# Patient Record
Sex: FEMALE | Race: WHITE | NOT HISPANIC OR LATINO | Employment: OTHER | URBAN - METROPOLITAN AREA
[De-identification: names, ages, dates, MRNs, and addresses within clinical notes are randomized per-mention and may not be internally consistent; named-entity substitution may affect disease eponyms.]

---

## 2018-09-24 ENCOUNTER — CONVERSION ENCOUNTER (OUTPATIENT)
Dept: OTHER | Facility: HOSPITAL | Age: 58
End: 2018-09-24

## 2018-09-24 ENCOUNTER — OFFICE VISIT CONVERTED (OUTPATIENT)
Dept: CARDIOLOGY | Facility: CLINIC | Age: 58
End: 2018-09-24
Attending: SPECIALIST

## 2018-09-25 ENCOUNTER — CONVERSION ENCOUNTER (OUTPATIENT)
Dept: CARDIOLOGY | Facility: CLINIC | Age: 58
End: 2018-09-25
Attending: SPECIALIST

## 2021-05-16 VITALS
WEIGHT: 122.12 LBS | BODY MASS INDEX: 19.17 KG/M2 | HEART RATE: 70 BPM | SYSTOLIC BLOOD PRESSURE: 122 MMHG | DIASTOLIC BLOOD PRESSURE: 87 MMHG | HEIGHT: 67 IN

## 2021-11-23 ENCOUNTER — OFFICE VISIT (OUTPATIENT)
Dept: PULMONOLOGY | Facility: CLINIC | Age: 61
End: 2021-11-23

## 2021-11-23 VITALS
RESPIRATION RATE: 14 BRPM | OXYGEN SATURATION: 99 % | WEIGHT: 118 LBS | TEMPERATURE: 99.3 F | SYSTOLIC BLOOD PRESSURE: 122 MMHG | BODY MASS INDEX: 18.52 KG/M2 | HEIGHT: 67 IN | DIASTOLIC BLOOD PRESSURE: 80 MMHG | HEART RATE: 80 BPM

## 2021-11-23 DIAGNOSIS — J44.9 CHRONIC OBSTRUCTIVE PULMONARY DISEASE, UNSPECIFIED COPD TYPE (HCC): Primary | ICD-10-CM

## 2021-11-23 DIAGNOSIS — IMO0001 LUNG NODULE < 6CM ON CT: ICD-10-CM

## 2021-11-23 DIAGNOSIS — F17.200 CURRENT SMOKER: ICD-10-CM

## 2021-11-23 DIAGNOSIS — T78.40XA ALLERGY, INITIAL ENCOUNTER: ICD-10-CM

## 2021-11-23 DIAGNOSIS — R05.9 COUGH: ICD-10-CM

## 2021-11-23 PROCEDURE — 99203 OFFICE O/P NEW LOW 30 MIN: CPT | Performed by: SPECIALIST

## 2021-11-23 RX ORDER — FLUCONAZOLE 200 MG/1
TABLET ORAL
COMMUNITY
Start: 2021-10-18 | End: 2021-11-23

## 2021-11-23 RX ORDER — BUDESONIDE AND FORMOTEROL FUMARATE DIHYDRATE 160; 4.5 UG/1; UG/1
2 AEROSOL RESPIRATORY (INHALATION) 2 TIMES DAILY PRN
COMMUNITY
Start: 2021-10-31

## 2021-11-23 RX ORDER — FLUCONAZOLE 150 MG/1
TABLET ORAL
COMMUNITY
Start: 2021-10-26 | End: 2021-11-23

## 2021-11-23 RX ORDER — SULFAMETHOXAZOLE AND TRIMETHOPRIM 800; 160 MG/1; MG/1
TABLET ORAL
COMMUNITY
Start: 2021-10-26 | End: 2021-11-23

## 2021-11-23 RX ORDER — IBANDRONATE SODIUM 150 MG/1
TABLET, FILM COATED ORAL
COMMUNITY
Start: 2021-08-24 | End: 2022-07-13

## 2021-11-23 RX ORDER — GLYCOPYRROLATE 1 MG/1
TABLET ORAL
COMMUNITY
End: 2022-05-11

## 2021-11-23 RX ORDER — ERGOCALCIFEROL 1.25 MG/1
50000 CAPSULE ORAL
COMMUNITY
Start: 2021-09-02

## 2021-11-23 RX ORDER — HYDROCODONE BITARTRATE AND ACETAMINOPHEN 5; 325 MG/1; MG/1
1 TABLET ORAL EVERY 6 HOURS PRN
COMMUNITY
Start: 2021-11-02 | End: 2022-12-05

## 2021-11-23 NOTE — PATIENT INSTRUCTIONS
Chronic Obstructive Pulmonary Disease  Chronic obstructive pulmonary disease (COPD) is a long-term (chronic) lung problem. When you have COPD, it is hard for air to get in and out of your lungs. Usually the condition gets worse over time, and your lungs will never return to normal. There are things you can do to keep yourself as healthy as possible.  · Your doctor may treat your condition with:  ? Medicines.  ? Oxygen.  ? Lung surgery.  · Your doctor may also recommend:  ? Rehabilitation. This includes steps to make your body work better. It may involve a team of specialists.  ? Quitting smoking, if you smoke.  ? Exercise and changes to your diet.  ? Comfort measures (palliative care).  Follow these instructions at home:  Medicines  · Take over-the-counter and prescription medicines only as told by your doctor.  · Talk to your doctor before taking any cough or allergy medicines. You may need to avoid medicines that cause your lungs to be dry.  Lifestyle  · If you smoke, stop. Smoking makes the problem worse. If you need help quitting, ask your doctor.  · Avoid being around things that make your breathing worse. This may include smoke, chemicals, and fumes.  · Stay active, but remember to rest as well.  · Learn and use tips on how to relax.  · Make sure you get enough sleep. Most adults need at least 7 hours of sleep every night.  · Eat healthy foods. Eat smaller meals more often. Rest before meals.  Controlled breathing  Learn and use tips on how to control your breathing as told by your doctor. Try:  · Breathing in (inhaling) through your nose for 1 second. Then, pucker your lips and breath out (exhale) through your lips for 2 seconds.  · Putting one hand on your belly (abdomen). Breathe in slowly through your nose for 1 second. Your hand on your belly should move out. Pucker your lips and breathe out slowly through your lips. Your hand on your belly should move in as you breathe out.    Controlled coughing  Learn  and use controlled coughing to clear mucus from your lungs. Follow these steps:  1. Lean your head a little forward.  2. Breathe in deeply.  3. Try to hold your breath for 3 seconds.  4. Keep your mouth slightly open while coughing 2 times.  5. Spit any mucus out into a tissue.  6. Rest and do the steps again 1 or 2 times as needed.  General instructions  · Make sure you get all the shots (vaccines) that your doctor recommends. Ask your doctor about a flu shot and a pneumonia shot.  · Use oxygen therapy and pulmonary rehabilitation if told by your doctor. If you need home oxygen therapy, ask your doctor if you should buy a tool to measure your oxygen level (oximeter).  · Make a COPD action plan with your doctor. This helps you to know what to do if you feel worse than usual.  · Manage any other conditions you have as told by your doctor.  · Avoid going outside when it is very hot, cold, or humid.  · Avoid people who have a sickness you can catch (contagious).  · Keep all follow-up visits as told by your doctor. This is important.  Contact a doctor if:  · You cough up more mucus than usual.  · There is a change in the color or thickness of the mucus.  · It is harder to breathe than usual.  · Your breathing is faster than usual.  · You have trouble sleeping.  · You need to use your medicines more often than usual.  · You have trouble doing your normal activities such as getting dressed or walking around the house.  Get help right away if:  · You have shortness of breath while resting.  · You have shortness of breath that stops you from:  ? Being able to talk.  ? Doing normal activities.  · Your chest hurts for longer than 5 minutes.  · Your skin color is more blue than usual.  · Your pulse oximeter shows that you have low oxygen for longer than 5 minutes.  · You have a fever.  · You feel too tired to breathe normally.  Summary  · Chronic obstructive pulmonary disease (COPD) is a long-term lung problem.  · The way your  lungs work will never return to normal. Usually the condition gets worse over time. There are things you can do to keep yourself as healthy as possible.  · Take over-the-counter and prescription medicines only as told by your doctor.  · If you smoke, stop. Smoking makes the problem worse.  This information is not intended to replace advice given to you by your health care provider. Make sure you discuss any questions you have with your health care provider.  Document Revised: 11/30/2018 Document Reviewed: 01/22/2018  [employer] Patient Education © 2021 [employer]    Smoking Tobacco Information, Adult  Smoking tobacco can be harmful to your health. Tobacco contains a poisonous (toxic), colorless chemical called nicotine. Nicotine is addictive. It changes the brain and can make it hard to stop smoking. Tobacco also has other toxic chemicals that can hurt your body and raise your risk of many cancers.  How can smoking tobacco affect me?  Smoking tobacco puts you at risk for:  · Cancer. Smoking is most commonly associated with lung cancer, but can also lead to cancer in other parts of the body.  · Chronic obstructive pulmonary disease (COPD). This is a long-term lung condition that makes it hard to breathe. It also gets worse over time.  · High blood pressure (hypertension), heart disease, stroke, or heart attack.  · Lung infections, such as pneumonia.  · Cataracts. This is when the lenses in the eyes become clouded.  · Digestive problems. This may include peptic ulcers, heartburn, and gastroesophageal reflux disease (GERD).  · Oral health problems, such as gum disease and tooth loss.  · Loss of taste and smell.  Smoking can affect your appearance by causing:  · Wrinkles.  · Yellow or stained teeth, fingers, and fingernails.  Smoking tobacco can also affect your social life, because:  · It may be challenging to find places to smoke when away from home. Many workplaces, restaurants, hotels, and public places are  tobacco-free.  · Smoking is expensive. This is due to the cost of tobacco and the long-term costs of treating health problems from smoking.  · Secondhand smoke may affect those around you. Secondhand smoke can cause lung cancer, breathing problems, and heart disease. Children of smokers have a higher risk for:  ? Sudden infant death syndrome (SIDS).  ? Ear infections.  ? Lung infections.  If you currently smoke tobacco, quitting now can help you:  · Lead a longer and healthier life.  · Look, smell, breathe, and feel better over time.  · Save money.  · Protect others from the harms of secondhand smoke.  What actions can I take to prevent health problems?  Quit smoking    · Do not start smoking. Quit if you already do.  · Make a plan to quit smoking and commit to it. Look for programs to help you and ask your health care provider for recommendations and ideas.  · Set a date and write down all the reasons you want to quit.  · Let your friends and family know you are quitting so they can help and support you. Consider finding friends who also want to quit. It can be easier to quit with someone else, so that you can support each other.  · Talk with your health care provider about using nicotine replacement medicines to help you quit, such as gum, lozenges, patches, sprays, or pills.  · Do not replace cigarette smoking with electronic cigarettes, which are commonly called e-cigarettes. The safety of e-cigarettes is not known, and some may contain harmful chemicals.  · If you try to quit but return to smoking, stay positive. It is common to slip up when you first quit, so take it one day at a time.  · Be prepared for cravings. When you feel the urge to smoke, chew gum or suck on hard candy.    Lifestyle  · Stay busy and take care of your body.  · Drink enough fluid to keep your urine pale yellow.  · Get plenty of exercise and eat a healthy diet. This can help prevent weight gain after quitting.  · Monitor your eating  habits. Quitting smoking can cause you to have a larger appetite than when you smoke.  · Find ways to relax. Go out with friends or family to a movie or a restaurant where people do not smoke.  · Ask your health care provider about having regular tests (screenings) to check for cancer. This may include blood tests, imaging tests, and other tests.  · Find ways to manage your stress, such as meditation, yoga, or exercise.  Where to find support  To get support to quit smoking, consider:  · Asking your health care provider for more information and resources.  · Taking classes to learn more about quitting smoking.  · Looking for local organizations that offer resources about quitting smoking.  · Joining a support group for people who want to quit smoking in your local community.  · Calling the [employer] counselor helpline: 0-486-Quit-Now (1-457.542.8788)  Where to find more information  You may find more information about quitting smoking from:  · HelpGuide.org: www.helpguide.org  · Smokefree.gov: smokefree.gov  · American Lung Association: www.lung.org  Contact a health care provider if you:  · Have problems breathing.  · Notice that your lips, nose, or fingers turn blue.  · Have chest pain.  · Are coughing up blood.  · Feel faint or you pass out.  · Have other health changes that cause you to worry.  Summary  · Smoking tobacco can negatively affect your health, the health of those around you, your finances, and your social life.  · Do not start smoking. Quit if you already do. If you need help quitting, ask your health care provider.  · Think about joining a support group for people who want to quit smoking in your local community. There are many effective programs that will help you to quit this behavior.  This information is not intended to replace advice given to you by your health care provider. Make sure you discuss any questions you have with your health care provider.  Document Revised: 09/11/2020 Document  Reviewed: 01/02/2018  [employer] Patient Education © 2021 [employer]    Steps to Quit Smoking  Smoking tobacco is the leading cause of preventable death. It can affect almost every organ in the body. Smoking puts you and people around you at risk for many serious, long-lasting (chronic) diseases. Quitting smoking can be hard, but it is one of the best things that you can do for your health. It is never too late to quit.  How do I get ready to quit?  When you decide to quit smoking, make a plan to help you succeed. Before you quit:  · Pick a date to quit. Set a date within the next 2 weeks to give you time to prepare.  · Write down the reasons why you are quitting. Keep this list in places where you will see it often.  · Tell your family, friends, and co-workers that you are quitting. Their support is important.  · Talk with your doctor about the choices that may help you quit.  · Find out if your health insurance will pay for these treatments.  · Know the people, places, things, and activities that make you want to smoke (triggers). Avoid them.  What first steps can I take to quit smoking?  · Throw away all cigarettes at home, at work, and in your car.  · Throw away the things that you use when you smoke, such as ashtrays and lighters.  · Clean your car. Make sure to empty the ashtray.  · Clean your home, including curtains and carpets.  What can I do to help me quit smoking?  Talk with your doctor about taking medicines and seeing a counselor at the same time. You are more likely to succeed when you do both.  · If you are pregnant or breastfeeding, talk with your doctor about counseling or other ways to quit smoking. Do not take medicine to help you quit smoking unless your doctor tells you to do so.  To quit smoking:  Quit right away  · Quit smoking totally, instead of slowly cutting back on how much you smoke over a period of time.  · Go to counseling. You are more likely to quit if you go to counseling sessions  regularly.  Take medicine  You may take medicines to help you quit. Some medicines need a prescription, and some you can buy over-the-counter. Some medicines may contain a drug called nicotine to replace the nicotine in cigarettes. Medicines may:  · Help you to stop having the desire to smoke (cravings).  · Help to stop the problems that come when you stop smoking (withdrawal symptoms).  Your doctor may ask you to use:  · Nicotine patches, gum, or lozenges.  · Nicotine inhalers or sprays.  · Non-nicotine medicine that is taken by mouth.  Find resources  Find resources and other ways to help you quit smoking and remain smoke-free after you quit. These resources are most helpful when you use them often. They include:  · Online chats with a counselor.  · Phone quitlines.  · Printed self-help materials.  · Support groups or group counseling.  · Text messaging programs.  · Mobile phone apps. Use apps on your mobile phone or tablet that can help you stick to your quit plan. There are many free apps for mobile phones and tablets as well as websites. Examples include Quit Guide from the CDC and smokefree.gov    What things can I do to make it easier to quit?    · Talk to your family and friends. Ask them to support and encourage you.  · Call a phone quitline (9-800-QUIT-NOW), reach out to support groups, or work with a counselor.  · Ask people who smoke to not smoke around you.  · Avoid places that make you want to smoke, such as:  ? Bars.  ? Parties.  ? Smoke-break areas at work.  · Spend time with people who do not smoke.  · Lower the stress in your life. Stress can make you want to smoke. Try these things to help your stress:  ? Getting regular exercise.  ? Doing deep-breathing exercises.  ? Doing yoga.  ? Meditating.  ? Doing a body scan. To do this, close your eyes, focus on one area of your body at a time from head to toe. Notice which parts of your body are tense. Try to relax the muscles in those areas.  How will I  feel when I quit smoking?  Day 1 to 3 weeks  Within the first 24 hours, you may start to have some problems that come from quitting tobacco. These problems are very bad 2-3 days after you quit, but they do not often last for more than 2-3 weeks. You may get these symptoms:  · Mood swings.  · Feeling restless, nervous, angry, or annoyed.  · Trouble concentrating.  · Dizziness.  · Strong desire for high-sugar foods and nicotine.  · Weight gain.  · Trouble pooping (constipation).  · Feeling like you may vomit (nausea).  · Coughing or a sore throat.  · Changes in how the medicines that you take for other issues work in your body.  · Depression.  · Trouble sleeping (insomnia).  Week 3 and afterward  After the first 2-3 weeks of quitting, you may start to notice more positive results, such as:  · Better sense of smell and taste.  · Less coughing and sore throat.  · Slower heart rate.  · Lower blood pressure.  · Clearer skin.  · Better breathing.  · Fewer sick days.  Quitting smoking can be hard. Do not give up if you fail the first time. Some people need to try a few times before they succeed. Do your best to stick to your quit plan, and talk with your doctor if you have any questions or concerns.  Summary  · Smoking tobacco is the leading cause of preventable death. Quitting smoking can be hard, but it is one of the best things that you can do for your health.  · When you decide to quit smoking, make a plan to help you succeed.  · Quit smoking right away, not slowly over a period of time.  · When you start quitting, seek help from your doctor, family, or friends.  This information is not intended to replace advice given to you by your health care provider. Make sure you discuss any questions you have with your health care provider.  Document Revised: 09/11/2020 Document Reviewed: 03/07/2020  [employer] Patient Education © 2021 [employer]    Pulmonary Nodule  A pulmonary nodule is tissue that has grown on your lung. A  nodule may be cancer, but most nodules are not cancer.  Follow these instructions at home:    · Take over-the-counter and prescription medicines only as told by your doctor.  · Do not use any products that have nicotine or tobacco, such as cigarettes and e-cigarettes. If you need help quitting, ask your doctor.  · Keep all follow-up visits as told by your doctor. This is important.  Contact a doctor if:  · You have trouble breathing when doing activities.  · You feel sick.  · You feel more tired than normal.  · You do not feel like eating.  · You lose weight without trying.  · You have chills.  · You have night sweats.  Get help right away if:  · You cannot catch your breath.  · You start making whistling sounds when breathing (wheezing).  · You cannot stop coughing.  · You cough up blood.  · You get dizzy.  · You feel like you are going to pass out (faint).  · You have sudden chest pain.  · You have a fever or symptoms for more than 2-3 days.  · You have a fever and your symptoms suddenly get worse.  Summary  · A pulmonary nodule is tissue that has grown on your lung.  · Most nodules are not cancer.  · Your doctor will do tests to know what kind of nodule you have, and whether you need treatment for it.  This information is not intended to replace advice given to you by your health care provider. Make sure you discuss any questions you have with your health care provider.  Document Revised: 01/11/2019 Document Reviewed: 01/16/2018  [employer] Patient Education © 2021 [employer]

## 2021-11-23 NOTE — PROGRESS NOTES
CONSULT NOTE     CHIEF COMPLAINT  Chief Complaint   Patient presents with   • Lung Nodule     6mm lung nodule New Patient   • COPD   • Emphysema        Primary Care Provider  Osei Staton MD     Referring Provider  Carrie Grace, *    Patient Complaint    ICD-10-CM ICD-9-CM   1. Chronic obstructive pulmonary disease, unspecified COPD type (HCC)  J44.9 496   2. Cough  R05.9 786.2   3. Allergy, initial encounter  T78.40XA 995.3   4. Lung nodule < 6cm on CT  R91.1 793.11   5. Current smoker  F17.200 305.1            Subjective          History of Presenting Illness  Theresa M Gabehart is a 60 y.o. female with a history of lung nodule and left significant history of the smoking patient had the CT scan of the chest done and it reported as emphysema and COPD with indeterminant right upper lobe nodule and also solitary mildly enlarged left hilar lymph node with other nonenlarged lymph nodes in the mediastinum are nonspecific.  Patient has mild hepatic steatosis.  Unfortunately I cannot see the films only I got the report.  Patient was then  at [facility].  At present patient is a current smoker smokes about a pack per day and used to smoke about 3 packets in the past.  Patient has cats in the house.  And he is using Symbicort which she was using as needed and at the instructions of her doctor the patient start using daily again.  Patient has a history of fracture of the back and multiple fractures of the pubic bones, osteoporosis, fibromyalgia, Van Willebrand disease.  Budd-Chiari syndrome etc.  Patient can walk only few peaks with a walker otherwise patient is on electric scooter.  Denies any shortness of breath as the patient does not walk much but admits for cough and also vague chest discomfort towards the left side.  Denied any diaphoretic symptoms.  Denied any fever, chills, sweating, hemoptysis or any recent travel history or any body who is sick around.  Denied any loss of taste or smell and other  related.    I have personally reviewed the review of systems, past family, social, medical and surgical histories; and agree with their findings.    HISTORY    Family History   Problem Relation Age of Onset   • Lung cancer Mother    • Diabetes Sister         Social History     Socioeconomic History   • Marital status: Single   Tobacco Use   • Smoking status: Current Every Day Smoker     Packs/day: 1.50     Years: 30.00     Pack years: 45.00     Types: Cigarettes   • Smokeless tobacco: Never Used   Vaping Use   • Vaping Use: Former   • Substances: Nicotine   • Devices: Refillable tank        History reviewed. No pertinent past medical history.       There is no immunization history on file for this patient.    Allergies   Allergen Reactions   • Clarithromycin Unknown - High Severity   • Meperidine Unknown - High Severity   • Metronidazole Unknown - High Severity   • Doxycycline Calcium Rash   • Gabapentin Rash          Current Outpatient Medications:   •  Calcium Carbonate-Vit D-Min (CALCIUM 1200 PO), Take 600 mg by mouth., Disp: , Rfl:   •  HYDROcodone-acetaminophen (NORCO) 5-325 MG per tablet, , Disp: , Rfl:   •  ibandronate (BONIVA) 150 MG tablet, , Disp: , Rfl:   •  Probiotic Product (PROBIOTIC BLEND PO), Take 1 tablet by mouth., Disp: , Rfl:   •  Symbicort 160-4.5 MCG/ACT inhaler, , Disp: , Rfl:   •  vitamin D (ERGOCALCIFEROL) 1.25 MG (24678 UT) capsule capsule, , Disp: , Rfl:   •  Zinc Sulfate (ZINC 15 PO), Take 1 each by mouth., Disp: , Rfl:   •  glycopyrrolate (Robinul) 1 MG tablet, , Disp: , Rfl:      Smoking status: Current    Objective     Vital Signs:   Vitals:    11/23/21 1507   BP: 122/80   Pulse: 80   Resp: 14   Temp: 99.3 °F (37.4 °C)   SpO2: 99%        Physical Exam:  Pleasant female and any electric scooter chair sitting.  Answering the questions appropriately and not in any apparent respiratory distress at rest.  HEENT: Atraumatic, anicteric.  Nose and throat showed no evidence of  congestion.  Neck: Supple, no JVD trachea central.  Chest and lungs: No obvious tenderness were noted.  Decreased breath sounds with a scattered rhonchi cleared with the cough.  Patient smells smoke.  Cardiovascular system: Regular rate and rhythm and no murmurs appreciated.  Abdomen: Soft and benign.  Extremities: No clubbing, no cyanosis.  Central nervous system patient is wheelchair-bound otherwise alert and oriented and able to move her limbs with no problem.     Result Review :   I have personally reviewed the CT scan of the chest report and as documented.  No films available to review.         Impression:  Patient with a history of heavy smoking  Multiple other medical problems as noted and being closely followed by primary team physician  Lung nodule and COPD as noted in the CT scan of the chest.    Plan:  Reviewed and discussed with the patient.  Discussed about importance of quitting smoking and its benefits and the consequences if she continues to smoke.  Alpha-1 antitrypsin with the phenotype.  Continue the present medical therapy.  Follow-up CT scan of the chest in couple of months with no contrast.  Pulmonary function testing with ABG.  Patient education.  Call us if needed in between.      Follow Up {Instructions Charge Capture  Follow-up Communications :23}  Return in about 2 months (around 1/23/2022).  Patient was given instructions and counseling regarding her condition or for health maintenance advice. Please see specific information pulled into the AVS if appropriate.     Chris Sánchez MD

## 2022-01-25 ENCOUNTER — TRANSCRIBE ORDERS (OUTPATIENT)
Dept: ADMINISTRATIVE | Facility: HOSPITAL | Age: 62
End: 2022-01-25

## 2022-01-25 DIAGNOSIS — R63.4 WEIGHT LOSS: Primary | ICD-10-CM

## 2022-01-25 DIAGNOSIS — K21.9 GASTROESOPHAGEAL REFLUX DISEASE, UNSPECIFIED WHETHER ESOPHAGITIS PRESENT: ICD-10-CM

## 2022-01-25 DIAGNOSIS — R13.10 DYSPHAGIA, UNSPECIFIED TYPE: ICD-10-CM

## 2022-02-23 ENCOUNTER — HOSPITAL ENCOUNTER (OUTPATIENT)
Dept: ULTRASOUND IMAGING | Facility: HOSPITAL | Age: 62
Discharge: HOME | End: 2022-02-23

## 2022-02-23 ENCOUNTER — HOSPITAL ENCOUNTER (OUTPATIENT)
Dept: GENERAL RADIOLOGY | Facility: HOSPITAL | Age: 62
Discharge: HOME | End: 2022-02-23

## 2022-02-23 ENCOUNTER — LAB (OUTPATIENT)
Dept: LAB | Facility: HOSPITAL | Age: 62
End: 2022-02-23

## 2022-02-23 ENCOUNTER — TRANSCRIBE ORDERS (OUTPATIENT)
Dept: LAB | Facility: HOSPITAL | Age: 62
End: 2022-02-23

## 2022-02-23 DIAGNOSIS — K21.9 GASTROESOPHAGEAL REFLUX DISEASE, UNSPECIFIED WHETHER ESOPHAGITIS PRESENT: ICD-10-CM

## 2022-02-23 DIAGNOSIS — R63.4 WEIGHT LOSS: ICD-10-CM

## 2022-02-23 DIAGNOSIS — R63.4 WEIGHT LOSS: Primary | ICD-10-CM

## 2022-02-23 DIAGNOSIS — R13.10 DYSPHAGIA, UNSPECIFIED TYPE: ICD-10-CM

## 2022-02-23 LAB
T4 FREE SERPL-MCNC: 1.54 NG/DL (ref 0.93–1.7)
TSH SERPL DL<=0.05 MIU/L-ACNC: 1.24 UIU/ML (ref 0.27–4.2)

## 2022-02-23 PROCEDURE — 84439 ASSAY OF FREE THYROXINE: CPT

## 2022-02-23 PROCEDURE — 63710000001 BARIUM SULFATE 40 % RECONSTITUTED SUSPENSION: Performed by: PHYSICIAN ASSISTANT

## 2022-02-23 PROCEDURE — 63710000001 SOD BICARB-CITRIC ACID-SIMETHICONE 2.21-1.53-0.04 G PACK: Performed by: PHYSICIAN ASSISTANT

## 2022-02-23 PROCEDURE — 63710000001 BARIUM SULFATE 60 % SUSPENSION: Performed by: PHYSICIAN ASSISTANT

## 2022-02-23 PROCEDURE — 86376 MICROSOMAL ANTIBODY EACH: CPT

## 2022-02-23 PROCEDURE — A9270 NON-COVERED ITEM OR SERVICE: HCPCS | Performed by: PHYSICIAN ASSISTANT

## 2022-02-23 PROCEDURE — 82104 ALPHA-1-ANTITRYPSIN PHENO: CPT | Performed by: SPECIALIST

## 2022-02-23 PROCEDURE — 36415 COLL VENOUS BLD VENIPUNCTURE: CPT

## 2022-02-23 PROCEDURE — 63710000001 BARIUM SULFATE 700 MG TABLET: Performed by: PHYSICIAN ASSISTANT

## 2022-02-23 PROCEDURE — 82103 ALPHA-1-ANTITRYPSIN TOTAL: CPT | Performed by: SPECIALIST

## 2022-02-23 PROCEDURE — 84443 ASSAY THYROID STIM HORMONE: CPT

## 2022-02-23 PROCEDURE — 74220 X-RAY XM ESOPHAGUS 1CNTRST: CPT

## 2022-02-23 PROCEDURE — 76536 US EXAM OF HEAD AND NECK: CPT

## 2022-02-23 RX ADMIN — BARIUM SULFATE 700 MG: 700 TABLET ORAL at 12:25

## 2022-02-23 RX ADMIN — BARIUM SULFATE 355 ML: 0.6 SUSPENSION ORAL at 12:25

## 2022-02-23 RX ADMIN — ANTACID/ANTIFLATULENT 1 PACKET: 380; 550; 10; 10 GRANULE, EFFERVESCENT ORAL at 12:25

## 2022-02-23 RX ADMIN — BARIUM SULFATE 55 ML: 0.81 POWDER, FOR SUSPENSION ORAL at 12:25

## 2022-02-24 LAB — THYROPEROXIDASE AB SERPL-ACNC: 10 IU/ML (ref 0–34)

## 2022-02-28 LAB
A1AT PHENOTYP SERPL IFE: NORMAL
A1AT SERPL-MCNC: 156 MG/DL (ref 101–187)

## 2022-03-11 ENCOUNTER — HOSPITAL ENCOUNTER (OUTPATIENT)
Dept: CT IMAGING | Facility: HOSPITAL | Age: 62
Discharge: HOME | End: 2022-03-11

## 2022-03-11 ENCOUNTER — HOSPITAL ENCOUNTER (OUTPATIENT)
Dept: RESPIRATORY THERAPY | Facility: HOSPITAL | Age: 62
Discharge: HOME | End: 2022-03-11

## 2022-03-11 DIAGNOSIS — IMO0001 LUNG NODULE < 6CM ON CT: ICD-10-CM

## 2022-03-11 DIAGNOSIS — J44.9 CHRONIC OBSTRUCTIVE PULMONARY DISEASE, UNSPECIFIED COPD TYPE: ICD-10-CM

## 2022-03-11 DIAGNOSIS — F17.200 CURRENT SMOKER: ICD-10-CM

## 2022-03-11 DIAGNOSIS — R05.9 COUGH: ICD-10-CM

## 2022-03-11 LAB
ARTERIAL PATENCY WRIST A: POSITIVE
BASE EXCESS BLDA CALC-SCNC: 0.6 MMOL/L (ref -2–2)
BDY SITE: ABNORMAL
COHGB MFR BLD: 6 % (ref 0–1.5)
FHHB: 2.7 % (ref 0–5)
HCO3 BLDA-SCNC: 24.1 MMOL/L (ref 22–26)
HGB BLDA-MCNC: 15.4 G/DL (ref 11.7–14.6)
INHALED O2 CONCENTRATION: 21 %
METHGB BLD QL: 0 % (ref 0–1.5)
MODALITY: ABNORMAL
OXYHGB MFR BLDV: 91.3 % (ref 94–99)
PCO2 BLDA: 35.5 MM HG (ref 35–45)
PH BLDA: 7.45 PH UNITS (ref 7.35–7.45)
PO2 BLD: 452 MM[HG] (ref 0–500)
PO2 BLDA: 95 MM HG (ref 80–100)
SAO2 % BLDCOA: 97.1 % (ref 95–99)

## 2022-03-11 PROCEDURE — 94729 DIFFUSING CAPACITY: CPT | Performed by: INTERNAL MEDICINE

## 2022-03-11 PROCEDURE — 94726 PLETHYSMOGRAPHY LUNG VOLUMES: CPT

## 2022-03-11 PROCEDURE — 94726 PLETHYSMOGRAPHY LUNG VOLUMES: CPT | Performed by: INTERNAL MEDICINE

## 2022-03-11 PROCEDURE — 83050 HGB METHEMOGLOBIN QUAN: CPT | Performed by: SPECIALIST

## 2022-03-11 PROCEDURE — 94729 DIFFUSING CAPACITY: CPT

## 2022-03-11 PROCEDURE — 94060 EVALUATION OF WHEEZING: CPT | Performed by: INTERNAL MEDICINE

## 2022-03-11 PROCEDURE — 82805 BLOOD GASES W/O2 SATURATION: CPT | Performed by: SPECIALIST

## 2022-03-11 PROCEDURE — 94060 EVALUATION OF WHEEZING: CPT

## 2022-03-11 PROCEDURE — 36600 WITHDRAWAL OF ARTERIAL BLOOD: CPT | Performed by: SPECIALIST

## 2022-03-11 PROCEDURE — 82375 ASSAY CARBOXYHB QUANT: CPT | Performed by: SPECIALIST

## 2022-03-11 RX ORDER — ALBUTEROL SULFATE 2.5 MG/3ML
2.5 SOLUTION RESPIRATORY (INHALATION) ONCE
Status: COMPLETED | OUTPATIENT
Start: 2022-03-11 | End: 2022-03-11

## 2022-03-11 RX ADMIN — ALBUTEROL SULFATE 2.5 MG: 2.5 SOLUTION RESPIRATORY (INHALATION) at 08:42

## 2022-03-18 ENCOUNTER — OFFICE VISIT (OUTPATIENT)
Dept: PULMONOLOGY | Facility: CLINIC | Age: 62
End: 2022-03-18

## 2022-03-18 VITALS
SYSTOLIC BLOOD PRESSURE: 115 MMHG | HEART RATE: 89 BPM | RESPIRATION RATE: 14 BRPM | HEIGHT: 67 IN | OXYGEN SATURATION: 100 % | TEMPERATURE: 98.9 F | WEIGHT: 110 LBS | DIASTOLIC BLOOD PRESSURE: 81 MMHG | BODY MASS INDEX: 17.27 KG/M2

## 2022-03-18 DIAGNOSIS — R05.9 COUGH: ICD-10-CM

## 2022-03-18 DIAGNOSIS — E44.1 MILD PROTEIN-CALORIE MALNUTRITION: ICD-10-CM

## 2022-03-18 DIAGNOSIS — J43.9 PULMONARY EMPHYSEMA, UNSPECIFIED EMPHYSEMA TYPE: Primary | ICD-10-CM

## 2022-03-18 DIAGNOSIS — R06.09 DYSPNEA ON EXERTION: ICD-10-CM

## 2022-03-18 DIAGNOSIS — R06.09 DOE (DYSPNEA ON EXERTION): ICD-10-CM

## 2022-03-18 DIAGNOSIS — Z71.6 ENCOUNTER FOR SMOKING CESSATION COUNSELING: ICD-10-CM

## 2022-03-18 DIAGNOSIS — F17.210 NICOTINE DEPENDENCE, CIGARETTES, UNCOMPLICATED: ICD-10-CM

## 2022-03-18 DIAGNOSIS — R09.89 PULMONARY AIR TRAPPING: ICD-10-CM

## 2022-03-18 DIAGNOSIS — R91.1 LUNG NODULE: ICD-10-CM

## 2022-03-18 PROCEDURE — 99215 OFFICE O/P EST HI 40 MIN: CPT | Performed by: NURSE PRACTITIONER

## 2022-03-18 PROCEDURE — 99406 BEHAV CHNG SMOKING 3-10 MIN: CPT | Performed by: NURSE PRACTITIONER

## 2022-03-18 RX ORDER — ALBUTEROL SULFATE 90 UG/1
2 AEROSOL, METERED RESPIRATORY (INHALATION) EVERY 4 HOURS PRN
Qty: 18 G | Refills: 11 | Status: SHIPPED | OUTPATIENT
Start: 2022-03-18

## 2022-03-18 RX ORDER — MULTIPLE VITAMINS W/ MINERALS TAB 9MG-400MCG
1 TAB ORAL DAILY
COMMUNITY
End: 2022-11-17

## 2022-03-18 RX ORDER — IPRATROPIUM BROMIDE AND ALBUTEROL SULFATE 2.5; .5 MG/3ML; MG/3ML
3 SOLUTION RESPIRATORY (INHALATION) 4 TIMES DAILY PRN
Qty: 360 ML | Refills: 3 | Status: SHIPPED | OUTPATIENT
Start: 2022-03-18 | End: 2023-03-02

## 2022-03-18 NOTE — PATIENT INSTRUCTIONS
COPD and Physical Activity  Chronic obstructive pulmonary disease (COPD) is a long-term (chronic) condition that affects the lungs. COPD is a general term that can be used to describe many different lung problems that cause lung swelling (inflammation) and limit airflow, including chronic bronchitis and emphysema.  The main symptom of COPD is shortness of breath, which makes it harder to do even simple tasks. This can also make it harder to exercise and be active. Talk with your health care provider about treatments to help you breathe better and actions you can take to prevent breathing problems during physical activity.  What are the benefits of exercising with COPD?  Exercising regularly is an important part of a healthy lifestyle. You can still exercise and do physical activities even though you have COPD. Exercise and physical activity improve your shortness of breath by increasing blood flow (circulation). This causes your heart to pump more oxygen through your body. Moderate exercise can improve your:  Oxygen use.  Energy level.  Shortness of breath.  Strength in your breathing muscles.  Heart health.  Sleep.  Self-esteem and feelings of self-worth.  Depression, stress, and anxiety levels.  Exercise can benefit everyone with COPD. The severity of your disease may affect how hard you can exercise, especially at first, but everyone can benefit. Talk with your health care provider about how much exercise is safe for you, and which activities and exercises are safe for you.  What actions can I take to prevent breathing problems during physical activity?  Sign up for a pulmonary rehabilitation program. This type of program may include:  Education about lung diseases.  Exercise classes that teach you how to exercise and be more active while improving your breathing. This usually involves:  Exercise using your lower extremities, such as a stationary bicycle.  About 30 minutes of exercise, 2 to 5 times per week, for  6 to 12 weeks  Strength training, such as push ups or leg lifts.  Nutrition education.  Group classes in which you can talk with others who also have COPD and learn ways to manage stress.  If you use an oxygen tank, you should use it while you exercise. Work with your health care provider to adjust your oxygen for your physical activity. Your resting flow rate is different from your flow rate during physical activity.  While you are exercising:  Take slow breaths.  Pace yourself and do not try to go too fast.  Purse your lips while breathing out. Pursing your lips is similar to a kissing or whistling position.  If doing exercise that uses a quick burst of effort, such as weight lifting:  Breathe in before starting the exercise.  Breathe out during the hardest part of the exercise (such as raising the weights).  Where to find support  You can find support for exercising with COPD from:  Your health care provider.  A pulmonary rehabilitation program.  Your local health department or community health programs.  Support groups, online or in-person. Your health care provider may be able to recommend support groups.  Where to find more information  You can find more information about exercising with COPD from:  American Lung Association: lung.org.  COPD Foundation: copdfoundation.org.  Contact a health care provider if:  Your symptoms get worse.  You have chest pain.  You have nausea.  You have a fever.  You have trouble talking or catching your breath.  You want to start a new exercise program or a new activity.  Summary  COPD is a general term that can be used to describe many different lung problems that cause lung swelling (inflammation) and limit airflow. This includes chronic bronchitis and emphysema.  Exercise and physical activity improve your shortness of breath by increasing blood flow (circulation). This causes your heart to provide more oxygen to your body.  Contact your health care provider before starting  any exercise program or new activity. Ask your health care provider what exercises and activities are safe for you.  This information is not intended to replace advice given to you by your health care provider. Make sure you discuss any questions you have with your health care provider.  Document Revised: 04/08/2020 Document Reviewed: 01/10/2019  Elsevier Patient Education © 2021 [employer]    Smoking Tobacco Information, Adult  Smoking tobacco can be harmful to your health. Tobacco contains a poisonous (toxic), colorless chemical called nicotine. Nicotine is addictive. It changes the brain and can make it hard to stop smoking. Tobacco also has other toxic chemicals that can hurt your body and raise your risk of many cancers.  How can smoking tobacco affect me?  Smoking tobacco puts you at risk for:  Cancer. Smoking is most commonly associated with lung cancer, but can also lead to cancer in other parts of the body.  Chronic obstructive pulmonary disease (COPD). This is a long-term lung condition that makes it hard to breathe. It also gets worse over time.  High blood pressure (hypertension), heart disease, stroke, or heart attack.  Lung infections, such as pneumonia.  Cataracts. This is when the lenses in the eyes become clouded.  Digestive problems. This may include peptic ulcers, heartburn, and gastroesophageal reflux disease (GERD).  Oral health problems, such as gum disease and tooth loss.  Loss of taste and smell.  Smoking can affect your appearance by causing:  Wrinkles.  Yellow or stained teeth, fingers, and fingernails.  Smoking tobacco can also affect your social life, because:  It may be challenging to find places to smoke when away from home. Many workplaces, restaurants, hotels, and public places are tobacco-free.  Smoking is expensive. This is due to the cost of tobacco and the long-term costs of treating health problems from smoking.  Secondhand smoke may affect those around you. Secondhand smoke  can cause lung cancer, breathing problems, and heart disease. Children of smokers have a higher risk for:  Sudden infant death syndrome (SIDS).  Ear infections.  Lung infections.  If you currently smoke tobacco, quitting now can help you:  Lead a longer and healthier life.  Look, smell, breathe, and feel better over time.  Save money.  Protect others from the harms of secondhand smoke.  What actions can I take to prevent health problems?  Quit smoking    Do not start smoking. Quit if you already do.  Make a plan to quit smoking and commit to it. Look for programs to help you and ask your health care provider for recommendations and ideas.  Set a date and write down all the reasons you want to quit.  Let your friends and family know you are quitting so they can help and support you. Consider finding friends who also want to quit. It can be easier to quit with someone else, so that you can support each other.  Talk with your health care provider about using nicotine replacement medicines to help you quit, such as gum, lozenges, patches, sprays, or pills.  Do not replace cigarette smoking with electronic cigarettes, which are commonly called e-cigarettes. The safety of e-cigarettes is not known, and some may contain harmful chemicals.  If you try to quit but return to smoking, stay positive. It is common to slip up when you first quit, so take it one day at a time.  Be prepared for cravings. When you feel the urge to smoke, chew gum or suck on hard candy.    Lifestyle  Stay busy and take care of your body.  Drink enough fluid to keep your urine pale yellow.  Get plenty of exercise and eat a healthy diet. This can help prevent weight gain after quitting.  Monitor your eating habits. Quitting smoking can cause you to have a larger appetite than when you smoke.  Find ways to relax. Go out with friends or family to a movie or a restaurant where people do not smoke.  Ask your health care provider about having regular tests  (screenings) to check for cancer. This may include blood tests, imaging tests, and other tests.  Find ways to manage your stress, such as meditation, yoga, or exercise.  Where to find support  To get support to quit smoking, consider:  Asking your health care provider for more information and resources.  Taking classes to learn more about quitting smoking.  Looking for local organizations that offer resources about quitting smoking.  Joining a support group for people who want to quit smoking in your local community.  Calling the smokefree.gov counselor helpline: 6-800-Quit-Now (1-206.349.9400)  Where to find more information  You may find more information about quitting smoking from:  HelpGuide.org: www.helpguide.org  Smokefree.gov: smokefree.gov  American Lung Association: www.lung.org  Contact a health care provider if you:  Have problems breathing.  Notice that your lips, nose, or fingers turn blue.  Have chest pain.  Are coughing up blood.  Feel faint or you pass out.  Have other health changes that cause you to worry.  Summary  Smoking tobacco can negatively affect your health, the health of those around you, your finances, and your social life.  Do not start smoking. Quit if you already do. If you need help quitting, ask your health care provider.  Think about joining a support group for people who want to quit smoking in your local community. There are many effective programs that will help you to quit this behavior.  This information is not intended to replace advice given to you by your health care provider. Make sure you discuss any questions you have with your health care provider.  Document Revised: 09/11/2020 Document Reviewed: 01/02/2018  Elsevier Patient Education © 2021 Elsevier Inc.

## 2022-03-18 NOTE — PROGRESS NOTES
Primary Care Provider  Carrie Grace APRN     Referring Provider  No ref. provider found     Chief Complaint  Emphysema (2mo f/u- PFT results) and Shortness of Breath (More than usual)    Subjective          Theresa M Gabehart presents to [facility]  History of Present Illness  Theresa M Gabehart is a 61 y.o. female patient previously seen by Dr. Sánchez as a new patient for a lung nodule, tobacco abuse of cigarettes ongoing, and emphysema.    Patient recently had a pulmonary function test on 3/11/2022.  The report has not been dictated, however the lung function shows an FVC of 115%, FEV1 109%, FEV1/FVC 73, , , and a diffusion capacity of 64%.  Patient has not had her high-resolution scan.  Patient's alpha-1 phenotype is MM/M with a level of 156.  Patient states she is doing okay since her last visit.  She continues to complain of shortness of breath with exertion.  She describes as mild to moderate in severity, worse exertion and improved with rest.  Patient states that she was also scheduled to have a CT scan on the same day as her pulmonary function test, of her she did recently have a CT scan and did not want to be subjected that much radiation in a short period of time.  Patient will reschedule CT scan to be performed before July 2022.  She does have a 6 mm nodule that was noted on a CT scan from July 2021, and is recommended to have a repeat scan in 6 to 12 months.  She states that she continues to smoke approximately 1.5 packs of cigarettes a day and she is working on quitting.  She continues to use her Symbicort as prescribed.  She does not have a rescue inhaler to use as needed.  She would like to have a risk inhaler and feels that a nebulizer machine would be helpful to use as needed as well.  Otherwise, she has no additional concerns at this time.  She denies any fevers or chills.  She denies using any antibiotics or steroids  for her lungs.  She denies a productive cough.  She is able to perform her ADLs as long as she takes frequent breaks.  She is in a motorized wheelchair today in office.  She does not receive vaccines.     Her history of smoking is   Tobacco Use: High Risk   • Smoking Tobacco Use: Current Every Day Smoker   • Smokeless Tobacco Use: Never Used     Sabinasa M Gabehart  reports that she has been smoking cigarettes. She has a 45.00 pack-year smoking history. She has never used smokeless tobacco.. I have educated her on the risk of diseases from using tobacco products such as cancer, COPD and heart disease.     I advised her to quit and she is willing to quit. We have discussed the following method/s for tobacco cessation:  Education Material Counseling [place].  Together we have set a quit date for 3 months.  She will follow up with me in 3 months or sooner to check on her progress.    I spent 5 minutes counseling the patient.           Review of Systems   Constitutional: Negative for chills, fatigue, fever, unexpected weight gain and unexpected weight loss.   HENT: Negative for congestion (Nasal), hearing loss, mouth sores, nosebleeds, postnasal drip, sore throat and trouble swallowing.    Eyes: Negative for blurred vision and visual disturbance.   Respiratory: Positive for cough and shortness of breath. Negative for apnea and wheezing.         Negative for Hemoptysis     Cardiovascular: Negative for chest pain, palpitations and leg swelling.   Gastrointestinal: Negative for abdominal pain, constipation, diarrhea, nausea, vomiting and GERD.        Negative for Jaundice  Negative for Bloating  Negative for Melena   Musculoskeletal: Negative for joint swelling and myalgias.        Negative for Joint pain  Negative for Joint stiffness   Skin: Negative for color change.        Negative for cyanosis   Neurological: Negative for syncope, weakness, numbness and headache.      Sleep: Negative for Excessive daytime  sleepiness  Negative for morning headaches  Negative for Snoring    Family History   Problem Relation Age of Onset   • Lung cancer Mother    • Diabetes Sister         Social History     Socioeconomic History   • Marital status:    Tobacco Use   • Smoking status: Current Every Day Smoker     Packs/day: 1.50     Years: 30.00     Pack years: 45.00     Types: Cigarettes   • Smokeless tobacco: Never Used   Vaping Use   • Vaping Use: Former   • Substances: Nicotine   • Devices: RefCardiolable tank   Substance and Sexual Activity   • Alcohol use: Defer   • Drug use: Defer   • Sexual activity: Defer        History reviewed. No pertinent past medical history.       There is no immunization history on file for this patient.      Allergies   Allergen Reactions   • Clarithromycin Unknown - High Severity   • Meperidine Unknown - High Severity   • Metronidazole Unknown - High Severity   • Doxycycline Calcium Rash   • Gabapentin Rash          Current Outpatient Medications:   •  Calcium Carbonate-Vit D-Min (CALCIUM 1200 PO), Take 600 mg by mouth., Disp: , Rfl:   •  HYDROcodone-acetaminophen (NORCO) 5-325 MG per tablet, , Disp: , Rfl:   •  ibandronate (BONIVA) 150 MG tablet, , Disp: , Rfl:   •  multivitamin with minerals tablet tablet, Take 1 tablet by mouth Daily., Disp: , Rfl:   •  Olopatadine HCl (PATADAY OP), Apply  to eye(s) as directed by provider., Disp: , Rfl:   •  Probiotic Product (PROBIOTIC BLEND PO), Take 1 tablet by mouth., Disp: , Rfl:   •  Symbicort 160-4.5 MCG/ACT inhaler, , Disp: , Rfl:   •  vitamin D (ERGOCALCIFEROL) 1.25 MG (87622 UT) capsule capsule, , Disp: , Rfl:   •  Zinc Sulfate (ZINC 15 PO), Take 1 each by mouth., Disp: , Rfl:   •  albuterol sulfate  (90 Base) MCG/ACT inhaler, Inhale 2 puffs Every 4 (Four) Hours As Needed for Wheezing., Disp: 18 g, Rfl: 11  •  glycopyrrolate (ROBINUL) 1 MG tablet, , Disp: , Rfl:   •  ipratropium-albuterol (DUO-NEB) 0.5-2.5 mg/3 ml nebulizer, Take 3 mL by  "nebulization 4 (Four) Times a Day As Needed for Wheezing., Disp: 360 mL, Rfl: 3     Objective   Physical Exam  Constitutional:       General: She is not in acute distress.     Appearance: Normal appearance. She is underweight.      Comments: Patient is in a motorized wheelchair   HENT:      Right Ear: Hearing normal.      Left Ear: Hearing normal.      Nose: No nasal tenderness or congestion.      Mouth/Throat:      Mouth: Mucous membranes are moist. No oral lesions.   Eyes:      Extraocular Movements: Extraocular movements intact.      Pupils: Pupils are equal, round, and reactive to light.   Neck:      Thyroid: No thyroid mass or thyromegaly.   Cardiovascular:      Rate and Rhythm: Normal rate and regular rhythm.      Pulses: Normal pulses.      Heart sounds: Normal heart sounds. No murmur heard.  Pulmonary:      Effort: Pulmonary effort is normal.      Breath sounds: Decreased breath sounds present. No wheezing, rhonchi or rales.   Chest:   Breasts:      Right: No axillary adenopathy.       Abdominal:      General: Bowel sounds are normal. There is no distension.      Palpations: Abdomen is soft.      Tenderness: There is no abdominal tenderness.   Musculoskeletal:      Cervical back: Neck supple.      Right lower leg: No edema.      Left lower leg: No edema.   Lymphadenopathy:      Cervical: No cervical adenopathy.      Upper Body:      Right upper body: No axillary adenopathy.   Skin:     General: Skin is warm and dry.      Findings: No lesion or rash.   Neurological:      General: No focal deficit present.      Mental Status: She is alert and oriented to person, place, and time.      Cranial Nerves: Cranial nerves are intact.   Psychiatric:         Mood and Affect: Affect normal. Mood is not anxious or depressed.         Vital Signs:   /81 (BP Location: Left arm, Patient Position: Sitting, Cuff Size: Adult)   Pulse 89   Temp 98.9 °F (37.2 °C) (Infrared)   Resp 14   Ht 170.2 cm (67\")   Wt 49.9 kg " (110 lb)   SpO2 100% Comment: room air  BMI 17.23 kg/m²        Result Review :       Personally reviewed alpha-1 level from 2/23/2022    Data reviewed: Radiologic studies Scanned chest CT 7/21/2021 and Pulmonary function test 3/11/2022 and Dr. Sánchez's last office note   Procedures        Assessment and Plan    Diagnoses and all orders for this visit:    1. Pulmonary emphysema, unspecified emphysema type (HCC) (Primary)  -     albuterol sulfate  (90 Base) MCG/ACT inhaler; Inhale 2 puffs Every 4 (Four) Hours As Needed for Wheezing.  Dispense: 18 g; Refill: 11  -     Home Nebulizer  -     ipratropium-albuterol (DUO-NEB) 0.5-2.5 mg/3 ml nebulizer; Take 3 mL by nebulization 4 (Four) Times a Day As Needed for Wheezing.  Dispense: 360 mL; Refill: 3    2. Encounter for smoking cessation counseling    3. Nicotine dependence, cigarettes, uncomplicated    4. Lung nodule    5. HOLCOMB (dyspnea on exertion)  -     albuterol sulfate  (90 Base) MCG/ACT inhaler; Inhale 2 puffs Every 4 (Four) Hours As Needed for Wheezing.  Dispense: 18 g; Refill: 11    6. Mild protein-calorie malnutrition (HCC)    7. Dyspnea on exertion    8. Pulmonary air trapping    9. Cough    10.  Continue Symbicort as prescribed.  Rinse mouth after each use.  11.  Start albuterol and DuoNeb's as needed.  Instructed patient that if she needs to use her albuterol inhaler more than twice a week, we may need to step up her maintenance therapy.  Patient verbalized understanding.  12.  Encourage patient to reschedule her CT scan of the chest to be performed before July of this year.  Patient verbalized understanding.  13.  Home nebulizer machine given in office today and instructions on how to use.  14.  Follow-up with primary care as scheduled.  15.  Follow-up with Robi 3 months, sooner if needed      I spent 50 minutes caring for Sabina on this date of service. This time includes time spent by me in the following activities:preparing  for the visit, reviewing tests, obtaining and/or reviewing a separately obtained history, performing a medically appropriate examination and/or evaluation , counseling and educating the patient/family/caregiver, ordering medications, tests, or procedures, documenting information in the medical record and independently interpreting results and communicating that information with the patient/family/caregiver    Follow Up   Return in about 3 months (around 6/18/2022) for Recheck with Robi.  Patient was given instructions and counseling regarding her condition or for health maintenance advice. Please see specific information pulled into the AVS if appropriate.

## 2022-03-23 ENCOUNTER — TELEPHONE (OUTPATIENT)
Dept: PULMONOLOGY | Facility: CLINIC | Age: 62
End: 2022-03-23

## 2022-03-23 DIAGNOSIS — R91.1 LUNG NODULE: Primary | ICD-10-CM

## 2022-03-23 NOTE — TELEPHONE ENCOUNTER
Pt called in and CT was ordered and PT didn't think she needed this but now would like to get a new order to get this done.  Please call pt at [phone]

## 2022-04-13 ENCOUNTER — HOSPITAL ENCOUNTER (OUTPATIENT)
Dept: CT IMAGING | Facility: HOSPITAL | Age: 62
Discharge: HOME | End: 2022-04-13
Admitting: NURSE PRACTITIONER

## 2022-04-13 ENCOUNTER — TELEPHONE (OUTPATIENT)
Dept: PULMONOLOGY | Facility: CLINIC | Age: 62
End: 2022-04-13

## 2022-04-13 DIAGNOSIS — R91.1 LUNG NODULE: ICD-10-CM

## 2022-04-13 DIAGNOSIS — R91.1 LUNG NODULE: Primary | ICD-10-CM

## 2022-04-13 PROCEDURE — 71250 CT THORAX DX C-: CPT

## 2022-04-13 NOTE — TELEPHONE ENCOUNTER
Spoke with radiologist, Dr. Kumar, patient has an enlarging right middle lobe nodule that is now 1.5 cm.  Patient is needing a PET scan due to the increase in size.  In July 2021, her nodule was 6 mm.  Her nodule has over doubled in size since then.  We will need a PET scan to evaluate further due to high risk of cancer.  Patient will also need a follow-up with me after her PET scan.

## 2022-04-13 NOTE — TELEPHONE ENCOUNTER
Pet scan is scheduled for Monday 04/18/2022 with an arrival time of 11:00 am to [address] [place] [address] (cancer [facility]). If she has questions or needs to change the appointment the number is [phone].    Scheduled pt a follow up appointment for 04/20/2022 at 1:15pm with Janett RIOS.    Please call patient with results and appointments, thank you!

## 2022-04-13 NOTE — TELEPHONE ENCOUNTER
Called and informed patient of results as well as upcoming appointment times for her PET scan and follow up appointment at our office. She verbalized her understanding in the times and dates. I told her to give us a phone call if anything changes and we will help her.

## 2022-04-18 ENCOUNTER — APPOINTMENT (OUTPATIENT)
Dept: PET IMAGING | Facility: HOSPITAL | Age: 62
End: 2022-04-18

## 2022-04-20 ENCOUNTER — APPOINTMENT (OUTPATIENT)
Dept: PET IMAGING | Facility: HOSPITAL | Age: 62
End: 2022-04-20

## 2022-04-20 ENCOUNTER — HOSPITAL ENCOUNTER (OUTPATIENT)
Dept: PET IMAGING | Facility: HOSPITAL | Age: 62
Discharge: HOME | End: 2022-04-20

## 2022-04-20 DIAGNOSIS — R91.1 LUNG NODULE: ICD-10-CM

## 2022-04-20 PROCEDURE — A9552 F18 FDG: HCPCS | Performed by: NURSE PRACTITIONER

## 2022-04-20 PROCEDURE — 78815 PET IMAGE W/CT SKULL-THIGH: CPT

## 2022-04-20 PROCEDURE — 0 FLUDEOXYGLUCOSE F18 SOLUTION: Performed by: NURSE PRACTITIONER

## 2022-04-20 RX ADMIN — FLUDEOXYGLUCOSE F18 1 DOSE: 300 INJECTION INTRAVENOUS at 14:32

## 2022-04-27 ENCOUNTER — TRANSCRIBE ORDERS (OUTPATIENT)
Dept: ADMINISTRATIVE | Facility: HOSPITAL | Age: 62
End: 2022-04-27

## 2022-04-27 ENCOUNTER — OFFICE VISIT (OUTPATIENT)
Dept: PULMONOLOGY | Facility: CLINIC | Age: 62
End: 2022-04-27

## 2022-04-27 VITALS
BODY MASS INDEX: 17.27 KG/M2 | OXYGEN SATURATION: 100 % | HEIGHT: 67 IN | RESPIRATION RATE: 14 BRPM | HEART RATE: 72 BPM | TEMPERATURE: 98.2 F | DIASTOLIC BLOOD PRESSURE: 84 MMHG | SYSTOLIC BLOOD PRESSURE: 119 MMHG | WEIGHT: 110 LBS

## 2022-04-27 DIAGNOSIS — F17.210 NICOTINE DEPENDENCE, CIGARETTES, UNCOMPLICATED: ICD-10-CM

## 2022-04-27 DIAGNOSIS — Z01.818 OTHER SPECIFIED PRE-OPERATIVE EXAMINATION: Primary | ICD-10-CM

## 2022-04-27 DIAGNOSIS — E44.1 MILD PROTEIN-CALORIE MALNUTRITION: ICD-10-CM

## 2022-04-27 DIAGNOSIS — R06.09 DYSPNEA ON EXERTION: ICD-10-CM

## 2022-04-27 DIAGNOSIS — D68.00 VON WILLEBRAND DISEASE: ICD-10-CM

## 2022-04-27 DIAGNOSIS — J43.9 PULMONARY EMPHYSEMA, UNSPECIFIED EMPHYSEMA TYPE: Primary | ICD-10-CM

## 2022-04-27 DIAGNOSIS — R91.1 LUNG NODULE: ICD-10-CM

## 2022-04-27 DIAGNOSIS — Z01.812 PRE-PROCEDURE LAB EXAM: ICD-10-CM

## 2022-04-27 DIAGNOSIS — Z71.6 ENCOUNTER FOR SMOKING CESSATION COUNSELING: ICD-10-CM

## 2022-04-27 DIAGNOSIS — R94.2 ABNORMAL PET SCAN OF LUNG: ICD-10-CM

## 2022-04-27 DIAGNOSIS — R06.2 WHEEZING: ICD-10-CM

## 2022-04-27 PROCEDURE — 99215 OFFICE O/P EST HI 40 MIN: CPT | Performed by: NURSE PRACTITIONER

## 2022-04-27 PROCEDURE — 99406 BEHAV CHNG SMOKING 3-10 MIN: CPT | Performed by: NURSE PRACTITIONER

## 2022-04-27 RX ORDER — CALCIUM CARBONATE 600 MG
1 TABLET ORAL 2 TIMES DAILY WITH MEALS
COMMUNITY
Start: 2022-03-07

## 2022-04-27 NOTE — PROGRESS NOTES
Primary Care Provider  Carrie Grace APRN     Referring Provider  No ref. provider found     Chief Complaint  Lung Nodule (F/u after PET scan ) and Wheezing (Sometimes in the morning )    Subjective          Theresa M Gabehart presents to [facility] PULMONARY & CR[facility]ICAL CARE MEDICINE  History of Present Illness  Theresa M Gabehart is a 61 y.o. female patient seen by Dr. Sánchez and myself.  She is here for management of a lung nodule, emphysema, dyspnea on exertion, Von Willebrand disease, and tobacco abuse of cigarettes ongoing.       Patient recently had a PET scan on 4/20/2022.  PET scan showed a hypermetabolic 1.5 cm nodule in the anterior and lateral right middle lobe.  This is concerning for a bronchogenic malignancy or lung cancer.  Patient will need a CT-guided biopsy for further evaluation.  These findings were discussed with patient today.  She is agreeable to proceeding with her CT-guided biopsy.  Patient states that her shortness of breath is mild in severity, worse with exertion and improved with rest.  She continues to use Symbicort as prescribed.  She states that she uses her albuterol twice a week.  Patient states that she did not receive her duo nebs through her pharmacy due to an insurance issue.  We will try to send these through [facility].  Patient continues to smoke 1.5 packs of cigarettes a day and is working on quitting.  She denies any fevers or chills.  She denies any weight loss or hemoptysis.  Patient denies using any antibiotics or steroids for her lungs.  Overall, patient states that she is doing well.  She refuses all vaccines.  She is still able to perform her ADLs with minimal assistance due to mobility issues.     Her history of smoking is   Tobacco Use: High Risk   • Smoking Tobacco Use: Current Every Day Smoker   • Smokeless Tobacco Use: Never Used     Theresa M Gabehart  reports that she has been smoking cigarettes. She has a 45.00 pack-year smoking  history. She has never used smokeless tobacco.. I have educated her on the risk of diseases from using tobacco products such as cancer, COPD and heart disease.     I advised her to quit and she is willing to quit. We have discussed the following method/s for tobacco cessation:  Education Material Counseling [place].  Together we have set a quit date for 3 months.  She will follow up with me in 2 weeks or sooner to check on her progress.    I spent 5 minutes counseling the patient.        Review of Systems   Constitutional: Negative for chills, fatigue, fever, unexpected weight gain and unexpected weight loss.   HENT: Negative for congestion (Nasal), hearing loss, mouth sores, nosebleeds, postnasal drip, sore throat and trouble swallowing.    Eyes: Negative for blurred vision and visual disturbance.   Respiratory: Positive for cough, shortness of breath and wheezing. Negative for apnea.         Negative for Hemoptysis     Cardiovascular: Negative for chest pain, palpitations and leg swelling.   Gastrointestinal: Negative for abdominal pain, constipation, diarrhea, nausea, vomiting and GERD.        Negative for Jaundice  Negative for Bloating  Negative for Melena   Musculoskeletal: Negative for joint swelling and myalgias.        Negative for Joint pain  Negative for Joint stiffness   Skin: Negative for color change.        Negative for cyanosis   Neurological: Negative for syncope, weakness, numbness and headache.      Sleep: Negative for Excessive daytime sleepiness  Negative for morning headaches  Negative for Snoring    Family History   Problem Relation Age of Onset   • Lung cancer Mother    • Diabetes Sister         Social History     Socioeconomic History   • Marital status:    Tobacco Use   • Smoking status: Current Every Day Smoker     Packs/day: 1.50     Years: 30.00     Pack years: 45.00     Types: Cigarettes   • Smokeless tobacco: Never Used   Vaping Use   • Vaping Use: Former   • Substances:  Nicotine   • Devices: Refillable tank   Substance and Sexual Activity   • Alcohol use: Defer   • Drug use: Defer   • Sexual activity: Defer        History reviewed. No pertinent past medical history.       There is no immunization history on file for this patient.      Allergies   Allergen Reactions   • Clarithromycin Unknown - High Severity   • Meperidine Unknown - High Severity   • Metronidazole Unknown - High Severity   • Doxycycline Calcium Rash   • Gabapentin Rash          Current Outpatient Medications:   •  albuterol sulfate  (90 Base) MCG/ACT inhaler, Inhale 2 puffs Every 4 (Four) Hours As Needed for Wheezing., Disp: 18 g, Rfl: 11  •  Calcium Carbonate-Vit D-Min (CALCIUM 1200 PO), Take 600 mg by mouth., Disp: , Rfl:   •  [employer] Calcium 600 MG tablet, Take 1 tablet by mouth 2 (Two) Times a Day With Meals., Disp: , Rfl:   •  glycopyrrolate (ROBINUL) 1 MG tablet, , Disp: , Rfl:   •  HYDROcodone-acetaminophen (NORCO) 5-325 MG per tablet, , Disp: , Rfl:   •  ibandronate (BONIVA) 150 MG tablet, , Disp: , Rfl:   •  ipratropium-albuterol (DUO-NEB) 0.5-2.5 mg/3 ml nebulizer, Take 3 mL by nebulization 4 (Four) Times a Day As Needed for Wheezing., Disp: 360 mL, Rfl: 3  •  multivitamin with minerals tablet tablet, Take 1 tablet by mouth Daily., Disp: , Rfl:   •  Olopatadine HCl (PATADAY OP), Apply  to eye(s) as directed by provider., Disp: , Rfl:   •  Probiotic Product (PROBIOTIC BLEND PO), Take 1 tablet by mouth., Disp: , Rfl:   •  Symbicort 160-4.5 MCG/ACT inhaler, , Disp: , Rfl:   •  vitamin D (ERGOCALCIFEROL) 1.25 MG (82087 UT) capsule capsule, , Disp: , Rfl:   •  Zinc Sulfate (ZINC 15 PO), Take 1 each by mouth., Disp: , Rfl:      Objective   Physical Exam  Constitutional:       General: She is not in acute distress.     Appearance: Normal appearance. She is underweight.   HENT:      Right Ear: Hearing normal.      Left Ear: Hearing normal.      Nose: No nasal tenderness or congestion.      Mouth/Throat:       "Mouth: Mucous membranes are moist. No oral lesions.   Eyes:      Extraocular Movements: Extraocular movements intact.      Pupils: Pupils are equal, round, and reactive to light.   Neck:      Thyroid: No thyroid mass or thyromegaly.   Cardiovascular:      Rate and Rhythm: Normal rate and regular rhythm.      Pulses: Normal pulses.      Heart sounds: Normal heart sounds. No murmur heard.  Pulmonary:      Effort: Pulmonary effort is normal.      Breath sounds: Decreased breath sounds present. No wheezing, rhonchi or rales.   Chest:   Breasts:      Right: No axillary adenopathy.       Abdominal:      General: Bowel sounds are normal. There is no distension.      Palpations: Abdomen is soft.      Tenderness: There is no abdominal tenderness.   Musculoskeletal:      Cervical back: Neck supple.      Right lower leg: No edema.      Left lower leg: No edema.   Lymphadenopathy:      Cervical: No cervical adenopathy.      Upper Body:      Right upper body: No axillary adenopathy.   Skin:     General: Skin is warm and dry.      Findings: No lesion or rash.   Neurological:      General: No focal deficit present.      Mental Status: She is alert and oriented to person, place, and time.      Cranial Nerves: Cranial nerves are intact.   Psychiatric:         Mood and Affect: Affect normal. Mood is not anxious or depressed.         Vital Signs:   /84 (BP Location: Left arm, Patient Position: Sitting, Cuff Size: Adult)   Pulse 72   Temp 98.2 °F (36.8 °C) (Infrared)   Resp 14   Ht 170.2 cm (67\")   Wt 49.9 kg (110 lb)   SpO2 100% Comment: room air  BMI 17.23 kg/m²        Result Review :   The following data was reviewed by: BLANCA Velazquez on 04/27/2022:      Personally reviewed alpha-1 level 156 with a phenotype MM from 2/23/2022    Data reviewed: Radiologic studies Scanned chest CT 7/21/2021, chest CT 4/13/2022, PET scan 4/20/2022 and My last office note   Procedures        Assessment and Plan    Diagnoses and all " orders for this visit:    1. Pulmonary emphysema, unspecified emphysema type (HCC) (Primary)    2. Lung nodule  -     CT Needle Biopsy Lung Right; Future    3. Dyspnea on exertion    4. Encounter for smoking cessation counseling    5. Nicotine dependence, cigarettes, uncomplicated    6. Abnormal PET scan of lung  -     CT Needle Biopsy Lung Right; Future  -     CBC & Differential; Future  -     aPTT; Future  -     Protime-INR Coumadin NO  (Warfarin) Therapy; Future    7. Mild protein-calorie malnutrition (HCC)    8. Pre-procedure lab exam  -     CBC & Differential; Future  -     aPTT; Future  -     Protime-INR Coumadin NO  (Warfarin) Therapy; Future  -     COVID PRE-OP / PRE-PROCEDURE SCREENING ORDER (NO ISOLATION) - Swab, Nasopharynx; Future    9. Von Willebrand disease (HCC)  -     CBC & Differential; Future  -     aPTT; Future  -     Protime-INR Coumadin NO  (Warfarin) Therapy; Future    10. Wheezing    11.  Continue Symbicort as prescribed.  Rinse mouth out after each use.  12.  Continue albuterol.  13.  We will send patient's DuoNeb's to [facility] [place].  14.  I have obtained consent for CT-guided biopsy.  Risks and benefits of biopsy discussed with patient today.  Risks include pneumothorax, hemothorax, bleeding, hypoxia, and death.  The patient recognizes these findings, acknowledges these findings and is agreeable to the procedure.  15.  Smoking cessation counseling provided.  I spent 5 minutes today counseling patient on the risks of smoking, including throat cancer, lung cancer, COPD, heart disease and death.  Also discussed the benefits of quitting.  16. Follow-up with me 1 week after CT-guided biopsy.    I spent 45 minutes caring for Sabina on this date of service. This time includes time spent by me in the following activities:preparing for the visit, reviewing tests, obtaining and/or reviewing a separately obtained history, performing a medically appropriate examination and/or evaluation , counseling and  educating the patient/family/caregiver, ordering medications, tests, or procedures, referring and communicating with other health care professionals  and documenting information in the medical record    Follow Up   Return in about 2 weeks (around 5/11/2022) for Recheck after CT-guided biopsy with Janett in 2 weeks.  Patient was given instructions and counseling regarding her condition or for health maintenance advice. Please see specific information pulled into the AVS if appropriate.        Positive

## 2022-04-27 NOTE — PATIENT INSTRUCTIONS
Smoking Tobacco Information, Adult  Smoking tobacco can be harmful to your health. Tobacco contains a poisonous (toxic), colorless chemical called nicotine. Nicotine is addictive. It changes the brain and can make it hard to stop smoking. Tobacco also has other toxic chemicals that can hurt your body and raise your risk of many cancers.  How can smoking tobacco affect me?  Smoking tobacco puts you at risk for:  Cancer. Smoking is most commonly associated with lung cancer, but can also lead to cancer in other parts of the body.  Chronic obstructive pulmonary disease (COPD). This is a long-term lung condition that makes it hard to breathe. It also gets worse over time.  High blood pressure (hypertension), heart disease, stroke, or heart attack.  Lung infections, such as pneumonia.  Cataracts. This is when the lenses in the eyes become clouded.  Digestive problems. This may include peptic ulcers, heartburn, and gastroesophageal reflux disease (GERD).  Oral health problems, such as gum disease and tooth loss.  Loss of taste and smell.  Smoking can affect your appearance by causing:  Wrinkles.  Yellow or stained teeth, fingers, and fingernails.  Smoking tobacco can also affect your social life, because:  It may be challenging to find places to smoke when away from home. Many workplaces, restaurants, hotels, and public places are tobacco-free.  Smoking is expensive. This is due to the cost of tobacco and the long-term costs of treating health problems from smoking.  Secondhand smoke may affect those around you. Secondhand smoke can cause lung cancer, breathing problems, and heart disease. Children of smokers have a higher risk for:  Sudden infant death syndrome (SIDS).  Ear infections.  Lung infections.  If you currently smoke tobacco, quitting now can help you:  Lead a longer and healthier life.  Look, smell, breathe, and feel better over time.  Save money.  Protect others from the harms of secondhand smoke.  What  actions can I take to prevent health problems?  Quit smoking    Do not start smoking. Quit if you already do.  Make a plan to quit smoking and commit to it. Look for programs to help you and ask your health care provider for recommendations and ideas.  Set a date and write down all the reasons you want to quit.  Let your friends and family know you are quitting so they can help and support you. Consider finding friends who also want to quit. It can be easier to quit with someone else, so that you can support each other.  Talk with your health care provider about using nicotine replacement medicines to help you quit, such as gum, lozenges, patches, sprays, or pills.  Do not replace cigarette smoking with electronic cigarettes, which are commonly called e-cigarettes. The safety of e-cigarettes is not known, and some may contain harmful chemicals.  If you try to quit but return to smoking, stay positive. It is common to slip up when you first quit, so take it one day at a time.  Be prepared for cravings. When you feel the urge to smoke, chew gum or suck on hard candy.    Lifestyle  Stay busy and take care of your body.  Drink enough fluid to keep your urine pale yellow.  Get plenty of exercise and eat a healthy diet. This can help prevent weight gain after quitting.  Monitor your eating habits. Quitting smoking can cause you to have a larger appetite than when you smoke.  Find ways to relax. Go out with friends or family to a movie or a restaurant where people do not smoke.  Ask your health care provider about having regular tests (screenings) to check for cancer. This may include blood tests, imaging tests, and other tests.  Find ways to manage your stress, such as meditation, yoga, or exercise.  Where to find support  To get support to quit smoking, consider:  Asking your health care provider for more information and resources.  Taking classes to learn more about quitting smoking.  Looking for local organizations  that offer resources about quitting smoking.  Joining a support group for people who want to quit smoking in your local community.  Calling the smokefree.gov counselor helpline: 1-800-Quit-Now (1-395.532.5136)  Where to find more information  You may find more information about quitting smoking from:  HelpGuide.org: www.helpguide.org  Smokefree.gov: smokefree.gov  American Lung Association: www.lung.org  Contact a health care provider if you:  Have problems breathing.  Notice that your lips, nose, or fingers turn blue.  Have chest pain.  Are coughing up blood.  Feel faint or you pass out.  Have other health changes that cause you to worry.  Summary  Smoking tobacco can negatively affect your health, the health of those around you, your finances, and your social life.  Do not start smoking. Quit if you already do. If you need help quitting, ask your health care provider.  Think about joining a support group for people who want to quit smoking in your local community. There are many effective programs that will help you to quit this behavior.  This information is not intended to replace advice given to you by your health care provider. Make sure you discuss any questions you have with your health care provider.  Document Revised: 09/11/2020 Document Reviewed: 01/02/2018  Elsevier Patient Education © 2021 [employer]

## 2022-05-11 ENCOUNTER — HOSPITAL ENCOUNTER (OUTPATIENT)
Dept: CT IMAGING | Facility: HOSPITAL | Age: 62
Discharge: HOME | End: 2022-05-11

## 2022-05-11 VITALS
HEIGHT: 67 IN | SYSTOLIC BLOOD PRESSURE: 121 MMHG | OXYGEN SATURATION: 99 % | BODY MASS INDEX: 17.89 KG/M2 | WEIGHT: 114 LBS | DIASTOLIC BLOOD PRESSURE: 88 MMHG | HEART RATE: 66 BPM | RESPIRATION RATE: 15 BRPM

## 2022-05-11 DIAGNOSIS — R94.2 ABNORMAL PET SCAN OF LUNG: ICD-10-CM

## 2022-05-11 DIAGNOSIS — R91.1 LUNG NODULE: ICD-10-CM

## 2022-05-11 NOTE — NURSING NOTE
Dr mckeon informed of patient having stated she has von willebrand disease. Dr mckeon spoke with patient and patient stated she doesn't not know what type of von willebrand. Called hematology office and they stated it would take 1-2 days to retreive that information. Patient and dr mckeon informed. Patient decided to cancel procedure today. Patient escorted to family. Pt going to attempt to find medical records

## 2022-06-02 DIAGNOSIS — D68.00 VON WILLEBRAND DISEASE: Primary | ICD-10-CM

## 2022-06-06 ENCOUNTER — TRANSCRIBE ORDERS (OUTPATIENT)
Dept: PULMONOLOGY | Facility: CLINIC | Age: 62
End: 2022-06-06

## 2022-06-06 ENCOUNTER — LAB (OUTPATIENT)
Dept: LAB | Facility: HOSPITAL | Age: 62
End: 2022-06-06

## 2022-06-06 DIAGNOSIS — D68.00 VON WILLEBRAND DISEASE: Primary | ICD-10-CM

## 2022-06-06 DIAGNOSIS — D68.00 VON WILLEBRAND DISEASE: ICD-10-CM

## 2022-06-06 LAB
ABO GROUP BLD: NORMAL
BLD GP AB SCN SERPL QL: NEGATIVE
RH BLD: POSITIVE
T&S EXPIRATION DATE: NORMAL

## 2022-06-06 PROCEDURE — 86900 BLOOD TYPING SEROLOGIC ABO: CPT

## 2022-06-06 PROCEDURE — 86850 RBC ANTIBODY SCREEN: CPT

## 2022-06-06 PROCEDURE — 86901 BLOOD TYPING SEROLOGIC RH(D): CPT

## 2022-06-06 PROCEDURE — 36415 COLL VENOUS BLD VENIPUNCTURE: CPT

## 2022-07-08 ENCOUNTER — LAB (OUTPATIENT)
Dept: LAB | Facility: HOSPITAL | Age: 62
End: 2022-07-08

## 2022-07-08 ENCOUNTER — TRANSCRIBE ORDERS (OUTPATIENT)
Dept: LAB | Facility: HOSPITAL | Age: 62
End: 2022-07-08

## 2022-07-08 DIAGNOSIS — Z01.812 PRE-PROCEDURE LAB EXAM: ICD-10-CM

## 2022-07-08 DIAGNOSIS — D68.00 VON WILLEBRAND DISEASE: ICD-10-CM

## 2022-07-08 DIAGNOSIS — Z01.818 PREOP TESTING: ICD-10-CM

## 2022-07-08 DIAGNOSIS — R94.2 ABNORMAL PET SCAN OF LUNG: ICD-10-CM

## 2022-07-08 DIAGNOSIS — Z01.818 PREOP TESTING: Primary | ICD-10-CM

## 2022-07-08 LAB
APTT PPP: 36.7 SECONDS (ref 24.2–34.2)
BASOPHILS # BLD AUTO: 0.06 10*3/MM3 (ref 0–0.2)
BASOPHILS NFR BLD AUTO: 0.7 % (ref 0–1.5)
DEPRECATED RDW RBC AUTO: 41.3 FL (ref 37–54)
EOSINOPHIL # BLD AUTO: 0.17 10*3/MM3 (ref 0–0.4)
EOSINOPHIL NFR BLD AUTO: 2 % (ref 0.3–6.2)
ERYTHROCYTE [DISTWIDTH] IN BLOOD BY AUTOMATED COUNT: 12.3 % (ref 12.3–15.4)
HCT VFR BLD AUTO: 39.8 % (ref 34–46.6)
HGB BLD-MCNC: 13.6 G/DL (ref 12–15.9)
IMM GRANULOCYTES # BLD AUTO: 0.02 10*3/MM3 (ref 0–0.05)
IMM GRANULOCYTES NFR BLD AUTO: 0.2 % (ref 0–0.5)
INR PPP: 0.97 (ref 0.86–1.15)
LYMPHOCYTES # BLD AUTO: 3.46 10*3/MM3 (ref 0.7–3.1)
LYMPHOCYTES NFR BLD AUTO: 41.1 % (ref 19.6–45.3)
MCH RBC QN AUTO: 32 PG (ref 26.6–33)
MCHC RBC AUTO-ENTMCNC: 34.2 G/DL (ref 31.5–35.7)
MCV RBC AUTO: 93.6 FL (ref 79–97)
MONOCYTES # BLD AUTO: 0.67 10*3/MM3 (ref 0.1–0.9)
MONOCYTES NFR BLD AUTO: 8 % (ref 5–12)
NEUTROPHILS NFR BLD AUTO: 4.03 10*3/MM3 (ref 1.7–7)
NEUTROPHILS NFR BLD AUTO: 48 % (ref 42.7–76)
NRBC BLD AUTO-RTO: 0 /100 WBC (ref 0–0.2)
PLATELET # BLD AUTO: 283 10*3/MM3 (ref 140–450)
PMV BLD AUTO: 10 FL (ref 6–12)
PROTHROMBIN TIME: 13 SECONDS (ref 11.8–14.9)
RBC # BLD AUTO: 4.25 10*6/MM3 (ref 3.77–5.28)
WBC NRBC COR # BLD: 8.41 10*3/MM3 (ref 3.4–10.8)

## 2022-07-08 PROCEDURE — U0005 INFEC AGEN DETEC AMPLI PROBE: HCPCS

## 2022-07-08 PROCEDURE — C9803 HOPD COVID-19 SPEC COLLECT: HCPCS

## 2022-07-08 PROCEDURE — 85025 COMPLETE CBC W/AUTO DIFF WBC: CPT

## 2022-07-08 PROCEDURE — 85610 PROTHROMBIN TIME: CPT

## 2022-07-08 PROCEDURE — 85730 THROMBOPLASTIN TIME PARTIAL: CPT

## 2022-07-08 PROCEDURE — U0004 COV-19 TEST NON-CDC HGH THRU: HCPCS

## 2022-07-08 PROCEDURE — 36415 COLL VENOUS BLD VENIPUNCTURE: CPT

## 2022-07-09 LAB — SARS-COV-2 RNA PNL SPEC NAA+PROBE: NOT DETECTED

## 2022-07-13 ENCOUNTER — TELEPHONE (OUTPATIENT)
Dept: PULMONOLOGY | Facility: CLINIC | Age: 62
End: 2022-07-13

## 2022-07-13 ENCOUNTER — HOSPITAL ENCOUNTER (OUTPATIENT)
Dept: CT IMAGING | Facility: HOSPITAL | Age: 62
Discharge: HOME | End: 2022-07-13

## 2022-07-13 VITALS
OXYGEN SATURATION: 98 % | RESPIRATION RATE: 12 BRPM | WEIGHT: 110.23 LBS | BODY MASS INDEX: 17.3 KG/M2 | HEIGHT: 67 IN | SYSTOLIC BLOOD PRESSURE: 129 MMHG | HEART RATE: 65 BPM | DIASTOLIC BLOOD PRESSURE: 86 MMHG

## 2022-07-13 DIAGNOSIS — R91.1 PULMONARY NODULE: ICD-10-CM

## 2022-07-13 DIAGNOSIS — D68.00 VON WILLEBRAND DISEASE: Primary | ICD-10-CM

## 2022-07-13 DIAGNOSIS — R94.2 ABNORMAL PET OF RIGHT LUNG: ICD-10-CM

## 2022-07-13 NOTE — TELEPHONE ENCOUNTER
Patient is needing a return phone call regarding her CT Needle Biopsy,she stated that someone in our office called her. I do not see any documentation in the chart. Please contact her at [phone]. Thank you.

## 2022-07-13 NOTE — NURSING NOTE
Dr Arteaga at bedside talking with pt about Von Willibrandt Disease, would like pt to be seen in Multidisciplinary Lung Clinic before doing a lung biopsy.  Pt in agreement.  Lung biopsy cancelled today.  Will call office and advise.

## 2022-07-13 NOTE — NURSING NOTE
Arrived to [facility] holding via personal electric wheelchair accompanied by RN.  Vitals stable.  States has chronic pain.

## 2022-07-13 NOTE — TELEPHONE ENCOUNTER
Unsure who called pt, spoke with Beena (referral coordinator) and she did not speak to pt. Will ask Kadi (Nurse navigator) if she called pt when she returns from lunch.

## 2022-07-13 NOTE — TELEPHONE ENCOUNTER
Spoke with radiologist who would like for patient to be seen by a multiply disciplinary team due to bleeding disorder.  They state that they have tried to receive records from patient's previous hematologist, with no success.  I placed an order for a referral to hematology, as well as thoracic surgery.  Radiologist feels that patient may be a good candidate for a resection, rather than a biopsy.

## 2022-07-13 NOTE — NURSING NOTE
Alisa spoke with Janett Chavarria re: pt biopsy cancelled for today, Dr Arteaga recommends multidisciplinary Lung clinic, pt still needs to find out what type Von Willebrand's she has, get consult with oncologist.  This was discussed with pt by Dr Arteaga as well.

## 2022-07-18 ENCOUNTER — TELEPHONE (OUTPATIENT)
Dept: PULMONOLOGY | Facility: CLINIC | Age: 62
End: 2022-07-18

## 2022-07-18 NOTE — TELEPHONE ENCOUNTER
Patient is scheduled for hematology consults on 07/27/2022 @ 1330.  Patient is aware and agreeable.

## 2022-07-19 ENCOUNTER — OFFICE VISIT (OUTPATIENT)
Dept: PULMONOLOGY | Facility: CLINIC | Age: 62
End: 2022-07-19

## 2022-07-19 ENCOUNTER — OFFICE VISIT (OUTPATIENT)
Dept: OTHER | Facility: HOSPITAL | Age: 62
End: 2022-07-19

## 2022-07-19 VITALS
WEIGHT: 110 LBS | HEIGHT: 67 IN | BODY MASS INDEX: 17.27 KG/M2 | HEART RATE: 79 BPM | RESPIRATION RATE: 18 BRPM | TEMPERATURE: 99.8 F | SYSTOLIC BLOOD PRESSURE: 110 MMHG | DIASTOLIC BLOOD PRESSURE: 73 MMHG | OXYGEN SATURATION: 97 %

## 2022-07-19 VITALS
WEIGHT: 110 LBS | SYSTOLIC BLOOD PRESSURE: 112 MMHG | OXYGEN SATURATION: 99 % | DIASTOLIC BLOOD PRESSURE: 82 MMHG | BODY MASS INDEX: 17.27 KG/M2 | HEIGHT: 67 IN | HEART RATE: 67 BPM

## 2022-07-19 DIAGNOSIS — J43.9 PULMONARY EMPHYSEMA, UNSPECIFIED EMPHYSEMA TYPE: ICD-10-CM

## 2022-07-19 DIAGNOSIS — R91.1 LUNG NODULE: Primary | ICD-10-CM

## 2022-07-19 DIAGNOSIS — R06.09 DYSPNEA ON EXERTION: ICD-10-CM

## 2022-07-19 DIAGNOSIS — D68.00 VON WILLEBRAND DISEASE: ICD-10-CM

## 2022-07-19 DIAGNOSIS — R91.1 PULMONARY NODULE: Primary | ICD-10-CM

## 2022-07-19 DIAGNOSIS — F17.210 NICOTINE DEPENDENCE, CIGARETTES, UNCOMPLICATED: ICD-10-CM

## 2022-07-19 PROCEDURE — 99214 OFFICE O/P EST MOD 30 MIN: CPT | Performed by: INTERNAL MEDICINE

## 2022-07-19 PROCEDURE — 99204 OFFICE O/P NEW MOD 45 MIN: CPT | Performed by: THORACIC SURGERY (CARDIOTHORACIC VASCULAR SURGERY)

## 2022-07-19 RX ORDER — LINACLOTIDE 145 UG/1
145 CAPSULE, GELATIN COATED ORAL AS NEEDED
COMMUNITY
Start: 2022-04-27

## 2022-07-19 NOTE — PROGRESS NOTES
"Chief Complaint  Lung nodule  Subjective        Sabina M Gabehart presents to [facility] MULTI-DISCIPLIN[facility] CLINIC  History of Present Illness    Miss Gabehart is a pleasant 61-year-old lady with a 1.5 cm right middle lobe lesion.    The patient reports that she was told that she has a spot in her upper right lobe in her right lung. The patient reports that she has been trying to \"figure this out\" since 2021. She states that it sounds like she will need a resection surgery rather than a biopsy because she has von Willebrand disease. She states that she has severe osteoporosis, neuropathy, fibromyalgia, and Arnold-Chiari malformation. She states that she can walk around a little, but she does not walk well. The patient reports that she stays in a lot of pain, but she only takes 5 mg of hydrocodone.     The patient states that she has been smoking for about 30 years. The patient reports that she smokes about 2 packs a day. The patient reports that she has had a  section and a cholecystectomy. She states she has not had trouble with anesthesia. She states that she has been diagnosed with COPD and advanced emphysema. The patient reports that she breathes fine. The patient reports that she had a stress test done a few years ago.    The patient reports that her mother had lung and breast cancer. She states that one of her sisters had a double mastectomy, but she is not certain that it was for breast cancer.    Objective   Vital Signs:  /82   Pulse 67   Ht 170.2 cm (67.01\")   Wt 49.9 kg (110 lb)   SpO2 99%   BMI 17.22 kg/m²   Estimated body mass index is 17.22 kg/m² as calculated from the following:    Height as of this encounter: 170.2 cm (67.01\").    Weight as of this encounter: 49.9 kg (110 lb).          Physical Exam  Vitals and nursing note reviewed.   Constitutional:       Appearance: She is well-developed.   HENT:      Head: Normocephalic and atraumatic.      Nose: Nose normal. "   Eyes:      Conjunctiva/sclera: Conjunctivae normal.   Cardiovascular:      Rate and Rhythm: Normal rate.   Pulmonary:      Effort: Pulmonary effort is normal.   Abdominal:      Palpations: Abdomen is soft.   Musculoskeletal:      Cervical back: Neck supple.   Skin:     General: Skin is warm and dry.   Neurological:      Mental Status: She is alert and oriented to person, place, and time.   Psychiatric:         Behavior: Behavior normal.         Thought Content: Thought content normal.         Judgment: Judgment normal.        Result Review :             I have independently reviewed the CT of the chest performed on 04/13/2022 which demonstrates a 1.5 cm nodule in the right middle lobe. There are no other nodules. There is no mediastinal or hilar lymphadenopathy. There are extensive coronary artery calcifications.    I have independently reviewed the PET scan performed on 04/20/2022 which demonstrates hypermetabolism in the known 1.5 cm right middle lobe nodule concerning for malignancy. There is no other evidence of hyper metabolism.    Pulmonary function studies demonstrate an FEV1 of 3.09 or 109% of predicted, and a DLCO of 15 or 64% of predicted.     Assessment and Plan   Diagnoses and all orders for this visit:    1. Lung nodule (Primary)  -     Stress Test With Myocardial Perfusion - One Day; Future  -     CT Chest Without Contrast; Future    2. Dyspnea on exertion  -     Stress Test With Myocardial Perfusion - One Day; Future  -     CT Chest Without Contrast; Future         Miss Gabehart is a very pleasant 61-year-old lady who presents to my clinic in a wheelchair who has a 1.5 cm right middle lobe lung nodule that has not been biopsied. There is concern for malignancy based on her PET scan. She will need risk stratification prior to discussion of surgical resection with a stress echo or a Lexiscan to evaluate her coronary artery calcifications as well as her dyspnea on exertion. She will also need a repeat  CT scan as her work-up was done 4 months ago. I will plan to see her back after her stress and CT scan to discuss further recommendations. She also has a history of von Willebrand. She is going to follow up with hematology to discuss.     I spent 45 minutes caring for Sabina on this date of service. This time includes time spent by me in the following activities:preparing for the visit, reviewing tests, obtaining and/or reviewing a separately obtained history, performing a medically appropriate examination and/or evaluation , counseling and educating the patient/family/caregiver, ordering medications, tests, or procedures, documenting information in the medical record and independently interpreting results and communicating that information with the patient/family/caregiver  Follow Up   No follow-ups on file.  Patient was given instructions and counseling regarding her condition or for health maintenance advice. Please see specific information pulled into the AVS if appropriate.     Transcribed from ambient dictation for Helen Pulliam MD by Nayana Mathis.  07/19/22   13:11 EDT    Patient verbalized consent to the visit recording.  I have personally performed the services described in this document as transcribed by the above individual, and it is both accurate and complete.  Helen Pulliam MD  7/29/2022  13:15 EDT

## 2022-07-19 NOTE — PROGRESS NOTES
Pulmonary Consultation    No ref. provider found,    Thank you for asking me to see Theresa M Gabehart for   Chief Complaint   Patient presents with   • Follow-up   • Emphysema   .      History of Present Illness  Theresa M Gabehart is a 61 y.o. female with a PMH significant for COPD von Willebrand's disease and tobacco abuse presents for follow-up patient is being followed up by chest surgeon for right lung nodule presently she denies any significant expectoration and her breathing is fairly stable she is taking her bronchodilators she continues to smoke patient denies any chest pain fever or hemoptysis       Tobacco use history:  Type: cigarettes  Amount: 1 ppd  Duration: 40 years  Cessation: n   Willing to quit: No      Review of Systems: History obtained from chart review and the patient.  Review of Systems   Respiratory: Positive for cough, shortness of breath and wheezing.    All other systems reviewed and are negative.    As described in the HPI. Otherwise, remainder of ROS (14 systems) were negative.    There is no problem list on file for this patient.        Current Outpatient Medications:   •  albuterol sulfate  (90 Base) MCG/ACT inhaler, Inhale 2 puffs Every 4 (Four) Hours As Needed for Wheezing., Disp: 18 g, Rfl: 11  •  [employer] Calcium 600 MG tablet, Take 1 tablet by mouth 2 (Two) Times a Day With Meals., Disp: , Rfl:   •  HYDROcodone-acetaminophen (NORCO) 5-325 MG per tablet, , Disp: , Rfl:   •  ipratropium-albuterol (DUO-NEB) 0.5-2.5 mg/3 ml nebulizer, Take 3 mL by nebulization 4 (Four) Times a Day As Needed for Wheezing., Disp: 360 mL, Rfl: 3  •  Linzess 145 MCG capsule capsule, TAKE 1 CAPSULE BY MOUTH EVERY DAY FOR 30 DAYS AS NEEDED, Disp: , Rfl:   •  multivitamin with minerals tablet tablet, Take 1 tablet by mouth Daily., Disp: , Rfl:   •  Probiotic Product (PROBIOTIC BLEND PO), Take 1 tablet by mouth., Disp: , Rfl:   •  Symbicort 160-4.5 MCG/ACT inhaler, , Disp: , Rfl:   •  vitamin D  "(ERGOCALCIFEROL) 1.25 MG (65229 UT) capsule capsule, , Disp: , Rfl:   •  Zinc Sulfate (ZINC 15 PO), Take 1 each by mouth., Disp: , Rfl:     Allergies   Allergen Reactions   • Clarithromycin Unknown - High Severity   • Meperidine Unknown - High Severity   • Metronidazole Unknown - High Severity   • Doxycycline Calcium Rash   • Gabapentin Rash       Past Medical History:   Diagnosis Date   • Allergic rhinitis    • Arnold-Chiari syndrome (HCC)    • Bleeding disorder (HCC)    • Carpal tunnel syndrome    • Collar bone fracture     history of   • Colon polyps    • COPD (chronic obstructive pulmonary disease) (HCC)    • Dupuytren's contracture syndrome    • Emphysema lung (HCC)    • Fibromyalgia    • History of fractured vertebra     18 fractured   • History of pelvic fracture    • Neuropathy    • Osteoporosis    • Vertigo    • Von Willebrand disease (HCC)      Past Surgical History:   Procedure Laterality Date   •  SECTION     • CHOLECYSTECTOMY     • COLONOSCOPY     • ENDOSCOPY     • HYSTERECTOMY     • KNEE ARTHROSCOPY     • ORTHOPEDIC SURGERY     • TONSILLECTOMY AND ADENOIDECTOMY       Social History     Socioeconomic History   • Marital status:    Tobacco Use   • Smoking status: Current Every Day Smoker     Packs/day: 1.50     Years: 30.00     Pack years: 45.00     Types: Cigarettes   • Smokeless tobacco: Never Used   Vaping Use   • Vaping Use: Former   • Substances: Nicotine   • Devices: Refillable tank   Substance and Sexual Activity   • Alcohol use: Defer   • Drug use: Defer   • Sexual activity: Defer     Family History   Problem Relation Age of Onset   • Lung cancer Mother    • Diabetes Sister           Objective     Blood pressure 110/73, pulse 79, temperature 99.8 °F (37.7 °C), resp. rate 18, height 170.2 cm (67.01\"), weight 49.9 kg (110 lb), SpO2 97 %.  Physical Exam  Vitals and nursing note reviewed.   Constitutional:       Appearance: Normal appearance.   HENT:      Head: Normocephalic and " atraumatic.      Nose: Nose normal.      Mouth/Throat:      Mouth: Mucous membranes are moist.      Pharynx: Oropharynx is clear.   Eyes:      Conjunctiva/sclera: Conjunctivae normal.      Pupils: Pupils are equal, round, and reactive to light.   Cardiovascular:      Rate and Rhythm: Normal rate and regular rhythm.      Pulses: Normal pulses.      Heart sounds: Normal heart sounds.   Pulmonary:      Effort: Pulmonary effort is normal.      Breath sounds: Rhonchi present.   Abdominal:      General: Abdomen is flat. Bowel sounds are normal.      Palpations: Abdomen is soft.   Musculoskeletal:         General: Normal range of motion.      Cervical back: Normal range of motion and neck supple.   Skin:     General: Skin is warm.      Capillary Refill: Capillary refill takes less than 2 seconds.   Neurological:      General: No focal deficit present.      Mental Status: She is alert.   Psychiatric:         Mood and Affect: Mood normal.         Behavior: Behavior normal.         There is no immunization history on file for this patient.           Assessment & Plan     Diagnoses and all orders for this visit:    1. Pulmonary nodule (Primary)    2. Von Willebrand disease (HCC)    3. Pulmonary emphysema, unspecified emphysema type (HCC)    4. Nicotine dependence, cigarettes, uncomplicated         Discussion/ Recommendations:   Patient is advised to keep her appointment with the chest surgeon for possible biopsy versus resection of the right lung nodule as it was metabolically active on the PET scan  Patient is advised to continue her inhalers including Symbicort and albuterol  Patient is advised to stop smoking  Patient is advised to follow-up CT scan  Discussed vaccination and recommended    BMI is below normal parameters (malnutrition). Recommendations: referral to primary care           Return in about 4 weeks (around 8/16/2022).      Thank you for allowing me to participate in the care of Theresa M Gabehart. Please do not  hesitate to contact me with any questions.         This document has been electronically signed by Wilber Carter MD on July 19, 2022 15:33 EDT

## 2022-07-27 ENCOUNTER — LAB (OUTPATIENT)
Dept: ONCOLOGY | Facility: HOSPITAL | Age: 62
End: 2022-07-27

## 2022-07-27 ENCOUNTER — CONSULT (OUTPATIENT)
Dept: ONCOLOGY | Facility: HOSPITAL | Age: 62
End: 2022-07-27

## 2022-07-27 VITALS
OXYGEN SATURATION: 98 % | HEART RATE: 89 BPM | RESPIRATION RATE: 16 BRPM | SYSTOLIC BLOOD PRESSURE: 96 MMHG | DIASTOLIC BLOOD PRESSURE: 76 MMHG | WEIGHT: 114.2 LBS | TEMPERATURE: 97.6 F | BODY MASS INDEX: 17.88 KG/M2

## 2022-07-27 DIAGNOSIS — D68.00 VON WILLEBRAND DISEASE: ICD-10-CM

## 2022-07-27 DIAGNOSIS — R79.1 PROLONGED PTT: ICD-10-CM

## 2022-07-27 DIAGNOSIS — R79.1 PROLONGED PTT: Primary | ICD-10-CM

## 2022-07-27 PROCEDURE — 85240 CLOT FACTOR VIII AHG 1 STAGE: CPT

## 2022-07-27 PROCEDURE — 85245 CLOT FACTOR VIII VW RISTOCTN: CPT

## 2022-07-27 PROCEDURE — G0463 HOSPITAL OUTPT CLINIC VISIT: HCPCS | Performed by: NURSE PRACTITIONER

## 2022-07-27 PROCEDURE — 36415 COLL VENOUS BLD VENIPUNCTURE: CPT

## 2022-07-27 PROCEDURE — 85246 CLOT FACTOR VIII VW ANTIGEN: CPT

## 2022-07-27 PROCEDURE — 99214 OFFICE O/P EST MOD 30 MIN: CPT | Performed by: NURSE PRACTITIONER

## 2022-07-27 NOTE — PROGRESS NOTES
"Chief Complaint  Von Willebrand Disease    Janett Chavarria APRN McKenna, Carrie Wolff, BLANCA    Records Obtained:  Records of the patients history including those obtained from  [facility] and patient information were reviewed and summarized in detail.    Reason for referral: prolonged PTT    Subjective          Theresa M Gabehart presents to [facility] for prolonged PTT    History of Present Illness     Ms. Theresa Gabehart presents  In  for new patient referral for \"von willenbrand disease\". Per patient report, states she was diagnosed as a young child when she had frequent nose bleeds. Also, reports she was told she had von willenbrands after she had a hysterectomy. Reports she had prolonged bleeding after a C section with twin and had to have 3 pints of blood after procedure. She esquivel not bring with her any documentation she has von willenbrands nor do I see any documentation in Epic where she has been tested previously. Lab work done 2 weeks ago did show a prolonged PTT of 36.7.     Also, with history of pulmonary nodule which has gotten bigger recently and has known about the right sided PN since sometime last year. Reports she can not have a lung biopsy until she is known what type of von willenbrands she has. She reports she saw Dr. Pulliam last week and was told she is not a surgical candidate. She continues to smoke greater than 1 PPD for at least 25-30 years. She is following with Dr. Carter (pulmonary). PET scan is 4/2022 showed a 1.5 cm anterior and lateral right middle lobe with intense uptake seen.     She is in  today and reports chronic back problems. Has history of arnold-chiari syndrome.         Oncology/Hematology History    No history exists.       Review of Systems   Constitutional: Positive for fatigue.   HENT: Negative.    Eyes: Negative.    Respiratory: Positive for shortness of breath.    Cardiovascular: Negative.    Gastrointestinal: Negative.  "   Endocrine: Negative.    Musculoskeletal: Negative.    Allergic/Immunologic: Negative.    Neurological: Negative.    Hematological: Bruises/bleeds easily.   Psychiatric/Behavioral: Negative.    All other systems reviewed and are negative.      Current Outpatient Medications on File Prior to Visit   Medication Sig Dispense Refill   • albuterol sulfate  (90 Base) MCG/ACT inhaler Inhale 2 puffs Every 4 (Four) Hours As Needed for Wheezing. 18 g 11   • [employer] Calcium 600 MG tablet Take 1 tablet by mouth 2 (Two) Times a Day With Meals.     • HYDROcodone-acetaminophen (NORCO) 5-325 MG per tablet      • ipratropium-albuterol (DUO-NEB) 0.5-2.5 mg/3 ml nebulizer Take 3 mL by nebulization 4 (Four) Times a Day As Needed for Wheezing. 360 mL 3   • Linzess 145 MCG capsule capsule TAKE 1 CAPSULE BY MOUTH EVERY DAY FOR 30 DAYS AS NEEDED     • multivitamin with minerals tablet tablet Take 1 tablet by mouth Daily.     • Probiotic Product (PROBIOTIC BLEND PO) Take 1 tablet by mouth.     • Symbicort 160-4.5 MCG/ACT inhaler      • vitamin D (ERGOCALCIFEROL) 1.25 MG (26232 UT) capsule capsule      • Zinc Sulfate (ZINC 15 PO) Take 1 each by mouth.       No current facility-administered medications on file prior to visit.       Allergies   Allergen Reactions   • Clarithromycin Unknown - High Severity   • Meperidine Unknown - High Severity   • Metronidazole Unknown - High Severity   • Doxycycline Calcium Rash   • Gabapentin Rash     Past Medical History:   Diagnosis Date   • Allergic rhinitis    • Arnold-Chiari syndrome ([facility])    • Bleeding disorder ([facility])    • Carpal tunnel syndrome    • Collar bone fracture     history of   • Colon polyps    • COPD (chronic obstructive pulmonary disease) ([facility])    • Dupuytren's contracture syndrome    • Emphysema lung ([facility])    • Fibromyalgia    • History of fractured vertebra     18 fractured   • History of pelvic fracture    • Neuropathy    • Osteoporosis    • Vertigo    • Von Willebrand disease ([facility])       Past Surgical History:   Procedure Laterality Date   •  SECTION     • CHOLECYSTECTOMY     • COLONOSCOPY     • ENDOSCOPY     • HYSTERECTOMY     • KNEE ARTHROSCOPY     • ORTHOPEDIC SURGERY     • TONSILLECTOMY AND ADENOIDECTOMY       Social History     Socioeconomic History   • Marital status:    Tobacco Use   • Smoking status: Current Every Day Smoker     Packs/day: 1.50     Years: 30.00     Pack years: 45.00     Types: Cigarettes   • Smokeless tobacco: Never Used   Vaping Use   • Vaping Use: Former   • Substances: Nicotine   • Devices: Refillable tank   Substance and Sexual Activity   • Alcohol use: Defer   • Drug use: Defer   • Sexual activity: Defer     Family History   Problem Relation Age of Onset   • Lung cancer Mother    • Diabetes Sister        There is no immunization history on file for this patient.    Objective   Physical Exam  Vitals and nursing note reviewed.   Constitutional:       Appearance: Normal appearance. She is normal weight.   HENT:      Head: Normocephalic.      Nose: Nose normal.      Mouth/Throat:      Mouth: Mucous membranes are moist.   Eyes:      Pupils: Pupils are equal, round, and reactive to light.   Cardiovascular:      Rate and Rhythm: Normal rate and regular rhythm.      Pulses: Normal pulses.      Heart sounds: Normal heart sounds. No murmur heard.  Pulmonary:      Effort: Pulmonary effort is normal. No respiratory distress.      Breath sounds: Normal breath sounds. No wheezing, rhonchi or rales.   Abdominal:      Palpations: Abdomen is soft.   Musculoskeletal:         General: Normal range of motion.      Cervical back: Normal range of motion and neck supple.   Skin:     General: Skin is warm and dry.      Capillary Refill: Capillary refill takes less than 2 seconds.   Neurological:      General: No focal deficit present.      Mental Status: She is alert and oriented to person, place, and time.   Psychiatric:         Mood and Affect: Mood normal.          Behavior: Behavior normal.         Thought Content: Thought content normal.         Judgment: Judgment normal.         Vitals:    07/27/22 1408   BP: 96/76   Pulse: 89   Resp: 16   Temp: 97.6 °F (36.4 °C)   SpO2: 98%   Weight: 51.8 kg (114 lb 3.2 oz)   PainSc: 0-No pain     ECOG score: 2         ECOG: (2) Ambulatory & Capable of Self Care, Unable to Carry Out Work Activity, Up & About Greater Than 50% of Waking Hours  Fall Risk Assessment was completed, and patient is at low risk for falls.  PHQ-9 Total Score: 0       The patient is  experiencing fatigue. Fatigue score: 5    PT/OT Functional Screening: PT fx screen: Weakness and No needs identified  Speech Functional Screening: Speech fx screen: No needs identified  Rehab to be ordered: Rehab to be ordered: No needs identified        Result Review :   The following data was reviewed by: BLANCA Monterroso on 07/27/2022:  Lab Results   Component Value Date    HGB 13.6 07/08/2022    HCT 39.8 07/08/2022    MCV 93.6 07/08/2022     07/08/2022    WBC 8.41 07/08/2022    NEUTROABS 4.03 07/08/2022    LYMPHSABS 3.46 (H) 07/08/2022    MONOSABS 0.67 07/08/2022    EOSABS 0.17 07/08/2022    BASOSABS 0.06 07/08/2022     No results found for: GLUCOSE, BUN, CREATININE, NA, K, CL, CO2, CALCIUM, PROTEINTOT, ALBUMIN, BILITOT, ALKPHOS, AST, ALT       Assessment and Plan    Diagnoses and all orders for this visit:    1. Prolonged PTT (Primary)  -     Von Willebrand Panel; Future    2. Von Willebrand disease (HCC)  -     Von Willebrand Panel; Future    do not seen any evidence for von willenbrand panel in epic or an of the scanned documents, although the patient reports she has been tested previously and does not know what type she has. No recent bleeding episodes. Awaiting a lung biopsy but needs further documentation and treatment plan for the VW. Discussed with Dr. Jonas today.       I spent 20 minutes caring for Sabina on this date of service. This time includes time  spent by me in the following activities:preparing for the visit, reviewing tests, obtaining and/or reviewing a separately obtained history, performing a medically appropriate examination and/or evaluation , counseling and educating the patient/family/caregiver, ordering medications, tests, or procedures, referring and communicating with other health care professionals , documenting information in the medical record, independently interpreting results and communicating that information with the patient/family/caregiver and care coordination    Patient Follow Up: 2 weeks.     Patient was given instructions and counseling regarding her condition or for health maintenance advice. Please see specific information pulled into the AVS if appropriate.     Paris Quezada, APRN    7/27/2022

## 2022-07-29 LAB
FACT VIII ACT/NOR PPP: 66 % (ref 56–140)
PATH INTERP BLD-IMP: NORMAL
VWF AG ACT/NOR PPP IA: 80 % (ref 50–200)
VWF:RCO ACT/NOR PPP PL AGG: 85 % (ref 50–200)

## 2022-08-16 ENCOUNTER — APPOINTMENT (OUTPATIENT)
Dept: OTHER | Facility: HOSPITAL | Age: 62
End: 2022-08-16

## 2022-08-26 ENCOUNTER — APPOINTMENT (OUTPATIENT)
Dept: NUCLEAR MEDICINE | Facility: HOSPITAL | Age: 62
End: 2022-08-26

## 2022-08-26 ENCOUNTER — APPOINTMENT (OUTPATIENT)
Dept: CT IMAGING | Facility: HOSPITAL | Age: 62
End: 2022-08-26

## 2022-09-01 ENCOUNTER — TELEPHONE (OUTPATIENT)
Dept: SURGERY | Facility: CLINIC | Age: 62
End: 2022-09-01

## 2022-09-01 ENCOUNTER — TELEPHONE (OUTPATIENT)
Dept: ONCOLOGY | Facility: HOSPITAL | Age: 62
End: 2022-09-01

## 2022-09-01 NOTE — TELEPHONE ENCOUNTER
Caller: Gabehart, Theresa M    Relationship: Self    Best call back number: [phone]    What is the best time to reach you: ANYTIME    Who are you requesting to speak with (clinical staff, provider,  specific staff member): SCHEDULING     What was the call regarding: NEED TO R/S 9/6 APPT HAS HER SCAN HAVE BEEN PUSHED TO 9/27.    WOULD LIKE APPT ON 10/7 BEFORE 2 OR AFTER 2:15     CALL TO SCHEDULE     Do you require a callback: YES

## 2022-09-01 NOTE — TELEPHONE ENCOUNTER
Caller: Gabehart, Theresa M    Relationship to patient: Self    Best call back number: [phone]    Chief complaint: SAME DAY APPT    Type of visit: FU    Requested date: 9/27/22     If rescheduling, when is the original appointment: 9/6/22     Additional notes: PT HAS HER TESTING ON 9/27/22, SHE IS WANTING TO HAVE HER FOLLOW UP ON THE SAME DAY, SO SHE DOESN'T HAVE TO MAKE ANOTHER TRIP. THANK YOU!

## 2022-09-06 ENCOUNTER — APPOINTMENT (OUTPATIENT)
Dept: ONCOLOGY | Facility: HOSPITAL | Age: 62
End: 2022-09-06

## 2022-09-06 ENCOUNTER — APPOINTMENT (OUTPATIENT)
Dept: OTHER | Facility: HOSPITAL | Age: 62
End: 2022-09-06

## 2022-09-27 ENCOUNTER — HOSPITAL ENCOUNTER (OUTPATIENT)
Dept: NUCLEAR MEDICINE | Facility: HOSPITAL | Age: 62
Discharge: HOME | End: 2022-09-27

## 2022-09-27 ENCOUNTER — HOSPITAL ENCOUNTER (OUTPATIENT)
Dept: CT IMAGING | Facility: HOSPITAL | Age: 62
Discharge: HOME | End: 2022-09-27
Admitting: THORACIC SURGERY (CARDIOTHORACIC VASCULAR SURGERY)

## 2022-09-27 DIAGNOSIS — R91.1 LUNG NODULE: ICD-10-CM

## 2022-09-27 DIAGNOSIS — R06.09 DYSPNEA ON EXERTION: ICD-10-CM

## 2022-09-27 LAB
BH CV IMMEDIATE POST RECOVERY TECH DATA SYMPTOMS: NORMAL
BH CV IMMEDIATE POST TECH DATA BLOOD PRESSURE: NORMAL MMHG
BH CV IMMEDIATE POST TECH DATA HEART RATE: 98 BPM
BH CV IMMEDIATE POST TECH DATA OXYGEN SATS: 97 %
BH CV REST NUCLEAR ISOTOPE DOSE: 9.8 MCI
BH CV SIX MINUTE RECOVERY TECH DATA BLOOD PRESSURE: NORMAL
BH CV SIX MINUTE RECOVERY TECH DATA HEART RATE: 92 BPM
BH CV SIX MINUTE RECOVERY TECH DATA OXYGEN SATURATION: 97 %
BH CV STRESS BP STAGE 1: NORMAL
BH CV STRESS COMMENTS STAGE 1: NORMAL
BH CV STRESS DOSE REGADENOSON STAGE 1: 0.4
BH CV STRESS DURATION MIN STAGE 1: 0
BH CV STRESS DURATION SEC STAGE 1: 10
BH CV STRESS HR STAGE 1: 98
BH CV STRESS NUCLEAR ISOTOPE DOSE: 35.1 MCI
BH CV STRESS O2 STAGE 1: 98
BH CV STRESS PROTOCOL 1: NORMAL
BH CV STRESS RECOVERY BP: NORMAL MMHG
BH CV STRESS RECOVERY HR: 92 BPM
BH CV STRESS RECOVERY O2: 97 %
BH CV STRESS STAGE 1: 1
BH CV THREE MINUTE POST TECH DATA BLOOD PRESSURE: NORMAL MMHG
BH CV THREE MINUTE POST TECH DATA HEART RATE: 95 BPM
BH CV THREE MINUTE POST TECH DATA OXYGEN SATURATION: 96 %
LV EF NUC BP: 51 %
MAXIMAL PREDICTED HEART RATE: 160 BPM
PERCENT MAX PREDICTED HR: 61.25 %
STRESS BASELINE BP: NORMAL MMHG
STRESS BASELINE HR: 62 BPM
STRESS O2 SAT REST: 98 %
STRESS PERCENT HR: 72 %
STRESS POST O2 SAT PEAK: 98 %
STRESS POST PEAK BP: NORMAL MMHG
STRESS POST PEAK HR: 98 BPM
STRESS TARGET HR: 136 BPM

## 2022-09-27 PROCEDURE — 25010000002 REGADENOSON 0.4 MG/5ML SOLUTION: Performed by: THORACIC SURGERY (CARDIOTHORACIC VASCULAR SURGERY)

## 2022-09-27 PROCEDURE — 0 TECHNETIUM TETROFOSMIN KIT: Performed by: THORACIC SURGERY (CARDIOTHORACIC VASCULAR SURGERY)

## 2022-09-27 PROCEDURE — A9502 TC99M TETROFOSMIN: HCPCS | Performed by: THORACIC SURGERY (CARDIOTHORACIC VASCULAR SURGERY)

## 2022-09-27 PROCEDURE — 93018 CV STRESS TEST I&R ONLY: CPT | Performed by: SPECIALIST

## 2022-09-27 PROCEDURE — 78452 HT MUSCLE IMAGE SPECT MULT: CPT | Performed by: SPECIALIST

## 2022-09-27 PROCEDURE — 78452 HT MUSCLE IMAGE SPECT MULT: CPT

## 2022-09-27 PROCEDURE — 71250 CT THORAX DX C-: CPT

## 2022-09-27 PROCEDURE — 93016 CV STRESS TEST SUPVJ ONLY: CPT | Performed by: NURSE PRACTITIONER

## 2022-09-27 PROCEDURE — 93017 CV STRESS TEST TRACING ONLY: CPT

## 2022-09-27 RX ADMIN — TETROFOSMIN 1 DOSE: 1.38 INJECTION, POWDER, LYOPHILIZED, FOR SOLUTION INTRAVENOUS at 10:10

## 2022-09-27 RX ADMIN — REGADENOSON 0.4 MG: 0.08 INJECTION, SOLUTION INTRAVENOUS at 11:32

## 2022-09-27 RX ADMIN — TETROFOSMIN 1 DOSE: 1.38 INJECTION, POWDER, LYOPHILIZED, FOR SOLUTION INTRAVENOUS at 11:32

## 2022-10-04 ENCOUNTER — APPOINTMENT (OUTPATIENT)
Dept: OTHER | Facility: HOSPITAL | Age: 62
End: 2022-10-04

## 2022-10-07 ENCOUNTER — OFFICE VISIT (OUTPATIENT)
Dept: ONCOLOGY | Facility: HOSPITAL | Age: 62
End: 2022-10-07

## 2022-10-07 ENCOUNTER — OFFICE VISIT (OUTPATIENT)
Dept: PULMONOLOGY | Facility: CLINIC | Age: 62
End: 2022-10-07

## 2022-10-07 VITALS
BODY MASS INDEX: 16.95 KG/M2 | DIASTOLIC BLOOD PRESSURE: 75 MMHG | HEART RATE: 72 BPM | SYSTOLIC BLOOD PRESSURE: 117 MMHG | WEIGHT: 108 LBS | TEMPERATURE: 98.7 F | OXYGEN SATURATION: 99 % | HEIGHT: 67 IN | RESPIRATION RATE: 18 BRPM

## 2022-10-07 VITALS
RESPIRATION RATE: 18 BRPM | WEIGHT: 108 LBS | DIASTOLIC BLOOD PRESSURE: 85 MMHG | HEART RATE: 84 BPM | BODY MASS INDEX: 16.91 KG/M2 | TEMPERATURE: 98.5 F | OXYGEN SATURATION: 100 % | SYSTOLIC BLOOD PRESSURE: 115 MMHG

## 2022-10-07 DIAGNOSIS — I25.10 CORONARY ARTERY CALCIFICATION SEEN ON CT SCAN: ICD-10-CM

## 2022-10-07 DIAGNOSIS — D68.00 VON WILLEBRAND DISEASE: ICD-10-CM

## 2022-10-07 DIAGNOSIS — R91.1 PULMONARY NODULE: Primary | ICD-10-CM

## 2022-10-07 DIAGNOSIS — F17.210 NICOTINE DEPENDENCE, CIGARETTES, UNCOMPLICATED: ICD-10-CM

## 2022-10-07 DIAGNOSIS — J43.9 PULMONARY EMPHYSEMA, UNSPECIFIED EMPHYSEMA TYPE: ICD-10-CM

## 2022-10-07 DIAGNOSIS — I71.21 ASCENDING AORTIC ANEURYSM, UNSPECIFIED WHETHER RUPTURED: ICD-10-CM

## 2022-10-07 PROBLEM — M51.36 ANNULAR TEAR OF LUMBAR DISC: Status: ACTIVE | Noted: 2022-10-07

## 2022-10-07 PROBLEM — J41.0 SIMPLE CHRONIC BRONCHITIS: Status: ACTIVE | Noted: 2022-10-07

## 2022-10-07 PROBLEM — Z72.0 TOBACCO USER: Status: ACTIVE | Noted: 2022-10-07

## 2022-10-07 PROBLEM — M79.7 FIBROMYALGIA: Status: ACTIVE | Noted: 2022-10-07

## 2022-10-07 PROBLEM — G56.01 CARPAL TUNNEL SYNDROME OF RIGHT WRIST: Status: ACTIVE | Noted: 2022-10-07

## 2022-10-07 PROBLEM — J44.9 CHRONIC OBSTRUCTIVE PULMONARY DISEASE: Status: ACTIVE | Noted: 2022-10-07

## 2022-10-07 PROBLEM — J30.2 SEASONAL ALLERGIES: Status: ACTIVE | Noted: 2022-10-07

## 2022-10-07 PROBLEM — M51.26 HERNIATED LUMBAR INTERVERTEBRAL DISC: Status: ACTIVE | Noted: 2022-10-07

## 2022-10-07 PROBLEM — M81.0 AGE RELATED OSTEOPOROSIS: Status: ACTIVE | Noted: 2022-10-07

## 2022-10-07 PROBLEM — K59.04 CHRONIC IDIOPATHIC CONSTIPATION: Status: ACTIVE | Noted: 2022-10-07

## 2022-10-07 PROBLEM — M12.811 ROTATOR CUFF ARTHROPATHY OF RIGHT SHOULDER: Status: ACTIVE | Noted: 2022-10-07

## 2022-10-07 PROBLEM — M19.90 ARTHRITIS: Status: ACTIVE | Noted: 2022-10-07

## 2022-10-07 PROBLEM — F41.9 ANXIETY DISORDER: Status: ACTIVE | Noted: 2022-10-07

## 2022-10-07 PROBLEM — G93.5 COMPRESSION OF BRAIN (HCC): Status: ACTIVE | Noted: 2022-10-07

## 2022-10-07 PROBLEM — L20.81 ATOPIC NEURODERMATITIS: Status: ACTIVE | Noted: 2022-10-07

## 2022-10-07 PROCEDURE — 99213 OFFICE O/P EST LOW 20 MIN: CPT | Performed by: NURSE PRACTITIONER

## 2022-10-07 PROCEDURE — G0463 HOSPITAL OUTPT CLINIC VISIT: HCPCS | Performed by: NURSE PRACTITIONER

## 2022-10-07 NOTE — PROGRESS NOTES
Primary Care Provider  Carrie Grace APRN   Referring Provider  No ref. provider found    Patient Complaint  Follow-up, COPD, and Bronchitis      SUBJECTIVE    History of Presenting Illness  Theresa M Gabehart is a pleasant 61 y.o. female who presents to [facility] for followup appointment after seeing thoracic surgoen, Dr. Pulliam and hematology. Patient is in a motorized wheelchair. Patient had an appointment today with hematology to follow-up on von Willebrand disease.  Patient did have test in office that show she does not have von Willebrand disease, VW panel was negative, CBC was normal.    Patient recently had a PET scan on 4/20/2022.  PET scan showed a hypermetabolic 1.5 cm nodule in the anterior and lateral right middle lobe.  This is concerning for a bronchogenic malignancy or lung cancer. Patient recently  had repeat CT scan done 9/27/2022.     Patient does have shortness of air if she exerts herself.  She states she just has low stamina low endurance.  She is currently using a motorized wheelchair to get around and she feels like long distances to walk from parking lot to doctors offices fatigues her.  However she states when she is at home she does walk around her home without difficulty.  She states she does not have any coughing, wheezing, headaches, chest pain, weight loss or hemoptysis. Denies fevers, chills and night sweats. Theresa M Gabehart is able to perform ADLs without difficulties and denies any swollen glands/lymph nodes in the head or neck.    I have personally reviewed the review of systems, past family, social, medical and surgical histories; and agree with their findings.    Review of Systems   Constitutional: Positive for fatigue. Negative for chills, diaphoresis, fever and unexpected weight change.   HENT: Negative.    Eyes: Negative.    Respiratory: Positive for shortness of breath. Negative for cough and wheezing.     Cardiovascular: Negative.  Negative for chest pain, palpitations and leg swelling.   Musculoskeletal: Negative.    Skin: Negative.         Family History   Problem Relation Age of Onset   • Lung cancer Mother    • Diabetes Sister         Social History     Socioeconomic History   • Marital status:    Tobacco Use   • Smoking status: Current Every Day Smoker     Packs/day: 2.00     Years: 30.00     Pack years: 60.00     Types: Cigarettes   • Smokeless tobacco: Never Used   Vaping Use   • Vaping Use: Former   Substance and Sexual Activity   • Alcohol use: Defer   • Drug use: Defer   • Sexual activity: Defer        Past Medical History:   Diagnosis Date   • Allergic rhinitis    • Arnold-Chiari syndrome (Formerly [facility])    • Bleeding disorder (Formerly [facility])    • Carpal tunnel syndrome    • Collar bone fracture     history of   • Colon polyps    • COPD (chronic obstructive pulmonary disease) (Formerly [facility])    • Dupuytren's contracture syndrome    • Emphysema lung (Formerly [facility])    • Fibromyalgia    • History of fractured vertebra     18 fractured   • History of pelvic fracture    • Neuropathy    • Osteoporosis    • Vertigo    • Von Willebrand disease           There is no immunization history on file for this patient.    Allergies   Allergen Reactions   • Clarithromycin Unknown - High Severity   • Meperidine Unknown - High Severity   • Metronidazole Unknown - High Severity   • Doxycycline Calcium Rash   • Gabapentin Rash          Current Outpatient Medications:   •  albuterol sulfate  (90 Base) MCG/ACT inhaler, Inhale 2 puffs Every 4 (Four) Hours As Needed for Wheezing., Disp: 18 g, Rfl: 11  •  [employer] Calcium 600 MG tablet, Take 1 tablet by mouth 2 (Two) Times a Day With Meals., Disp: , Rfl:   •  HYDROcodone-acetaminophen (NORCO) 5-325 MG per tablet, , Disp: , Rfl:   •  ipratropium-albuterol (DUO-NEB) 0.5-2.5 mg/3 ml nebulizer, Take 3 mL by nebulization 4 (Four) Times a Day As Needed for Wheezing., Disp: 360 mL, Rfl: 3  •  Linzess 145 MCG  "capsule capsule, TAKE 1 CAPSULE BY MOUTH EVERY DAY FOR 30 DAYS AS NEEDED, Disp: , Rfl:   •  multivitamin with minerals tablet tablet, Take 1 tablet by mouth Daily., Disp: , Rfl:   •  Probiotic Product (PROBIOTIC BLEND PO), Take 1 tablet by mouth., Disp: , Rfl:   •  Symbicort 160-4.5 MCG/ACT inhaler, , Disp: , Rfl:   •  vitamin D (ERGOCALCIFEROL) 1.25 MG (31807 UT) capsule capsule, , Disp: , Rfl:   •  Zinc Sulfate (ZINC 15 PO), Take 1 each by mouth., Disp: , Rfl:        OBJECTIVE    Vital Signs   /75 (BP Location: Right arm, Patient Position: Sitting, Cuff Size: Adult)   Pulse 72   Temp 98.7 °F (37.1 °C) (Tympanic)   Resp 18   Ht 170.2 cm (67\")   Wt 49 kg (108 lb)   SpO2 99% Comment: room air  BMI 16.92 kg/m²     Physical Exam  Vitals reviewed.   Constitutional:       General: She is not in acute distress.     Appearance: Normal appearance. She is not ill-appearing.   HENT:      Head: Normocephalic and atraumatic.      Nose: Nose normal.      Mouth/Throat:      Mouth: Mucous membranes are moist.      Pharynx: Oropharynx is clear.   Eyes:      Extraocular Movements: Extraocular movements intact.      Conjunctiva/sclera: Conjunctivae normal.      Pupils: Pupils are equal, round, and reactive to light.   Cardiovascular:      Rate and Rhythm: Regular rhythm.      Pulses: Normal pulses.      Heart sounds: Normal heart sounds.   Pulmonary:      Effort: Pulmonary effort is normal. No respiratory distress.      Breath sounds: Normal breath sounds. No stridor. No wheezing, rhonchi or rales.   Abdominal:      General: Bowel sounds are normal.   Musculoskeletal:         General: Normal range of motion.      Cervical back: Normal range of motion and neck supple.      Right lower leg: No edema.      Left lower leg: No edema.   Lymphadenopathy:      Cervical: No cervical adenopathy.   Skin:     General: Skin is warm and dry.   Neurological:      General: No focal deficit present.      Mental Status: She is alert and " oriented to person, place, and time.   Psychiatric:         Mood and Affect: Mood normal.         Behavior: Behavior normal.          Results Review  I have personally reviewed the prior office notes, labs, diagnostic imaging, CT scan and PFT as follows:  CT Chest Without Contrast Diagnostic [LIA860] (Order [id number])  Order  Status: Final result             Appointment Information      Display Notes    V-MG OK'D TO KEEP PER ERMA-PT HAS STRESS TEST BEFORE CT                   PACS Images     Radiology Images    Study Result    Narrative & Impression   PROCEDURE:  CT CHEST WO CONTRAST DIAGNOSTIC     COMPARISON: [facility], CT, CT CHEST WO CONTRAST DIAGNOSTIC, 4/13/2022, 11:58.     INDICATIONS:  GENERALIZED SOB WITH CHECK LUNG NODULE hypermetabolic 1.5 centimeter nodule right   middle lobe.  Smoker.     TECHNIQUE:    CT images were created without the administration of contrast material.       PROTOCOL:     Standard imaging protocol performed                 RADIATION:      DLP: 184.7mGy*cm               Automated exposure control was utilized to minimize radiation dose.      FINDINGS:          There are hilar mediastinal lymph nodes which is increased in size since previous study.  They are   not pathologically enlarged but are enlarged previous study.  For instance, pre-vascular lymph node   measures 7 millimeter short axis on image number 45.  Previously it measured about 5 millimeters.    AP window lymph node measures 7 millimeters in short axis on image number 61. Previously this   measured about 5 millimeters in short axis.  There also calcified hilar mediastinal lymph nodes.    There is severe coronary artery atherosclerotic calcification.  The heart size is normal.  The   ascending thoracic aorta measures up to 4.1 centimeters which is mildly aneurysmal.  There is no   pericardial effusion.  Descending thoracic aorta has normal size.     The right middle lobe pulmonary nodule is significantly  increased in size.  On image number 48   measures 2.6 x 1.9 centimeters.  There is mild-to-moderate upper lobe predominant emphysema.3 small   2 millimeter nodule in the superior lingula image number 35. This is unchanged.  No endobronchial   lesions.  No pleural effusions.     Status post cholecystectomy.  Upper abdomen otherwise normal appearance.  Soft tissues are   unremarkable.     No aggressive focal lytic or sclerotic osseous lesions.       IMPRESSION:                 Increased size of irregular marginated noncalcified pulmonary nodule in the right middle lobe   measuring maximally 2.6 centimeters.  Increased size of mediastinal lymph nodes since previous   study.  Lung cancer is most likely and has progressed.  No definite metastatic findings.  The lymph nodes could be better evaluated with PET-CT.  Mild to moderate emphysema.  Ascending thoracic aortic aneurysm.  Severe coronary artery atherosclerotic calcification.            ANN GOMES MD         Electronically Signed and Approved By: ANN GOMES MD on 2022 at 15:23          Theresa M Gabehart  Regadenoson Stress Test With Myocardial Perfusion SPECT (Multi Study)  Order# [id number]  Reading physician: Danny Osborn MD Ordering physician: Helen Pulliam MD Study date: 22       Patient Information    Patient Name   Theresa M Gabehart MRN   [id number] Legal Sex   Female  (Age)   1960 (61 y.o.)         Interpretation Summary    · Left ventricular ejection fraction is normal. (Calculated EF = 51%).  · Myocardial perfusion imaging indicates a normal myocardial perfusion study with no evidence of ischemia.  · Impressions are consistent with a low risk study.  · Findings consistent with a normal ECG stress test.              Pulmonary Function Test: Patient Communication     Add Comments   Not seen     Results  Pulmonary Function Test (Order [id number])      Order-Level Documents:    Scan on 3/11/2022 by Chris Sánchez MD:  PULMONARY FUNCTION TEST, [facility], 03/11/2022         Author: -- Service: -- Author Type: --   Filed:  Date of Service:  Creation Time:    Status: (Other)   Spirometry  FEV1/FVC 73%  FEV1 109% of predicted  % of predicted  There is no significant response to bronchodilator therapy seen        Lung volumes  Total lung capacity 120% of predicted  Residual volume 110% of predicted  Expiratory reserve volume 263% of predicted        Diffusion  DLCO is 64% of predicted        Interpretation  Spirometry shows no obstruction  There is no significant response to bronchodilator therapy seen  Lung volumes show air trapping and hyperinflation noted  DLCO is mildly reduced        Order   Pulmonary Function Test (Order # [id number]) on 11/23/2021   View Encounter      Von Willebrand Panel  Order: [id number]   Status: Final result     Visible to patient: No (not released)     Next appt: 10/18/2022 at 11:00 AM in Multidisciplinary (Helen Pulliam MD)     Dx: Von Willebrand disease; Prolonged PTT    Specimen Information: Arm, Left; Blood         0 Result Notes    Component   Ref Range & Units 2 mo ago    Factor VIII Activity   56 - 140 % 66    Von Willebrand Ag   50 - 200 % 80    VWF Activity   50 - 200 % 85    Resulting Agency [facility]             Narrative  Performed by: [facility]  Test(s) 684103-iki Willebrand Factor (vWF) Ag   was developed and its performance characteristics determined   by [facility]. It has not been cleared or approved by the Food   and Drug Administration.   Performed at:  01 - [facility]  [id number]   : Cherry Portillo MD, Phone:  [phone]      Specimen Collected: 07/27/22 14:41 Last Resulted: 07/29/22 13:06            Coag Studies Interp Report  Order: [id number] - Reflex for Order [id number]   Status: Final result     Visible to patient: No (not released)     Next appt: 10/18/2022 at 11:00 AM in Multidisciplinary (Helen Pulliam MD)     Dx: Von  Willebrand disease; Prolonged PTT    Specimen Information: Arm, Left; Blood         0 Result Notes    Component 2 mo ago    von Willebrand Interp Note    Comment: -------------------------------   COAGULATION:   VON WILLEBRAND FACTOR ASSESSMENT CURRENT RESULTS ASSESSMENT   The VWF:Ag is normal. The VWF:RCo is normal. The FVIII is   normal.   VON WILLEBRAND FACTOR ASSESSMENT CURRENT RESULTS   INTERPRETATION   -   These results are not consistent with a diagnosis of VWD   according to the current NHLBI guideline.   VON WILLEBRAND FACTOR ASSESSMENT   -   Results may be falsely elevated and possibly falsely normal   as VWF and FVIII may increase in samples drawn from patients   (particularly children) who are visibly stressed at the time   of phlebotomy, as acute phase reactants, or in response to   certain drug therapies such as desmopressin. Repeat testing   may be necessary before excluding a diagnosis of VWD   especially if the clinical suspicion is high for an   underlying bleeding disorder. The setting for phlebotomy   should be as calm as possible and patients should be   encouraged to sit quietly prior to the blood draw.   VON WILLEBRAND FACTOR ASSESSMENT DEFINITIONS   -   VWD - von Willebrand disease; VWF - von Willebrand factor;   VWF:Ag - VWF antigen; VWF:RCo - VWF ristocetin cofactor   activity; FVIII - factor VIII activity.   MEDICAL DIRECTOR:   For questions regarding panel interpretation, please contact   Sam Galeana M.D. at [facility] at   [phone].   -------------------------------   DISCLAIMER   These assessments and interpretations are provided as a   convenience in support of the physician-patient relationship   and are not intended to replace the physician's clinical   judgment. They are derived from national guidelines in   addition to other evidence and expert opinion. The clinician   should consider this information within the context of   clinical opinion and the  individual patient.   SEE GUIDANCE FOR VON WILLEBRAND FACTOR ASSESSMENT: (1) The   National Heart, Lung and Blood [place]. The Diagnosis,   Evaluation and Management of von Willebrand Disease.   [place], [place]: National Institutes of Health Publication   . 2007. Available at   http://www.nhlbi.nih.gov/guidelines/vwd/. (2) Benedict CANTU et   al. Am J Hematol. 2009; 84(6):366-370. (3) Davi M et al.   Haemophilia. 2004;10(3):199-217. (4) Darcy BENJAMIN et al.   Haemophilia. 2004; 10(3):218-231.   Resulting Agency [facility]             Narrative  Performed by: [facility]  Performed at:  02 - [facility]   [address] [place] [address] [address] [place]  [id number]   : Collin Tobin PhD, Phone:  [phone]      Specimen Collected: 07/27/22 14:41 Last Resulted: 07/29/22 13:06            Contains abnormal data CBC Auto Differential  Order: [id number] - Part of Panel Order [id number]   Status: Final result     Visible to patient: No (not released)     Dx: Von Willebrand disease; Pre-procedure...    Specimen Information: Arm, Left; Blood         0 Result Notes    Component   Ref Range & Units 3 mo ago    WBC   3.40 - 10.80 10*3/mm3 8.41    RBC   3.77 - 5.28 10*6/mm3 4.25    Hemoglobin   12.0 - 15.9 g/dL 13.6    Hematocrit   34.0 - 46.6 % 39.8    MCV   79.0 - 97.0 fL 93.6    MCH   26.6 - 33.0 pg 32.0    MCHC   31.5 - 35.7 g/dL 34.2    RDW   12.3 - 15.4 % 12.3    RDW-SD   37.0 - 54.0 fl 41.3    MPV   6.0 - 12.0 fL 10.0    Platelets   140 - 450 10*3/mm3 283    Neutrophil %   42.7 - 76.0 % 48.0    Lymphocyte %   19.6 - 45.3 % 41.1    Monocyte %   5.0 - 12.0 % 8.0    Eosinophil %   0.3 - 6.2 % 2.0    Basophil %   0.0 - 1.5 % 0.7    Immature Grans %   0.0 - 0.5 % 0.2    Neutrophils, Absolute   1.70 - 7.00 10*3/mm3 4.03    Lymphocytes, Absolute   0.70 - 3.10 10*3/mm3 3.46 High     Monocytes, Absolute   0.10 - 0.90 10*3/mm3 0.67    Eosinophils, Absolute   0.00 - 0.40 10*3/mm3 0.17    Basophils, Absolute   0.00 - 0.20 10*3/mm3 0.06     Immature Grans, Absolute   0.00 - 0.05 10*3/mm3 0.02    nRBC   0.0 - 0.2 /100 WBC 0.0    Resulting Agency [facility]              Specimen Collected: 07/08/22 14:35 Last Resulted: 07/08/22 22:21                  ASSESSMENT & PLAN    Patient Active Problem List   Diagnosis   • Age related osteoporosis   • Annular tear of lumbar disc   • Anxiety disorder   • Arthritis   • Atopic neurodermatitis   • Carpal tunnel syndrome of right wrist   • Chronic idiopathic constipation   • Chronic obstructive pulmonary disease (HCC)   • Compression of brain (HCC)   • Fibromyalgia   • Herniated lumbar intervertebral disc   • Rotator cuff arthropathy of right shoulder   • Seasonal allergies   • Simple chronic bronchitis (HCC)   • Solitary pulmonary nodule   • Tobacco user   • Von Willebrand disease       Diagnoses and all orders for this visit:    1. Pulmonary nodule (Primary)    2. Nicotine dependence, cigarettes, uncomplicated    3. Pulmonary emphysema, unspecified emphysema type (HCC)    4. Ascending aortic aneurysm, unspecified whether ruptured  -     Ambulatory Referral to Vascular Surgery    5. Coronary artery calcification seen on CT scan  -     Ambulatory Referral to Cardiology       Plan:  I discussed with patient findings of her CT scan from 9/27/2022 which showed an increased size of right middle lung nodule, a sending thoracic aneurysm, coronary artery calcifications.  Discussed with patient findings from her pulmonary function test.   Continue to followup with thoracic surgeon, Dr. Pulliam, as scheduled on 10/18/2022.  Continue with albuterol inhaler as needed for shortness of air.  Continue with Duo-Nebs and Symbicort as prescribed for emphysema.  Referral to vascular to follow-up on a sending thoracic aneurysm.  Referral to cardiology, Dr. Osborn to follow-up on coronary artery calcifications per CT scan, as patient has been a prior patient.  Patient to follow-up in 6 to 8 weeks or sooner if needed.    Smoking  status:current  Vaccination status:declines  Medications personally reviewed    Follow Up  Return in about 6 weeks (around 11/18/2022) for Dr. Bonds or Jaentt Chavarria NP.    Patient was given instructions and counseling regarding her condition or for health maintenance advice. Please see specific information pulled into the AVS if appropriate.

## 2022-10-07 NOTE — PROGRESS NOTES
"Chief Complaint  Von Willebrand Disease    Carrie Grace,*  Carrie Grace, APRN      Subjective          Theresa M Gabehart presents to [facility] for follow up for lab results.     History of Present Illness   Mr. Theresa Gabehart presents in motorized scooter. She was seen initially as referral from her PCP for possible von-willebrand's disease. Patient reports she was told as a child she was a \"free bleeder\". History of frequent nose bleedings and rectal bleeding and vaginal hemorrhage after twin boys. PTT was slightly elevated on 7/8/22 of 36.7. Patient seen in our office on 7/28/22 and von willebrand panel was drawn. Results came back as normal and she does not have VW. Factor 8 level was normal as well.     She does reports she is following with Dr. Pulliam and pulmonary for a spot on her lung and has follow up with pulmonary today and follow up with Dr. Pulliam on 10/19/22.     She denies any excessive bleeding or bruising noted.         Oncology/Hematology History    No history exists.       Review of Systems   Constitutional: Negative for appetite change, diaphoresis, fatigue, fever, unexpected weight gain and unexpected weight loss.   HENT: Negative for hearing loss, mouth sores, sore throat, swollen glands, trouble swallowing and voice change.    Eyes: Negative for blurred vision.   Respiratory: Negative for cough, shortness of breath and wheezing.    Cardiovascular: Negative for chest pain and palpitations.   Gastrointestinal: Negative for abdominal pain, blood in stool, constipation, diarrhea, nausea and vomiting.   Endocrine: Negative for cold intolerance and heat intolerance.   Genitourinary: Negative for difficulty urinating, dysuria, frequency, hematuria and urinary incontinence.   Musculoskeletal: Negative for arthralgias, back pain and myalgias.   Skin: Negative for rash, skin lesions and wound.   Neurological: Negative for dizziness, seizures, " weakness, numbness and headache.   Hematological: Does not bruise/bleed easily.   Psychiatric/Behavioral: Negative for depressed mood. The patient is not nervous/anxious.        Current Outpatient Medications on File Prior to Visit   Medication Sig Dispense Refill   • albuterol sulfate  (90 Base) MCG/ACT inhaler Inhale 2 puffs Every 4 (Four) Hours As Needed for Wheezing. 18 g 11   • [employer] Calcium 600 MG tablet Take 1 tablet by mouth 2 (Two) Times a Day With Meals.     • HYDROcodone-acetaminophen (NORCO) 5-325 MG per tablet      • ipratropium-albuterol (DUO-NEB) 0.5-2.5 mg/3 ml nebulizer Take 3 mL by nebulization 4 (Four) Times a Day As Needed for Wheezing. 360 mL 3   • Linzess 145 MCG capsule capsule TAKE 1 CAPSULE BY MOUTH EVERY DAY FOR 30 DAYS AS NEEDED     • multivitamin with minerals tablet tablet Take 1 tablet by mouth Daily.     • Probiotic Product (PROBIOTIC BLEND PO) Take 1 tablet by mouth.     • Symbicort 160-4.5 MCG/ACT inhaler      • vitamin D (ERGOCALCIFEROL) 1.25 MG (29638 UT) capsule capsule      • Zinc Sulfate (ZINC 15 PO) Take 1 each by mouth.       No current facility-administered medications on file prior to visit.       Allergies   Allergen Reactions   • Clarithromycin Unknown - High Severity   • Meperidine Unknown - High Severity   • Metronidazole Unknown - High Severity   • Doxycycline Calcium Rash   • Gabapentin Rash     Past Medical History:   Diagnosis Date   • Allergic rhinitis    • Arnold-Chiari syndrome (HCC)    • Bleeding disorder ([facility])    • Carpal tunnel syndrome    • Collar bone fracture     history of   • Colon polyps    • COPD (chronic obstructive pulmonary disease) ([facility])    • Dupuytren's contracture syndrome    • Emphysema lung ([facility])    • Fibromyalgia    • History of fractured vertebra     18 fractured   • History of pelvic fracture    • Neuropathy    • Osteoporosis    • Vertigo    • Von Willebrand disease      Past Surgical History:   Procedure Laterality Date   •   SECTION     • CHOLECYSTECTOMY     • COLONOSCOPY     • ENDOSCOPY     • HYSTERECTOMY     • KNEE ARTHROSCOPY     • ORTHOPEDIC SURGERY     • TONSILLECTOMY AND ADENOIDECTOMY       Social History     Socioeconomic History   • Marital status:    Tobacco Use   • Smoking status: Current Every Day Smoker     Packs/day: 1.50     Years: 30.00     Pack years: 45.00     Types: Cigarettes   • Smokeless tobacco: Never Used   Vaping Use   • Vaping Use: Former   • Substances: Nicotine   • Devices: Refillable tank   Substance and Sexual Activity   • Alcohol use: Defer   • Drug use: Defer   • Sexual activity: Defer     Family History   Problem Relation Age of Onset   • Lung cancer Mother    • Diabetes Sister        There is no immunization history on file for this patient.    Objective   Physical Exam  Vitals and nursing note reviewed.   Constitutional:       Appearance: Normal appearance. She is normal weight.   HENT:      Nose: Nose normal.      Mouth/Throat:      Mouth: Mucous membranes are moist.   Eyes:      Pupils: Pupils are equal, round, and reactive to light.   Cardiovascular:      Rate and Rhythm: Regular rhythm.      Pulses: Normal pulses.      Heart sounds: Normal heart sounds.   Pulmonary:      Effort: Pulmonary effort is normal. No respiratory distress.      Breath sounds: Normal breath sounds.   Abdominal:      Palpations: Abdomen is soft.   Musculoskeletal:         General: Normal range of motion.      Cervical back: Normal range of motion and neck supple.   Skin:     General: Skin is warm and dry.   Neurological:      General: No focal deficit present.      Mental Status: She is alert and oriented to person, place, and time.   Psychiatric:         Mood and Affect: Mood normal.         Behavior: Behavior normal.         Thought Content: Thought content normal.         Judgment: Judgment normal.         Vitals:    10/07/22 1254   BP: 115/85   Pulse: 84   Resp: 18   Temp: 98.5 °F (36.9 °C)   SpO2: 100%   Weight:  49 kg (108 lb)   PainSc: 0-No pain     ECOG score: 3         ECOG: (1) Restricted in Physically Strenuous Activity, Ambulatory & Able to Do Work of Light Nature  Fall Risk Assessment was completed, and patient is at moderate risk for falls.  PHQ-9 Total Score:         The patient is  experiencing fatigue. Fatigue score: 1    PT/OT Functional Screening: PT fx screen: Weakness  Speech Functional Screening: Speech fx screen: No needs identified  Rehab to be ordered: Rehab to be ordered: No needs identified        Result Review :   The following data was reviewed by: BLANCA Monterroso on 10/07/2022:  Lab Results   Component Value Date    HGB 13.6 07/08/2022    HCT 39.8 07/08/2022    MCV 93.6 07/08/2022     07/08/2022    WBC 8.41 07/08/2022    NEUTROABS 4.03 07/08/2022    LYMPHSABS 3.46 (H) 07/08/2022    MONOSABS 0.67 07/08/2022    EOSABS 0.17 07/08/2022    BASOSABS 0.06 07/08/2022     No results found for: GLUCOSE, BUN, CREATININE, NA, K, CL, CO2, CALCIUM, PROTEINTOT, ALBUMIN, BILITOT, ALKPHOS, AST, ALT       Assessment and Plan    Diagnoses and all orders for this visit:    1. Von Willebrand disease      VW panel was normal. CBC is normal.   Denies any excessive bleeding or persistent bruising.     No need for further follow up unless she is having problems.       Patient Follow Up: PRN with MD    Patient was given instructions and counseling regarding her condition or for health maintenance advice. Please see specific information pulled into the AVS if appropriate.     BLANCA Monterroso    10/7/2022

## 2022-10-13 ENCOUNTER — OFFICE VISIT (OUTPATIENT)
Dept: CARDIOLOGY | Facility: CLINIC | Age: 62
End: 2022-10-13

## 2022-10-13 VITALS
SYSTOLIC BLOOD PRESSURE: 109 MMHG | WEIGHT: 115.2 LBS | BODY MASS INDEX: 18.08 KG/M2 | HEIGHT: 67 IN | HEART RATE: 87 BPM | DIASTOLIC BLOOD PRESSURE: 69 MMHG

## 2022-10-13 DIAGNOSIS — R06.02 SHORTNESS OF BREATH: Primary | ICD-10-CM

## 2022-10-13 DIAGNOSIS — Z72.0 NICOTINE USE: ICD-10-CM

## 2022-10-13 PROCEDURE — 93000 ELECTROCARDIOGRAM COMPLETE: CPT | Performed by: SPECIALIST

## 2022-10-13 PROCEDURE — 99203 OFFICE O/P NEW LOW 30 MIN: CPT | Performed by: SPECIALIST

## 2022-10-13 NOTE — PROGRESS NOTES
[facility]   Cardiology Consult Note    Patient Name: Theresa M Gabehart  : 1960  Referring Physician: No ref. provider found  Subjective   Subjective     Reason for Consult/ Chief Complaint:   Chief Complaint   Patient presents with   • Establish Care   • Shortness of Breath   • Results     Stress test results.       HPI:  Theresa M Gabehart is a 61 y.o. female with coronary calcification on routine CT.  Has shortness of breath on exertion.  Has had a sestamibi stress test.  No chest pain.  No exertional angina.      Review of Systems:    Constitutional no fever,  no weight loss   Skin no rash   Otolaryngeal no difficulty swallowing   Cardiovascular See HPI   Pulmonary no cough, no sputum production   Gastrointestinal no constipation, no diarrhea   Genitourinary no dysuria, no hematuria   Hematologic no easy bruisability, no abnormal bleeding   Musculoskeletal no muscle pain   Neurologic no dizziness, no falls       Personal History     Past Medical History:  Past Medical History:   Diagnosis Date   • Allergic rhinitis    • Arnold-Chiari syndrome (HCC)    • Bleeding disorder (HCC)    • Carpal tunnel syndrome    • Collar bone fracture     history of   • Colon polyps    • COPD (chronic obstructive pulmonary disease) (HCC)    • Dupuytren's contracture syndrome    • Emphysema lung (HCC)    • Fibromyalgia    • History of fractured vertebra     18 fractured   • History of pelvic fracture    • Neuropathy    • Osteoporosis    • Vertigo    • Von Willebrand disease        Family History:   Family History   Problem Relation Age of Onset   • Lung cancer Mother    • Diabetes Sister        Social History:  reports that she has been smoking cigarettes. She has a 30.00 pack-year smoking history. She has never used smokeless tobacco. Drug use questions deferred to the physician. She reports that she does not drink alcohol.    Home Medications:  HYDROcodone-acetaminophen, Probiotic Product, Zinc Sulfate, albuterol  sulfate HFA, budesonide-formoterol, calcium, ipratropium-albuterol, linaclotide, multivitamin with minerals, and vitamin D    Allergies:  Allergies   Allergen Reactions   • Clarithromycin Unknown - High Severity   • Meperidine Unknown - High Severity   • Metronidazole Unknown - High Severity   • Doxycycline Calcium Rash   • Gabapentin Rash       Objective    Objective     Vitals:   Heart Rate:  [87] 87  BP: (109)/(69) 109/69  Body mass index is 18.04 kg/m².  PHYSICAL EXAM:    General Appearance:   · well developed  · well nourished  HENT:   · oropharynx moist  · lips not cyanotic  Neck:  · thyroid not enlarged  · supple  Respiratory:  · no respiratory distress  · normal breath sounds  · no rales  Cardiovascular:  · no jugular venous distention  · regular rhythm  · apical impulse normal  · S1 normal, S2 normal  · no S3, no S4   · no murmur  · no rub, no thrill  · carotid pulses normal; no bruit  · pedal pulses normal  · lower extremity edema: none    Skin:   · warm, dry  Psychiatric:  · judgement and insight appropriate  · normal mood and affect    RESULTS:          Result Review    Result Review:  I have personally reviewed the available results:  [x]  Laboratory  [x]  EKG/Telemetry   [x]  Cardiology/Vascular   [x] Medications  [x]  Old records          ECG 12 Lead    Date/Time: 10/13/2022 1:31 PM  Performed by: Danny Osborn MD  Authorized by: Danny Osborn MD   Rhythm: sinus rhythm  Rate: normal  QRS axis: normal    Clinical impression: normal ECG             Impression/Plan  1. Coronary artery disease/coronary calcification: Sestamibi stress test negative for ischemia.  No chest pain.  Discussed with patient results of her stress test.  2.  Shortness of breath: Echocardiogram to evaluate left ventricular systolic function.  Continue current bronchodilators.  3.  Positive nicotine use: Smoking cessation discussed with patient.        Electronically signed by Danny Osborn MD, 10/13/22, 1:25 PM  EDT.

## 2022-10-13 NOTE — PATIENT INSTRUCTIONS
Managing the Challenge of Quitting Smoking  Quitting smoking is a physical and mental challenge. You will face cravings, withdrawal symptoms, and temptation. Before quitting, work with your health care provider to make a plan that can help you manage quitting. Preparation can help you quit and keep you from giving in.  How to manage lifestyle changes  Managing stress  Stress can make you want to smoke, and wanting to smoke may cause stress. It is important to find ways to manage your stress. You might try some of the following:  Practice relaxation techniques.  Breathe slowly and deeply, in through your nose and out through your mouth.  Listen to music.  Soak in a bath or take a shower.  Imagine a peaceful place or vacation.  Get some support.  Talk with family or friends about your stress.  Join a support group.  Talk with a counselor or therapist.  Get some physical activity.  Go for a walk, run, or bike ride.  Play a favorite sport.  Practice yoga.    Medicines  Talk with your health care provider about medicines that might help you deal with cravings and make quitting easier for you.  Relationships  Social situations can be difficult when you are quitting smoking. To manage this, you can:  Avoid parties and other social situations where people might be smoking.  Avoid alcohol.  Leave right away if you have the urge to smoke.  Explain to your family and friends that you are quitting smoking. Ask for support and let them know you might be a bit grumpy.  Plan activities where smoking is not an option.  General instructions  Be aware that many people gain weight after they quit smoking. However, not everyone does. To keep from gaining weight, have a plan in place before you quit and stick to the plan after you quit. Your plan should include:  Having healthy snacks. When you have a craving, it may help to:  Eat popcorn, carrots, celery, or other cut vegetables.  Chew sugar-free gum.  Changing how you eat.  Eat small  portion sizes at meals.  Eat 4-6 small meals throughout the day instead of 1-2 large meals a day.  Be mindful when you eat. Do not watch television or do other things that might distract you as you eat.  Exercising regularly.  Make time to exercise each day. If you do not have time for a long workout, do short bouts of exercise for 5-10 minutes several times a day.  Do some form of strengthening exercise, such as weight lifting.  Do some exercise that gets your heart beating and causes you to breathe deeply, such as walking fast, running, swimming, or biking. This is very important.  Drinking plenty of water or other low-calorie or no-calorie drinks. Drink 6-8 glasses of water daily.    How to recognize withdrawal symptoms  Your body and mind may experience discomfort as you try to get used to not having nicotine in your system. These effects are called withdrawal symptoms. They may include:  Feeling hungrier than normal.  Having trouble concentrating.  Feeling irritable or restless.  Having trouble sleeping.  Feeling depressed.  Craving a cigarette.  To manage withdrawal symptoms:  Avoid places, people, and activities that trigger your cravings.  Remember why you want to quit.  Get plenty of sleep.  Avoid coffee and other caffeinated drinks. These may worsen some of your symptoms.  These symptoms may surprise you. But be assured that they are normal to have when quitting smoking.  How to manage cravings  Come up with a plan for how to deal with your cravings. The plan should include the following:  A definition of the specific situation you want to deal with.  An alternative action you will take.  A clear idea for how this action will help.  The name of someone who might help you with this.  Cravings usually last for 5-10 minutes. Consider taking the following actions to help you with your plan to deal with cravings:  Keep your mouth busy.  Chew sugar-free gum.  Suck on hard candies or a straw.  Brush your  teeth.  Keep your hands and body busy.  Change to a different activity right away.  Squeeze or play with a ball.  Do an activity or a hobby, such as making bead jewelry, practicing needlepoint, or working with wood.  Mix up your normal routine.  Take a short exercise break. Go for a quick walk or run up and down stairs.  Focus on doing something kind or helpful for someone else.  Call a friend or family member to talk during a craving.  Join a support group.  Contact a quitline.  Where to find support  To get help or find a support group:  Call the [employer] [place]'s Smoking Quitline: 2-513-QUIT NOW (944-7445)  Visit the website of the Substance Abuse and Mental Health Services Administration: www.samhsa.gov  Text QUIT to SmokefreeTXT: 784193  Where to find more information  Visit these websites to find more information on quitting smoking:  [employer] [place]: www.smokefree.gov  American Lung Association: www.lung.org  American Cancer Society: www.cancer.org  Centers for Disease Control and Prevention: www.cdc.gov  American Heart Association: www.heart.org  Contact a health care provider if:  You want to change your plan for quitting.  The medicines you are taking are not helping.  Your eating feels out of control or you cannot sleep.  Get help right away if:  You feel depressed or become very anxious.  Summary  Quitting smoking is a physical and mental challenge. You will face cravings, withdrawal symptoms, and temptation to smoke again. Preparation can help you as you go through these challenges.  Try different techniques to manage stress, handle social situations, and prevent weight gain.  You can deal with cravings by keeping your mouth busy (such as by chewing gum), keeping your hands and body busy, calling family or friends, or contacting a quitline for people who want to quit smoking.  You can deal with withdrawal symptoms by avoiding places where people smoke, getting plenty of rest, and  avoiding drinks with caffeine.  This information is not intended to replace advice given to you by your health care provider. Make sure you discuss any questions you have with your health care provider.  Document Revised: 10/06/2020 Document Reviewed: 10/06/2020  Elsevier Patient Education © 2022 [employer]

## 2022-10-18 ENCOUNTER — OFFICE VISIT (OUTPATIENT)
Dept: OTHER | Facility: HOSPITAL | Age: 62
End: 2022-10-18

## 2022-10-18 VITALS
BODY MASS INDEX: 18.05 KG/M2 | SYSTOLIC BLOOD PRESSURE: 110 MMHG | WEIGHT: 115 LBS | HEART RATE: 81 BPM | OXYGEN SATURATION: 100 % | DIASTOLIC BLOOD PRESSURE: 70 MMHG | HEIGHT: 67 IN

## 2022-10-18 DIAGNOSIS — C34.91 NSCLC OF RIGHT LUNG: Primary | ICD-10-CM

## 2022-10-18 DIAGNOSIS — R91.1 LUNG NODULE: ICD-10-CM

## 2022-10-18 PROCEDURE — 99214 OFFICE O/P EST MOD 30 MIN: CPT | Performed by: THORACIC SURGERY (CARDIOTHORACIC VASCULAR SURGERY)

## 2022-10-18 NOTE — PROGRESS NOTES
"Chief Complaint  Lung nodule    Subjective        Sabinasa M Gabehart presents to [facility]  History of Present Illness   Ms. Gabehart is a very pleasant 61-year-old lady who presents for follow-up for lung nodule.    The patient reports that she is feeling okay. She states that she has a hiatal hernia and needs her esophagus stretched. She reports she had an appointment for an echocardiogram this morning, but it was canceled because Dr. Osborn went on a 2-week vacation. She notes her appointment is not until 12/07/2022.    Objective   Vital Signs:  /70   Pulse 81   Ht 170.2 cm (67\")   Wt 52.2 kg (115 lb)   SpO2 100%   BMI 18.01 kg/m²   Estimated body mass index is 18.04 kg/m² as calculated from the following:    Height as of 10/13/22: 170.2 cm (67\").    Weight as of 10/13/22: 52.3 kg (115 lb 3.2 oz).          Physical Exam  Vitals and nursing note reviewed.   Constitutional:       Appearance: She is well-developed.   HENT:      Head: Normocephalic and atraumatic.      Nose: Nose normal.   Eyes:      Conjunctiva/sclera: Conjunctivae normal.   Cardiovascular:      Rate and Rhythm: Normal rate.   Pulmonary:      Effort: Pulmonary effort is normal.   Abdominal:      Palpations: Abdomen is soft.   Musculoskeletal:      Cervical back: Neck supple.   Skin:     General: Skin is warm and dry.   Neurological:      Mental Status: She is alert and oriented to person, place, and time.   Psychiatric:         Behavior: Behavior normal.         Thought Content: Thought content normal.         Judgment: Judgment normal.        Result Review :         I have independently reviewed the CT of the chest performed on 09/27/2022 which demonstrates an increase in the right middle lobe nodule now measuring 2.6 cm consistent with a malignancy. There are no pathologically enlarged mediastinal or hilar lymph nodes. No pleural or pericardial effusion.    Stress test consistent with normal EKG " stress.    Pulmonary function studies demonstrate an FEV1 of 3.09 or 100% of predicted and a DLCO of 15 or 64% of predicted.         Assessment and Plan   Diagnoses and all orders for this visit:    1. NSCLC of right lung (HCC) (Primary)  -     Ambulatory Referral to Radiation Oncology  -     CT Chest Without Contrast; Future    2. Lung nodule  -     Ambulatory Referral to Radiation Oncology      Ms. Gabehart is a pleasant 61-year-old lady with an enlarging right middle lobe lesion now measuring 2.6 cm consistent with malignancy. She has difficulty making it to appointments given her reduced mobility, this has not been biopsied. Given her overall mobility issues and overall health, I do not think she is a good surgical candidate. I would recommend treating this middle lobe lesion with stereotactic radiation. There is some comment of some enlarging mediastinal and hilar lymph nodes; however, they are all subcentimeter and are within normal limits. I would not recommend any biopsy of these. We discussed stereotactic radiation treatment would be 3-5 treatments depending on what the radiation oncologist decides to do. She will need continued surveillance so will plan to see her back in 4 month for CT of the chest.     I spent 30 minutes caring for Sabina on this date of service. This time includes time spent by me in the following activities:preparing for the visit, reviewing tests, obtaining and/or reviewing a separately obtained history, performing a medically appropriate examination and/or evaluation , counseling and educating the patient/family/caregiver, ordering medications, tests, or procedures, documenting information in the medical record and independently interpreting results and communicating that information with the patient/family/caregiver  Follow Up   No follow-ups on file.   The patient will follow up in 4 months with a CT scan.  Patient was given instructions and counseling regarding her condition or  for health maintenance advice. Please see specific information pulled into the AVS if appropriate.     Transcribed from ambient dictation for Helen Pulliam MD by Ella Lorenzana.  10/18/22   14:11 EDT    Patient or patient representative verbalized consent to the visit recording.  I have personally performed the services described in this document as transcribed by the above individual, and it is both accurate and complete.

## 2022-10-24 ENCOUNTER — CONSULT (OUTPATIENT)
Dept: RADIATION ONCOLOGY | Facility: HOSPITAL | Age: 62
End: 2022-10-24

## 2022-10-24 VITALS
TEMPERATURE: 99.1 F | OXYGEN SATURATION: 100 % | WEIGHT: 111.99 LBS | RESPIRATION RATE: 16 BRPM | DIASTOLIC BLOOD PRESSURE: 76 MMHG | SYSTOLIC BLOOD PRESSURE: 110 MMHG | BODY MASS INDEX: 17.54 KG/M2 | HEART RATE: 82 BPM

## 2022-10-24 DIAGNOSIS — R91.1 SOLITARY PULMONARY NODULE: ICD-10-CM

## 2022-10-24 DIAGNOSIS — C34.2 PRIMARY MALIGNANT NEOPLASM OF RIGHT MIDDLE LOBE OF LUNG: Primary | ICD-10-CM

## 2022-10-24 PROCEDURE — 99204 OFFICE O/P NEW MOD 45 MIN: CPT | Performed by: RADIOLOGY

## 2022-10-24 PROCEDURE — G0463 HOSPITAL OUTPT CLINIC VISIT: HCPCS | Performed by: RADIOLOGY

## 2022-10-24 NOTE — PROGRESS NOTES
New Patient Office Visit      Encounter Date: 10/24/2022   Patient Name: Theresa M Gabehart  YOB: 1960   Medical Record Number: [id number]   Primary Diagnosis: Primary malignant neoplasm of right middle lobe of lung (HCC) [C34.2]   Cancer Staging: Cancer Staging   No matching staging information was found for the patient.    Chief Complaint:    Chief Complaint   Patient presents with   • Consult   • Lung Cancer       History of Present Illness: Theresa M Gabehart is a 61-year-old female with a right middle lobe nodule consistent with primary lung malignancy who is seen in consultation regarding radiotherapy as definitive management.  Ms. Gabehart has a history of emphysema and was evaluated with a CT scan revealing a 1.5 cm right middle lobe nodule.  She subsequently underwent a PET scan on 4/20/2022 revealing this nodule to be FDG avid with a max SUV of 2.3.  She does have a history of von Willebrand's disease thus was not advised to undergo CT-guided biopsy.  She did undergo repeat CT scan of the chest without contrast on 9/27/2022 revealing increased size of the irregular marginated noncalcified pulmonary nodule within the right middle lobe, now measuring 2.6 cm.  There was increased size of mediastinal lymph nodes since last evaluated.  Ms. Gabehart does have chronic dyspnea on exertion and reports an approximate 15 pound weight loss over the past year.  She denies headaches, vision changes, focal weakness, confusion or paresthesias.  Ms. Gabehart was evaluated by Dr. Pulliam and deemed not an appropriate surgical candidate due to her history of von Willebrand's disease as well as her advanced COPD.      Subjective          Review of Systems: Review of Systems   Constitutional: Positive for unexpected weight change. Negative for appetite change and fatigue.   HENT: Positive for trouble swallowing (needs esophageal dilation ). Negative for sore throat and tinnitus.    Eyes: Positive for visual  disturbance (floaters).   Respiratory: Positive for shortness of breath (occasional with exertion). Negative for cough.    Cardiovascular: Positive for chest pain (occasional ) and palpitations (occasional ).        Aortic Aneurysm   Gastrointestinal: Negative for constipation, diarrhea and nausea.        GERD   Genitourinary: Positive for frequency and urgency. Negative for difficulty urinating and dysuria.   Musculoskeletal: Positive for arthralgias, back pain and gait problem (broken back several times).   Skin: Negative for rash.   Neurological: Positive for weakness. Negative for dizziness and headaches.        Neuropathy     Psychiatric/Behavioral: Positive for sleep disturbance (hasnt been sleeping well since cat has been sick). Negative for agitation and suicidal ideas.       Past Medical History:   Past Medical History:   Diagnosis Date   • Allergic rhinitis    • Arnold-Chiari syndrome (Formerly [facility])    • Bleeding disorder (Formerly [facility])    • Carpal tunnel syndrome    • Collar bone fracture     history of   • Colon polyps    • COPD (chronic obstructive pulmonary disease) (Formerly [facility])    • Dupuytren's contracture syndrome    • Emphysema lung (Formerly [facility])    • Fibromyalgia    • History of fractured vertebra     18 fractured   • History of pelvic fracture    • Lung cancer (Formerly [facility])    • Neuropathy    • Osteoporosis    • Vertigo    • Von Willebrand disease        Past Surgical History:   Past Surgical History:   Procedure Laterality Date   •  SECTION     • CHOLECYSTECTOMY     • COLONOSCOPY     • ENDOSCOPY     • HYSTERECTOMY     • KNEE ARTHROSCOPY     • ORTHOPEDIC SURGERY     • TONSILLECTOMY AND ADENOIDECTOMY     • UPPER GASTROINTESTINAL ENDOSCOPY         Family History:   Family History   Problem Relation Age of Onset   • Lung cancer Mother    • Diabetes Sister    • Breast cancer Sister        Social History:   Social History     Socioeconomic History   • Marital status:    Tobacco Use   • Smoking status: Every Day     Packs/day:  1.00     Years: 30.00     Pack years: 30.00     Types: Cigarettes     Start date: 1979   • Smokeless tobacco: Never   • Tobacco comments:     WAS SMOKING TWO PACKS DAILY, HAS CUT DOWN TO 1 PACK A DAY   Vaping Use   • Vaping Use: Former   Substance and Sexual Activity   • Alcohol use: Never   • Drug use: Never   • Sexual activity: Defer       Medications:     Current Outpatient Medications:   •  albuterol sulfate  (90 Base) MCG/ACT inhaler, Inhale 2 puffs Every 4 (Four) Hours As Needed for Wheezing., Disp: 18 g, Rfl: 11  •  [employer] Calcium 600 MG tablet, Take 1 tablet by mouth 2 (Two) Times a Day With Meals., Disp: , Rfl:   •  HYDROcodone-acetaminophen (NORCO) 5-325 MG per tablet, , Disp: , Rfl:   •  ipratropium-albuterol (DUO-NEB) 0.5-2.5 mg/3 ml nebulizer, Take 3 mL by nebulization 4 (Four) Times a Day As Needed for Wheezing., Disp: 360 mL, Rfl: 3  •  Linzess 145 MCG capsule capsule, TAKE 1 CAPSULE BY MOUTH EVERY DAY FOR 30 DAYS AS NEEDED, Disp: , Rfl:   •  multivitamin with minerals tablet tablet, Take 1 tablet by mouth Daily., Disp: , Rfl:   •  Probiotic Product (PROBIOTIC BLEND PO), Take 1 tablet by mouth., Disp: , Rfl:   •  Symbicort 160-4.5 MCG/ACT inhaler, , Disp: , Rfl:   •  vitamin D (ERGOCALCIFEROL) 1.25 MG (24956 UT) capsule capsule, , Disp: , Rfl:   •  Zinc Sulfate (ZINC 15 PO), Take 1 each by mouth., Disp: , Rfl:     Allergies:   Allergies   Allergen Reactions   • Clarithromycin Unknown - High Severity   • Meperidine Unknown - High Severity   • Metronidazole Unknown - High Severity   • Doxycycline Calcium Rash   • Gabapentin Rash       Pain: (on a scale of 0-10)   Pain Score    10/24/22 1301   PainSc: 0-No pain       Advanced Care Plan: N   Quality of Life: 80 - Restricted Physical Activity    Objective     Physical Exam:   Vital Signs:   Vitals:    10/24/22 1301   BP: 110/76   Pulse: 82   Resp: 16   Temp: 99.1 °F (37.3 °C)   TempSrc: Temporal   SpO2: 100%   Weight: 50.8 kg (111 lb 15.9 oz)   PainSc:  0-No pain     Body mass index is 17.54 kg/m².     Physical Exam  Constitutional:       General: She is not in acute distress.     Appearance: Normal appearance. She is not ill-appearing, toxic-appearing or diaphoretic.   HENT:      Head: Normocephalic and atraumatic.   Pulmonary:      Effort: Pulmonary effort is normal. No respiratory distress.   Abdominal:      General: There is no distension.      Palpations: Abdomen is soft.   Skin:     General: Skin is warm and dry.   Neurological:      General: No focal deficit present.      Mental Status: She is alert and oriented to person, place, and time.      Cranial Nerves: No cranial nerve deficit.      Gait: Gait normal.   Psychiatric:         Mood and Affect: Mood normal.         Behavior: Behavior normal.         Judgment: Judgment normal.         Results:   Radiographs: CT Chest Without Contrast Diagnostic    Result Date: 9/27/2022    Increased size of irregular marginated noncalcified pulmonary nodule in the right middle lobe measuring maximally 2.6 centimeters.  Increased size of mediastinal lymph nodes since previous study.  Lung cancer is most likely and has progressed. No definite metastatic findings.  The lymph nodes could be better evaluated with PET-CT. Mild to moderate emphysema. Ascending thoracic aortic aneurysm. Severe coronary artery atherosclerotic calcification.     ANN GOMES MD       Electronically Signed and Approved By: ANN GOMES MD on 9/27/2022 at 15:23              I personally reviewed the CT scan of the chest without contrast performed on 9/27/2022.  I additionally reviewed the PET/CT performed on 4/20/2022.  The pertinent findings are as above in HPI.    Labs:   WBC   Date Value Ref Range Status   07/08/2022 8.41 3.40 - 10.80 10*3/mm3 Final     Hemoglobin   Date Value Ref Range Status   07/08/2022 13.6 12.0 - 15.9 g/dL Final     Hematocrit   Date Value Ref Range Status   07/08/2022 39.8 34.0 - 46.6 % Final     Platelets   Date Value Ref  Range Status   07/08/2022 283 140 - 450 10*3/mm3 Final    No results found for: PSA No results found for: CEA   PFTs:FEV1 of 109% of predicted, FVC of  (115% of predicted), FEV1/FVC ratio 73%; DLCO 64% of predicted.    Assessment / Plan          Assessment/Plan:   Theresa Gabehart is a 61-year-old female with a right middle lobe nodule presumed primary lung cancer.  She additionally has some enlarging mediastinal lymph nodes that represent infectious versus inflammatory changes versus malignancy.  ECOG 1    I discussed the clinical and radiographic findings today with Ms. Gabehart.  I explained the role of radiotherapy in the management of both early stage lung cancer and advanced stage lung cancer.  I recommended repeat PET/CT given the 6-month interval since last performed.  I think this is most prudent given Ms. Gabehart cannot undergo CT-guided biopsy of the right upper lobe lesion and may not be a candidate for bronchoscopy with biopsy of her enlarging mediastinal lymph nodes.  I have also ordered an MRI of the brain with and without contrast for completion of staging.  Ms. Gabehart will return for evaluation in 2 weeks after undergoing the studies.  She was encouraged to contact me in the interim with any questions or concerns regarding her care.      Shaw Turner MD  Radiation Oncology  [facility]    This document has been signed by Shwa Turner MD on October 24, 2022 14:27 EDT

## 2022-10-25 ENCOUNTER — TELEPHONE (OUTPATIENT)
Dept: NUTRITION | Facility: HOSPITAL | Age: 62
End: 2022-10-25

## 2022-10-25 DIAGNOSIS — C34.91 PRIMARY LUNG CANCER, RIGHT: Primary | ICD-10-CM

## 2022-10-25 NOTE — PROGRESS NOTES
Nutrition referral received 10/24/22 due to MST score of 3.  Pt had reported a wt decline of 15#, however today during our conversation she stated she forgot to mention the time frame this wt loss occurred:  Over a period of 8-9 years.  Pt reports she has good access to food.  She reviewed the foods her freezer is stocked with and she cooks occasionally (home cooked meals) for her and her .  Pt reports she is eating well now and denies nutrition-related concerns at this time.    Oncology RD remains available per protocol.

## 2022-10-28 ENCOUNTER — LAB (OUTPATIENT)
Dept: LAB | Facility: HOSPITAL | Age: 62
End: 2022-10-28

## 2022-10-28 ENCOUNTER — HOSPITAL ENCOUNTER (OUTPATIENT)
Dept: MRI IMAGING | Facility: HOSPITAL | Age: 62
Discharge: HOME | End: 2022-10-28

## 2022-10-28 DIAGNOSIS — C34.2 PRIMARY MALIGNANT NEOPLASM OF RIGHT MIDDLE LOBE OF LUNG: ICD-10-CM

## 2022-10-28 LAB
CREAT BLDA-MCNC: 0.5 MG/DL
EGFRCR SERPLBLD CKD-EPI 2021: 107.5 ML/MIN/1.73

## 2022-10-28 PROCEDURE — 0 GADOBENATE DIMEGLUMINE 529 MG/ML SOLUTION: Performed by: RADIOLOGY

## 2022-10-28 PROCEDURE — 82565 ASSAY OF CREATININE: CPT

## 2022-10-28 PROCEDURE — A9577 INJ MULTIHANCE: HCPCS | Performed by: RADIOLOGY

## 2022-10-28 PROCEDURE — 70553 MRI BRAIN STEM W/O & W/DYE: CPT

## 2022-10-28 RX ADMIN — GADOBENATE DIMEGLUMINE 9 ML: 529 INJECTION, SOLUTION INTRAVENOUS at 08:20

## 2022-10-30 ENCOUNTER — HOSPITAL ENCOUNTER (EMERGENCY)
Facility: HOSPITAL | Age: 62
Discharge: HOME | End: 2022-10-31
Attending: EMERGENCY MEDICINE | Admitting: EMERGENCY MEDICINE

## 2022-10-30 ENCOUNTER — APPOINTMENT (OUTPATIENT)
Dept: GENERAL RADIOLOGY | Facility: HOSPITAL | Age: 62
End: 2022-10-30

## 2022-10-30 VITALS
WEIGHT: 117.5 LBS | RESPIRATION RATE: 20 BRPM | TEMPERATURE: 98.2 F | HEIGHT: 67 IN | DIASTOLIC BLOOD PRESSURE: 91 MMHG | OXYGEN SATURATION: 97 % | SYSTOLIC BLOOD PRESSURE: 127 MMHG | BODY MASS INDEX: 18.44 KG/M2 | HEART RATE: 72 BPM

## 2022-10-30 DIAGNOSIS — C34.90 MALIGNANT NEOPLASM OF LUNG, UNSPECIFIED LATERALITY, UNSPECIFIED PART OF LUNG: ICD-10-CM

## 2022-10-30 DIAGNOSIS — R07.89 CHEST DISCOMFORT: Primary | ICD-10-CM

## 2022-10-30 LAB
ALBUMIN SERPL-MCNC: 4.3 G/DL (ref 3.5–5.2)
ALBUMIN/GLOB SERPL: 1.4 G/DL
ALP SERPL-CCNC: 59 U/L (ref 39–117)
ALT SERPL W P-5'-P-CCNC: 11 U/L (ref 1–33)
ANION GAP SERPL CALCULATED.3IONS-SCNC: 13.2 MMOL/L (ref 5–15)
AST SERPL-CCNC: 13 U/L (ref 1–32)
BASOPHILS # BLD AUTO: 0.04 10*3/MM3 (ref 0–0.2)
BASOPHILS NFR BLD AUTO: 0.4 % (ref 0–1.5)
BILIRUB SERPL-MCNC: 0.3 MG/DL (ref 0–1.2)
BUN SERPL-MCNC: 11 MG/DL (ref 8–23)
BUN/CREAT SERPL: 19.6 (ref 7–25)
CALCIUM SPEC-SCNC: 9.4 MG/DL (ref 8.6–10.5)
CHLORIDE SERPL-SCNC: 102 MMOL/L (ref 98–107)
CO2 SERPL-SCNC: 26.8 MMOL/L (ref 22–29)
CREAT SERPL-MCNC: 0.56 MG/DL (ref 0.57–1)
D DIMER PPP FEU-MCNC: 0.3 MCGFEU/ML (ref 0–0.57)
DEPRECATED RDW RBC AUTO: 44 FL (ref 37–54)
EGFRCR SERPLBLD CKD-EPI 2021: 104.6 ML/MIN/1.73
EOSINOPHIL # BLD AUTO: 0.26 10*3/MM3 (ref 0–0.4)
EOSINOPHIL NFR BLD AUTO: 2.8 % (ref 0.3–6.2)
ERYTHROCYTE [DISTWIDTH] IN BLOOD BY AUTOMATED COUNT: 12.9 % (ref 12.3–15.4)
GLOBULIN UR ELPH-MCNC: 3 GM/DL
GLUCOSE SERPL-MCNC: 110 MG/DL (ref 65–99)
HCT VFR BLD AUTO: 41.6 % (ref 34–46.6)
HGB BLD-MCNC: 14.3 G/DL (ref 12–15.9)
HOLD SPECIMEN: NORMAL
HOLD SPECIMEN: NORMAL
IMM GRANULOCYTES # BLD AUTO: 0.01 10*3/MM3 (ref 0–0.05)
IMM GRANULOCYTES NFR BLD AUTO: 0.1 % (ref 0–0.5)
LIPASE SERPL-CCNC: 17 U/L (ref 13–60)
LYMPHOCYTES # BLD AUTO: 2.22 10*3/MM3 (ref 0.7–3.1)
LYMPHOCYTES NFR BLD AUTO: 24.2 % (ref 19.6–45.3)
MAGNESIUM SERPL-MCNC: 1.8 MG/DL (ref 1.6–2.4)
MCH RBC QN AUTO: 32 PG (ref 26.6–33)
MCHC RBC AUTO-ENTMCNC: 34.4 G/DL (ref 31.5–35.7)
MCV RBC AUTO: 93.1 FL (ref 79–97)
MONOCYTES # BLD AUTO: 0.62 10*3/MM3 (ref 0.1–0.9)
MONOCYTES NFR BLD AUTO: 6.8 % (ref 5–12)
NEUTROPHILS NFR BLD AUTO: 6.01 10*3/MM3 (ref 1.7–7)
NEUTROPHILS NFR BLD AUTO: 65.7 % (ref 42.7–76)
NRBC BLD AUTO-RTO: 0 /100 WBC (ref 0–0.2)
NT-PROBNP SERPL-MCNC: 50 PG/ML (ref 0–900)
PLATELET # BLD AUTO: 328 10*3/MM3 (ref 140–450)
PMV BLD AUTO: 9.5 FL (ref 6–12)
POTASSIUM SERPL-SCNC: 3.4 MMOL/L (ref 3.5–5.2)
PROT SERPL-MCNC: 7.3 G/DL (ref 6–8.5)
RBC # BLD AUTO: 4.47 10*6/MM3 (ref 3.77–5.28)
SODIUM SERPL-SCNC: 142 MMOL/L (ref 136–145)
TROPONIN I SERPL-MCNC: 0 NG/ML (ref 0–0.08)
TROPONIN I SERPL-MCNC: 0 NG/ML (ref 0–0.08)
WBC NRBC COR # BLD: 9.16 10*3/MM3 (ref 3.4–10.8)
WHOLE BLOOD HOLD COAG: NORMAL
WHOLE BLOOD HOLD SPECIMEN: NORMAL

## 2022-10-30 PROCEDURE — 85379 FIBRIN DEGRADATION QUANT: CPT | Performed by: EMERGENCY MEDICINE

## 2022-10-30 PROCEDURE — 99284 EMERGENCY DEPT VISIT MOD MDM: CPT

## 2022-10-30 PROCEDURE — 83735 ASSAY OF MAGNESIUM: CPT

## 2022-10-30 PROCEDURE — 36415 COLL VENOUS BLD VENIPUNCTURE: CPT

## 2022-10-30 PROCEDURE — 83880 ASSAY OF NATRIURETIC PEPTIDE: CPT

## 2022-10-30 PROCEDURE — 71045 X-RAY EXAM CHEST 1 VIEW: CPT

## 2022-10-30 PROCEDURE — 93010 ELECTROCARDIOGRAM REPORT: CPT | Performed by: INTERNAL MEDICINE

## 2022-10-30 PROCEDURE — 84484 ASSAY OF TROPONIN QUANT: CPT

## 2022-10-30 PROCEDURE — 93005 ELECTROCARDIOGRAM TRACING: CPT | Performed by: EMERGENCY MEDICINE

## 2022-10-30 PROCEDURE — 80053 COMPREHEN METABOLIC PANEL: CPT

## 2022-10-30 PROCEDURE — 93005 ELECTROCARDIOGRAM TRACING: CPT

## 2022-10-30 PROCEDURE — 85025 COMPLETE CBC W/AUTO DIFF WBC: CPT

## 2022-10-30 PROCEDURE — 83690 ASSAY OF LIPASE: CPT

## 2022-10-30 RX ORDER — SODIUM CHLORIDE 0.9 % (FLUSH) 0.9 %
10 SYRINGE (ML) INJECTION AS NEEDED
Status: DISCONTINUED | OUTPATIENT
Start: 2022-10-30 | End: 2022-10-31 | Stop reason: HOSPADM

## 2022-10-30 RX ORDER — ASPIRIN 81 MG/1
324 TABLET, CHEWABLE ORAL ONCE
Status: DISCONTINUED | OUTPATIENT
Start: 2022-10-30 | End: 2022-10-31 | Stop reason: HOSPADM

## 2022-11-03 LAB
QT INTERVAL: 377 MS
QT INTERVAL: 381 MS

## 2022-11-09 ENCOUNTER — HOSPITAL ENCOUNTER (OUTPATIENT)
Dept: PET IMAGING | Facility: HOSPITAL | Age: 62
Discharge: HOME | End: 2022-11-09

## 2022-11-09 DIAGNOSIS — C34.2 PRIMARY MALIGNANT NEOPLASM OF RIGHT MIDDLE LOBE OF LUNG: ICD-10-CM

## 2022-11-09 PROCEDURE — 0 FLUDEOXYGLUCOSE F18 SOLUTION: Performed by: RADIOLOGY

## 2022-11-09 PROCEDURE — 78815 PET IMAGE W/CT SKULL-THIGH: CPT

## 2022-11-09 PROCEDURE — A9552 F18 FDG: HCPCS | Performed by: RADIOLOGY

## 2022-11-09 RX ADMIN — FLUDEOXYGLUCOSE F18 1 DOSE: 300 INJECTION INTRAVENOUS at 09:48

## 2022-11-10 ENCOUNTER — APPOINTMENT (OUTPATIENT)
Dept: MRI IMAGING | Facility: HOSPITAL | Age: 62
End: 2022-11-10

## 2022-11-14 ENCOUNTER — APPOINTMENT (OUTPATIENT)
Dept: RADIATION ONCOLOGY | Facility: HOSPITAL | Age: 62
End: 2022-11-14

## 2022-11-15 DIAGNOSIS — R91.8 LUNG MASS: Primary | ICD-10-CM

## 2022-11-17 NOTE — PRE-PROCEDURE INSTRUCTIONS
Education provided on diet restrictions prior to procedure.  Medications were assessed and reviewed with instructions given on which medications are okay to take prior to procedure.  Advised Tylenol is okay, however do not take any NSAIDS.  Also, do not take any vitamins or supplements.  Discharge instructions will be provided post-procedure regarding which medications to continue/restart.

## 2022-11-18 ENCOUNTER — ANESTHESIA EVENT (OUTPATIENT)
Dept: GASTROENTEROLOGY | Facility: HOSPITAL | Age: 62
End: 2022-11-18

## 2022-11-18 ENCOUNTER — HOSPITAL ENCOUNTER (OUTPATIENT)
Facility: HOSPITAL | Age: 62
Setting detail: HOSPITAL OUTPATIENT SURGERY
Discharge: HOME | End: 2022-11-18
Attending: INTERNAL MEDICINE | Admitting: INTERNAL MEDICINE

## 2022-11-18 ENCOUNTER — ANESTHESIA (OUTPATIENT)
Dept: GASTROENTEROLOGY | Facility: HOSPITAL | Age: 62
End: 2022-11-18

## 2022-11-18 VITALS
OXYGEN SATURATION: 95 % | HEIGHT: 67 IN | HEART RATE: 84 BPM | SYSTOLIC BLOOD PRESSURE: 116 MMHG | BODY MASS INDEX: 17.3 KG/M2 | WEIGHT: 110.23 LBS | TEMPERATURE: 98.3 F | RESPIRATION RATE: 21 BRPM | DIASTOLIC BLOOD PRESSURE: 79 MMHG

## 2022-11-18 DIAGNOSIS — R91.8 LUNG MASS: ICD-10-CM

## 2022-11-18 PROCEDURE — C1726 CATH, BAL DIL, NON-VASCULAR: HCPCS | Performed by: INTERNAL MEDICINE

## 2022-11-18 PROCEDURE — 88305 TISSUE EXAM BY PATHOLOGIST: CPT | Performed by: INTERNAL MEDICINE

## 2022-11-18 PROCEDURE — 88342 IMHCHEM/IMCYTCHM 1ST ANTB: CPT | Performed by: INTERNAL MEDICINE

## 2022-11-18 PROCEDURE — 88341 IMHCHEM/IMCYTCHM EA ADD ANTB: CPT | Performed by: INTERNAL MEDICINE

## 2022-11-18 PROCEDURE — 88173 CYTOPATH EVAL FNA REPORT: CPT | Performed by: INTERNAL MEDICINE

## 2022-11-18 PROCEDURE — 25010000002 PROPOFOL 10 MG/ML EMULSION: Performed by: NURSE ANESTHETIST, CERTIFIED REGISTERED

## 2022-11-18 PROCEDURE — 31653 BRONCH EBUS SAMPLNG 3/> NODE: CPT | Performed by: INTERNAL MEDICINE

## 2022-11-18 RX ORDER — LIDOCAINE HYDROCHLORIDE 20 MG/ML
INJECTION, SOLUTION EPIDURAL; INFILTRATION; INTRACAUDAL; PERINEURAL AS NEEDED
Status: DISCONTINUED | OUTPATIENT
Start: 2022-11-18 | End: 2022-11-18 | Stop reason: SURG

## 2022-11-18 RX ORDER — PROPOFOL 10 MG/ML
VIAL (ML) INTRAVENOUS AS NEEDED
Status: DISCONTINUED | OUTPATIENT
Start: 2022-11-18 | End: 2022-11-18 | Stop reason: SURG

## 2022-11-18 RX ORDER — LIDOCAINE HYDROCHLORIDE 40 MG/ML
INJECTION, SOLUTION RETROBULBAR; TOPICAL AS NEEDED
Status: DISCONTINUED | OUTPATIENT
Start: 2022-11-18 | End: 2022-11-18 | Stop reason: HOSPADM

## 2022-11-18 RX ORDER — SODIUM CHLORIDE, SODIUM LACTATE, POTASSIUM CHLORIDE, CALCIUM CHLORIDE 600; 310; 30; 20 MG/100ML; MG/100ML; MG/100ML; MG/100ML
30 INJECTION, SOLUTION INTRAVENOUS CONTINUOUS
Status: DISCONTINUED | OUTPATIENT
Start: 2022-11-18 | End: 2022-11-18 | Stop reason: HOSPADM

## 2022-11-18 RX ADMIN — PROPOFOL 50 MG: 10 INJECTION, EMULSION INTRAVENOUS at 09:15

## 2022-11-18 RX ADMIN — LIDOCAINE HYDROCHLORIDE 100 MG: 20 INJECTION, SOLUTION EPIDURAL; INFILTRATION; INTRACAUDAL; PERINEURAL at 09:15

## 2022-11-18 RX ADMIN — SODIUM CHLORIDE, POTASSIUM CHLORIDE, SODIUM LACTATE AND CALCIUM CHLORIDE 30 ML/HR: 600; 310; 30; 20 INJECTION, SOLUTION INTRAVENOUS at 09:00

## 2022-11-18 RX ADMIN — PROPOFOL 300 MCG/KG/MIN: 10 INJECTION, EMULSION INTRAVENOUS at 09:15

## 2022-11-18 NOTE — ANESTHESIA PREPROCEDURE EVALUATION
Anesthesia Evaluation     Patient summary reviewed and Nursing notes reviewed   no history of anesthetic complications:  NPO Solid Status: > 8 hours  NPO Liquid Status: > 2 hours           Airway   Mallampati: II  TM distance: >3 FB  Neck ROM: full  No difficulty expected  Dental    (+) poor dentition        Pulmonary    (+) a smoker Current, lung cancer, COPD, decreased breath sounds (Poor air movement),   Cardiovascular - normal exam  Exercise tolerance: poor (<4 METS)    Rhythm: regular  Rate: normal        Neuro/Psych  (+) dizziness/light headedness, numbness, psychiatric history Anxiety,      ROS Comment: Chiari malformation  GI/Hepatic/Renal/Endo    (+)  hiatal hernia,      Musculoskeletal     Abdominal    Substance History - negative use     OB/GYN negative ob/gyn ROS         Other   arthritis, blood dyscrasia ([place]),   history of cancer    ROS/Med Hx Other: PAT Nursing Notes unavailable.                   Anesthesia Plan    ASA 3     general and MAC     (Patient understands anesthesia not responsible for dental damage.    Elevated risk of pulmonary complications explained. )  intravenous induction     Anesthetic plan, risks, benefits, and alternatives have been provided, discussed and informed consent has been obtained with: patient.    Use of blood products discussed with patient .   Plan discussed with CRNA.        CODE STATUS:

## 2022-11-18 NOTE — H&P
[facility]   HISTORY AND PHYSICAL    Patient Name: Theresa M Gabehart  : 1960  MRN: [id number]  Primary Care Physician:  Carrie Grace APRN  Date of admission: 2022    Subjective   Subjective     Chief Complaint: Reason for consultation mediastinal adenopathy and lung nodule    History of Present Illness  Patient here today to have bronchoscopy  Has a enlarging nodule  Has now PET positive mediastinal lymphadenopathy concerning for metastatic disease  Review of Systems   All other systems reviewed and are negative.       Personal History     Past Medical History:   Diagnosis Date   • Allergic rhinitis    • Arnold-Chiari syndrome (HCC)    • Bleeding disorder (HCC)    • Carpal tunnel syndrome    • Collar bone fracture     history of   • Colon polyps    • COPD (chronic obstructive pulmonary disease) (HCC)    • Dupuytren's contracture syndrome    • Dysphagia    • Emphysema lung (HCC)    • Fibromyalgia    • Hiatal hernia    • History of fractured vertebra     18 fractured   • History of pelvic fracture    • Lung cancer (HCC)    • Neuropathy    • Osteoporosis    • Vertigo    • Von Willebrand disease        Past Surgical History:   Procedure Laterality Date   •  SECTION     • CHOLECYSTECTOMY     • COLONOSCOPY     • ENDOSCOPY     • HYSTERECTOMY     • KNEE ARTHROSCOPY     • ORTHOPEDIC SURGERY     • TONSILLECTOMY AND ADENOIDECTOMY     • UPPER GASTROINTESTINAL ENDOSCOPY         Family History: family history includes Breast cancer in her sister; Diabetes in her sister; Lung cancer in her mother. Otherwise pertinent FHx was reviewed and not pertinent to current issue.    Social History:  reports that she has been smoking cigarettes. She started smoking about 43 years ago. She has a 30.00 pack-year smoking history. She has never used smokeless tobacco. She reports that she does not drink alcohol and does not use drugs.    Home Medications:  Aspirin, HYDROcodone-acetaminophen, Probiotic  Product, Zinc Sulfate, albuterol sulfate HFA, budesonide-formoterol, calcium, ipratropium-albuterol, linaclotide, and vitamin D    Allergies:  Allergies   Allergen Reactions   • Clarithromycin Unknown - High Severity   • Meperidine Unknown - High Severity   • Metronidazole Unknown - High Severity   • Doxycycline Calcium Rash   • Gabapentin Rash       Objective    Objective     Vitals:   Temp:  [97.4 °F (36.3 °C)] 97.4 °F (36.3 °C)  Heart Rate:  [82] 82  Resp:  [16] 16  BP: (123)/(80) 123/80    Physical Exam  Vital Signs Reviewed  General WDWN, Alert, NAD.    HEENT:  PERRL, EOMI.  OP, nares clear, no sinus tenderness  Neck:  Supple, no JVD, no thyromegaly  Lymph: no axillary, cervical, supraclavicular lymphadenopathy noted bilaterally  Chest:  good aeration, clear to auscultation bilaterally, tympanic to percussion bilaterally, no work of breathing noted  CV: RRR, no MGR, pulses 2+, equal.  Abd:  Soft, NT, ND, + BS, no HSM  EXT:  no clubbing, no cyanosis, no edema, no joint tenderness  Neuro:  A&Ox3, CN grossly intact, no focal deficits.  Skin: No rashes or lesions noted  Result Review    Result Review:  I have personally reviewed the results from the time of this admission to 11/18/2022 09:03 EST and agree with these findings:  []  Laboratory list / accordion  []  Microbiology  []  Radiology  []  EKG/Telemetry   []  Cardiology/Vascular   []  Pathology  []  Old records  []  Other:  Most notable findings include: CT scan showed mediastinal adenopathy      Assessment & Plan   Assessment / Plan     Brief Patient Summary:  Theresa M Gabehart is a 61 y.o. female who here with PET positive mediastinal adenopathy   a  Active Hospital Problems    Diagnosis    • **Lung mass      Plan:   Spoke with hematology oncology patient has reported bleeding disorder however has had endoscopies in the past colonoscopy and EGD with biopsies with no significant bleeding, has a von Willebrand panel that is negative    Spoke with Dr. Whiting  the patient's hematologist that she saw the group at this time he is okay with biopsy feels that the risk of bleeding is low    We will proceed with bronchoscopy with EBUS    Risk versus benefits of bronchoscopy explained to the patient including death, respiratory failure requiring intubation mechanical ventilation, pneumothorax requiring chest tube placement, bleeding, patient voices understanding of the risk of the procedure and wishes to proceed.    DVT prophylaxis:  No DVT prophylaxis order currently exists.    CODE STATUS:         Shaw Bonds DO    Electronically signed by Shaw Bonds DO, 11/18/22, 9:05 AM EST.

## 2022-11-18 NOTE — OP NOTE
Procedure name: Bronchoscopy with endobronchial ultrasound guided transbronchial fine needle aspiration of the stations station 11 L, station 7, station 4R, station 10 R     Indication right upper lobe pulmonary nodule with PET positive mediastinal lymphadenopathy     Risk versus benefits of bronchoscopy explained to the patient including death, respiratory failure requiring intubation mechanical ventilation, pneumothorax requiring chest tube placement, bleeding, patient voices understanding of the risk of the procedure and wishes to proceed.     Sedation-IV MAC anesthesia per the anesthesia service     Procedure details  Patient was brought back to the endoscopy suite she was sedated with IV MAC anesthesia, a bite block was placed on oral cavity and endobronchial ultrasound scope was then used to intubate the trachea, a survey was done of the mediastinal hilar lymph node stations, and then the endobronchial ultrasound scope was used to take in a bronchial ultrasound guided transbronchial needle aspirations of the following lymph node sites,11L, stations 7, 4R10R, next the endobronchial ultrasound scope was removed the therapeutic scope was then inserted into the oral cavity and the biopsy sites were examined.  There is no evidence of any active bleeding from any biopsy sites, there is no endobronchial lesion seen to the segmental level of the right tracheal bronchial tree and on the left tracheobronchial tree.     Postoperative diagnosis  Right upper lobe pulmonary nodule PET positive adenopathy    Specimens obtained  Endobronchial ultrasound-guided transbronchial fine-needle aspiration of the following stations lymph node station 11 L, lymph node station 7, lymph node station 4R, lymph node station 10 R     estimated blood loss  Less than 10 mL     Patient tolerated procedure well     No immediate complications        Plan  Patient is a followup results of Biopsy    Electronically signed by Shaw Menendez  DO Cammie, 11/18/22, 10:13 AM EST.

## 2022-11-18 NOTE — ANESTHESIA POSTPROCEDURE EVALUATION
Patient: Theresa M Gabehart    Procedure Summary     Date: 11/18/22 Room / Location: [facility] ENDOSCOPY 3 / [facility] ENDOSCOPY    Anesthesia Start: 0910 Anesthesia Stop: 1011    Procedure: BRONCHOSCOPY WITH ENDOBRONCHIAL ULTRASOUND AND FINE NEEDLE ASPIRATIONS (Bronchus) Diagnosis:       Lung mass      (Lung mass [R91.8])    Surgeons: Shaw Bonds DO Provider: Heena Bhakta MD    Anesthesia Type: general, MAC ASA Status: 3          Anesthesia Type: general, MAC    Vitals  Vitals Value Taken Time   /76 11/18/22 1024   Temp 36.7 °C (98 °F) 11/18/22 1010   Pulse 94 11/18/22 1027   Resp 22 11/18/22 1020   SpO2 99 % 11/18/22 1027   Vitals shown include unvalidated device data.        Post Anesthesia Care and Evaluation    Patient location during evaluation: bedside  Patient participation: complete - patient participated  Level of consciousness: awake  Pain management: adequate    Airway patency: patent  Anesthetic complications: No anesthetic complications  PONV Status: none  Cardiovascular status: acceptable and stable  Respiratory status: acceptable  Hydration status: acceptable    Comments: An Anesthesiologist personally participated in the most demanding procedures (including induction and emergence if applicable) in the anesthesia plan, monitored the course of anesthesia administration at frequent intervals and remained physically present and available for immediate diagnosis and treatment of emergencies.

## 2022-11-22 LAB
CYTO UR: NORMAL
LAB AP CASE REPORT: NORMAL
LAB AP CLINICAL INFORMATION: NORMAL
LAB AP SPECIAL STAINS: NORMAL
PATH REPORT.FINAL DX SPEC: NORMAL
PATH REPORT.GROSS SPEC: NORMAL
STAT OF ADQ CVX/VAG CYTO-IMP: NORMAL

## 2022-11-23 ENCOUNTER — OFFICE VISIT (OUTPATIENT)
Dept: PULMONOLOGY | Facility: CLINIC | Age: 62
End: 2022-11-23

## 2022-11-23 VITALS
RESPIRATION RATE: 19 BRPM | SYSTOLIC BLOOD PRESSURE: 120 MMHG | OXYGEN SATURATION: 98 % | HEIGHT: 68 IN | TEMPERATURE: 98.6 F | HEART RATE: 88 BPM | WEIGHT: 109 LBS | DIASTOLIC BLOOD PRESSURE: 78 MMHG | BODY MASS INDEX: 16.52 KG/M2

## 2022-11-23 DIAGNOSIS — C34.91 NON-SMALL CELL CANCER OF RIGHT LUNG: ICD-10-CM

## 2022-11-23 DIAGNOSIS — J43.2 CENTRILOBULAR EMPHYSEMA: ICD-10-CM

## 2022-11-23 DIAGNOSIS — C34.91 PRIMARY MALIGNANT NEOPLASM OF RIGHT LUNG METASTATIC TO OTHER SITE: Primary | ICD-10-CM

## 2022-11-23 PROBLEM — C34.90 NON-SMALL CELL LUNG CANCER: Status: ACTIVE | Noted: 2022-11-23

## 2022-11-23 PROCEDURE — 99215 OFFICE O/P EST HI 40 MIN: CPT | Performed by: INTERNAL MEDICINE

## 2022-11-23 RX ORDER — FLUTICASONE PROPIONATE 50 MCG
1 SPRAY, SUSPENSION (ML) NASAL DAILY PRN
COMMUNITY

## 2022-11-23 RX ORDER — LYSINE 500 MG
TABLET ORAL DAILY
COMMUNITY

## 2022-11-23 NOTE — ASSESSMENT & PLAN NOTE
Patient with PFT showing not obstruction    CT scan reviewed showing moderate amount of centrilobular emphysema    Continue Symbicort    Add Spiriva    Patient politely declines flu vaccine today

## 2022-11-23 NOTE — ASSESSMENT & PLAN NOTE
Patient with stage IIIa lung cancer  Followed by thoracic surgery  Make referral for oncology spoke with Dr. Whiting regarding this case  Already followed by radiation oncology  PET scan and MRI already performed  We will make referral for Dr. Vargas for Port-A-Cath placement

## 2022-11-23 NOTE — PROGRESS NOTES
"Chief Complaint  COPD, Lung Mass, Follow-up (Post Bronch ), Shortness of Breath (Mainly with walking. ), Wheezing, and Cough (Some blood still (a little))    Subjective        Theresa M Gabehart presents to [facility]  History of Present Illness  Follow-up today lung biopsy  Patient underwent EBUS on 11/18/2022  Biopsy of lymph node station 10 R station 4R and station 7 all positive for metastatic adenocarcinoma  11 L- for malignancy  Still some shortness of breath worse with exertion better with rest  No hemoptysis  Inform patient of her diagnosis  Objective   Vital Signs:  /78 (BP Location: Left arm, Patient Position: Sitting, Cuff Size: Adult)   Pulse 88   Temp 98.6 °F (37 °C) (Temporal)   Resp 19   Ht 173.7 cm (68.4\")   Wt 49.4 kg (109 lb)   SpO2 98% Comment: room air  BMI 16.38 kg/m²   Estimated body mass index is 16.38 kg/m² as calculated from the following:    Height as of this encounter: 173.7 cm (68.4\").    Weight as of this encounter: 49.4 kg (109 lb).          Physical Exam   Vital Signs Reviewed  General WDWN, Alert, NAD.    Chest:  good aeration, clear to auscultation bilaterally, tympanic to percussion bilaterally, no work of breathing noted  CV: RRR, no MGR, .  EXT:  no clubbing, no cyanosis, no edema, no joint tenderness  Neuro:  A&Ox3, CN grossly intact, no focal deficits.  Skin: No rashes or lesions noted  Result Review :      Data reviewed: Pathology reviewed showing adenocarcinoma          Assessment and Plan   Diagnoses and all orders for this visit:    1. Primary malignant neoplasm of right lung metastatic to other site (HCC) (Primary)  -     Ambulatory Referral to Oncology  -     Ambulatory Referral to General Surgery    2. Non-small cell cancer of right lung (HCC)  Assessment & Plan:  Patient with stage IIIa lung cancer  Followed by thoracic surgery  Make referral for oncology spoke with Dr. Whiting regarding this case  Already " followed by radiation oncology  PET scan and MRI already performed  We will make referral for Dr. Vargas for Port-A-Cath placement      3. Centrilobular emphysema (HCC)  Assessment & Plan:  Patient with PFT showing not obstruction    CT scan reviewed showing moderate amount of centrilobular emphysema    Continue Symbicort    Add Spiriva    Patient politely declines flu vaccine today        Other orders  -     tiotropium bromide monohydrate (SPIRIVA RESPIMAT) 2.5 MCG/ACT aerosol solution inhaler; Inhale 2 puffs Daily.  Dispense: 1 each; Refill: 11         I spent 45 minutes caring for Sabina on this date of service. This time includes time spent by me in the following activities:preparing for the visit, reviewing tests, obtaining and/or reviewing a separately obtained history, performing a medically appropriate examination and/or evaluation , counseling and educating the patient/family/caregiver, ordering medications, tests, or procedures and care coordination   Personally spoke with thoracic surgery and updating them on the results of the patient's lymph node biopsy  Personally spoke with Dr. Whiting about the patient's lymph node biopsy  Also personally spoke with Dr. Turner regarding this as well      Follow Up   Return in about 3 months (around 2/23/2023).  Patient was given instructions and counseling regarding her condition or for health maintenance advice. Please see specific information pulled into the AVS if appropriate.

## 2022-11-27 ENCOUNTER — PREP FOR SURGERY (OUTPATIENT)
Dept: OTHER | Facility: HOSPITAL | Age: 62
End: 2022-11-27

## 2022-11-27 DIAGNOSIS — C34.91 PRIMARY MALIGNANT NEOPLASM OF RIGHT LUNG METASTATIC TO OTHER SITE: Primary | ICD-10-CM

## 2022-11-27 RX ORDER — CEFAZOLIN SODIUM 2 G/100ML
2 INJECTION, SOLUTION INTRAVENOUS ONCE
Status: CANCELLED | OUTPATIENT
Start: 2022-11-27 | End: 2022-11-27

## 2022-11-28 ENCOUNTER — TELEPHONE (OUTPATIENT)
Dept: SURGERY | Facility: CLINIC | Age: 62
End: 2022-11-28

## 2022-11-28 PROBLEM — C34.91 PRIMARY MALIGNANT NEOPLASM OF RIGHT LUNG METASTATIC TO OTHER SITE (HCC): Status: ACTIVE | Noted: 2022-11-28

## 2022-11-28 NOTE — TELEPHONE ENCOUNTER
----- Message from Ishan Vargas MD sent at 11/27/2022  8:52 AM EST -----  Please call the patient on Monday, November 28 and make arrangements over the telephone for her to have a portacatheter placed.  Get a list of her home medicines and allergies.  The port should be put in sometime within the next 2 weeks.  I already spoke to the patient on the telephone and she is expecting your call on Monday.  I already put in a case request.  Thank you.

## 2022-11-28 NOTE — TELEPHONE ENCOUNTER
I called and scheduled pt surgery for Friday 12-2-22. Spoke with Juan Salazar in surgery scheduling.     Med List:   Linzess 145 mcg  Duo-Neb   Albuterol Inhaler  Calcium 600 mg  Norco 5-325mg  Symbicort 160-4.5 mcg  Vit D 1.25 mg  L-Lysine 500 mg  Flonase 50 mcg  Aspirin 81mg  Probiotic   Zinc     Allergies:  Clarithromycin  Meperidine  Metronidazole  Doxycycline Calcium   Gabapentin

## 2022-11-30 ENCOUNTER — OFFICE VISIT (OUTPATIENT)
Dept: ONCOLOGY | Facility: HOSPITAL | Age: 62
End: 2022-11-30

## 2022-11-30 ENCOUNTER — DOCUMENTATION (OUTPATIENT)
Dept: ONCOLOGY | Facility: HOSPITAL | Age: 62
End: 2022-11-30

## 2022-11-30 ENCOUNTER — LAB (OUTPATIENT)
Dept: ONCOLOGY | Facility: HOSPITAL | Age: 62
End: 2022-11-30

## 2022-11-30 ENCOUNTER — TELEPHONE (OUTPATIENT)
Dept: NUTRITION | Facility: HOSPITAL | Age: 62
End: 2022-11-30

## 2022-11-30 ENCOUNTER — HOSPITAL ENCOUNTER (OUTPATIENT)
Dept: ONCOLOGY | Facility: HOSPITAL | Age: 62
Setting detail: INFUSION SERIES
Discharge: HOME | End: 2022-11-30

## 2022-11-30 VITALS
TEMPERATURE: 97.3 F | BODY MASS INDEX: 16.9 KG/M2 | DIASTOLIC BLOOD PRESSURE: 75 MMHG | RESPIRATION RATE: 16 BRPM | HEART RATE: 77 BPM | SYSTOLIC BLOOD PRESSURE: 121 MMHG | WEIGHT: 112.43 LBS | OXYGEN SATURATION: 99 %

## 2022-11-30 DIAGNOSIS — C34.2 MALIGNANT NEOPLASM OF MIDDLE LOBE OF RIGHT LUNG: Primary | ICD-10-CM

## 2022-11-30 DIAGNOSIS — R63.4 WEIGHT LOSS: ICD-10-CM

## 2022-11-30 DIAGNOSIS — C34.2 MALIGNANT NEOPLASM OF MIDDLE LOBE OF RIGHT LUNG: ICD-10-CM

## 2022-11-30 PROBLEM — C34.91 PRIMARY MALIGNANT NEOPLASM OF RIGHT LUNG METASTATIC TO OTHER SITE: Status: RESOLVED | Noted: 2022-11-28 | Resolved: 2022-11-30

## 2022-11-30 PROBLEM — C34.90 NON-SMALL CELL LUNG CANCER: Status: RESOLVED | Noted: 2022-11-23 | Resolved: 2022-11-30

## 2022-11-30 LAB
ALBUMIN SERPL-MCNC: 4.4 G/DL (ref 3.5–5.2)
ALBUMIN/GLOB SERPL: 1.4 G/DL
ALP SERPL-CCNC: 58 U/L (ref 39–117)
ALT SERPL W P-5'-P-CCNC: 13 U/L (ref 1–33)
ANION GAP SERPL CALCULATED.3IONS-SCNC: 11.8 MMOL/L (ref 5–15)
AST SERPL-CCNC: 16 U/L (ref 1–32)
BASOPHILS # BLD AUTO: 0.08 10*3/MM3 (ref 0–0.2)
BASOPHILS NFR BLD AUTO: 0.8 % (ref 0–1.5)
BILIRUB SERPL-MCNC: 0.3 MG/DL (ref 0–1.2)
BUN SERPL-MCNC: 12 MG/DL (ref 8–23)
BUN/CREAT SERPL: 22.6 (ref 7–25)
CALCIUM SPEC-SCNC: 9.5 MG/DL (ref 8.6–10.5)
CHLORIDE SERPL-SCNC: 102 MMOL/L (ref 98–107)
CO2 SERPL-SCNC: 24.2 MMOL/L (ref 22–29)
CREAT SERPL-MCNC: 0.53 MG/DL (ref 0.57–1)
DEPRECATED RDW RBC AUTO: 43.1 FL (ref 37–54)
EGFRCR SERPLBLD CKD-EPI 2021: 106 ML/MIN/1.73
EOSINOPHIL # BLD AUTO: 0.55 10*3/MM3 (ref 0–0.4)
EOSINOPHIL NFR BLD AUTO: 5.5 % (ref 0.3–6.2)
ERYTHROCYTE [DISTWIDTH] IN BLOOD BY AUTOMATED COUNT: 12.5 % (ref 12.3–15.4)
GLOBULIN UR ELPH-MCNC: 3.2 GM/DL
GLUCOSE SERPL-MCNC: 89 MG/DL (ref 65–99)
HCT VFR BLD AUTO: 40.4 % (ref 34–46.6)
HGB BLD-MCNC: 13.5 G/DL (ref 12–15.9)
IMM GRANULOCYTES # BLD AUTO: 0.03 10*3/MM3 (ref 0–0.05)
IMM GRANULOCYTES NFR BLD AUTO: 0.3 % (ref 0–0.5)
LYMPHOCYTES # BLD AUTO: 3.19 10*3/MM3 (ref 0.7–3.1)
LYMPHOCYTES NFR BLD AUTO: 32 % (ref 19.6–45.3)
MCH RBC QN AUTO: 31 PG (ref 26.6–33)
MCHC RBC AUTO-ENTMCNC: 33.4 G/DL (ref 31.5–35.7)
MCV RBC AUTO: 92.9 FL (ref 79–97)
MONOCYTES # BLD AUTO: 0.85 10*3/MM3 (ref 0.1–0.9)
MONOCYTES NFR BLD AUTO: 8.5 % (ref 5–12)
NEUTROPHILS NFR BLD AUTO: 5.26 10*3/MM3 (ref 1.7–7)
NEUTROPHILS NFR BLD AUTO: 52.9 % (ref 42.7–76)
NRBC BLD AUTO-RTO: 0 /100 WBC (ref 0–0.2)
PLATELET # BLD AUTO: 394 10*3/MM3 (ref 140–450)
PMV BLD AUTO: 9.3 FL (ref 6–12)
POTASSIUM SERPL-SCNC: 3.9 MMOL/L (ref 3.5–5.2)
PROT SERPL-MCNC: 7.6 G/DL (ref 6–8.5)
RBC # BLD AUTO: 4.35 10*6/MM3 (ref 3.77–5.28)
SODIUM SERPL-SCNC: 138 MMOL/L (ref 136–145)
WBC NRBC COR # BLD: 9.96 10*3/MM3 (ref 3.4–10.8)

## 2022-11-30 PROCEDURE — 25010000002 CYANOCOBALAMIN PER 1000 MCG: Performed by: INTERNAL MEDICINE

## 2022-11-30 PROCEDURE — 36415 COLL VENOUS BLD VENIPUNCTURE: CPT

## 2022-11-30 PROCEDURE — 99205 OFFICE O/P NEW HI 60 MIN: CPT | Performed by: INTERNAL MEDICINE

## 2022-11-30 PROCEDURE — 85025 COMPLETE CBC W/AUTO DIFF WBC: CPT

## 2022-11-30 PROCEDURE — 80053 COMPREHEN METABOLIC PANEL: CPT

## 2022-11-30 PROCEDURE — 96372 THER/PROPH/DIAG INJ SC/IM: CPT

## 2022-11-30 RX ORDER — CYANOCOBALAMIN 1000 UG/ML
1000 INJECTION, SOLUTION INTRAMUSCULAR; SUBCUTANEOUS ONCE
Status: CANCELLED | OUTPATIENT
Start: 2022-11-30 | End: 2022-11-30

## 2022-11-30 RX ORDER — PROCHLORPERAZINE MALEATE 10 MG
10 TABLET ORAL EVERY 6 HOURS PRN
Qty: 20 TABLET | Refills: 3 | Status: SHIPPED | OUTPATIENT
Start: 2022-11-30

## 2022-11-30 RX ORDER — DEXAMETHASONE 4 MG/1
TABLET ORAL
Qty: 6 TABLET | Refills: 3 | Status: SHIPPED | OUTPATIENT
Start: 2022-11-30 | End: 2023-01-10 | Stop reason: SDUPTHER

## 2022-11-30 RX ORDER — FOLIC ACID 1 MG/1
1 TABLET ORAL DAILY
Qty: 30 TABLET | Refills: 3 | Status: SHIPPED | OUTPATIENT
Start: 2022-11-30

## 2022-11-30 RX ORDER — ONDANSETRON HYDROCHLORIDE 8 MG/1
8 TABLET, FILM COATED ORAL 3 TIMES DAILY PRN
Qty: 30 TABLET | Refills: 5 | Status: SHIPPED | OUTPATIENT
Start: 2022-11-30

## 2022-11-30 RX ORDER — CYANOCOBALAMIN 1000 UG/ML
1000 INJECTION, SOLUTION INTRAMUSCULAR; SUBCUTANEOUS ONCE
Status: COMPLETED | OUTPATIENT
Start: 2022-11-30 | End: 2022-11-30

## 2022-11-30 RX ADMIN — CYANOCOBALAMIN 1000 MCG: 1000 INJECTION, SOLUTION INTRAMUSCULAR at 15:15

## 2022-11-30 NOTE — ASSESSMENT & PLAN NOTE
Patient has biopsy-proven adenocarcinoma of the right middle lobe of lung as well as hilar mediastinal lymph nodes making her stage IIIa.  This is a new diagnosis and is life-threatening without treatment.    She has been evaluated by surgery not felt to be a surgical candidate.  I discussed the case with Dr. Turner, radiation oncology who has seen this patient in the past.  Based on NCCN guidelines, we are in agreement with concurrent chemo RT followed by maintenance immunotherapy.  She will be referred back to Dr. Turner for simulation and discussion of the radiation portion of the regimen.  With regard to chemotherapy, I would recommend carboplatin plus Alimta given every 3 weeks for 4 cycles in conjunction with radiation.  This will be followed by maintenance immunotherapy with durvalumab every 2 weeks for 1 year.  We discussed Port-A-Cath placement for infusion purposes given the multiple cycles involved.  Side effects of the chemotherapy discussed including fatigue, alopecia, mucositis, nausea, vomiting, diarrhea, rash, hand-foot syndrome, liver dysfunction, kidney dysfunction, low blood counts consider.  Written teaching information provided.  I discussed side effects of immunotherapy with durvalumab including infusion reactions, allergic reactions, thyroid dysfunction, liver dysfunction, kidney dysfunction, colitis/diarrhea, rash, arthritis, pneumonitis/lung dysfunction, rare instance of neurologic issues such as Guillain-Barré.  Written teaching information again provided.  Patient is agreeable to therapy as outlined.  She will have lab work today to ensure adequate endorgan functions and blood counts.  She will receive a B12 injection today.  Prescriptions for folic acid 1 mg daily, dexamethasone twice daily the day before, day of and day after chemotherapy provided.  Prescriptions for Zofran and Compazine as needed for nausea provided.  She will be referred to surgery for Port-A-Cath placement.  This  office will coordinate with Dr. Turner in radiation oncology regarding start date.  I will plan to see her for cycle 2-day 1 with lab work prior to monitor for toxicities.

## 2022-11-30 NOTE — PROGRESS NOTES
Diagnosis: Lung Cancer    Reason for Referral: Rounding with new patient and high distress score    INSURANCE: Medicare A and B    Distress Score: 10 indicated for pain, fatigue, tobacco use, memory or concentration, worry or anxiety, sadness or depression, fear, taking care of myself and taking care of others, access to medicine, and treatment decisions.    PHQ Score: 0    Mood: OSW met with patient in the clinic today following a high distress screening score. Patient was easy to engage and eager to receive assistance. Patient denied any history of mental health and was educated on symptoms of depression and anxiety. Patient stated she would keep in mind that she has an option for behavioral health should she need it. Patient was positive and forward thinking. Patient reported she did not want any medications that would cause dependency. Patient was open to Palliative care services once OSW explained the difference in Palliative Care and Hospice Services. OSW offered emotional support, active listening, and empathy.     Previous Cancer [place]: This is patient's initial cancer diagnosis.    Hobbies: Patient stated she enjoys christine painting.     Support systems: Patient stated she has minimal supports available. She has a friend identified to assist with transportation when needed. 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 Patient has twin sons age 39 and 3 grandchildren.    Transportation: Patient plans to drive herself to treatment when able and relying on a friend and hopes that her son will move from [place] to assist.     Finances: Patient stated she is a retired  from [employer]. She has a alf disability benefit that exceeds the income limits to allow her to qualify for medicaid, snap, etc.     Residential status/Who lives in the home: Patient lives in the home with her significant other and her cats.  She is currently renting an older mobile home.    Home  Care Needs: Patient has difficulty ambulating. She has an electric wheelchair/scooter.     Resources/Referrals: OSW provided her the phone number to apply for [employer], [facility], and Alzheimer's support groups. OSW offered smoking cessation due to patient attempting to reduce her cigarette smoking. Patient declined smoking cessation services at this time.

## 2022-11-30 NOTE — PROGRESS NOTES
Chief Complaint  Lung Cancer    Shaw Bonds*  Lesly, Carrie Wolff, APRN    Subjective          Theresa M Gabehart presents to [facility] for for evaluation of right lung cancer.  Patient reports that she had a nodule in the right chest identified on imaging last year.  She has a reported history of von Willebrand's disease so biopsy was not recommended.  Recent testing does not demonstrate any evidence of von Willebrand's disease.  The lesion was watched and has been increasing in size.  Most recent imaging from September of this year showed an increasing mass in the right middle lobe of the lung.  There is also right hilar and mediastinal adenopathy.  She was evaluated by Dr. Pulliam, thoracic oncology.  Not felt to be a surgical candidate.  She was then seen by Dr. Turner with radiation oncology.  He referred her to Dr. Turk for second surgical opinion.  He also ordered MRI of the brain which was negative for metastatic disease.  PET scan obtained showed hypermetabolic activity in the right middle lobe lesion as well as hilar mediastinal adenopathy.  Dr. Turk did not feel she was a candidate for surgical intervention.  She was also seen by Dr. Bonds, pulmonology who performed bronchoscopy with biopsies.  Multiple larisa stations positive for adenocarcinoma.  Patient denies chest pain, shortness of breath or exertional dyspnea except with heavier activities.  She denies any masses or adenopathy, no unusual aches or pains.  She notes adequate appetite and her weight is maintained.  She has had multiple injuries in the past and has chronic back pain but this is no different than previous.  She is a smoker and is actively trying to quit.  She presents today for evaluation of treatment options for her lung cancer.    Oncology/Hematology History Overview Note   11/18/2022  Lymph node, station 10 L, FNA:  - Negative for malignant cells  - Lymphoid tissue  present  2. Lymph node, station 7, FNA:  - Positive for malignant cells  - Metastatic adenocarcinoma, lung primary  3. Lymph node, station 4R, FNA:  - Positive for malignant cells  - Metastatic adenocarcinoma, lung primary  4. Lymph node, station 10 R, FNA:  - Positive for malignant cells  - Metastatic adenocarcinoma, lung primary    11/30/22 orders written for concurrent carbo & alimta with XRT     Malignant neoplasm of middle lobe of right lung (HCC)   11/30/2022 Initial Diagnosis    Malignant neoplasm of middle lobe of right lung (HCC)     11/30/2022 Cancer Staged    Staging form: Lung, AJCC 8th Edition  - Clinical: Stage IIIA (cT2a, cN2, cM0) - Signed by Narayan Jonas MD on 11/30/2022 12/7/2022 -  Chemotherapy    OP LUNG PEMEtrexed / CARBOplatin AUC=5 + XRT     3/1/2023 -  Chemotherapy    OP LUNG Durvalumab         Review of Systems   Constitutional: Positive for fatigue and unexpected weight loss. Negative for appetite change, diaphoresis, fever and unexpected weight gain.   HENT: Negative for hearing loss, mouth sores, sore throat, swollen glands, trouble swallowing and voice change.    Eyes: Negative for blurred vision.   Respiratory: Negative for cough, shortness of breath and wheezing.    Cardiovascular: Negative for chest pain and palpitations.   Gastrointestinal: Negative for abdominal pain, blood in stool, constipation, diarrhea, nausea and vomiting.   Endocrine: Negative for cold intolerance and heat intolerance.   Genitourinary: Negative for difficulty urinating, dysuria, frequency, hematuria and urinary incontinence.   Musculoskeletal: Positive for back pain (Chronic from prior injuries). Negative for arthralgias and myalgias.   Skin: Negative for rash, skin lesions and wound.   Neurological: Negative for dizziness, seizures, weakness, numbness and headache.   Hematological: Does not bruise/bleed easily.   Psychiatric/Behavioral: Negative for depressed mood. The patient is not nervous/anxious.       Current Outpatient Medications on File Prior to Visit   Medication Sig Dispense Refill   • albuterol sulfate  (90 Base) MCG/ACT inhaler Inhale 2 puffs Every 4 (Four) Hours As Needed for Wheezing. 18 g 11   • Aspirin 81 MG capsule Take 81 mg by mouth Daily.     • calcium carbonate (MAALOX) 600 MG chewable tablet Chew 600 mg Daily.     • [employer] Calcium 600 MG tablet Take 1 tablet by mouth 2 (Two) Times a Day With Meals.     • fluticasone (FLONASE) 50 MCG/ACT nasal spray 1 spray into the nostril(s) as directed by provider Daily.     • HYDROcodone-acetaminophen (NORCO) 5-325 MG per tablet Take 1 tablet by mouth Every 6 (Six) Hours As Needed.     • ipratropium-albuterol (DUO-NEB) 0.5-2.5 mg/3 ml nebulizer Take 3 mL by nebulization 4 (Four) Times a Day As Needed for Wheezing. 360 mL 3   • L-Lysine 500 MG tablet tablet Take  by mouth Daily.     • Linzess 145 MCG capsule capsule Take 145 mcg by mouth As Needed.     • Probiotic Product (PROBIOTIC BLEND PO) Take 1 tablet by mouth Daily.     • Symbicort 160-4.5 MCG/ACT inhaler      • tiotropium bromide monohydrate (SPIRIVA RESPIMAT) 2.5 MCG/ACT aerosol solution inhaler Inhale 2 puffs Daily. 1 each 11   • vitamin D (ERGOCALCIFEROL) 1.25 MG (77687 UT) capsule capsule Take 50,000 Units by mouth Every 7 (Seven) Days.     • Zinc Sulfate (ZINC 15 PO) Take 1 each by mouth Every Other Day.       No current facility-administered medications on file prior to visit.       Allergies   Allergen Reactions   • Clarithromycin Unknown - High Severity   • Meperidine Unknown - High Severity   • Metronidazole Unknown - High Severity   • Doxycycline Calcium Rash   • Gabapentin Rash     Past Medical History:   Diagnosis Date   • Allergic rhinitis    • Arnold-Chiari syndrome (HCC)    • Bleeding disorder (HCC)    • Carpal tunnel syndrome    • Collar bone fracture     history of   • Colon polyps    • COPD (chronic obstructive pulmonary disease) ([facility])    • Dupuytren's contracture syndrome    •  Dysphagia    • Emphysema lung (HCC)    • Fibromyalgia    • Hiatal hernia    • History of fractured vertebra     18 fractured   • History of pelvic fracture    • Lung cancer (HCC)    • Malignant neoplasm of middle lobe of right lung (HCC) 2022   • Neuropathy    • Osteoporosis    • Vertigo    • Von Willebrand disease      Past Surgical History:   Procedure Laterality Date   • BRONCHOSCOPY N/A 2022    Procedure: BRONCHOSCOPY WITH ENDOBRONCHIAL ULTRASOUND AND FINE NEEDLE ASPIRATIONS;  Surgeon: Shaw Bonds DO;  Location: [facility] ENDOSCOPY;  Service: Pulmonary;  Laterality: N/A;  MEDIASTINAL ADENOPATHY,  LUNG NODULE   •  SECTION     • CHOLECYSTECTOMY     • COLONOSCOPY     • ENDOSCOPY     • HYSTERECTOMY     • KNEE ARTHROSCOPY     • ORTHOPEDIC SURGERY     • TONSILLECTOMY AND ADENOIDECTOMY     • UPPER GASTROINTESTINAL ENDOSCOPY       Social History     Socioeconomic History   • Marital status:    Tobacco Use   • Smoking status: Every Day     Packs/day: 0.50     Years: 30.00     Pack years: 15.00     Types: Cigarettes     Start date:    • Smokeless tobacco: Never   • Tobacco comments:     WAS SMOKING TWO PACKS DAILY, HAS CUT DOWN TO 1 PACK A DAY   Vaping Use   • Vaping Use: Former   Substance and Sexual Activity   • Alcohol use: Never   • Drug use: Never   • Sexual activity: Defer     Family History   Problem Relation Age of Onset   • Lung cancer Mother    • Diabetes Sister    • Breast cancer Sister    • Malig Hyperthermia Neg Hx        Objective   Physical Exam  Vitals reviewed. Exam conducted with a chaperone present.   Constitutional:       General: She is not in acute distress.     Appearance: Normal appearance.   HENT:      Head: Normocephalic and atraumatic.   Eyes:      Conjunctiva/sclera: Conjunctivae normal.      Pupils: Pupils are equal, round, and reactive to light.   Cardiovascular:      Rate and Rhythm: Normal rate and regular rhythm.      Heart sounds: Normal heart  sounds. No murmur heard.    No gallop.   Pulmonary:      Effort: Pulmonary effort is normal.      Breath sounds: Normal breath sounds.   Abdominal:      General: Abdomen is flat. Bowel sounds are normal. There is no distension.      Palpations: Abdomen is soft.      Tenderness: There is no abdominal tenderness.   Musculoskeletal:      Cervical back: Neck supple.      Right lower leg: No edema.      Left lower leg: No edema.   Lymphadenopathy:      Cervical: No cervical adenopathy.   Neurological:      Mental Status: She is alert and oriented to person, place, and time.   Psychiatric:         Mood and Affect: Mood normal.         Behavior: Behavior normal.         Vitals:    11/30/22 1250   BP: 121/75   Pulse: 77   Resp: 16   Temp: 97.3 °F (36.3 °C)   SpO2: 99%   Weight: 51 kg (112 lb 7 oz)   PainSc:   3   PainLoc: Generalized     ECOG score: 2         PHQ-9 Total Score:                    Result Review :   The following data was reviewed by: Narayan Jonas MD on 11/30/2022:  Lab Results   Component Value Date    HGB 14.3 10/30/2022    HCT 41.6 10/30/2022    MCV 93.1 10/30/2022     10/30/2022    WBC 9.16 10/30/2022    NEUTROABS 6.01 10/30/2022    LYMPHSABS 2.22 10/30/2022    MONOSABS 0.62 10/30/2022    EOSABS 0.26 10/30/2022    BASOSABS 0.04 10/30/2022     Lab Results   Component Value Date    GLUCOSE 110 (H) 10/30/2022    BUN 11 10/30/2022    CREATININE 0.56 (L) 10/30/2022     10/30/2022    K 3.4 (L) 10/30/2022     10/30/2022    CO2 26.8 10/30/2022    CALCIUM 9.4 10/30/2022    PROTEINTOT 7.3 10/30/2022    ALBUMIN 4.30 10/30/2022    BILITOT 0.3 10/30/2022    ALKPHOS 59 10/30/2022    AST 13 10/30/2022    ALT 11 10/30/2022     Lab Results   Component Value Date    MG 1.8 10/30/2022    FREET4 1.54 02/23/2022    TSH 1.240 02/23/2022       Data reviewed: Radiologic studies PET scan images and report reviewed.  Records from Dr. Turk, thoracic oncology and Dr. Turner, radiation oncology reviewed.   Pulmonology records from Dr. Bonds including bronchoscopy with EBUS reviewed.  Pathology reports reviewed.      Assessment and Plan    Diagnoses and all orders for this visit:    1. Malignant neoplasm of middle lobe of right lung (HCC) (Primary)  Assessment & Plan:  Patient has biopsy-proven adenocarcinoma of the right middle lobe of lung as well as hilar mediastinal lymph nodes making her stage IIIa.  This is a new diagnosis and is life-threatening without treatment.    She has been evaluated by surgery not felt to be a surgical candidate.  I discussed the case with Dr. Turner, radiation oncology who has seen this patient in the past.  Based on NCCN guidelines, we are in agreement with concurrent chemo RT followed by maintenance immunotherapy.  She will be referred back to Dr. Turner for simulation and discussion of the radiation portion of the regimen.  With regard to chemotherapy, I would recommend carboplatin plus Alimta given every 3 weeks for 4 cycles in conjunction with radiation.  This will be followed by maintenance immunotherapy with durvalumab every 2 weeks for 1 year.  We discussed Port-A-Cath placement for infusion purposes given the multiple cycles involved.  Side effects of the chemotherapy discussed including fatigue, alopecia, mucositis, nausea, vomiting, diarrhea, rash, hand-foot syndrome, liver dysfunction, kidney dysfunction, low blood counts consider.  Written teaching information provided.  I discussed side effects of immunotherapy with durvalumab including infusion reactions, allergic reactions, thyroid dysfunction, liver dysfunction, kidney dysfunction, colitis/diarrhea, rash, arthritis, pneumonitis/lung dysfunction, rare instance of neurologic issues such as Guillain-Barré.  Written teaching information again provided.  Patient is agreeable to therapy as outlined.  She will have lab work today to ensure adequate endorgan functions and blood counts.  She will receive a B12 injection today.   Prescriptions for folic acid 1 mg daily, dexamethasone twice daily the day before, day of and day after chemotherapy provided.  Prescriptions for Zofran and Compazine as needed for nausea provided.  She will be referred to surgery for Port-A-Cath placement.  This office will coordinate with Dr. Turner in radiation oncology regarding start date.  I will plan to see her for cycle 2-day 1 with lab work prior to monitor for toxicities.    Orders:  -     CBC & Differential; Future  -     Comprehensive Metabolic Panel; Future  -     Ambulatory Referral to OP ONC Nutrition Services  -     prochlorperazine (COMPAZINE) 10 MG tablet; Take 1 tablet by mouth Every 6 (Six) Hours As Needed for Nausea or Vomiting.  Dispense: 20 tablet; Refill: 3  -     Clinic Appointment Request; Future  -     Infusion Appointment Request; Future  -     Cancel: cyanocobalamin injection 1,000 mcg  -     Provider communication  -     folic acid (FOLVITE) 1 MG tablet; Take 1 tablet by mouth Daily. Start 7 days prior to chemotherapy until at least 3 weeks after all chemotherapy.  Dispense: 30 tablet; Refill: 3  -     dexamethasone (DECADRON) 4 MG tablet; Take 1 tablet oral twice a day for 3 consecutive days beginning the day before chemotherapy and continue for 6 doses.Take with food.  Dispense: 6 tablet; Refill: 3  -     ondansetron (ZOFRAN) 8 MG tablet; Take 1 tablet by mouth 3 (Three) Times a Day As Needed for Nausea or Vomiting.  Dispense: 30 tablet; Refill: 5    2. Weight loss  Assessment & Plan:  Patient will be referred to dietitian for evaluation.  Reassess weight at next visit.    Orders:  -     Ambulatory Referral to OP ONC Nutrition Services      I spent 66 minutes caring for Sabina on this date of service. This time includes time spent by me in the following activities:preparing for the visit, reviewing tests, obtaining and/or reviewing a separately obtained history, performing a medically appropriate examination and/or evaluation ,  counseling and educating the patient/family/caregiver, ordering medications, tests, or procedures, referring and communicating with other health care professionals , documenting information in the medical record, independently interpreting results and communicating that information with the patient/family/caregiver and care coordination    Patient Follow Up: Cycle 2-day 1    Patient was given instructions and counseling regarding her condition or for health maintenance advice. Please see specific information pulled into the AVS if appropriate.     Narayan Jonas MD    11/30/2022

## 2022-11-30 NOTE — PROGRESS NOTES
MD Consult received for weight loss. Attempt made X 2 to contact pt via phone, however, unable to leave VM and no answer. Will continue to attempt to reach pt via phone at a later time.

## 2022-12-01 ENCOUNTER — ANESTHESIA EVENT (OUTPATIENT)
Dept: PERIOP | Facility: HOSPITAL | Age: 62
End: 2022-12-01

## 2022-12-01 ENCOUNTER — OFFICE VISIT (OUTPATIENT)
Dept: ONCOLOGY | Facility: HOSPITAL | Age: 62
End: 2022-12-01

## 2022-12-01 VITALS
TEMPERATURE: 97.8 F | HEART RATE: 77 BPM | SYSTOLIC BLOOD PRESSURE: 115 MMHG | BODY MASS INDEX: 16.9 KG/M2 | DIASTOLIC BLOOD PRESSURE: 77 MMHG | OXYGEN SATURATION: 100 % | WEIGHT: 112.43 LBS | RESPIRATION RATE: 16 BRPM

## 2022-12-01 DIAGNOSIS — Z71.9 ENCOUNTER FOR EDUCATION: ICD-10-CM

## 2022-12-01 DIAGNOSIS — C34.2 MALIGNANT NEOPLASM OF MIDDLE LOBE OF RIGHT LUNG: Primary | ICD-10-CM

## 2022-12-01 PROBLEM — D02.20: Status: ACTIVE | Noted: 2022-12-01

## 2022-12-01 PROCEDURE — G0463 HOSPITAL OUTPT CLINIC VISIT: HCPCS | Performed by: NURSE PRACTITIONER

## 2022-12-01 PROCEDURE — 99215 OFFICE O/P EST HI 40 MIN: CPT | Performed by: NURSE PRACTITIONER

## 2022-12-01 NOTE — PROGRESS NOTES
allCHEMOTHERAPY PREPARATION  .    Theresa M Gabehart  [id number]  1960    Chief Complaint: chemo education     History of present illness:  Theresa M Gabehart is a 61 y.o. year old female who is here today for chemotherapy preparation and needs assessment.  The patient has been diagnosed with stage IIIA lung cancer adenocarcinoma and is scheduled to begin treatment with Carboplatin, Pemetrexed every  21 days x 4 cycles, then followed by every 14 days Imfinzi.      Oncology History:    Oncology/Hematology History Overview Note   11/18/2022  Lymph node, station 10 L, FNA:  - Negative for malignant cells  - Lymphoid tissue present  2. Lymph node, station 7, FNA:  - Positive for malignant cells  - Metastatic adenocarcinoma, lung primary  3. Lymph node, station 4R, FNA:  - Positive for malignant cells  - Metastatic adenocarcinoma, lung primary  4. Lymph node, station 10 R, FNA:  - Positive for malignant cells  - Metastatic adenocarcinoma, lung primary    11/30/22 orders written for concurrent carbo & alimta with XRT     Malignant neoplasm of middle lobe of right lung (HCC)   11/30/2022 Initial Diagnosis    Malignant neoplasm of middle lobe of right lung (HCC)     11/30/2022 Cancer Staged    Staging form: Lung, AJCC 8th Edition  - Clinical: Stage IIIA (cT2a, cN2, cM0) - Signed by Narayan Jonas MD on 11/30/2022 12/7/2022 -  Chemotherapy    OP LUNG PEMEtrexed / CARBOplatin AUC=5 + XRT     3/1/2023 -  Chemotherapy    OP LUNG Durvalumab         The current medication list and allergy list were reviewed and reconciled.     Past Medical History, Past Surgical History, Social History, Family History have been reviewed and are without significant changes except as mentioned.    Review of Systems:    Review of Systems   Constitutional: Positive for fatigue.   HENT: Negative.    Eyes: Negative.    Respiratory: Negative.    Cardiovascular: Negative.    Gastrointestinal: Negative.    Endocrine: Negative.     Genitourinary: Negative.    Musculoskeletal: Negative.    Skin: Negative.    Allergic/Immunologic: Negative.    Neurological: Negative.    Hematological: Negative.    Psychiatric/Behavioral: The patient is nervous/anxious.        Physical Exam:    Vitals:    12/01/22 1356   BP: 115/77   Pulse: 77   Resp: 16   Temp: 97.8 °F (36.6 °C)   SpO2: 100%     Vitals:    12/01/22 1356   PainSc:   4   PainLoc: Generalized          ECOG: (1) Restricted in Physically Strenuous Activity, Ambulatory & Able to Do Work of Light Nature    General: well appearing, in no acute distress  Cardiovascular: regular rate and rhythm, no murmurs noted  Lungs: clear to auscultation bilaterally, respirations even and unlabored  Extremities: no lower extremity edema  Skin: no rashes, lesions, bruising, or petechiae  Psych: Mood is stable            NEEDS ASSESSMENTS    VAD Assessment  The patient and I discussed planned intravenous chemotherapy as well as other IV treatments that are often needed throughout the course of treatment. These may include, but are not limited to blood transfusions, antibiotics, and IV hydration. The vasculature does not appear to be adequate for multiple peripheral IVs throughout their treatment course. Discussed risks and benefits of VADs. The patient would like to pursue Port-A-Cath insertion prior to initiation of treatment.       Palliative Care  The patient and I discussed palliative care services. Palliative care is not the same as Hospice care. This is specialized medical care for people living with serious illness with the goal of improving quality of life for the patient and their family. [facility] has partnered with \[facility]\"" to offer our patients outpatient palliative care early along with their treatment to assist in coordination of care, symptom management, pain management, and medical decision making.  Oncology criteria for palliative care referral is not met at this time. The patient is not interested in a  palliative care consultation.     Additional Referral needs  none      CHEMOTHERAPY EDUCATION    Booklets Given: Chemotherapy and You [x]  Eating Hints [x]    Sexuality/Fertility Books []      Chemotherapy/Biotherapy Education Sheets: (list all that apply)  nausea management, acid reflux management, diarrhea management, Cancer resourse contacts information, skin and mouth care and vaccination information                                                                                                                                                                 Chemotherapy Regimen:   Treatment Plans     Name Type Plan Dates Plan Provider         Active    OP LUNG PEMEtrexed / CARBOplatin AUC=5 + XRT ONCOLOGY TREATMENT  11/29/2022 - Present Narayan Jonas MD                    TOPICS EDUCATION PROVIDED COMMENTS   ANEMIA:  role of RBC, cause, s/s, ways to manage, role of transfusion [x]    THROMBOCYTOPENIA:  role of platelet, cause, s/s, ways to prevent bleeding, things to avoid, when to seek help [x]    NEUTROPENIA:  role of WBC, cause, infection precautions, s/s of infection, when to call MD [x]    NUTRITION & APPETITE CHANGES:  importance of maintaining healthy diet & weight, ways to manage to improve intake, dietary consult, exercise regimen [x]    DIARRHEA:  causes, s/s of dehydration, ways to manage, dietary changes, when to call MD [x]    CONSTIPATION:  causes, ways to manage, dietary changes, when to call MD [x]    NAUSEA & VOMITING:  cause, use of antiemetics, dietary changes, when to call MD [x]    MOUTH SORES:  causes, oral care, ways to manage [x]    ALOPECIA:  cause, ways to manage, resources [x]    INFERTILITY & SEXUALITY:  causes, fertility preservation options, sexuality changes, ways to manage, importance of birth control [x]    NERVOUS SYSTEM CHANGES:  causes, s/s, neuropathies, cognitive changes, ways to manage [x]    PAIN:  causes, ways to manage [x]    SKIN & NAIL CHANGES:  cause, s/s, ways to  manage [x]    ORGAN TOXICITIES:  cause, s/s, need for diagnostic tests, labs, when to notify MD [x]    SURVIVORSHIP:  distress, distress assessment, secondary malignancies, early/late effects, follow-up, social issues, social support [x]    HOME CARE:  use of spill kits, storing of PO chemo, how to manage bodily fluids [x]    MISCELLANEOUS:  drug interactions, administration, vesicant, et [x]        Assessment and Plan:    Diagnoses and all orders for this visit:    1. Malignant neoplasm of middle lobe of right lung (HCC) (Primary)    2. Encounter for education      Patient is going for port insertion on 12/2/22.   Radiation consult with Dr. Turner on 12/5/22.   Cycle 1 day 1, chemotherapy start date on 12/5/22 at 0930.   Follow up with Dr. donato for cycle 2 day 1.     The patient and I have reviewed their cancer diagnosis and scheduled treatment plan. Needs assessment was completed including genetics, psychosocial needs, barriers to care, VAD evaluation, advanced care planning, and palliative care services. Referrals have been ordered as appropriate based upon our evaluation and patient desires.     Chemotherapy teaching was also completed today as documented above. Adequate time was given to answer all questions to her satisfaction. Patient and family are aware of their care team members and contact information if they have questions or problems throughout the treatment course. The patient is adequately prepared to begin treatment as scheduled.     Reviewed with patient education regarding EMLA cream, dexamethasone and Zofran prescriptions sent to pharmacy.       I advised the patient that she can take Tylenol or Ibuprofen as needed for aches/pains related to cancer/treatment.  I also advised that her can use Senakot or Miralax as needed for constipation or Imodium as needed for diarrhea.       I reviewed with the patient the care team members. I also reviewed the option of the urgent care clinic through our  oncology office for evaluation and management of symptoms related to treatment.    I spent 40 minutes caring for Sabina on this date of service. This time includes time spent by me in the following activities: preparing for the visit, reviewing tests, obtaining and/or reviewing a separately obtained history, performing a medically appropriate examination and/or evaluation, counseling and educating the patient/family/caregiver, ordering medications, tests, or procedures, referring and communicating with other health care professionals, documenting information in the medical record and independently interpreting results and communicating that information with the patient/family/caregiver.     Paris Quezada, APRN

## 2022-12-01 NOTE — PRE-PROCEDURE INSTRUCTIONS
PRE-OP INSTRUCTIONS REVIEWED WITH PATIENT: FASTING/BATHING/ARRIVAL PROCEDURES.  INSTRUCTED TO TAKE A.M. DAY OF SURGERY: SYBICORT INHALER, PRN: ALBUTEROL INHALER/NEB., NORCO.  UNDERSTANDING VERBALIZED.

## 2022-12-02 ENCOUNTER — APPOINTMENT (OUTPATIENT)
Dept: GENERAL RADIOLOGY | Facility: HOSPITAL | Age: 62
End: 2022-12-02

## 2022-12-02 ENCOUNTER — TELEPHONE (OUTPATIENT)
Dept: NUTRITION | Facility: HOSPITAL | Age: 62
End: 2022-12-02

## 2022-12-02 ENCOUNTER — HOSPITAL ENCOUNTER (OUTPATIENT)
Facility: HOSPITAL | Age: 62
Setting detail: HOSPITAL OUTPATIENT SURGERY
Discharge: HOME | End: 2022-12-02
Attending: SURGERY | Admitting: SURGERY

## 2022-12-02 ENCOUNTER — ANESTHESIA (OUTPATIENT)
Dept: PERIOP | Facility: HOSPITAL | Age: 62
End: 2022-12-02

## 2022-12-02 VITALS
WEIGHT: 113.1 LBS | TEMPERATURE: 98.2 F | BODY MASS INDEX: 17.75 KG/M2 | DIASTOLIC BLOOD PRESSURE: 69 MMHG | OXYGEN SATURATION: 99 % | HEIGHT: 67 IN | HEART RATE: 70 BPM | SYSTOLIC BLOOD PRESSURE: 103 MMHG | RESPIRATION RATE: 16 BRPM

## 2022-12-02 DIAGNOSIS — C34.91 PRIMARY MALIGNANT NEOPLASM OF RIGHT LUNG METASTATIC TO OTHER SITE: ICD-10-CM

## 2022-12-02 PROCEDURE — 71045 X-RAY EXAM CHEST 1 VIEW: CPT

## 2022-12-02 PROCEDURE — 25010000002 HEPARIN (PORCINE) PER 1000 UNITS: Performed by: SURGERY

## 2022-12-02 PROCEDURE — 25010000002 FENTANYL CITRATE (PF) 50 MCG/ML SOLUTION: Performed by: NURSE ANESTHETIST, CERTIFIED REGISTERED

## 2022-12-02 PROCEDURE — 25010000002 MIDAZOLAM PER 1 MG: Performed by: ANESTHESIOLOGY

## 2022-12-02 PROCEDURE — 77001 FLUOROGUIDE FOR VEIN DEVICE: CPT | Performed by: SURGERY

## 2022-12-02 PROCEDURE — 25010000002 PROPOFOL 10 MG/ML EMULSION: Performed by: NURSE ANESTHETIST, CERTIFIED REGISTERED

## 2022-12-02 PROCEDURE — 36561 INSERT TUNNELED CV CATH: CPT | Performed by: SURGERY

## 2022-12-02 PROCEDURE — 76000 FLUOROSCOPY <1 HR PHYS/QHP: CPT

## 2022-12-02 PROCEDURE — 25010000002 CEFAZOLIN IN DEXTROSE 2-4 GM/100ML-% SOLUTION: Performed by: SURGERY

## 2022-12-02 PROCEDURE — C1788 PORT, INDWELLING, IMP: HCPCS | Performed by: SURGERY

## 2022-12-02 DEVICE — PRT INTRO VASC/INTERV VORTEX FILL/HL DETACH/POLYURET/CATH 8F: Type: IMPLANTABLE DEVICE | Site: INTERNAL JUGULAR | Status: FUNCTIONAL

## 2022-12-02 RX ORDER — HYDROCODONE BITARTRATE AND ACETAMINOPHEN 5; 325 MG/1; MG/1
1 TABLET ORAL EVERY 6 HOURS PRN
Qty: 8 TABLET | Refills: 0 | Status: SHIPPED | OUTPATIENT
Start: 2022-12-02

## 2022-12-02 RX ORDER — ONDANSETRON 2 MG/ML
4 INJECTION INTRAMUSCULAR; INTRAVENOUS ONCE AS NEEDED
Status: DISCONTINUED | OUTPATIENT
Start: 2022-12-02 | End: 2022-12-02 | Stop reason: HOSPADM

## 2022-12-02 RX ORDER — CEFAZOLIN SODIUM 2 G/100ML
2 INJECTION, SOLUTION INTRAVENOUS ONCE
Status: COMPLETED | OUTPATIENT
Start: 2022-12-02 | End: 2022-12-02

## 2022-12-02 RX ORDER — SODIUM CHLORIDE, SODIUM LACTATE, POTASSIUM CHLORIDE, CALCIUM CHLORIDE 600; 310; 30; 20 MG/100ML; MG/100ML; MG/100ML; MG/100ML
9 INJECTION, SOLUTION INTRAVENOUS CONTINUOUS PRN
Status: DISCONTINUED | OUTPATIENT
Start: 2022-12-02 | End: 2022-12-02 | Stop reason: SDUPTHER

## 2022-12-02 RX ORDER — MIDAZOLAM HYDROCHLORIDE 1 MG/ML
2 INJECTION INTRAMUSCULAR; INTRAVENOUS ONCE
Status: COMPLETED | OUTPATIENT
Start: 2022-12-02 | End: 2022-12-02

## 2022-12-02 RX ORDER — ACETAMINOPHEN 500 MG
1000 TABLET ORAL ONCE
Status: DISCONTINUED | OUTPATIENT
Start: 2022-12-02 | End: 2022-12-02 | Stop reason: SDUPTHER

## 2022-12-02 RX ORDER — GLYCOPYRROLATE 0.2 MG/ML
0.2 INJECTION INTRAMUSCULAR; INTRAVENOUS
Status: DISCONTINUED | OUTPATIENT
Start: 2022-12-02 | End: 2022-12-02 | Stop reason: SDUPTHER

## 2022-12-02 RX ORDER — SODIUM CHLORIDE, SODIUM LACTATE, POTASSIUM CHLORIDE, CALCIUM CHLORIDE 600; 310; 30; 20 MG/100ML; MG/100ML; MG/100ML; MG/100ML
9 INJECTION, SOLUTION INTRAVENOUS CONTINUOUS PRN
Status: DISCONTINUED | OUTPATIENT
Start: 2022-12-02 | End: 2022-12-02 | Stop reason: HOSPADM

## 2022-12-02 RX ORDER — OXYCODONE HYDROCHLORIDE 5 MG/1
5 TABLET ORAL
Status: DISCONTINUED | OUTPATIENT
Start: 2022-12-02 | End: 2022-12-02 | Stop reason: HOSPADM

## 2022-12-02 RX ORDER — GLYCOPYRROLATE 0.2 MG/ML
0.2 INJECTION INTRAMUSCULAR; INTRAVENOUS
Status: DISCONTINUED | OUTPATIENT
Start: 2022-12-02 | End: 2022-12-02 | Stop reason: HOSPADM

## 2022-12-02 RX ORDER — ACETAMINOPHEN 500 MG
1000 TABLET ORAL ONCE
Status: DISCONTINUED | OUTPATIENT
Start: 2022-12-02 | End: 2022-12-02 | Stop reason: HOSPADM

## 2022-12-02 RX ORDER — PHENYLEPHRINE HCL IN 0.9% NACL 1 MG/10 ML
SYRINGE (ML) INTRAVENOUS AS NEEDED
Status: DISCONTINUED | OUTPATIENT
Start: 2022-12-02 | End: 2022-12-02 | Stop reason: SURG

## 2022-12-02 RX ORDER — PROPOFOL 10 MG/ML
VIAL (ML) INTRAVENOUS AS NEEDED
Status: DISCONTINUED | OUTPATIENT
Start: 2022-12-02 | End: 2022-12-02 | Stop reason: SURG

## 2022-12-02 RX ORDER — MIDAZOLAM HYDROCHLORIDE 1 MG/ML
2 INJECTION INTRAMUSCULAR; INTRAVENOUS ONCE
Status: DISCONTINUED | OUTPATIENT
Start: 2022-12-02 | End: 2022-12-02 | Stop reason: SDUPTHER

## 2022-12-02 RX ORDER — BUPIVACAINE HYDROCHLORIDE 2.5 MG/ML
INJECTION, SOLUTION EPIDURAL; INFILTRATION; INTRACAUDAL AS NEEDED
Status: DISCONTINUED | OUTPATIENT
Start: 2022-12-02 | End: 2022-12-02 | Stop reason: HOSPADM

## 2022-12-02 RX ORDER — FENTANYL CITRATE 50 UG/ML
INJECTION, SOLUTION INTRAMUSCULAR; INTRAVENOUS AS NEEDED
Status: DISCONTINUED | OUTPATIENT
Start: 2022-12-02 | End: 2022-12-02 | Stop reason: SURG

## 2022-12-02 RX ORDER — LIDOCAINE HYDROCHLORIDE 20 MG/ML
INJECTION, SOLUTION EPIDURAL; INFILTRATION; INTRACAUDAL; PERINEURAL AS NEEDED
Status: DISCONTINUED | OUTPATIENT
Start: 2022-12-02 | End: 2022-12-02 | Stop reason: SURG

## 2022-12-02 RX ADMIN — LIDOCAINE HYDROCHLORIDE 25 MG: 20 INJECTION, SOLUTION EPIDURAL; INFILTRATION; INTRACAUDAL; PERINEURAL at 11:16

## 2022-12-02 RX ADMIN — Medication 100 MCG: at 11:32

## 2022-12-02 RX ADMIN — FENTANYL CITRATE 25 MCG: 50 INJECTION, SOLUTION INTRAMUSCULAR; INTRAVENOUS at 11:20

## 2022-12-02 RX ADMIN — PROPOFOL 150 MCG/KG/MIN: 10 INJECTION, EMULSION INTRAVENOUS at 11:08

## 2022-12-02 RX ADMIN — FENTANYL CITRATE 25 MCG: 50 INJECTION, SOLUTION INTRAMUSCULAR; INTRAVENOUS at 11:06

## 2022-12-02 RX ADMIN — Medication 100 MCG: at 11:36

## 2022-12-02 RX ADMIN — CEFAZOLIN SODIUM 2 G: 2 INJECTION, SOLUTION INTRAVENOUS at 11:02

## 2022-12-02 RX ADMIN — PROPOFOL 20 MG: 10 INJECTION, EMULSION INTRAVENOUS at 11:16

## 2022-12-02 RX ADMIN — SODIUM CHLORIDE, POTASSIUM CHLORIDE, SODIUM LACTATE AND CALCIUM CHLORIDE: 600; 310; 30; 20 INJECTION, SOLUTION INTRAVENOUS at 11:02

## 2022-12-02 RX ADMIN — MIDAZOLAM HYDROCHLORIDE 2 MG: 1 INJECTION, SOLUTION INTRAMUSCULAR; INTRAVENOUS at 10:51

## 2022-12-02 RX ADMIN — PROPOFOL 30 MG: 10 INJECTION, EMULSION INTRAVENOUS at 11:08

## 2022-12-02 NOTE — DISCHARGE INSTRUCTIONS
Dr. Vargas's Instructions          DIET  Resume your regular diet.     ACTIVITY  Do not lift anything greater than 25 pounds with the arm on the same side the port was placed for one week.  After one week, you have no activity restrictions and may gradually increase your activities using common sense.     WOUND CARE & SHOWERING/BATHING  Your incisions are covered with a plastic type material that will flake off on its own in the next few weeks.  If the plastic type material has not flaked off on its own by 2 weeks after your surgery then use tweezers to pull it off.  You have sutures in your incisions but they are placed below the level of the skin and they will slowly dissolve (they do not need to be removed).  The skin around your incisions will likely have some bruising.  This is normal.  You can shower beginning tomorrow but wait two weeks after your surgery before taking any tub baths.      HOME MEDICATIONS  Resume all your normal home medications.     PAIN CONTROL  You will receive a narcotic pain medicine prescription before you are discharged home.  Be sure to take the narcotic pain medication with some food so as not to upset your stomach.  Do not drive while you are taking the prescription pain medication.  Take Tylenol or Motrin for mild pain.     BOWEL MOVEMENTS  It is not unusual for narcotic pain medications to cause constipation.  Also, the medications and anesthesia you received for your surgery can have a constipating effect.  To help avoid constipation, drink at least four eight-ounce glasses of water each day and use over-the-counter laxatives/stool softeners (dulcolax, milk of magnesia, senokot, etc.).  I recommend drinking the aforementioned amount of water daily and taking 30 ml of milk of magnesia two times each day while you are using the prescribed narcotic pain medication.  If your bowel movements become too loose or too frequent, then simply stop following these recommendations unless  you start to feel constipated.     FOLLOW-UP VISIT & QUESTIONS/CONCERNS  Call Dr. Vargas´s office at [phone] and schedule a follow-up appointment for about two weeks after your surgery date.  However, if you are doing fine and don´t have any concerns then simply cancel the follow-up appointment.  Should you have any questions or concerns, please call Dr. Canseco office.                  DISCHARGE INSTRUCTIONS  INSERTION OF   PORT-A-CATH      For your surgery you had:  General anesthesia (you may have a sore throat for the first 24 hours)  IV sedation  Local anesthesia  You may experience dizziness, drowsiness, or light-headedness for several hours following surgery/procedure.  Do not stay alone today or tonight.  Limit your activity for 24 hours.  You should not drive, operate machinery, drink alcohol, or sign legally binding documents for 24 hours or while you are taking pain medication.  Resume your diet slowly.  Follow whatever special dietary instructions you may have been given by your doctor.  Last dose of pain medication was given at:   .  NOTIFY YOUR DOCTOR IF YOU EXPERIENCE ANY OF THE FOLLOWING:  Temperature greater than 101 degrees Fahrenheit  Shaking Chills  Redness or excessive drainage from incision  Nausea, vomiting and/opr pain that is not controlled by prescribed medications  Increase in bleeding or bleeding that is excessive  Unable to urinate in 6 hours after surgery  If unable to reach your doctor, please go to the closest Emergency Room [facility]  You may shower Saturday   .  Apply an ice pack intermittently for 20 minutes for a total of 24-48 hours.  Do not apply directly to the skin.       Port should be flushed/heparinized once a month (even when not being used).  Keep Port-A-Cath ID Card in your purse of wallet.  Medications per physician instructions as indicated on Discharge Medication Information Sheet.  You should see    for follow-up care  on   .                                                 Phone number:       SPECIAL INSTRUCTIONS:

## 2022-12-02 NOTE — PROGRESS NOTES
Outpatient Nutrition Oncology Assessment    Patient Name: Theresa M Gabehart  YOB: 1960  MRN: [id number]  Assessment Date: 12/2/2022    CLINICAL NUTRITION ASSESSMENT  Dx: Malignant neoplasm of middle lobe of right lung      Type of Cancer Treatment ( See Below)       CLINICAL NUTRITION ASSESSMENT      Reason for Assessment  Physician consult     H&P:    Past Medical History:   Diagnosis Date   • Allergic rhinitis    • Aortic aneurysm ([facility])     FOLLOWED BY GARCIA   • Arnold-Chiari syndrome ([facility])    • Bleeding disorder ([facility])     FREE BLUE DUGGAN TEST NEGATIVE   • Carpal tunnel syndrome     RIGHT   • Colon polyps    • Colon polyps    • COPD (chronic obstructive pulmonary disease) ([facility])     NEBS/INHALER   • Dupuytren's contracture syndrome    • Dysphagia     ESOPHAGEAL STRICTURE   • Emphysema lung ([facility])    • Fibromyalgia    • Hiatal hernia    • History of fractured vertebra     19 fractured/HEALING, 2021   • History of pelvic fracture     2021 HEALED   • Lung cancer (HCC)     RIGHT   • Malignant neoplasm of middle lobe of right lung ([facility]) 11/30/2022   • Neuropathy    • Osteoporosis    • Vertigo         Current Problems:   Patient Active Problem List   Diagnosis Code   • Age related osteoporosis M81.0   • Annular tear of lumbar disc M51.36   • Anxiety disorder F41.9   • Arthritis M19.90   • Atopic neurodermatitis L20.81   • Carpal tunnel syndrome of right wrist G56.01   • Chronic idiopathic constipation K59.04   • Chronic obstructive pulmonary disease (HCC) J44.9   • Compression of brain (HCC) G93.5   • Fibromyalgia M79.7   • Herniated lumbar intervertebral disc M51.26   • Rotator cuff arthropathy of right shoulder M12.811   • Seasonal allergies J30.2   • Simple chronic bronchitis (HCC) J41.0   • Solitary pulmonary nodule R91.1   • Tobacco user Z72.0   • Von Willebrand disease D68.00   • Lung mass R91.8   • Centrilobular emphysema (HCC) J43.2   • Malignant neoplasm of middle lobe of right  lung (HCC) C34.2   • Weight loss R63.4   • Carcinoma in situ of bronchus or lung D02.20         Anthropometrics     Row Name 12/02/22 1625          Anthropometrics    Weight for Calculation 51.3 kg (113 lb 1.5 oz)                 BMI kg/m2   There is no height or weight on file to calculate BMI.    Weight Hx  Wt Readings from Last 30 Encounters:   12/02/22 0905 51.3 kg (113 lb 1.5 oz)   12/01/22 1356 51 kg (112 lb 7 oz)   11/30/22 1250 51 kg (112 lb 7 oz)   11/23/22 0828 49.4 kg (109 lb)   11/18/22 0736 50 kg (110 lb 3.7 oz)   11/17/22 1104 49.9 kg (110 lb)   10/30/22 1703 53.3 kg (117 lb 8.1 oz)   10/24/22 1301 50.8 kg (111 lb 15.9 oz)   10/18/22 1143 52.2 kg (115 lb)   10/13/22 1250 52.3 kg (115 lb 3.2 oz)   10/07/22 1401 49 kg (108 lb)   10/07/22 1254 49 kg (108 lb)   07/27/22 1408 51.8 kg (114 lb 3.2 oz)   07/19/22 1518 49.9 kg (110 lb)   07/19/22 1129 49.9 kg (110 lb)   07/13/22 0832 50 kg (110 lb 3.7 oz)   05/11/22 0805 51.7 kg (114 lb)   04/27/22 1019 49.9 kg (110 lb)   03/18/22 1401 49.9 kg (110 lb)   11/23/21 1507 53.5 kg (118 lb)   09/24/18 0000 55.4 kg (122 lb 2 oz)         Estimated/Assessed Needs - Anthropometrics     Row Name 12/02/22 1625          Anthropometrics    Weight for Calculation 51.3 kg (113 lb 1.5 oz)        Estimated/Assessed Needs    Additional Documentation KCAL/KG (Group);Protein Requirements (Group);Fluid Requirements (Group)        KCAL/KG    KCAL/KG 40 Kcal/Kg (kcal)     40 Kcal/Kg (kcal) 2052        Protein Requirements    Weight Used For Protein Calculations 51.3 kg (113 lb 1.5 oz)     Est Protein Requirement Amount (gms/kg) 1.8 gm protein     Estimated Protein Requirements (gms/day) 92.34                  Labs/Medications        Pertinent Labs Reviewed.   Results from last 7 days   Lab Units 11/30/22  1514   SODIUM mmol/L 138   POTASSIUM mmol/L 3.9   CHLORIDE mmol/L 102   CO2 mmol/L 24.2   BUN mg/dL 12   CREATININE mg/dL 0.53*   CALCIUM mg/dL 9.5   BILIRUBIN mg/dL 0.3   ALK PHOS  U/L 58   ALT (SGPT) U/L 13   AST (SGOT) U/L 16   GLUCOSE mg/dL 89     Results from last 7 days   Lab Units 11/30/22  1514   HEMOGLOBIN g/dL 13.5   HEMATOCRIT % 40.4     COVID19   Date Value Ref Range Status   07/08/2022 Not Detected Not Detected - Ref. Range Final     No results found for: HGBA1C      Pertinent Medications Aspirin, HYDROcodone-acetaminophen, L-Lysine, Probiotic Product, Zinc Sulfate, albuterol sulfate HFA, budesonide-formoterol, calcium, calcium carbonate, dexamethasone, fluticasone, folic acid, ipratropium-albuterol, linaclotide, ondansetron, prochlorperazine, tiotropium bromide monohydrate, and vitamin D     Physical Findings            Current Nutrition Orders & Evaluation of Intake       Oral Nutrition     Current PO Diet Higher Calorie / high Protein Diet, meals and snacks throughout the day   Supplement Boost High Protein 1X/day     Enteral Nutrition     Current Formula    Schedule      Parenteral Nutrition     Current Prescription    Schedule      Nutrition Diagnosis        Nutrition Dx Problem 1 Unintentional weight loss related to increased nutrient needs due to catabolic disease as evidenced by physiological causes increasing nutrient needs. and hypermetabolic state.       Nutrition Intervention       [facility]  Written Materials Mailed: High Calorie / High Protein Diet, High Calorie Snack List, Smoothie Recipes, Boost Coupons  Nutrition F/U      Monitor/Evaluation       Monitor Per oncology nutrition protocol.     Comments:  MD Consult received for 20lb UWL. Spoke with pt via phone. She reports losing ~20lbs unintentionally over the past few years. She reports her UBW: 130lbs and her weight currently is around 108-109lbs (she reports more recent weights of 112-113lbs was obtained while wearing multiple layers of clothes). She reports good appetite, consumes high calorie / high protein foods throughout the day in meals and snacks (nuts, martinez, eggs, cheese, sausage,  2% milk, etc). She also consumes ~1 Boost High Protein a day. She reports desire to gain weight. Discussed with pt different ideas on how to increase calorie intake and protein intake throughout the day (high calorie food choices, snack ideas, meal and food fortification, ONS options, etc). Pt v/u. Pt amendable to having handouts mailed to listed address - confirmed address with pt. Plan for pt to start tx next week (chemo and possible XRT). Will f/u with pt next week to complete nutrition education at that time.     Electronically signed by:  Federica Guerrier RD  12/02/22 16:27 EST

## 2022-12-02 NOTE — ANESTHESIA PREPROCEDURE EVALUATION
Anesthesia Evaluation     Patient summary reviewed and Nursing notes reviewed   no history of anesthetic complications:  NPO Solid Status: > 8 hours  NPO Liquid Status: > 2 hours           Airway   Mallampati: II  TM distance: >3 FB  Neck ROM: full  No difficulty expected  Dental      Pulmonary - normal exam    breath sounds clear to auscultation  (+) lung cancer, COPD,   Cardiovascular - negative cardio ROS and normal exam  Exercise tolerance: good (4-7 METS)    Rhythm: regular  Rate: normal        Neuro/Psych  (+) dizziness/light headedness, numbness, psychiatric history,    GI/Hepatic/Renal/Endo    (+)  hiatal hernia,      Musculoskeletal     Radiculopathy:     Abdominal    Substance History - negative use     OB/GYN negative ob/gyn ROS         Other   arthritis,    history of cancer active    ROS/Med Hx Other: <4 mets, soae, hx of thoracic aneurysm, 4.1cm, stress test ef 51% this sept good fxn, bronch for lung cancer dx nov. 19th    Pt states she has von willebrands' disease            Latest Reference Range & Units 11/30/22 15:14   Glucose 65 - 99 mg/dL 89   Sodium 136 - 145 mmol/L 138   Potassium 3.5 - 5.2 mmol/L 3.9   CO2 22.0 - 29.0 mmol/L 24.2   Chloride 98 - 107 mmol/L 102   Anion Gap 5.0 - 15.0 mmol/L 11.8   Creatinine 0.57 - 1.00 mg/dL 0.53 (L)   BUN 8 - 23 mg/dL 12   BUN/Creatinine Ratio 7.0 - 25.0  22.6   Calcium 8.6 - 10.5 mg/dL 9.5   eGFR >60.0 mL/min/1.73 106.0   Alkaline Phosphatase 39 - 117 U/L 58   Total Protein 6.0 - 8.5 g/dL 7.6   ALT (SGPT) 1 - 33 U/L 13   AST (SGOT) 1 - 32 U/L 16   Total Bilirubin 0.0 - 1.2 mg/dL 0.3   Albumin 3.50 - 5.20 g/dL 4.40   Globulin gm/dL 3.2   A/G Ratio g/dL 1.4   (L): Data is abnormally low     Latest Reference Range & Units 11/30/22 15:14   Hemoglobin 12.0 - 15.9 g/dL 13.5   Hematocrit 34.0 - 46.6 % 40.4     - NORMAL ECG -  Sinus rhythm  When compared with ECG of 30-Oct-2022 16:24:55,  No significant change    Interpretation Summary 9/27/22    • Left ventricular  "ejection fraction is normal. (Calculated EF = 51%).  • Myocardial perfusion imaging indicates a normal myocardial perfusion study with no evidence of ischemia.  • Impressions are consistent with a low risk study.  • Findings consistent with a normal ECG stress test.            Anesthesia Plan    ASA 3     general     (Patient understands anesthesia not responsible for dental damage.    Von Willebrand's disease will hold off on ddavp right now (explained to pt if it was a bigger procedure we would need to treat)    All questions answered, pt very upset because of she feels \"the lung cancer diagnosis was missed despite me going to pulmonary lots of times\")  intravenous induction     Anesthetic plan, risks, benefits, and alternatives have been provided, discussed and informed consent has been obtained with: patient.    Use of blood products discussed with patient .   Plan discussed with CRNA.        CODE STATUS:       "

## 2022-12-02 NOTE — OP NOTE
INSERTION VENOUS ACCESS DEVICE  Procedure Report    Patient Name:  Theresa M Gabehart  YOB: 1960    Date of Surgery:  12/2/2022     Pre-op Diagnosis:   Primary malignant neoplasm of right lung metastatic to other site (HCC) [C34.91]    Pre-Op Diagnosis Codes:     * Primary malignant neoplasm of right lung metastatic to other site (HCC) [C34.91]       Post-op Diagnosis:   Post-Op Diagnosis Codes:     * Primary malignant neoplasm of right lung metastatic to other site (HCC) [C34.91]    Procedure/CPT® Codes:   21855    Procedure(s):  INSERTION OF PORTACATH    Staff:  Surgeon(s):  Ishan Vargas MD    Assistant: Rohit Palomares CSA    Anesthesia: Monitored Anesthesia Care    Estimated Blood Loss: Minimal    Complications:  None    Drains:  None    Packing:  None    Implants:    Implant Name Type Inv. Item Serial No.  Lot No. LRB No. Used Action   PRT INTRO VASC/INTERV VORTEX FILL/HL DETACH/POLYURET/CATH 8F - [id number] Implant PRT INTRO VASC/INTERV VORTEX FILL/HL DETACH/POLYURET/CATH 8F  [employer] [id number] Right 1 Implanted     Specimen: None    Indications:  Lung cancer. Need to receive IV therapy.     Findings:  Angiodynamics Smartport placed via right internal jugular vein.     Description of Procedure: Patient was taken to the operating room placed supine on the operating table.  Timeout verification was performed. MAC anesthesia was administered.  Patient was prepped and draped in usual fashion.  Using ultrasound, the right internal jugular vein was identified and then accessed with a needle.  A guidewire was placed through the needle and the needle was withdrawn.  Fluoroscopy was used to confirm the guidewire was positioned appropriately in the superior vena cava.  A subcutaneous pocket was created at the right upper chest to accommodate the port.  The inner dilator was placed inside of the tear-away sheath and both were placed over the guidewire into the right internal  jugular vein.  The inner guidewire and dilator were removed.  The catheter was placed through the tear-away sheath and the sheath was withdrawn.  The tunneler trocar was used to tunnel the catheter to the subcutaneous pocket.  Then under direct visualization with fluoroscopy, the catheter was pulled back until the tip was positioned appropriately in the superior vena cava.  The catheter was cut to the appropriate length and the locking cap was placed onto the catheter.   The catheter was connected to the port, the locking cap was secured, and the port was placed within the subcutaneous pocket.  I accessed the port with a Olivares needle.  I could easily aspirate venous blood.  The port was then flushed with heparinized solution.  The wound was closed appropriately with buried absorbable suture followed by appropriate dressings.  No complications.  Sponge needle and instrument counts were correct.  Patient was transported to the recovery area in stable condition.    Assistant: Rohit Palomares CSA was responsible for performing the following activities: Retraction, Suturing, Closing and Placing Dressing.  His skilled assistance was necessary for the success of this case.    Ishan Vargas MD     Date: 12/2/2022  Time: 11:32 EST    Electronically signed by Ishan Vargas MD, 12/02/22, 11:32 AM EST.

## 2022-12-02 NOTE — ANESTHESIA POSTPROCEDURE EVALUATION
Patient: Theresa M Gabehart    Procedure Summary     Date: 12/02/22 Room / Location: [facility] OR  / [facility]    Anesthesia Start: 1102 Anesthesia Stop: 1147    Procedure: INSERTION OF PORTACATH Diagnosis:       Primary malignant neoplasm of right lung metastatic to other site (HCC)      (Primary malignant neoplasm of right lung metastatic to other site (HCC) [C34.91])    Surgeons: Ishan Vargas MD Provider: Sam Dixon MD    Anesthesia Type: general ASA Status: 3          Anesthesia Type: general    Vitals  Vitals Value Taken Time   /86 12/02/22 1221   Temp 36.2 °C (97.2 °F) 12/02/22 1145   Pulse 71 12/02/22 1224   Resp 22 12/02/22 1145   SpO2 99 % 12/02/22 1224   Vitals shown include unvalidated device data.        Post Anesthesia Care and Evaluation    Patient location during evaluation: bedside  Patient participation: complete - patient participated  Level of consciousness: awake  Pain management: adequate    Airway patency: patent  Anesthetic complications: No anesthetic complications  PONV Status: none  Cardiovascular status: acceptable and stable  Respiratory status: acceptable  Hydration status: acceptable    Comments: An Anesthesiologist personally participated in the most demanding procedures (including induction and emergence if applicable) in the anesthesia plan, monitored the course of anesthesia administration at frequent intervals and remained physically present and available for immediate diagnosis and treatment of emergencies.

## 2022-12-05 ENCOUNTER — HOSPITAL ENCOUNTER (OUTPATIENT)
Dept: RADIATION ONCOLOGY | Facility: HOSPITAL | Age: 62
Discharge: HOME | End: 2022-12-05
Admitting: RADIOLOGY
Payer: MEDICARE

## 2022-12-05 ENCOUNTER — HOSPITAL ENCOUNTER (OUTPATIENT)
Dept: RADIATION ONCOLOGY | Facility: HOSPITAL | Age: 62
Setting detail: RADIATION/ONCOLOGY SERIES
End: 2022-12-05
Payer: MEDICARE

## 2022-12-05 ENCOUNTER — OFFICE VISIT (OUTPATIENT)
Dept: RADIATION ONCOLOGY | Facility: HOSPITAL | Age: 62
End: 2022-12-05

## 2022-12-05 VITALS
HEART RATE: 95 BPM | DIASTOLIC BLOOD PRESSURE: 71 MMHG | WEIGHT: 114.42 LBS | SYSTOLIC BLOOD PRESSURE: 117 MMHG | OXYGEN SATURATION: 99 % | BODY MASS INDEX: 17.92 KG/M2 | RESPIRATION RATE: 16 BRPM | TEMPERATURE: 98.7 F

## 2022-12-05 DIAGNOSIS — C34.2 MALIGNANT NEOPLASM OF MIDDLE LOBE OF RIGHT LUNG: Primary | ICD-10-CM

## 2022-12-05 DIAGNOSIS — C34.11 MALIGNANT NEOPLASM OF UPPER LOBE, RIGHT BRONCHUS OR LUNG: ICD-10-CM

## 2022-12-05 PROCEDURE — 77263 THER RADIOLOGY TX PLNG CPLX: CPT | Performed by: RADIOLOGY

## 2022-12-05 PROCEDURE — 77470 SPECIAL RADIATION TREATMENT: CPT | Performed by: RADIOLOGY

## 2022-12-05 PROCEDURE — 0 IOPAMIDOL PER 1 ML: Performed by: RADIOLOGY

## 2022-12-05 PROCEDURE — G0463 HOSPITAL OUTPT CLINIC VISIT: HCPCS | Performed by: RADIOLOGY

## 2022-12-05 PROCEDURE — 77332 RADIATION TREATMENT AID(S): CPT | Performed by: RADIOLOGY

## 2022-12-05 PROCEDURE — 99214 OFFICE O/P EST MOD 30 MIN: CPT | Performed by: RADIOLOGY

## 2022-12-05 RX ADMIN — IOPAMIDOL 15 ML: 755 INJECTION, SOLUTION INTRAVENOUS at 16:08

## 2022-12-05 NOTE — PROGRESS NOTES
Follow Up Office Visit      Encounter Date: 12/05/2022   Patient Name: Theresa M Gabehart  YOB: 1960   Medical Record Number: [id number]   Primary Diagnosis: Malignant neoplasm of middle lobe of right lung (HCC) [C34.2]   Cancer Staging: Cancer Staging   Malignant neoplasm of middle lobe of right lung (HCC)  Staging form: Lung, AJCC 8th Edition  - Clinical: Stage IIIA (cT2a, cN2, cM0) - Signed by Narayan Jonas MD on 11/30/2022      Chief Complaint:    Chief Complaint   Patient presents with   • Follow-up   • Lung Cancer       History of Present Illness: Theresa M Gabehart returns for follow-up after undergoing PET/CT and bronchoscopy with endobronchial ultrasound.  PET/CT on 11/9/2022 revealed interval increase in size of the mass in the lateral segment of the right middle lobe consistent with worsening primary lung carcinoma.  There was new metabolically active right hilar and mediastinal adenopathy consistent with metastatic disease with no abnormal uptake in the head/neck, abdomen, pelvis or osseous structures.  Bronchoscopy and biopsy was performed on 11/18/2022 with pathology revealing adenocarcinoma within station 7, station 4R and station 10 R.  Ms. Gipson reports feeling well overall but she is a bit overwhelmed with her recent diagnosis.    Subjective      Review of Systems: Review of Systems   Constitutional: Positive for fatigue (5/  10). Negative for appetite change and unexpected weight change.   HENT: Positive for tinnitus (Occasional, ongoing) and trouble swallowing (Occasional, suppose to have her esophagus stretched.). Negative for hearing loss, sore throat and voice change.    Eyes: Negative for visual disturbance.   Respiratory: Positive for shortness of breath (With activity, started a couple of months ago.). Negative for cough.    Cardiovascular: Positive for chest pain (Across chest, noted after biopsy. ) and palpitations (Occasional, ongoing). Negative for leg  swelling.   Gastrointestinal: Negative for blood in stool, constipation, diarrhea, nausea and vomiting.   Endocrine:        Occasional night sweats.     Genitourinary: Positive for hematuria (Noted in tests for years, ongoing) and urgency (Occasional, drinks cranberry with relief.). Negative for difficulty urinating, dysuria and frequency.   Musculoskeletal: Positive for arthralgias (Generalized 4/5, ongoing), back pain (Ongoing) and gait problem (In motorized wheelchair.). Negative for joint swelling.   Skin: Negative for color change and rash.   Neurological: Positive for numbness (Generalized neuropathy, ongoing) and headaches (x couple of days, noted at night. Resolves on own. ). Negative for dizziness and weakness.   Psychiatric/Behavioral: Negative for sleep disturbance.       The following portions of the patient's history were reviewed and updated as appropriate: allergies, current medications, past family history, past medical history, past social history, past surgical history and problem list.    Medications:     Current Outpatient Medications:   •  albuterol sulfate  (90 Base) MCG/ACT inhaler, Inhale 2 puffs Every 4 (Four) Hours As Needed for Wheezing., Disp: 18 g, Rfl: 11  •  [employer] Calcium 600 MG tablet, Take 1 tablet by mouth 2 (Two) Times a Day With Meals., Disp: , Rfl:   •  fluticasone (FLONASE) 50 MCG/ACT nasal spray, 1 spray into the nostril(s) as directed by provider Daily As Needed., Disp: , Rfl:   •  folic acid (FOLVITE) 1 MG tablet, Take 1 tablet by mouth Daily. Start 7 days prior to chemotherapy until at least 3 weeks after all chemotherapy., Disp: 30 tablet, Rfl: 3  •  HYDROcodone-acetaminophen (Norco) 5-325 MG per tablet, Take 1 tablet by mouth Every 6 (Six) Hours As Needed for Moderate Pain (NOTE: You can take two tablets instead of one tablet every four hours instead of every six hours if needed for pain)., Disp: 8 tablet, Rfl: 0  •  ipratropium-albuterol (DUO-NEB) 0.5-2.5 mg/3 ml  nebulizer, Take 3 mL by nebulization 4 (Four) Times a Day As Needed for Wheezing., Disp: 360 mL, Rfl: 3  •  L-Lysine 500 MG tablet tablet, Take  by mouth Daily., Disp: , Rfl:   •  Linzess 145 MCG capsule capsule, Take 145 mcg by mouth As Needed., Disp: , Rfl:   •  Symbicort 160-4.5 MCG/ACT inhaler, 2 puffs 2 (Two) Times a Day As Needed., Disp: , Rfl:   •  vitamin D (ERGOCALCIFEROL) 1.25 MG (15950 UT) capsule capsule, Take 50,000 Units by mouth Every 7 (Seven) Days., Disp: , Rfl:   •  Zinc Sulfate (ZINC 15 PO), Take 1 each by mouth Every Other Day., Disp: , Rfl:   •  Aspirin 81 MG capsule, Take 81 mg by mouth Daily. LAST DOSE 11/24/22, Disp: , Rfl:   •  dexamethasone (DECADRON) 4 MG tablet, Take 1 tablet oral twice a day for 3 consecutive days beginning the day before chemotherapy and continue for 6 doses.Take with food. (Patient taking differently: Take 1 tablet oral twice a day for 3 consecutive days beginning the day before chemotherapy and continue for 6 doses.Take with food. 12/1/22 PT HAS NOT STARTED THIS), Disp: 6 tablet, Rfl: 3  •  ondansetron (ZOFRAN) 8 MG tablet, Take 1 tablet by mouth 3 (Three) Times a Day As Needed for Nausea or Vomiting., Disp: 30 tablet, Rfl: 5  •  Probiotic Product (PROBIOTIC BLEND PO), Take 1 tablet by mouth Daily., Disp: , Rfl:   •  prochlorperazine (COMPAZINE) 10 MG tablet, Take 1 tablet by mouth Every 6 (Six) Hours As Needed for Nausea or Vomiting., Disp: 20 tablet, Rfl: 3  •  tiotropium bromide monohydrate (SPIRIVA RESPIMAT) 2.5 MCG/ACT aerosol solution inhaler, Inhale 2 puffs Daily. (Patient taking differently: Inhale 2 puffs Daily. 12/1/22 PT STATES SHE HAS NOT STARTED THIS YET), Disp: 1 each, Rfl: 11    Allergies:   Allergies   Allergen Reactions   • Clarithromycin Rash   • Metronidazole Unknown - High Severity   • Doxycycline Calcium Rash   • Gabapentin Mental Status Change       ECOG: (1) Restricted in physically strenuous activity, ambulatory and able to do work of light  nature  Quality of Life: 80 - Restricted Physical Activity     Objective     Physical Exam:   Vital Signs:   Vitals:    12/05/22 1425   BP: 117/71   Pulse: 95   Resp: 16   Temp: 98.7 °F (37.1 °C)   TempSrc: Temporal   SpO2: 99%   Weight: 51.9 kg (114 lb 6.7 oz)   PainSc:   4     Body mass index is 17.92 kg/m².     Physical Exam  Constitutional:       General: She is not in acute distress.     Appearance: Normal appearance. She is not ill-appearing, toxic-appearing or diaphoretic.      Comments: Thin   HENT:      Head: Normocephalic and atraumatic.   Pulmonary:      Effort: Pulmonary effort is normal. No respiratory distress.   Abdominal:      General: Abdomen is flat. There is no distension.   Skin:     General: Skin is warm and dry.   Neurological:      General: No focal deficit present.      Mental Status: She is alert and oriented to person, place, and time.      Cranial Nerves: No cranial nerve deficit.   Psychiatric:         Mood and Affect: Mood normal.         Behavior: Behavior normal.         Judgment: Judgment normal.         Radiographs: CT Chest Without Contrast Diagnostic    Result Date: 9/27/2022    Increased size of irregular marginated noncalcified pulmonary nodule in the right middle lobe measuring maximally 2.6 centimeters.  Increased size of mediastinal lymph nodes since previous study.  Lung cancer is most likely and has progressed. No definite metastatic findings.  The lymph nodes could be better evaluated with PET-CT. Mild to moderate emphysema. Ascending thoracic aortic aneurysm. Severe coronary artery atherosclerotic calcification.     ANN GOMES MD       Electronically Signed and Approved By: ANN GOMES MD on 9/27/2022 at 15:23             MRI Brain With & Without Contrast    Result Date: 10/28/2022     1. No evidence of intracranial metastatic disease  2. Chronic microvascular ischemic white matter change      JOSE JUAN FLORES MD       Electronically Signed and Approved By: JOSE JUAN FLORES  MD on 10/28/2022 at 14:36             XR Chest 1 View    Result Date: 12/2/2022   Port tip terminates at the cavoatrial junction.  Please see operating physician report.  Fluoroscopy in OR without radiologist present.       ANN GOMES MD       Electronically Signed and Approved By: ANN GOMES MD on 12/02/2022 at 13:53             XR Chest 1 View    Result Date: 12/2/2022    Right jugular Port-A-Cath tip is at the cavoatrial junction.  No pneumothorax is seen.  3 cm mass in the right mid lung is unchanged.       VIVIANA SMITH MD       Electronically Signed and Approved By: VIVIANA SMITH MD on 12/02/2022 at 12:05             XR Chest 1 View    Result Date: 10/30/2022     1. Probable pulmonary emphysema with no acute process demonstrated  2. Known right-sided pulmonary nodule (refer to comparison CT chest dated 09/27/2022)      JOSE JUAN FLORES MD       Electronically Signed and Approved By: JOSE JUAN FLORES MD on 10/30/2022 at 17:45             NM PET/CT Skull Base to Mid Thigh    Result Date: 11/10/2022    1. Interval increase in size of the mass in the lateral segment of the right middle lobe consistent with worsening primary lung carcinoma. 2. There is new metabolically active right hilar and mediastinal adenopathy consistent with metastatic disease. 3. There is no abnormal uptake within the head/neck, abdomen, pelvis, or osseous structures.     TRINITY LANDRY MD       Electronically Signed and Approved By: TRINITY LANDRY MD on 11/10/2022 at 9:45           I personally reviewed the PET/CT performed on 11/10/2022.  The pertinent findings are as above.       Pathology: I personally reviewed the pathology report for the procedure performed on 11/18/2022.  The pertinent findings are as above.        Assessment / Plan          Assessment/Plan:   Theresa Gabehart is a 61-year-old female with cT2 cN2 cM0 adenocarcinoma of the right middle lobe.  She has no clinical or radiographic evidence of distant metastatic disease.  ECOG  1    I discussed the clinical, radiographic and pathologic findings today with Ms. Gabehart.  I once again explained the rationale for the delivery of concurrent chemoradiation and stage III lung cancer.  We discussed the expected acute and chronic toxicities associated with radiotherapy.  I recommended external beam radiotherapy concurrent with chemo therapy and Ms. Gabehart is agreeable to my recommendation.  She will undergo CT simulation today for treatment planning purposes.      Shaw Turner MD  Radiation Oncology  [facility]    This document has been signed by Shaw Turner MD on December 5, 2022 15:37 EST

## 2022-12-06 PROBLEM — Z45.2 ENCOUNTER FOR ADJUSTMENT OR MANAGEMENT OF VASCULAR ACCESS DEVICE: Status: ACTIVE | Noted: 2022-12-06

## 2022-12-06 RX ORDER — SODIUM CHLORIDE 0.9 % (FLUSH) 0.9 %
20 SYRINGE (ML) INJECTION AS NEEDED
Status: CANCELLED | OUTPATIENT
Start: 2022-12-07

## 2022-12-06 RX ORDER — HEPARIN SODIUM (PORCINE) LOCK FLUSH IV SOLN 100 UNIT/ML 100 UNIT/ML
500 SOLUTION INTRAVENOUS AS NEEDED
Status: CANCELLED | OUTPATIENT
Start: 2022-12-07

## 2022-12-07 ENCOUNTER — HOSPITAL ENCOUNTER (OUTPATIENT)
Dept: ONCOLOGY | Facility: HOSPITAL | Age: 62
Discharge: HOME | End: 2022-12-07
Admitting: INTERNAL MEDICINE

## 2022-12-07 ENCOUNTER — HOSPITAL ENCOUNTER (OUTPATIENT)
Dept: RADIATION ONCOLOGY | Facility: HOSPITAL | Age: 62
Discharge: HOME | End: 2022-12-07

## 2022-12-07 VITALS
HEIGHT: 64 IN | HEART RATE: 99 BPM | DIASTOLIC BLOOD PRESSURE: 85 MMHG | WEIGHT: 112.88 LBS | TEMPERATURE: 98.7 F | OXYGEN SATURATION: 97 % | BODY MASS INDEX: 19.27 KG/M2 | SYSTOLIC BLOOD PRESSURE: 155 MMHG | RESPIRATION RATE: 18 BRPM

## 2022-12-07 DIAGNOSIS — C34.2 MALIGNANT NEOPLASM OF MIDDLE LOBE OF RIGHT LUNG: Primary | ICD-10-CM

## 2022-12-07 DIAGNOSIS — Z45.2 ENCOUNTER FOR ADJUSTMENT OR MANAGEMENT OF VASCULAR ACCESS DEVICE: ICD-10-CM

## 2022-12-07 LAB
ALBUMIN SERPL-MCNC: 4.19 G/DL (ref 3.5–5.2)
ALBUMIN/GLOB SERPL: 1.5 G/DL
ALP SERPL-CCNC: 55 U/L (ref 39–117)
ALT SERPL W P-5'-P-CCNC: 13 U/L (ref 1–33)
ANION GAP SERPL CALCULATED.3IONS-SCNC: 9.1 MMOL/L (ref 5–15)
AST SERPL-CCNC: 11 U/L (ref 1–32)
BASOPHILS # BLD AUTO: 0.03 10*3/MM3 (ref 0–0.2)
BASOPHILS NFR BLD AUTO: 0.2 % (ref 0–1.5)
BILIRUB SERPL-MCNC: <0.2 MG/DL (ref 0–1.2)
BUN SERPL-MCNC: 17 MG/DL (ref 8–23)
BUN/CREAT SERPL: 34.7 (ref 7–25)
CALCIUM SPEC-SCNC: 9.7 MG/DL (ref 8.6–10.5)
CHLORIDE SERPL-SCNC: 105 MMOL/L (ref 98–107)
CO2 SERPL-SCNC: 26.9 MMOL/L (ref 22–29)
CREAT SERPL-MCNC: 0.49 MG/DL (ref 0.57–1)
DEPRECATED RDW RBC AUTO: 43.1 FL (ref 37–54)
EGFRCR SERPLBLD CKD-EPI 2021: 108.1 ML/MIN/1.73
EOSINOPHIL # BLD AUTO: 0.31 10*3/MM3 (ref 0–0.4)
EOSINOPHIL NFR BLD AUTO: 1.9 % (ref 0.3–6.2)
ERYTHROCYTE [DISTWIDTH] IN BLOOD BY AUTOMATED COUNT: 12.6 % (ref 12.3–15.4)
GLOBULIN UR ELPH-MCNC: 2.7 GM/DL
GLUCOSE SERPL-MCNC: 151 MG/DL (ref 65–99)
HCT VFR BLD AUTO: 37.3 % (ref 34–46.6)
HGB BLD-MCNC: 12.7 G/DL (ref 12–15.9)
IMM GRANULOCYTES # BLD AUTO: 0.03 10*3/MM3 (ref 0–0.05)
IMM GRANULOCYTES NFR BLD AUTO: 0.2 % (ref 0–0.5)
LYMPHOCYTES # BLD AUTO: 2.31 10*3/MM3 (ref 0.7–3.1)
LYMPHOCYTES NFR BLD AUTO: 14.5 % (ref 19.6–45.3)
MCH RBC QN AUTO: 31.6 PG (ref 26.6–33)
MCHC RBC AUTO-ENTMCNC: 34 G/DL (ref 31.5–35.7)
MCV RBC AUTO: 92.8 FL (ref 79–97)
MONOCYTES # BLD AUTO: 1.18 10*3/MM3 (ref 0.1–0.9)
MONOCYTES NFR BLD AUTO: 7.4 % (ref 5–12)
NEUTROPHILS NFR BLD AUTO: 12.12 10*3/MM3 (ref 1.7–7)
NEUTROPHILS NFR BLD AUTO: 75.8 % (ref 42.7–76)
PLATELET # BLD AUTO: 318 10*3/MM3 (ref 140–450)
PMV BLD AUTO: 9 FL (ref 6–12)
POTASSIUM SERPL-SCNC: 3.3 MMOL/L (ref 3.5–5.2)
PROT SERPL-MCNC: 6.9 G/DL (ref 6–8.5)
RAD ONC ARIA COURSE ID: NORMAL
RAD ONC ARIA COURSE INTENT: NORMAL
RAD ONC ARIA COURSE LAST TREATMENT DATE: NORMAL
RAD ONC ARIA COURSE START DATE: NORMAL
RAD ONC ARIA COURSE TREATMENT ELAPSED DAYS: 0
RAD ONC ARIA FIRST TREATMENT DATE: NORMAL
RAD ONC ARIA PLAN FRACTIONS TREATED TO DATE: 1
RAD ONC ARIA PLAN ID: NORMAL
RAD ONC ARIA PLAN NAME: NORMAL
RAD ONC ARIA PLAN PRESCRIBED DOSE PER FRACTION: 2 GY
RAD ONC ARIA PLAN PRIMARY REFERENCE POINT: NORMAL
RAD ONC ARIA PLAN TOTAL FRACTIONS PRESCRIBED: 30
RAD ONC ARIA PLAN TOTAL PRESCRIBED DOSE: 6000 CGY
RAD ONC ARIA REFERENCE POINT DOSAGE GIVEN TO DATE: 2 GY
RAD ONC ARIA REFERENCE POINT ID: NORMAL
RAD ONC ARIA REFERENCE POINT SESSION DOSAGE GIVEN: 2 GY
RBC # BLD AUTO: 4.02 10*6/MM3 (ref 3.77–5.28)
SODIUM SERPL-SCNC: 141 MMOL/L (ref 136–145)
WBC NRBC COR # BLD: 15.98 10*3/MM3 (ref 3.4–10.8)

## 2022-12-07 PROCEDURE — 77300 RADIATION THERAPY DOSE PLAN: CPT | Performed by: RADIOLOGY

## 2022-12-07 PROCEDURE — 25010000002 PALONOSETRON PER 25 MCG: Performed by: INTERNAL MEDICINE

## 2022-12-07 PROCEDURE — 25010000002 PEMETREXED 100 MG RECONSTITUTED SOLUTION 1 EACH VIAL: Performed by: INTERNAL MEDICINE

## 2022-12-07 PROCEDURE — 96413 CHEMO IV INFUSION 1 HR: CPT

## 2022-12-07 PROCEDURE — 96411 CHEMO IV PUSH ADDL DRUG: CPT

## 2022-12-07 PROCEDURE — 25010000002 PEMETREXED PER 10 MG: Performed by: INTERNAL MEDICINE

## 2022-12-07 PROCEDURE — 85025 COMPLETE CBC W/AUTO DIFF WBC: CPT | Performed by: INTERNAL MEDICINE

## 2022-12-07 PROCEDURE — 96375 TX/PRO/DX INJ NEW DRUG ADDON: CPT

## 2022-12-07 PROCEDURE — 80053 COMPREHEN METABOLIC PANEL: CPT | Performed by: INTERNAL MEDICINE

## 2022-12-07 PROCEDURE — 77338 DESIGN MLC DEVICE FOR IMRT: CPT | Performed by: RADIOLOGY

## 2022-12-07 PROCEDURE — 77301 RADIOTHERAPY DOSE PLAN IMRT: CPT | Performed by: RADIOLOGY

## 2022-12-07 PROCEDURE — 96409 CHEMO IV PUSH SNGL DRUG: CPT

## 2022-12-07 PROCEDURE — 25010000002 CARBOPLATIN PER 50 MG: Performed by: INTERNAL MEDICINE

## 2022-12-07 PROCEDURE — 77386: CPT | Performed by: RADIOLOGY

## 2022-12-07 PROCEDURE — 25010000002 FOSAPREPITANT PER 1 MG: Performed by: INTERNAL MEDICINE

## 2022-12-07 PROCEDURE — 25010000002 HEPARIN LOCK FLUSH PER 10 UNITS: Performed by: INTERNAL MEDICINE

## 2022-12-07 PROCEDURE — 63710000001 OLANZAPINE 2.5 MG TABLET: Performed by: INTERNAL MEDICINE

## 2022-12-07 PROCEDURE — 96367 TX/PROPH/DG ADDL SEQ IV INF: CPT

## 2022-12-07 PROCEDURE — A9270 NON-COVERED ITEM OR SERVICE: HCPCS | Performed by: INTERNAL MEDICINE

## 2022-12-07 RX ORDER — SODIUM CHLORIDE 9 MG/ML
250 INJECTION, SOLUTION INTRAVENOUS ONCE
Status: CANCELLED | OUTPATIENT
Start: 2022-12-07

## 2022-12-07 RX ORDER — DIPHENHYDRAMINE HYDROCHLORIDE 50 MG/ML
50 INJECTION INTRAMUSCULAR; INTRAVENOUS AS NEEDED
Status: CANCELLED | OUTPATIENT
Start: 2022-12-07

## 2022-12-07 RX ORDER — SODIUM CHLORIDE 9 MG/ML
250 INJECTION, SOLUTION INTRAVENOUS ONCE
Status: COMPLETED | OUTPATIENT
Start: 2022-12-07 | End: 2022-12-07

## 2022-12-07 RX ORDER — HEPARIN SODIUM (PORCINE) LOCK FLUSH IV SOLN 100 UNIT/ML 100 UNIT/ML
500 SOLUTION INTRAVENOUS AS NEEDED
Status: CANCELLED | OUTPATIENT
Start: 2022-12-07

## 2022-12-07 RX ORDER — SODIUM CHLORIDE 0.9 % (FLUSH) 0.9 %
20 SYRINGE (ML) INJECTION AS NEEDED
Status: CANCELLED | OUTPATIENT
Start: 2022-12-07

## 2022-12-07 RX ORDER — OLANZAPINE 5 MG/1
5 TABLET ORAL ONCE
Status: CANCELLED | OUTPATIENT
Start: 2022-12-07 | End: 2022-12-07

## 2022-12-07 RX ORDER — OLANZAPINE 2.5 MG/1
5 TABLET ORAL ONCE
Status: COMPLETED | OUTPATIENT
Start: 2022-12-07 | End: 2022-12-07

## 2022-12-07 RX ORDER — HEPARIN SODIUM (PORCINE) LOCK FLUSH IV SOLN 100 UNIT/ML 100 UNIT/ML
500 SOLUTION INTRAVENOUS AS NEEDED
Status: DISCONTINUED | OUTPATIENT
Start: 2022-12-07 | End: 2022-12-08 | Stop reason: HOSPADM

## 2022-12-07 RX ORDER — FAMOTIDINE 10 MG/ML
20 INJECTION, SOLUTION INTRAVENOUS AS NEEDED
Status: CANCELLED | OUTPATIENT
Start: 2022-12-07

## 2022-12-07 RX ORDER — PALONOSETRON 0.05 MG/ML
0.25 INJECTION, SOLUTION INTRAVENOUS ONCE
Status: COMPLETED | OUTPATIENT
Start: 2022-12-07 | End: 2022-12-07

## 2022-12-07 RX ORDER — SODIUM CHLORIDE 0.9 % (FLUSH) 0.9 %
20 SYRINGE (ML) INJECTION AS NEEDED
Status: DISCONTINUED | OUTPATIENT
Start: 2022-12-07 | End: 2022-12-08 | Stop reason: HOSPADM

## 2022-12-07 RX ORDER — PALONOSETRON 0.05 MG/ML
0.25 INJECTION, SOLUTION INTRAVENOUS ONCE
Status: CANCELLED | OUTPATIENT
Start: 2022-12-07

## 2022-12-07 RX ADMIN — SODIUM CHLORIDE 250 ML: 9 INJECTION, SOLUTION INTRAVENOUS at 11:54

## 2022-12-07 RX ADMIN — SODIUM CHLORIDE 100 ML: 9 INJECTION, SOLUTION INTRAVENOUS at 12:00

## 2022-12-07 RX ADMIN — PALONOSETRON 0.25 MG: 0.05 INJECTION, SOLUTION INTRAVENOUS at 11:55

## 2022-12-07 RX ADMIN — OLANZAPINE 5 MG: 2.5 TABLET, FILM COATED ORAL at 11:54

## 2022-12-07 RX ADMIN — HEPARIN 500 UNITS: 100 SYRINGE at 13:41

## 2022-12-07 RX ADMIN — PEMETREXED DISODIUM 800 MG: 500 INJECTION, POWDER, LYOPHILIZED, FOR SOLUTION INTRAVENOUS at 12:47

## 2022-12-07 RX ADMIN — Medication 20 ML: at 13:40

## 2022-12-07 RX ADMIN — CARBOPLATIN 620 MG: 10 INJECTION, SOLUTION INTRAVENOUS at 13:03

## 2022-12-07 NOTE — PROGRESS NOTES
"Presents for cycle #1, day#1 Pemeterxed 500 mg/m2 with Carboplatin AUC (5) every 3 weeks.    Ht: 163 cm  Wt: 51.2 kg  BSA: 1.54 m2    Doses verified using today's parameters and are within acceptable limits of variation and rounding.  Note: AUC calculation based on actual (not rounded) Scr value per MD orders.    Labs reviewed and are within Epic comm parameter limits to proceed.  Of note, Scr=0.49 and eCrCl=99 ml/min    *B12 given 11/30 and will repeat in ~ 6 weeks  * Folic acid initiated ~ 5 days ago per patient (usually start 7 days before Rx)  * Dexamethasone 4 mg BID initiated yesterday and will receive today and tomorrow then stop until next cycle  Advised pt to take with food.    UpToDate patient information printed and provided to patient and key information verbally highlighted including: Overview of regimen including infusion times; Recognition and management of allergic/infusion reactions; N/V management; hydration with 2-3 liters of PO fluids daily for at least 2-3 days weith each cycle;  \"call your doctor right away\" parameters.    All questions were answered in kind and no outstanding issues are noted at this time.  RTC in 3 weeks for cycle #2 with labs prior.    "

## 2022-12-07 NOTE — PROGRESS NOTES
Briefly spoke with pt during initial infusion (see previous RD notes). Pt had inquired about more calorie dense ONS during telephone consultation - provided pt with samples and coupons of Ensure Plus and Ensure Complete today and discussed flavor options if GI distress occurs. Also discussed oral care nutrition therapy with pt and written materials provided. Pt receiving XRT as well - per discussion with CECILIO Mistry - pt will be followed during XRT as well. Oncology RD had mailed additional handouts regarding high calorie / high protein diet, ideas, and recipes as well (pt has not received these yet). Will continue to f/u per protocol.

## 2022-12-08 ENCOUNTER — HOSPITAL ENCOUNTER (OUTPATIENT)
Dept: RADIATION ONCOLOGY | Facility: HOSPITAL | Age: 62
Discharge: HOME | End: 2022-12-08

## 2022-12-08 ENCOUNTER — TELEPHONE (OUTPATIENT)
Dept: CARDIOLOGY | Facility: CLINIC | Age: 62
End: 2022-12-08

## 2022-12-08 VITALS
DIASTOLIC BLOOD PRESSURE: 96 MMHG | HEART RATE: 88 BPM | SYSTOLIC BLOOD PRESSURE: 117 MMHG | OXYGEN SATURATION: 100 % | TEMPERATURE: 98.7 F

## 2022-12-08 VITALS — BODY MASS INDEX: 19.68 KG/M2 | WEIGHT: 114.64 LBS

## 2022-12-08 DIAGNOSIS — C34.2 MALIGNANT NEOPLASM OF MIDDLE LOBE OF RIGHT LUNG: Primary | ICD-10-CM

## 2022-12-08 DIAGNOSIS — C34.2 PRIMARY MALIGNANT NEOPLASM OF RIGHT MIDDLE LOBE OF LUNG: Primary | ICD-10-CM

## 2022-12-08 LAB
RAD ONC ARIA COURSE ID: NORMAL
RAD ONC ARIA COURSE INTENT: NORMAL
RAD ONC ARIA COURSE LAST TREATMENT DATE: NORMAL
RAD ONC ARIA COURSE START DATE: NORMAL
RAD ONC ARIA COURSE TREATMENT ELAPSED DAYS: 1
RAD ONC ARIA FIRST TREATMENT DATE: NORMAL
RAD ONC ARIA PLAN FRACTIONS TREATED TO DATE: 2
RAD ONC ARIA PLAN ID: NORMAL
RAD ONC ARIA PLAN NAME: NORMAL
RAD ONC ARIA PLAN PRESCRIBED DOSE PER FRACTION: 2 GY
RAD ONC ARIA PLAN PRIMARY REFERENCE POINT: NORMAL
RAD ONC ARIA PLAN TOTAL FRACTIONS PRESCRIBED: 30
RAD ONC ARIA PLAN TOTAL PRESCRIBED DOSE: 6000 CGY
RAD ONC ARIA REFERENCE POINT DOSAGE GIVEN TO DATE: 4 GY
RAD ONC ARIA REFERENCE POINT ID: NORMAL
RAD ONC ARIA REFERENCE POINT SESSION DOSAGE GIVEN: 2 GY

## 2022-12-08 PROCEDURE — 77014 CHG CT GUIDANCE RADIATION THERAPY FLDS PLACEMENT: CPT | Performed by: RADIOLOGY

## 2022-12-08 PROCEDURE — 77386: CPT | Performed by: RADIOLOGY

## 2022-12-08 PROCEDURE — 77427 RADIATION TX MANAGEMENT X5: CPT | Performed by: RADIOLOGY

## 2022-12-08 NOTE — TELEPHONE ENCOUNTER
----- Message from Ella Lindsey sent at 12/8/2022 11:54 AM EST -----    ----- Message -----  From: Danny Osborn MD  Sent: 12/8/2022  11:07 AM EST  To: Ella Lindsey    Notify pt echocardiogram shows normal heart function and no significant valve abnormality. Keep follow up as scheduled.

## 2022-12-08 NOTE — TELEPHONE ENCOUNTER
PT ADVISED OF ECHO RESULTS AND TO KEEP F/U APPT, IF SHE NEEDS TO BE SEEN SOONER TOLD HER TO CALL AND GET [facility] APPT

## 2022-12-08 NOTE — PROGRESS NOTES
Outpatient Nutrition Oncology Assessment    Patient Name: Theresa M Gabehart  YOB: 1960  MRN: [id number]  Assessment Date: 12/8/2022    CLINICAL NUTRITION ASSESSMENT    Dx:  Lung Ca      Type of Cancer Treatment Carboplatin, Alimta, XRT to R lung & mediastinum                  Current Problems:   Patient Active Problem List   Diagnosis Code   • Age related osteoporosis M81.0   • Annular tear of lumbar disc M51.36   • Anxiety disorder F41.9   • Arthritis M19.90   • Atopic neurodermatitis L20.81   • Carpal tunnel syndrome of right wrist G56.01   • Chronic idiopathic constipation K59.04   • Chronic obstructive pulmonary disease (HCC) J44.9   • Compression of brain (HCC) G93.5   • Fibromyalgia M79.7   • Herniated lumbar intervertebral disc M51.26   • Rotator cuff arthropathy of right shoulder M12.811   • Seasonal allergies J30.2   • Simple chronic bronchitis (HCC) J41.0   • Solitary pulmonary nodule R91.1   • Tobacco user Z72.0   • Von Willebrand disease D68.00   • Lung mass R91.8   • Centrilobular emphysema (HCC) J43.2   • Malignant neoplasm of middle lobe of right lung (HCC) C34.2   • Weight loss R63.4   • Carcinoma in situ of bronchus or lung D02.20   • Malignant neoplasm of upper lobe, right bronchus or lung (HCC) C34.11   • Encounter for adjustment or management of vascular access device Z45.2         Anthropometrics     Row Name 12/08/22 0939 12/08/22 0900       Anthropometrics    Weight -- 52 kg (114 lb 10.2 oz)    Weight for Calculation 52 kg (114 lb 10.2 oz) --                BMI kg/m2   Body mass index is 19.68 kg/m².    Weight Hx  Wt Readings from Last 30 Encounters:   12/08/22 0900 52 kg (114 lb 10.2 oz)   12/07/22 0942 51.2 kg (112 lb 14 oz)   12/07/22 1538 50.8 kg (112 lb)   12/05/22 1425 51.9 kg (114 lb 6.7 oz)   12/02/22 0905 51.3 kg (113 lb 1.5 oz)   12/01/22 1356 51 kg (112 lb 7 oz)   11/30/22 1250 51 kg (112 lb 7 oz)   11/23/22 0828 49.4 kg (109 lb)   11/18/22 0736 50 kg (110 lb 3.7 oz)    11/17/22 1104 49.9 kg (110 lb)   10/30/22 1703 53.3 kg (117 lb 8.1 oz)   10/24/22 1301 50.8 kg (111 lb 15.9 oz)   10/18/22 1143 52.2 kg (115 lb)   10/13/22 1250 52.3 kg (115 lb 3.2 oz)   10/07/22 1401 49 kg (108 lb)   10/07/22 1254 49 kg (108 lb)   07/27/22 1408 51.8 kg (114 lb 3.2 oz)   07/19/22 1518 49.9 kg (110 lb)   07/19/22 1129 49.9 kg (110 lb)   07/13/22 0832 50 kg (110 lb 3.7 oz)   05/11/22 0805 51.7 kg (114 lb)   04/27/22 1019 49.9 kg (110 lb)   03/18/22 1401 49.9 kg (110 lb)   11/23/21 1507 53.5 kg (118 lb)   09/24/18 0000 55.4 kg (122 lb 2 oz)         Estimated/Assessed Needs - Anthropometrics     Row Name 12/08/22 0939 12/08/22 0900       Anthropometrics    Weight -- 52 kg (114 lb 10.2 oz)    Weight for Calculation 52 kg (114 lb 10.2 oz) --       Estimated/Assessed Needs    Additional Documentation Fluid Requirements (Group);KCAL/KG (Group);Protein Requirements (Group) --       KCAL/KG    KCAL/KG 40 Kcal/Kg (kcal) --    40 Kcal/Kg (kcal) 2080 --       Protein Requirements    Weight Used For Protein Calculations 52 kg (114 lb 10.2 oz) --    Est Protein Requirement Amount (gms/kg) 2.0 gm protein --    Estimated Protein Requirements (gms/day) 104 --       Fluid Requirements    Fluid Requirements (mL/day) 1560 --    RDA Method (mL) 1560 --                 Labs/Medications        Pertinent Labs Reviewed.   Results from last 7 days   Lab Units 12/07/22  1009   SODIUM mmol/L 141   POTASSIUM mmol/L 3.3*   CHLORIDE mmol/L 105   CO2 mmol/L 26.9   BUN mg/dL 17   CREATININE mg/dL 0.49*   CALCIUM mg/dL 9.7   BILIRUBIN mg/dL <0.2   ALK PHOS U/L 55   ALT (SGPT) U/L 13   AST (SGOT) U/L 11   GLUCOSE mg/dL 151*     Results from last 7 days   Lab Units 12/07/22  1009   HEMOGLOBIN g/dL 12.7   HEMATOCRIT % 37.3       Pertinent Medications Aspirin, HYDROcodone-acetaminophen, L-Lysine, Probiotic Product, Zinc Sulfate, albuterol sulfate HFA, budesonide-formoterol, calcium, dexamethasone, fluticasone, folic acid,  ipratropium-albuterol, linaclotide, ondansetron, prochlorperazine, tiotropium bromide monohydrate, and vitamin D     Physical Findings        Malnutrition Severity Assessment     Row Name 12/08/22 0940          Malnutrition Severity Assessment    Malnutrition Type Chronic Disease - Related Malnutrition        Muscle Loss    Loss of Muscle Mass Findings Moderate     [place] Moderate - slight depression        Fat Loss    Subcutaneous Fat Loss Findings Moderate     Orbital Region  Moderate -  somewhat hollowness, slightly dark circles        Criteria Met (Must meet criteria for severity in at least 2 of these categories: M Wasting, Fat Loss, Fluid, Secondary Signs, Wt. Status, Intake)    Patient meets criteria for  Moderate (non-severe) Malnutrition                  Nutrition Diagnosis        Nutrition Dx Problem 1 Moderate malnutrition related to increased nutrient needs due to catabolic disease as evidenced by physiological causes increasing nutrient needs., hypermetabolic state. and muscle wasting., subcutaneous fat loss.       Nutrition Intervention       RD Action Nutrition assessment  Discussion on current nutrition-related concerns and nutritional state  Reviewed potential side effects that could impact proper nutrition during tx (both chemo & XRT):  Esophagitis, n/v, mucositis, diarrhea, decreased appetite     Monitor/Evaluation       Monitor Per oncology nutrition protocol.     Comments:    Initial week of tx.  Per records, pt lost 9# from July-October 2022 (7.3% loss X 3 months) however has gained back 2-5# (depending on the day and if wearing clothing/shoes).  Oncology RDs have spoken to pt via telephone due to previous nutrition referrals.  Seen by Oncology RD yesterday at [facility] center for discussion on nutrition-related concerns (ongoing struggle to maintain wt due to multiple hypermetabolic conditions) and regarding potential nutrition concerns that could occur w/ chemotherapy.  Pt states she  has been given paperwork on n/v, diarrhea, and oral care.  Discussed main concern w/ XRT is esophagitis and at that time will provide further education- briefly explained MNT for now.  Pt's main concern today is dehydration due to recent excessive sweating.  She report poor skin turgor today due to this- meeting with RN staff afterward we talk.  Pt is trying to eat high kcal/high protein foods- discussed some ideas further to help.  She likes Boost high protein- has been purchasing, however recommended Boost Plus instead due to the higher kcal content.  Sample of Boost Plus & store coupons provided.        Electronically signed by:  Fidelia Valdovinos RD  12/08/22 09:40 EST

## 2022-12-08 NOTE — PROGRESS NOTES
On Treatment Visit       Patient: Theresa M Gabehart   YOB: 1960   Medical Record Number: [id number]     Date of Visit  December 8, 2022   Primary Diagnosis:Malignant neoplasm of middle lobe of right lung (HCC) [C34.2]  Cancer Staging: Cancer Staging   Stage IIIA (cT2a, cN2, cM0)      Ms.Gabehart was seen today for an on treatment visit.  She is receiving radiation therapy to the Rt lung and mediastinum.  She  has received 400 cGy in 2 fractions out of a planned dose of 6000 cGy in 30 fractions. She is currently receiving concurrent  Carboplatin/pemetrexed chemotherapy per Dr. Jonas.     Pt. Asked to be seen with c/o low oral temp and night sweats.  She has had the sweats in the past, presumed hot flashes.  She started chemo yesterday but latest round of hot flashes started 3 nights ago.                                           Review of Systems:   Review of Systems   Constitutional:        Patient requested to be seen today, stating that she was concerned about being dehydrated.  Vitals were wnl.  She denies any nausea or vomiting, states that she is drinking and eating normally.  Mucous membranes are pink and skin turgor is wnl.  Dr. Egan to evaluate patient.     Gastrointestinal: Negative for nausea and vomiting.       Vitals:     Vitals:    12/08/22 1128   BP: 117/96   Pulse: 88   Temp: 98.7 °F (37.1 °C)   SpO2: 100%       Weight:   Wt Readings from Last 3 Encounters:   12/08/22 52 kg (114 lb 10.2 oz)   12/07/22 51.2 kg (112 lb 14 oz)   12/07/22 50.8 kg (112 lb)      Pain:  There were no vitals filed for this visit.      Physical Exam:  Physical Exam  Constitutional:       General: She is not in acute distress.  HENT:      Mouth/Throat:      Mouth: Mucous membranes are moist.      Pharynx: Oropharynx is clear.   Cardiovascular:      Rate and Rhythm: Normal rate and regular rhythm.   Pulmonary:      Effort: Pulmonary effort is normal.      Breath sounds: No wheezing, rhonchi or rales.    Neurological:      Mental Status: She is alert.           Plan: I have reviewed treatment setup notes, checked and approved the daily guidance images.  I reviewed dose delivery, treatment parameters and deemed them appropriate. We plan to continue radiation therapy as prescribed. Encouraged continued oral hydration, monitor temp.      Blanquita Egan MD  Radiation Oncology   Electronically signed 12/8/2022  11:29 EST

## 2022-12-09 ENCOUNTER — DOCUMENTATION (OUTPATIENT)
Dept: RADIATION ONCOLOGY | Facility: HOSPITAL | Age: 62
End: 2022-12-09

## 2022-12-09 ENCOUNTER — HOSPITAL ENCOUNTER (OUTPATIENT)
Dept: RADIATION ONCOLOGY | Facility: HOSPITAL | Age: 62
Discharge: HOME | End: 2022-12-09

## 2022-12-09 LAB
RAD ONC ARIA COURSE ID: NORMAL
RAD ONC ARIA COURSE INTENT: NORMAL
RAD ONC ARIA COURSE LAST TREATMENT DATE: NORMAL
RAD ONC ARIA COURSE START DATE: NORMAL
RAD ONC ARIA COURSE TREATMENT ELAPSED DAYS: 2
RAD ONC ARIA FIRST TREATMENT DATE: NORMAL
RAD ONC ARIA PLAN FRACTIONS TREATED TO DATE: 3
RAD ONC ARIA PLAN ID: NORMAL
RAD ONC ARIA PLAN NAME: NORMAL
RAD ONC ARIA PLAN PRESCRIBED DOSE PER FRACTION: 2 GY
RAD ONC ARIA PLAN PRIMARY REFERENCE POINT: NORMAL
RAD ONC ARIA PLAN TOTAL FRACTIONS PRESCRIBED: 30
RAD ONC ARIA PLAN TOTAL PRESCRIBED DOSE: 6000 CGY
RAD ONC ARIA REFERENCE POINT DOSAGE GIVEN TO DATE: 6 GY
RAD ONC ARIA REFERENCE POINT ID: NORMAL
RAD ONC ARIA REFERENCE POINT SESSION DOSAGE GIVEN: 2 GY

## 2022-12-09 PROCEDURE — 77386: CPT | Performed by: RADIOLOGY

## 2022-12-09 PROCEDURE — 77014 CHG CT GUIDANCE RADIATION THERAPY FLDS PLACEMENT: CPT | Performed by: RADIOLOGY

## 2022-12-09 NOTE — PROGRESS NOTES
Diagnosis: Lung cancer    Reason for Referral: Rounding with radiation therapy patients    Content of Visit: OSW met with patient to follow up on her access to resources. She stated [employer] was sending her $25 visa cards. Patient stated gas was costing her $40 a day and eating out. Patient stated she plans to make meals on Sunday for the week to reduce the cost. OSW provided an overview of [employer] program and the application process. Patient plans to apply. OSW provided emotional support by active listening, empathy, normalizing and validating her thoughts and feelings. Patient stated she was not expecting to feel as tired as she does after chemo treatment. She is planning to drive herself to radiation and only use her friend to drive her to chemo.     Resources/Referrals Provided: OSW reviewed resources and access to [employer] and [employer].     Patient expressed gratitude for assistance and resources.

## 2022-12-12 ENCOUNTER — HOSPITAL ENCOUNTER (OUTPATIENT)
Dept: RADIATION ONCOLOGY | Facility: HOSPITAL | Age: 62
Discharge: HOME | End: 2022-12-12

## 2022-12-12 LAB
RAD ONC ARIA COURSE ID: NORMAL
RAD ONC ARIA COURSE INTENT: NORMAL
RAD ONC ARIA COURSE LAST TREATMENT DATE: NORMAL
RAD ONC ARIA COURSE START DATE: NORMAL
RAD ONC ARIA COURSE TREATMENT ELAPSED DAYS: 5
RAD ONC ARIA FIRST TREATMENT DATE: NORMAL
RAD ONC ARIA PLAN FRACTIONS TREATED TO DATE: 4
RAD ONC ARIA PLAN ID: NORMAL
RAD ONC ARIA PLAN NAME: NORMAL
RAD ONC ARIA PLAN PRESCRIBED DOSE PER FRACTION: 2 GY
RAD ONC ARIA PLAN PRIMARY REFERENCE POINT: NORMAL
RAD ONC ARIA PLAN TOTAL FRACTIONS PRESCRIBED: 30
RAD ONC ARIA PLAN TOTAL PRESCRIBED DOSE: 6000 CGY
RAD ONC ARIA REFERENCE POINT DOSAGE GIVEN TO DATE: 8 GY
RAD ONC ARIA REFERENCE POINT ID: NORMAL
RAD ONC ARIA REFERENCE POINT SESSION DOSAGE GIVEN: 2 GY

## 2022-12-12 PROCEDURE — 77386: CPT | Performed by: RADIOLOGY

## 2022-12-12 PROCEDURE — 77014 CHG CT GUIDANCE RADIATION THERAPY FLDS PLACEMENT: CPT | Performed by: RADIOLOGY

## 2022-12-13 ENCOUNTER — HOSPITAL ENCOUNTER (OUTPATIENT)
Dept: RADIATION ONCOLOGY | Facility: HOSPITAL | Age: 62
Discharge: HOME | End: 2022-12-13

## 2022-12-13 VITALS
BODY MASS INDEX: 19.56 KG/M2 | SYSTOLIC BLOOD PRESSURE: 108 MMHG | HEART RATE: 98 BPM | WEIGHT: 113.98 LBS | DIASTOLIC BLOOD PRESSURE: 88 MMHG | RESPIRATION RATE: 12 BRPM | OXYGEN SATURATION: 100 % | TEMPERATURE: 98.6 F

## 2022-12-13 DIAGNOSIS — C34.2 MALIGNANT NEOPLASM OF MIDDLE LOBE OF RIGHT LUNG: Primary | ICD-10-CM

## 2022-12-13 LAB
RAD ONC ARIA COURSE ID: NORMAL
RAD ONC ARIA COURSE INTENT: NORMAL
RAD ONC ARIA COURSE LAST TREATMENT DATE: NORMAL
RAD ONC ARIA COURSE START DATE: NORMAL
RAD ONC ARIA COURSE TREATMENT ELAPSED DAYS: 6
RAD ONC ARIA FIRST TREATMENT DATE: NORMAL
RAD ONC ARIA PLAN FRACTIONS TREATED TO DATE: 5
RAD ONC ARIA PLAN ID: NORMAL
RAD ONC ARIA PLAN NAME: NORMAL
RAD ONC ARIA PLAN PRESCRIBED DOSE PER FRACTION: 2 GY
RAD ONC ARIA PLAN PRIMARY REFERENCE POINT: NORMAL
RAD ONC ARIA PLAN TOTAL FRACTIONS PRESCRIBED: 30
RAD ONC ARIA PLAN TOTAL PRESCRIBED DOSE: 6000 CGY
RAD ONC ARIA REFERENCE POINT DOSAGE GIVEN TO DATE: 10 GY
RAD ONC ARIA REFERENCE POINT ID: NORMAL
RAD ONC ARIA REFERENCE POINT SESSION DOSAGE GIVEN: 2 GY

## 2022-12-13 PROCEDURE — 77014 CHG CT GUIDANCE RADIATION THERAPY FLDS PLACEMENT: CPT | Performed by: RADIOLOGY

## 2022-12-13 PROCEDURE — 77336 RADIATION PHYSICS CONSULT: CPT | Performed by: RADIOLOGY

## 2022-12-13 PROCEDURE — 77386: CPT | Performed by: RADIOLOGY

## 2022-12-13 NOTE — PROGRESS NOTES
On Treatment Visit       Patient: Theresa M Gabehart   YOB: 1960   Medical Record Number: [id number]     Date of Visit  December 13, 2022   Primary Diagnosis:Malignant neoplasm of middle lobe of right lung (HCC) [C34.2]  Cancer Staging: Cancer Staging   Malignant neoplasm of middle lobe of right lung (HCC)  Staging form: Lung, AJCC 8th Edition  - Clinical: Stage IIIA (cT2a, cN2, cM0) - Signed by Narayan Jonas MD on 11/30/2022        Ms.Gabehart was seen today for an on treatment visit.  She is receiving radiation therapy to the RUL lung. She  has received 1000 cGy in 5 fractions out of a planned dose of 6000 cGy in 30 fractions. She is currently receiving concurrent pemetrexed/carboplatin chemotherapy per Dr. Jonas.     Feels much better today with increased nutritional intake.                                       Review of Systems:   Review of Systems   Constitutional: Negative for appetite change, fatigue and unexpected weight change.   HENT: Positive for trouble swallowing (States her esophagus feels irritated- taken mylanta with some relief.). Negative for sore throat and voice change.    Respiratory: Negative for cough and shortness of breath.    Cardiovascular: Negative for chest pain and palpitations.   Gastrointestinal: Positive for constipation (Past couple of days, resolved this morning. Has linzess at home.) and nausea (Dry heaved this morning, has since resolved.). Negative for diarrhea and vomiting.   Endocrine:        Occasional night sweats, ongoing     Genitourinary: Negative for difficulty urinating, dysuria, frequency and urgency.        Darker urine than normal- educated on increasing intake.     Musculoskeletal: Positive for arthralgias (Ongoing), back pain (Ongoing) and gait problem (In motorized wheelchair.).   Skin: Negative for color change and rash.   Neurological: Negative for dizziness, weakness and headaches.   Psychiatric/Behavioral: Negative for sleep disturbance.        Vitals:     Vitals:    12/13/22 1532   BP: 108/88   Pulse: 98   Resp: 12   Temp: 98.6 °F (37 °C)   SpO2: 100%       Weight:   Wt Readings from Last 3 Encounters:   12/13/22 51.7 kg (113 lb 15.7 oz)   12/08/22 52 kg (114 lb 10.2 oz)   12/07/22 51.2 kg (112 lb 14 oz)      Pain:    Pain Score    12/13/22 1532   PainSc: 0-No pain         Physical Exam:  Gen: WD/WN; NAD  HEENT: MMM  Trachea: midline  Chest: symmetric  Resp: normal respiratory effort  Extr: warm, well-perfused  Neuro: awake and alert; no aphasia or neglect    Plan: I have reviewed treatment setup notes, checked and approved the daily guidance images.  I reviewed dose delivery, treatment parameters and deemed them appropriate. We plan to continue radiation therapy as prescribed.          Radiation Oncology    Electronically signed by Shaw Turner MD 12/13/2022  16:32 EST

## 2022-12-14 ENCOUNTER — HOSPITAL ENCOUNTER (OUTPATIENT)
Dept: RADIATION ONCOLOGY | Facility: HOSPITAL | Age: 62
Discharge: HOME | End: 2022-12-14

## 2022-12-14 VITALS — WEIGHT: 112.43 LBS | BODY MASS INDEX: 19.3 KG/M2

## 2022-12-14 LAB
RAD ONC ARIA COURSE ID: NORMAL
RAD ONC ARIA COURSE INTENT: NORMAL
RAD ONC ARIA COURSE LAST TREATMENT DATE: NORMAL
RAD ONC ARIA COURSE START DATE: NORMAL
RAD ONC ARIA COURSE TREATMENT ELAPSED DAYS: 7
RAD ONC ARIA FIRST TREATMENT DATE: NORMAL
RAD ONC ARIA PLAN FRACTIONS TREATED TO DATE: 6
RAD ONC ARIA PLAN ID: NORMAL
RAD ONC ARIA PLAN NAME: NORMAL
RAD ONC ARIA PLAN PRESCRIBED DOSE PER FRACTION: 2 GY
RAD ONC ARIA PLAN PRIMARY REFERENCE POINT: NORMAL
RAD ONC ARIA PLAN TOTAL FRACTIONS PRESCRIBED: 30
RAD ONC ARIA PLAN TOTAL PRESCRIBED DOSE: 6000 CGY
RAD ONC ARIA REFERENCE POINT DOSAGE GIVEN TO DATE: 12 GY
RAD ONC ARIA REFERENCE POINT ID: NORMAL
RAD ONC ARIA REFERENCE POINT SESSION DOSAGE GIVEN: 2 GY

## 2022-12-14 PROCEDURE — 77014 CHG CT GUIDANCE RADIATION THERAPY FLDS PLACEMENT: CPT | Performed by: RADIOLOGY

## 2022-12-14 PROCEDURE — 77386: CPT | Performed by: RADIOLOGY

## 2022-12-14 NOTE — PROGRESS NOTES
Outpatient Nutrition Oncology Follow Up    Patient Name: Theresa M Gabehart  YOB: 1960  MRN: [id number]      Reason for Consult:  Radiation tx f/u        Today's Weight:  51 kg    Weight Change:  Wt stable from last week    Nutrition-related concerns: Other: pt reports mild dysphagia/esophagitis due to tx (reported to MD)    Current PO intake:  Reports good PO intake due to changing her appointment time in radiation tx    Consult or Intervention:  Pt reports her PO intake has actually improved since she was able to change her radiation tx time to the afternoon.  She is now getting up and cooking breakfast, then eating a meal on the go on her way to tx.  She will stop for a snack on her way home, then goes home & cooks dinner.  She is trying to eat high kcal, high protein foods.    Nutrition Diagnosis: Moderate malnutrition related to increased nutrient needs due to catabolic disease as evidenced by physiological causes increasing nutrient needs. and hypermetabolic state.    Plan: Will follow up per oncology nutrition protocol

## 2022-12-15 ENCOUNTER — HOSPITAL ENCOUNTER (OUTPATIENT)
Dept: RADIATION ONCOLOGY | Facility: HOSPITAL | Age: 62
Discharge: HOME | End: 2022-12-15

## 2022-12-15 LAB
RAD ONC ARIA COURSE ID: NORMAL
RAD ONC ARIA COURSE INTENT: NORMAL
RAD ONC ARIA COURSE LAST TREATMENT DATE: NORMAL
RAD ONC ARIA COURSE START DATE: NORMAL
RAD ONC ARIA COURSE TREATMENT ELAPSED DAYS: 8
RAD ONC ARIA FIRST TREATMENT DATE: NORMAL
RAD ONC ARIA PLAN FRACTIONS TREATED TO DATE: 7
RAD ONC ARIA PLAN ID: NORMAL
RAD ONC ARIA PLAN NAME: NORMAL
RAD ONC ARIA PLAN PRESCRIBED DOSE PER FRACTION: 2 GY
RAD ONC ARIA PLAN PRIMARY REFERENCE POINT: NORMAL
RAD ONC ARIA PLAN TOTAL FRACTIONS PRESCRIBED: 30
RAD ONC ARIA PLAN TOTAL PRESCRIBED DOSE: 6000 CGY
RAD ONC ARIA REFERENCE POINT DOSAGE GIVEN TO DATE: 14 GY
RAD ONC ARIA REFERENCE POINT ID: NORMAL
RAD ONC ARIA REFERENCE POINT SESSION DOSAGE GIVEN: 2 GY

## 2022-12-15 PROCEDURE — 77014 CHG CT GUIDANCE RADIATION THERAPY FLDS PLACEMENT: CPT | Performed by: RADIOLOGY

## 2022-12-15 PROCEDURE — 77427 RADIATION TX MANAGEMENT X5: CPT | Performed by: RADIOLOGY

## 2022-12-15 PROCEDURE — 77386: CPT | Performed by: RADIOLOGY

## 2022-12-16 ENCOUNTER — OFFICE VISIT (OUTPATIENT)
Dept: SURGERY | Facility: CLINIC | Age: 62
End: 2022-12-16

## 2022-12-16 ENCOUNTER — HOSPITAL ENCOUNTER (OUTPATIENT)
Dept: RADIATION ONCOLOGY | Facility: HOSPITAL | Age: 62
Discharge: HOME | End: 2022-12-16

## 2022-12-16 VITALS — WEIGHT: 113 LBS | BODY MASS INDEX: 19.29 KG/M2 | HEIGHT: 64 IN | RESPIRATION RATE: 16 BRPM

## 2022-12-16 DIAGNOSIS — Z09 ENCOUNTER FOR FOLLOW-UP: Primary | ICD-10-CM

## 2022-12-16 LAB
RAD ONC ARIA COURSE ID: NORMAL
RAD ONC ARIA COURSE INTENT: NORMAL
RAD ONC ARIA COURSE LAST TREATMENT DATE: NORMAL
RAD ONC ARIA COURSE START DATE: NORMAL
RAD ONC ARIA COURSE TREATMENT ELAPSED DAYS: 9
RAD ONC ARIA FIRST TREATMENT DATE: NORMAL
RAD ONC ARIA PLAN FRACTIONS TREATED TO DATE: 8
RAD ONC ARIA PLAN ID: NORMAL
RAD ONC ARIA PLAN NAME: NORMAL
RAD ONC ARIA PLAN PRESCRIBED DOSE PER FRACTION: 2 GY
RAD ONC ARIA PLAN PRIMARY REFERENCE POINT: NORMAL
RAD ONC ARIA PLAN TOTAL FRACTIONS PRESCRIBED: 30
RAD ONC ARIA PLAN TOTAL PRESCRIBED DOSE: 6000 CGY
RAD ONC ARIA REFERENCE POINT DOSAGE GIVEN TO DATE: 16 GY
RAD ONC ARIA REFERENCE POINT ID: NORMAL
RAD ONC ARIA REFERENCE POINT SESSION DOSAGE GIVEN: 2 GY

## 2022-12-16 PROCEDURE — 99024 POSTOP FOLLOW-UP VISIT: CPT | Performed by: SURGERY

## 2022-12-16 PROCEDURE — 77386: CPT | Performed by: RADIOLOGY

## 2022-12-16 PROCEDURE — 77014 CHG CT GUIDANCE RADIATION THERAPY FLDS PLACEMENT: CPT | Performed by: RADIOLOGY

## 2022-12-16 NOTE — PROGRESS NOTES
Patient is here for follow-up after portacatheter placement.  No complaints regarding the portacatheter.  Port site looks fine.  Incision is healing well.  No new issues to address.  Patient will see me PRN.

## 2022-12-19 ENCOUNTER — HOSPITAL ENCOUNTER (OUTPATIENT)
Dept: RADIATION ONCOLOGY | Facility: HOSPITAL | Age: 62
Discharge: HOME | End: 2022-12-19

## 2022-12-19 LAB
RAD ONC ARIA COURSE ID: NORMAL
RAD ONC ARIA COURSE INTENT: NORMAL
RAD ONC ARIA COURSE LAST TREATMENT DATE: NORMAL
RAD ONC ARIA COURSE START DATE: NORMAL
RAD ONC ARIA COURSE TREATMENT ELAPSED DAYS: 12
RAD ONC ARIA FIRST TREATMENT DATE: NORMAL
RAD ONC ARIA PLAN FRACTIONS TREATED TO DATE: 9
RAD ONC ARIA PLAN ID: NORMAL
RAD ONC ARIA PLAN NAME: NORMAL
RAD ONC ARIA PLAN PRESCRIBED DOSE PER FRACTION: 2 GY
RAD ONC ARIA PLAN PRIMARY REFERENCE POINT: NORMAL
RAD ONC ARIA PLAN TOTAL FRACTIONS PRESCRIBED: 30
RAD ONC ARIA PLAN TOTAL PRESCRIBED DOSE: 6000 CGY
RAD ONC ARIA REFERENCE POINT DOSAGE GIVEN TO DATE: 18 GY
RAD ONC ARIA REFERENCE POINT ID: NORMAL
RAD ONC ARIA REFERENCE POINT SESSION DOSAGE GIVEN: 2 GY

## 2022-12-19 PROCEDURE — 77014 CHG CT GUIDANCE RADIATION THERAPY FLDS PLACEMENT: CPT | Performed by: RADIOLOGY

## 2022-12-19 PROCEDURE — 77386: CPT | Performed by: RADIOLOGY

## 2022-12-20 ENCOUNTER — HOSPITAL ENCOUNTER (OUTPATIENT)
Dept: RADIATION ONCOLOGY | Facility: HOSPITAL | Age: 62
Discharge: HOME | End: 2022-12-20

## 2022-12-20 VITALS
BODY MASS INDEX: 19.83 KG/M2 | WEIGHT: 115.52 LBS | RESPIRATION RATE: 16 BRPM | SYSTOLIC BLOOD PRESSURE: 121 MMHG | OXYGEN SATURATION: 99 % | DIASTOLIC BLOOD PRESSURE: 72 MMHG | HEART RATE: 85 BPM | TEMPERATURE: 98.8 F

## 2022-12-20 DIAGNOSIS — C34.11 MALIGNANT NEOPLASM OF UPPER LOBE, RIGHT BRONCHUS OR LUNG: Primary | ICD-10-CM

## 2022-12-20 LAB
RAD ONC ARIA COURSE ID: NORMAL
RAD ONC ARIA COURSE INTENT: NORMAL
RAD ONC ARIA COURSE LAST TREATMENT DATE: NORMAL
RAD ONC ARIA COURSE START DATE: NORMAL
RAD ONC ARIA COURSE TREATMENT ELAPSED DAYS: 13
RAD ONC ARIA FIRST TREATMENT DATE: NORMAL
RAD ONC ARIA PLAN FRACTIONS TREATED TO DATE: 10
RAD ONC ARIA PLAN ID: NORMAL
RAD ONC ARIA PLAN NAME: NORMAL
RAD ONC ARIA PLAN PRESCRIBED DOSE PER FRACTION: 2 GY
RAD ONC ARIA PLAN PRIMARY REFERENCE POINT: NORMAL
RAD ONC ARIA PLAN TOTAL FRACTIONS PRESCRIBED: 30
RAD ONC ARIA PLAN TOTAL PRESCRIBED DOSE: 6000 CGY
RAD ONC ARIA REFERENCE POINT DOSAGE GIVEN TO DATE: 20 GY
RAD ONC ARIA REFERENCE POINT ID: NORMAL
RAD ONC ARIA REFERENCE POINT SESSION DOSAGE GIVEN: 2 GY

## 2022-12-20 PROCEDURE — 77386: CPT | Performed by: RADIOLOGY

## 2022-12-20 PROCEDURE — 77014 CHG CT GUIDANCE RADIATION THERAPY FLDS PLACEMENT: CPT | Performed by: RADIOLOGY

## 2022-12-20 PROCEDURE — 77336 RADIATION PHYSICS CONSULT: CPT | Performed by: RADIOLOGY

## 2022-12-20 NOTE — PROGRESS NOTES
On Treatment Visit       Patient: Theresa M Gabehart   YOB: 1960   Medical Record Number: [id number]     Date of Visit  December 20, 2022   Primary Diagnosis:Malignant neoplasm of upper lobe, right bronchus or lung (HCC) [C34.11]  Cancer Staging: Cancer Staging   Malignant neoplasm of middle lobe of right lung (HCC)  Staging form: Lung, AJCC 8th Edition  - Clinical: Stage IIIA (cT2a, cN2, cM0) - Signed by Narayan Jonas MD on 11/30/2022        Ms.Gabehart was seen today for an on treatment visit.  She is receiving radiation therapy to the RUL lung. She  has received 2000 cGy in 10 fractions out of a planned dose of 6000 cGy in 30 fractions. She is currently receiving concurrent pemetrexed/carboplatin chemotherapy per Dr. Jonas.     New onset odynophagia                                       Review of Systems:   Review of Systems   Constitutional: Negative for appetite change, fatigue and unexpected weight change.   HENT: Positive for trouble swallowing (States her esophagus is burning, taken mylanta with some relief.     9/10). Negative for sore throat and voice change.    Respiratory: Negative for cough and shortness of breath.    Cardiovascular: Negative for chest pain, palpitations and leg swelling.   Gastrointestinal: Positive for diarrhea (Occasional, resolves on own). Negative for constipation, nausea and vomiting.   Genitourinary: Negative for difficulty urinating, dysuria, frequency and urgency.   Musculoskeletal: Positive for arthralgias (Ongoing), back pain (Ongoing) and gait problem (In motorized wheelchair.). Negative for joint swelling.   Skin: Negative for color change and rash.   Neurological: Positive for headaches (Occasional). Negative for dizziness and weakness.   Psychiatric/Behavioral: Negative for sleep disturbance.       Vitals:     Vitals:    12/20/22 1520   BP: 121/72   Pulse: 85   Resp: 16   Temp: 98.8 °F (37.1 °C)   SpO2: 99%       Weight:   Wt Readings from Last 3  Encounters:   12/20/22 52.4 kg (115 lb 8.3 oz)   12/16/22 51.3 kg (113 lb)   12/14/22 51 kg (112 lb 7 oz)      Pain:    Pain Score    12/20/22 1520   PainSc:   9         Physical Exam:  Gen: WD/WN; NAD  HEENT: MMM  Trachea: midline  Chest: symmetric  Resp: normal respiratory effort  Extr: warm, well-perfused  Neuro: awake and alert; no aphasia or neglect    Plan: I have reviewed treatment setup notes, checked and approved the daily guidance images.  I reviewed dose delivery, treatment parameters and deemed them appropriate. We plan to continue radiation therapy as prescribed.      Prescribed Junie's Magic potion    Radiation Oncology    Electronically signed by Shaw Turner MD 12/20/2022  16:31 EST

## 2022-12-21 ENCOUNTER — HOSPITAL ENCOUNTER (OUTPATIENT)
Dept: RADIATION ONCOLOGY | Facility: HOSPITAL | Age: 62
Discharge: HOME | End: 2022-12-21

## 2022-12-21 VITALS — BODY MASS INDEX: 19.98 KG/M2 | WEIGHT: 116.4 LBS

## 2022-12-21 LAB
RAD ONC ARIA COURSE ID: NORMAL
RAD ONC ARIA COURSE INTENT: NORMAL
RAD ONC ARIA COURSE LAST TREATMENT DATE: NORMAL
RAD ONC ARIA COURSE START DATE: NORMAL
RAD ONC ARIA COURSE TREATMENT ELAPSED DAYS: 14
RAD ONC ARIA FIRST TREATMENT DATE: NORMAL
RAD ONC ARIA PLAN FRACTIONS TREATED TO DATE: 11
RAD ONC ARIA PLAN ID: NORMAL
RAD ONC ARIA PLAN NAME: NORMAL
RAD ONC ARIA PLAN PRESCRIBED DOSE PER FRACTION: 2 GY
RAD ONC ARIA PLAN PRIMARY REFERENCE POINT: NORMAL
RAD ONC ARIA PLAN TOTAL FRACTIONS PRESCRIBED: 30
RAD ONC ARIA PLAN TOTAL PRESCRIBED DOSE: 6000 CGY
RAD ONC ARIA REFERENCE POINT DOSAGE GIVEN TO DATE: 22 GY
RAD ONC ARIA REFERENCE POINT ID: NORMAL
RAD ONC ARIA REFERENCE POINT SESSION DOSAGE GIVEN: 2 GY

## 2022-12-21 PROCEDURE — 77386: CPT | Performed by: RADIOLOGY

## 2022-12-21 PROCEDURE — 77014 CHG CT GUIDANCE RADIATION THERAPY FLDS PLACEMENT: CPT | Performed by: RADIOLOGY

## 2022-12-21 NOTE — PROGRESS NOTES
Outpatient Nutrition Oncology Follow Up    Patient Name: Theresa M Gabehart  YOB: 1960  MRN: [id number]      Reason for Consult:  Radiation tx f/u       Today's Weight:  52.82 kg    Weight Change:  1 kg gain in the past week    Nutrition-related concerns: Dysphagia/odynophagia and Esophagitis    Consult or Intervention:  Pt with wt gain since last week.  Seems to be making a good effort to eat adequately.  She is experiencing esophagitis this week therefore MD called in [employer] Magic potion- waiting in pharmacy for .  No other issues this week.    Nutrition Diagnosis: Moderate malnutrition related to increased nutrient needs due to catabolic disease as evidenced by physiological causes increasing nutrient needs., hypermetabolic state., muscle wasting. and fat loss.    Plan: Will follow up per oncology nutrition protocol

## 2022-12-22 ENCOUNTER — HOSPITAL ENCOUNTER (OUTPATIENT)
Dept: RADIATION ONCOLOGY | Facility: HOSPITAL | Age: 62
Discharge: HOME | End: 2022-12-22

## 2022-12-22 LAB
RAD ONC ARIA COURSE ID: NORMAL
RAD ONC ARIA COURSE INTENT: NORMAL
RAD ONC ARIA COURSE LAST TREATMENT DATE: NORMAL
RAD ONC ARIA COURSE START DATE: NORMAL
RAD ONC ARIA COURSE TREATMENT ELAPSED DAYS: 15
RAD ONC ARIA FIRST TREATMENT DATE: NORMAL
RAD ONC ARIA PLAN FRACTIONS TREATED TO DATE: 12
RAD ONC ARIA PLAN ID: NORMAL
RAD ONC ARIA PLAN NAME: NORMAL
RAD ONC ARIA PLAN PRESCRIBED DOSE PER FRACTION: 2 GY
RAD ONC ARIA PLAN PRIMARY REFERENCE POINT: NORMAL
RAD ONC ARIA PLAN TOTAL FRACTIONS PRESCRIBED: 30
RAD ONC ARIA PLAN TOTAL PRESCRIBED DOSE: 6000 CGY
RAD ONC ARIA REFERENCE POINT DOSAGE GIVEN TO DATE: 24 GY
RAD ONC ARIA REFERENCE POINT ID: NORMAL
RAD ONC ARIA REFERENCE POINT SESSION DOSAGE GIVEN: 2 GY

## 2022-12-22 PROCEDURE — 77014 CHG CT GUIDANCE RADIATION THERAPY FLDS PLACEMENT: CPT | Performed by: RADIOLOGY

## 2022-12-22 PROCEDURE — 77427 RADIATION TX MANAGEMENT X5: CPT | Performed by: RADIOLOGY

## 2022-12-22 PROCEDURE — 77386: CPT | Performed by: RADIOLOGY

## 2022-12-27 ENCOUNTER — HOSPITAL ENCOUNTER (OUTPATIENT)
Dept: RADIATION ONCOLOGY | Facility: HOSPITAL | Age: 62
Discharge: HOME | End: 2022-12-27

## 2022-12-27 VITALS
OXYGEN SATURATION: 100 % | HEART RATE: 97 BPM | TEMPERATURE: 99.3 F | WEIGHT: 114.2 LBS | BODY MASS INDEX: 19.6 KG/M2 | SYSTOLIC BLOOD PRESSURE: 132 MMHG | DIASTOLIC BLOOD PRESSURE: 74 MMHG | RESPIRATION RATE: 16 BRPM

## 2022-12-27 DIAGNOSIS — C34.2 PRIMARY MALIGNANT NEOPLASM OF RIGHT MIDDLE LOBE OF LUNG: Primary | ICD-10-CM

## 2022-12-27 LAB
RAD ONC ARIA COURSE ID: NORMAL
RAD ONC ARIA COURSE INTENT: NORMAL
RAD ONC ARIA COURSE LAST TREATMENT DATE: NORMAL
RAD ONC ARIA COURSE START DATE: NORMAL
RAD ONC ARIA COURSE TREATMENT ELAPSED DAYS: 20
RAD ONC ARIA FIRST TREATMENT DATE: NORMAL
RAD ONC ARIA PLAN FRACTIONS TREATED TO DATE: 13
RAD ONC ARIA PLAN ID: NORMAL
RAD ONC ARIA PLAN NAME: NORMAL
RAD ONC ARIA PLAN PRESCRIBED DOSE PER FRACTION: 2 GY
RAD ONC ARIA PLAN PRIMARY REFERENCE POINT: NORMAL
RAD ONC ARIA PLAN TOTAL FRACTIONS PRESCRIBED: 30
RAD ONC ARIA PLAN TOTAL PRESCRIBED DOSE: 6000 CGY
RAD ONC ARIA REFERENCE POINT DOSAGE GIVEN TO DATE: 26 GY
RAD ONC ARIA REFERENCE POINT ID: NORMAL
RAD ONC ARIA REFERENCE POINT SESSION DOSAGE GIVEN: 2 GY

## 2022-12-27 PROCEDURE — 77386: CPT | Performed by: RADIOLOGY

## 2022-12-27 PROCEDURE — 77014 CHG CT GUIDANCE RADIATION THERAPY FLDS PLACEMENT: CPT | Performed by: RADIOLOGY

## 2022-12-27 NOTE — PROGRESS NOTES
On Treatment Visit       Patient: Theresa M Gabehart   YOB: 1960   Medical Record Number: [id number]     Date of Visit  December 27, 2022   Primary Diagnosis:Primary malignant neoplasm of right middle lobe of lung (HCC) [C34.2]  Cancer Staging: Cancer Staging   Malignant neoplasm of middle lobe of right lung (HCC)  Staging form: Lung, AJCC 8th Edition  - Clinical: Stage IIIA (cT2a, cN2, cM0) - Signed by Narayan Jonas MD on 11/30/2022        Ms.Gabehart was seen today for an on treatment visit.  She is receiving radiation therapy to the RUL lung. She  has received 2600 cGy in 13 fractions out of a planned dose of 6000 cGy in 30 fractions. She is currently receiving concurrent pemetrexed/carboplatin chemotherapy per Dr. Jonas.     Persistent odynophagia                                       Review of Systems:   Review of Systems   Constitutional: Positive for unexpected weight change (wt loss of 2 lbs in the last week). Negative for appetite change and fatigue.   HENT: Positive for trouble swallowing (States her esophagus is burning, taken mylanta with some relief.     9/10). Negative for sore throat and voice change.    Respiratory: Negative for cough and shortness of breath (with exertion).    Gastrointestinal: Negative for constipation, diarrhea, nausea and vomiting.   Genitourinary: Negative for difficulty urinating, dysuria, frequency and urgency.   Musculoskeletal: Positive for arthralgias (Ongoing), back pain (Ongoing) and gait problem (In motorized wheelchair.). Negative for joint swelling.   Skin: Negative for color change and rash.   Neurological: Positive for headaches (Occasional). Negative for dizziness and weakness.   Psychiatric/Behavioral: Negative for sleep disturbance.       Vitals:     Vitals:    12/27/22 1546   BP: 132/74   Pulse: 97   Resp: 16   Temp: 99.3 °F (37.4 °C)   SpO2: 100%       Weight:   Wt Readings from Last 3 Encounters:   12/27/22 51.8 kg (114 lb 3.2 oz)   12/21/22  52.8 kg (116 lb 6.5 oz)   12/20/22 52.4 kg (115 lb 8.3 oz)      Pain:    Pain Score    12/27/22 1546   PainSc:   4   PainLoc: Comment: lungs and throat         Physical Exam:  Gen: WD/WN; NAD  HEENT: MMM  Trachea: midline  Chest: symmetric  Resp: normal respiratory effort  Extr: warm, well-perfused  Neuro: awake and alert; no aphasia or neglect    Plan: I have reviewed treatment setup notes, checked and approved the daily guidance images.  I reviewed dose delivery, treatment parameters and deemed them appropriate. We plan to continue radiation therapy as prescribed.      Prescribed Junie's Magic potion    Radiation Oncology    Electronically signed by Shaw Turner MD 12/27/2022  16:51 EST

## 2022-12-28 ENCOUNTER — OFFICE VISIT (OUTPATIENT)
Dept: ONCOLOGY | Facility: HOSPITAL | Age: 62
End: 2022-12-28

## 2022-12-28 ENCOUNTER — HOSPITAL ENCOUNTER (OUTPATIENT)
Dept: ONCOLOGY | Facility: HOSPITAL | Age: 62
Setting detail: INFUSION SERIES
Discharge: HOME | End: 2022-12-28

## 2022-12-28 ENCOUNTER — HOSPITAL ENCOUNTER (OUTPATIENT)
Dept: RADIATION ONCOLOGY | Facility: HOSPITAL | Age: 62
Discharge: HOME | End: 2022-12-28

## 2022-12-28 ENCOUNTER — TELEPHONE (OUTPATIENT)
Dept: ONCOLOGY | Facility: HOSPITAL | Age: 62
End: 2022-12-28

## 2022-12-28 VITALS
DIASTOLIC BLOOD PRESSURE: 76 MMHG | BODY MASS INDEX: 20.29 KG/M2 | HEIGHT: 64 IN | OXYGEN SATURATION: 100 % | SYSTOLIC BLOOD PRESSURE: 122 MMHG | RESPIRATION RATE: 16 BRPM | TEMPERATURE: 97.9 F | HEART RATE: 97 BPM | WEIGHT: 118.83 LBS

## 2022-12-28 VITALS
TEMPERATURE: 97.9 F | WEIGHT: 118.83 LBS | HEART RATE: 97 BPM | RESPIRATION RATE: 16 BRPM | BODY MASS INDEX: 20.4 KG/M2 | SYSTOLIC BLOOD PRESSURE: 122 MMHG | DIASTOLIC BLOOD PRESSURE: 76 MMHG | OXYGEN SATURATION: 100 %

## 2022-12-28 VITALS — BODY MASS INDEX: 20.22 KG/M2 | WEIGHT: 118.45 LBS

## 2022-12-28 DIAGNOSIS — T45.1X5A ANEMIA DUE TO CHEMOTHERAPY: ICD-10-CM

## 2022-12-28 DIAGNOSIS — C34.2 MALIGNANT NEOPLASM OF MIDDLE LOBE OF RIGHT LUNG: Primary | ICD-10-CM

## 2022-12-28 DIAGNOSIS — Z45.2 ENCOUNTER FOR ADJUSTMENT OR MANAGEMENT OF VASCULAR ACCESS DEVICE: ICD-10-CM

## 2022-12-28 DIAGNOSIS — C34.81 MALIGNANT NEOPLASM OF OVERLAPPING SITES OF RIGHT LUNG: ICD-10-CM

## 2022-12-28 DIAGNOSIS — C34.81 MALIGNANT NEOPLASM OF OVERLAPPING SITES OF RIGHT LUNG: Primary | ICD-10-CM

## 2022-12-28 DIAGNOSIS — D64.81 ANEMIA DUE TO CHEMOTHERAPY: ICD-10-CM

## 2022-12-28 DIAGNOSIS — E87.6 HYPOKALEMIA: ICD-10-CM

## 2022-12-28 PROBLEM — C34.11 MALIGNANT NEOPLASM OF UPPER LOBE, RIGHT BRONCHUS OR LUNG: Status: RESOLVED | Noted: 2022-12-05 | Resolved: 2022-12-28

## 2022-12-28 LAB
ALBUMIN SERPL-MCNC: 4.2 G/DL (ref 3.5–5.2)
ALBUMIN/GLOB SERPL: 1.8 G/DL
ALP SERPL-CCNC: 53 U/L (ref 39–117)
ALT SERPL W P-5'-P-CCNC: 31 U/L (ref 1–33)
ANION GAP SERPL CALCULATED.3IONS-SCNC: 12.1 MMOL/L (ref 5–15)
AST SERPL-CCNC: 17 U/L (ref 1–32)
BASOPHILS # BLD AUTO: 0.01 10*3/MM3 (ref 0–0.2)
BASOPHILS NFR BLD AUTO: 0.2 % (ref 0–1.5)
BILIRUB SERPL-MCNC: 0.2 MG/DL (ref 0–1.2)
BUN SERPL-MCNC: 11 MG/DL (ref 8–23)
BUN/CREAT SERPL: 21.2 (ref 7–25)
CALCIUM SPEC-SCNC: 9.4 MG/DL (ref 8.6–10.5)
CHLORIDE SERPL-SCNC: 105 MMOL/L (ref 98–107)
CO2 SERPL-SCNC: 22.9 MMOL/L (ref 22–29)
CREAT SERPL-MCNC: 0.52 MG/DL (ref 0.57–1)
DEPRECATED RDW RBC AUTO: 44.7 FL (ref 37–54)
EGFRCR SERPLBLD CKD-EPI 2021: 105.9 ML/MIN/1.73
EOSINOPHIL # BLD AUTO: 0.01 10*3/MM3 (ref 0–0.4)
EOSINOPHIL NFR BLD AUTO: 0.2 % (ref 0.3–6.2)
ERYTHROCYTE [DISTWIDTH] IN BLOOD BY AUTOMATED COUNT: 13.5 % (ref 12.3–15.4)
GLOBULIN UR ELPH-MCNC: 2.4 GM/DL
GLUCOSE SERPL-MCNC: 160 MG/DL (ref 65–99)
HCT VFR BLD AUTO: 34.2 % (ref 34–46.6)
HGB BLD-MCNC: 11.4 G/DL (ref 12–15.9)
IMM GRANULOCYTES # BLD AUTO: 0.01 10*3/MM3 (ref 0–0.05)
IMM GRANULOCYTES NFR BLD AUTO: 0.2 % (ref 0–0.5)
LYMPHOCYTES # BLD AUTO: 0.78 10*3/MM3 (ref 0.7–3.1)
LYMPHOCYTES NFR BLD AUTO: 14.4 % (ref 19.6–45.3)
MCH RBC QN AUTO: 31.1 PG (ref 26.6–33)
MCHC RBC AUTO-ENTMCNC: 33.3 G/DL (ref 31.5–35.7)
MCV RBC AUTO: 93.4 FL (ref 79–97)
MONOCYTES # BLD AUTO: 0.79 10*3/MM3 (ref 0.1–0.9)
MONOCYTES NFR BLD AUTO: 14.6 % (ref 5–12)
NEUTROPHILS NFR BLD AUTO: 3.81 10*3/MM3 (ref 1.7–7)
NEUTROPHILS NFR BLD AUTO: 70.4 % (ref 42.7–76)
PLATELET # BLD AUTO: 252 10*3/MM3 (ref 140–450)
PMV BLD AUTO: 8.5 FL (ref 6–12)
POTASSIUM SERPL-SCNC: 3.3 MMOL/L (ref 3.5–5.2)
PROT SERPL-MCNC: 6.6 G/DL (ref 6–8.5)
RAD ONC ARIA COURSE ID: NORMAL
RAD ONC ARIA COURSE INTENT: NORMAL
RAD ONC ARIA COURSE LAST TREATMENT DATE: NORMAL
RAD ONC ARIA COURSE START DATE: NORMAL
RAD ONC ARIA COURSE TREATMENT ELAPSED DAYS: 21
RAD ONC ARIA FIRST TREATMENT DATE: NORMAL
RAD ONC ARIA PLAN FRACTIONS TREATED TO DATE: 14
RAD ONC ARIA PLAN ID: NORMAL
RAD ONC ARIA PLAN NAME: NORMAL
RAD ONC ARIA PLAN PRESCRIBED DOSE PER FRACTION: 2 GY
RAD ONC ARIA PLAN PRIMARY REFERENCE POINT: NORMAL
RAD ONC ARIA PLAN TOTAL FRACTIONS PRESCRIBED: 30
RAD ONC ARIA PLAN TOTAL PRESCRIBED DOSE: 6000 CGY
RAD ONC ARIA REFERENCE POINT DOSAGE GIVEN TO DATE: 28 GY
RAD ONC ARIA REFERENCE POINT ID: NORMAL
RAD ONC ARIA REFERENCE POINT SESSION DOSAGE GIVEN: 2 GY
RBC # BLD AUTO: 3.66 10*6/MM3 (ref 3.77–5.28)
SODIUM SERPL-SCNC: 140 MMOL/L (ref 136–145)
WBC NRBC COR # BLD: 5.41 10*3/MM3 (ref 3.4–10.8)

## 2022-12-28 PROCEDURE — 77386: CPT | Performed by: RADIOLOGY

## 2022-12-28 PROCEDURE — 99214 OFFICE O/P EST MOD 30 MIN: CPT | Performed by: INTERNAL MEDICINE

## 2022-12-28 PROCEDURE — 25010000002 PEMETREXED PER 10 MG: Performed by: INTERNAL MEDICINE

## 2022-12-28 PROCEDURE — 25010000002 CARBOPLATIN PER 50 MG: Performed by: INTERNAL MEDICINE

## 2022-12-28 PROCEDURE — 25010000002 PALONOSETRON PER 25 MCG: Performed by: INTERNAL MEDICINE

## 2022-12-28 PROCEDURE — 96367 TX/PROPH/DG ADDL SEQ IV INF: CPT

## 2022-12-28 PROCEDURE — 85025 COMPLETE CBC W/AUTO DIFF WBC: CPT | Performed by: INTERNAL MEDICINE

## 2022-12-28 PROCEDURE — 25010000002 HEPARIN LOCK FLUSH PER 10 UNITS: Performed by: INTERNAL MEDICINE

## 2022-12-28 PROCEDURE — 96411 CHEMO IV PUSH ADDL DRUG: CPT

## 2022-12-28 PROCEDURE — 25010000002 PEMETREXED 100 MG RECONSTITUTED SOLUTION 1 EACH VIAL: Performed by: INTERNAL MEDICINE

## 2022-12-28 PROCEDURE — 80053 COMPREHEN METABOLIC PANEL: CPT | Performed by: INTERNAL MEDICINE

## 2022-12-28 PROCEDURE — 96413 CHEMO IV INFUSION 1 HR: CPT

## 2022-12-28 PROCEDURE — 96375 TX/PRO/DX INJ NEW DRUG ADDON: CPT

## 2022-12-28 PROCEDURE — 77014 CHG CT GUIDANCE RADIATION THERAPY FLDS PLACEMENT: CPT | Performed by: RADIOLOGY

## 2022-12-28 PROCEDURE — 25010000002 FOSAPREPITANT PER 1 MG: Performed by: INTERNAL MEDICINE

## 2022-12-28 RX ORDER — SODIUM CHLORIDE 0.9 % (FLUSH) 0.9 %
20 SYRINGE (ML) INJECTION AS NEEDED
Status: DISCONTINUED | OUTPATIENT
Start: 2022-12-28 | End: 2022-12-29 | Stop reason: HOSPADM

## 2022-12-28 RX ORDER — SODIUM CHLORIDE 9 MG/ML
250 INJECTION, SOLUTION INTRAVENOUS ONCE
Status: COMPLETED | OUTPATIENT
Start: 2022-12-28 | End: 2022-12-28

## 2022-12-28 RX ORDER — HEPARIN SODIUM (PORCINE) LOCK FLUSH IV SOLN 100 UNIT/ML 100 UNIT/ML
500 SOLUTION INTRAVENOUS AS NEEDED
Status: DISCONTINUED | OUTPATIENT
Start: 2022-12-28 | End: 2022-12-29 | Stop reason: HOSPADM

## 2022-12-28 RX ORDER — OLANZAPINE 2.5 MG/1
5 TABLET ORAL ONCE
Status: COMPLETED | OUTPATIENT
Start: 2022-12-28 | End: 2022-12-28

## 2022-12-28 RX ORDER — PALONOSETRON 0.05 MG/ML
0.25 INJECTION, SOLUTION INTRAVENOUS ONCE
Status: COMPLETED | OUTPATIENT
Start: 2022-12-28 | End: 2022-12-28

## 2022-12-28 RX ORDER — SODIUM CHLORIDE 9 MG/ML
250 INJECTION, SOLUTION INTRAVENOUS ONCE
Status: CANCELLED | OUTPATIENT
Start: 2022-12-28

## 2022-12-28 RX ORDER — OLANZAPINE 5 MG/1
5 TABLET ORAL ONCE
Status: CANCELLED | OUTPATIENT
Start: 2022-12-28 | End: 2022-12-28

## 2022-12-28 RX ORDER — SODIUM CHLORIDE 0.9 % (FLUSH) 0.9 %
20 SYRINGE (ML) INJECTION AS NEEDED
Status: CANCELLED | OUTPATIENT
Start: 2022-12-28

## 2022-12-28 RX ORDER — HEPARIN SODIUM (PORCINE) LOCK FLUSH IV SOLN 100 UNIT/ML 100 UNIT/ML
500 SOLUTION INTRAVENOUS AS NEEDED
Status: CANCELLED | OUTPATIENT
Start: 2022-12-28

## 2022-12-28 RX ORDER — PALONOSETRON 0.05 MG/ML
0.25 INJECTION, SOLUTION INTRAVENOUS ONCE
Status: CANCELLED | OUTPATIENT
Start: 2022-12-28

## 2022-12-28 RX ORDER — FAMOTIDINE 10 MG/ML
20 INJECTION, SOLUTION INTRAVENOUS AS NEEDED
Status: CANCELLED | OUTPATIENT
Start: 2022-12-28

## 2022-12-28 RX ORDER — DIPHENHYDRAMINE HYDROCHLORIDE 50 MG/ML
50 INJECTION INTRAMUSCULAR; INTRAVENOUS AS NEEDED
Status: CANCELLED | OUTPATIENT
Start: 2022-12-28

## 2022-12-28 RX ADMIN — HEPARIN SODIUM (PORCINE) LOCK FLUSH IV SOLN 100 UNIT/ML 500 UNITS: 100 SOLUTION at 11:33

## 2022-12-28 RX ADMIN — FOSAPREPITANT 100 ML: 150 INJECTION, POWDER, LYOPHILIZED, FOR SOLUTION INTRAVENOUS at 09:58

## 2022-12-28 RX ADMIN — OLANZAPINE 5 MG: 2.5 TABLET, FILM COATED ORAL at 10:14

## 2022-12-28 RX ADMIN — PALONOSETRON 0.25 MG: 0.05 INJECTION, SOLUTION INTRAVENOUS at 10:14

## 2022-12-28 RX ADMIN — PEMETREXED DISODIUM 800 MG: 500 INJECTION, POWDER, LYOPHILIZED, FOR SOLUTION INTRAVENOUS at 10:37

## 2022-12-28 RX ADMIN — SODIUM CHLORIDE 250 ML: 9 INJECTION, SOLUTION INTRAVENOUS at 09:58

## 2022-12-28 RX ADMIN — Medication 20 ML: at 11:32

## 2022-12-28 RX ADMIN — CARBOPLATIN 610 MG: 10 INJECTION, SOLUTION INTRAVENOUS at 10:56

## 2022-12-28 NOTE — ASSESSMENT & PLAN NOTE
Patient is receiving concurrent chemo RT with radiation, carboplatin and pemetrexed.  She is due for cycle 2.  Lab work is adequate for treatment.  Proceed with cycle 2 as planned.  Continue XRT as per radiation oncology.  RTC 3 weeks for OV, cycle 3 with lab work prior to monitor for toxicities.

## 2022-12-28 NOTE — ASSESSMENT & PLAN NOTE
Called and spoke w/ Obdulia  Hold a day of synthroid and restart on synthroid normal schedule on Friday. Needs labs. Obdulia will call to schedule labs as soon as she figures out when to she can bring Ms. Adame in. If any palpitations, SOB, dizziness, lightheadedness, bring Ms. Adame to ED.    Hemoglobin has decreased to 11.4 g/dL but no symptoms.  No intervention required at this point.  Repeat CBC next visit.

## 2022-12-28 NOTE — PROGRESS NOTES
Chief Complaint  Chemotherapy    Carrie Grace APRN    Subjective          Theresa M Gabehart presents to [facility] for cycle 2 of concurrent chemo along with radiation.  She states the first cycle went okay.  She did have some increased fatigue with the last cycle.  She notes mild nausea but antiemetics were effective.  She has been taking her folic acid daily as well as her dexamethasone around each cycle.  She reports adequate appetite and her weight is maintained.  Her energy level is low but adequate for her ADLs.  She denies new masses or adenopathy, no unusual aches or pains.  She denies issues from her Port-A-Cath.    Oncology/Hematology History Overview Note   11/18/2022  Lymph node, station 10 L, FNA:  - Negative for malignant cells  - Lymphoid tissue present  2. Lymph node, station 7, FNA:  - Positive for malignant cells  - Metastatic adenocarcinoma, lung primary  3. Lymph node, station 4R, FNA:  - Positive for malignant cells  - Metastatic adenocarcinoma, lung primary  4. Lymph node, station 10 R, FNA:  - Positive for malignant cells  - Metastatic adenocarcinoma, lung primary    11/30/22 orders written for concurrent carbo & alimta with XRT     Malignant neoplasm of overlapping sites of right lung (HCC)   11/30/2022 Initial Diagnosis    Malignant neoplasm of middle lobe of right lung (HCC)     11/30/2022 Cancer Staged    Staging form: Lung, AJCC 8th Edition  - Clinical: Stage IIIA (cT2a, cN2, cM0) - Signed by Narayan Jonas MD on 11/30/2022 12/7/2022 -  Chemotherapy    OP LUNG PEMEtrexed / CARBOplatin AUC=5 + XRT     3/1/2023 -  Chemotherapy    OP LUNG Durvalumab     Malignant neoplasm of upper lobe, right bronchus or lung (HCC) (Resolved)   12/5/2022 Initial Diagnosis    Malignant neoplasm of upper lobe, right bronchus or lung (HCC)     12/7/2022 -  Radiation    RADIATION THERAPY Treatment Details (Noted on 12/5/2022)  Site: Right Lung - Upper  lobe  Technique: IMRT  Goal: No goal specified  Planned Treatment Start Date: 12/7/2022         Review of Systems   Constitutional: Negative for appetite change, diaphoresis, fatigue, fever, unexpected weight gain and unexpected weight loss.   HENT: Negative for hearing loss, mouth sores, sore throat, swollen glands, trouble swallowing and voice change.    Eyes: Negative for blurred vision.   Respiratory: Negative for cough, shortness of breath and wheezing.    Cardiovascular: Negative for chest pain and palpitations.   Gastrointestinal: Negative for abdominal pain, blood in stool, constipation, diarrhea, nausea and vomiting.   Endocrine: Negative for cold intolerance and heat intolerance.   Genitourinary: Negative for difficulty urinating, dysuria, frequency, hematuria and urinary incontinence.   Musculoskeletal: Negative for arthralgias, back pain and myalgias.   Skin: Negative for rash, skin lesions and wound.   Neurological: Negative for dizziness, seizures, weakness, numbness and headache.   Hematological: Does not bruise/bleed easily.   Psychiatric/Behavioral: Negative for depressed mood. The patient is not nervous/anxious.      Current Outpatient Medications on File Prior to Visit   Medication Sig Dispense Refill   • albuterol sulfate  (90 Base) MCG/ACT inhaler Inhale 2 puffs Every 4 (Four) Hours As Needed for Wheezing. 18 g 11   • Aspirin 81 MG capsule Take 81 mg by mouth Daily. LAST DOSE 11/24/22     • [employer] Calcium 600 MG tablet Take 1 tablet by mouth 2 (Two) Times a Day With Meals.     • dexamethasone (DECADRON) 4 MG tablet Take 1 tablet oral twice a day for 3 consecutive days beginning the day before chemotherapy and continue for 6 doses.Take with food. (Patient taking differently: Take 1 tablet oral twice a day for 3 consecutive days beginning the day before chemotherapy and continue for 6 doses.Take with food.  12/1/22 PT HAS NOT STARTED THIS) 6 tablet 3   • fluticasone (FLONASE) 50 MCG/ACT nasal  spray 1 spray into the nostril(s) as directed by provider Daily As Needed.     • folic acid (FOLVITE) 1 MG tablet Take 1 tablet by mouth Daily. Start 7 days prior to chemotherapy until at least 3 weeks after all chemotherapy. 30 tablet 3   • HYDROcodone-acetaminophen (Norco) 5-325 MG per tablet Take 1 tablet by mouth Every 6 (Six) Hours As Needed for Moderate Pain (NOTE: You can take two tablets instead of one tablet every four hours instead of every six hours if needed for pain). 8 tablet 0   • ipratropium-albuterol (DUO-NEB) 0.5-2.5 mg/3 ml nebulizer Take 3 mL by nebulization 4 (Four) Times a Day As Needed for Wheezing. 360 mL 3   • L-Lysine 500 MG tablet tablet Take  by mouth Daily.     • Linzess 145 MCG capsule capsule Take 145 mcg by mouth As Needed.     • Nystatin-Diphenhydramine-Hydrocortisone-Lidocaine Take 5 mL by mouth Every 3 (Three) Hours As Needed (Sore throat, trouble swallowing). Swish and swallow. 300 mL 2   • Nystatin-Diphenhydramine-Hydrocortisone-Lidocaine Swish and swallow 5 mL Every 3 (Three) Hours As Needed. 300 mL 2   • ondansetron (ZOFRAN) 8 MG tablet Take 1 tablet by mouth 3 (Three) Times a Day As Needed for Nausea or Vomiting. 30 tablet 5   • Probiotic Product (PROBIOTIC BLEND PO) Take 1 tablet by mouth Daily.     • prochlorperazine (COMPAZINE) 10 MG tablet Take 1 tablet by mouth Every 6 (Six) Hours As Needed for Nausea or Vomiting. 20 tablet 3   • Symbicort 160-4.5 MCG/ACT inhaler 2 puffs 2 (Two) Times a Day As Needed.     • tiotropium bromide monohydrate (SPIRIVA RESPIMAT) 2.5 MCG/ACT aerosol solution inhaler Inhale 2 puffs Daily. (Patient taking differently: Inhale 2 puffs Daily. 12/1/22 PT STATES SHE HAS NOT STARTED THIS YET) 1 each 11   • vitamin D (ERGOCALCIFEROL) 1.25 MG (30341 UT) capsule capsule Take 50,000 Units by mouth Every 7 (Seven) Days.     • Zinc Sulfate (ZINC 15 PO) Take 1 each by mouth Every Other Day.       Current Facility-Administered Medications on File Prior to Visit    Medication Dose Route Frequency Provider Last Rate Last Admin   • [COMPLETED] CARBOplatin (PARAPLATIN) 610 mg in sodium chloride 0.9 % 336 mL chemo IVPB  610 mg Intravenous Once Narayan Jonas MD   Stopped at 12/28/22 1127   • [COMPLETED] FOSAPREPITANT 150 MG/100ML NORMAL SALINE (CBC) IVPB 100 mL 100 mL  150 mg Intravenous Once Narayan Jonas MD   Stopped at 12/28/22 1028   • heparin injection 500 Units  500 Units Intravenous PRN Narayan Jonas MD   500 Units at 12/28/22 1133   • [COMPLETED] OLANZapine (zyPREXA) tablet 5 mg  5 mg Oral Once Narayan Jonas MD   5 mg at 12/28/22 1014   • [COMPLETED] palonosetron (ALOXI) injection 0.25 mg  0.25 mg Intravenous Once Narayan Jonas MD   0.25 mg at 12/28/22 1014   • [COMPLETED] PEMEtrexed (ALIMTA) 800 mg in sodium chloride 0.9 % 100 mL chemo IVPB  800 mg Intravenous Once Narayan Jonas MD   Stopped at 12/28/22 1048   • sodium chloride 0.9 % flush 20 mL  20 mL Intravenous PRN Narayan Jonas MD   20 mL at 12/28/22 1132   • [COMPLETED] sodium chloride 0.9 % infusion 250 mL  250 mL Intravenous Once Narayan Jonas MD   Stopped at 12/28/22 1132       Allergies   Allergen Reactions   • Clarithromycin Rash   • Metronidazole Unknown - High Severity   • Doxycycline Calcium Rash   • Gabapentin Mental Status Change     Past Medical History:   Diagnosis Date   • Allergic rhinitis    • Anemia due to chemotherapy 12/28/2022   • Aortic aneurysm ([facility])     FOLLOWED BY GARCIA   • Arnold-Chiari syndrome ([facility])    • Bleeding disorder ([facility])     FREE BLUE DUGGAN TEST NEGATIVE   • Carpal tunnel syndrome     RIGHT   • Colon polyps    • Colon polyps    • COPD (chronic obstructive pulmonary disease) ([facility])     NEBS/INHALER   • Dupuytren's contracture syndrome    • Dysphagia     ESOPHAGEAL STRICTURE   • Emphysema lung ([facility])    • Fibromyalgia    • Hiatal hernia    • History of fractured vertebra     18 fractured/HEALING, 2021   • History of pelvic fracture       HEALED   • Hypokalemia 2022   • Lung cancer (HCC)     RIGHT   • Malignant neoplasm of middle lobe of right lung (HCC) 2022   • Malignant neoplasm of overlapping sites of right lung (HCC) 2022   • Neuropathy    • Osteoporosis    • Vertigo      Past Surgical History:   Procedure Laterality Date   • BRONCHOSCOPY N/A 2022    Procedure: BRONCHOSCOPY WITH ENDOBRONCHIAL ULTRASOUND AND FINE NEEDLE ASPIRATIONS;  Surgeon: Shaw Bonds DO;  Location: [facility] ENDOSCOPY;  Service: Pulmonary;  Laterality: N/A;  MEDIASTINAL ADENOPATHY,  LUNG NODULE   •  SECTION     • CHOLECYSTECTOMY     • COLONOSCOPY     • ENDOSCOPY     • HYSTERECTOMY     • KNEE ARTHROSCOPY     • ORTHOPEDIC SURGERY     • TONSILLECTOMY AND ADENOIDECTOMY     • UPPER GASTROINTESTINAL ENDOSCOPY     • VENOUS ACCESS DEVICE (PORT) INSERTION N/A 2022    Procedure: INSERTION OF PORTACATH;  Surgeon: Ishan Vargas MD;  [place] [facility] [place] [facility] [place]  Service: General;  Laterality: N/A;     Social History     Socioeconomic History   • Marital status:    Tobacco Use   • Smoking status: Every Day     Packs/day: 0.50     Years: 30.00     Pack years: 15.00     Types: Cigarettes     Start date:    • Smokeless tobacco: Never   • Tobacco comments:     WAS SMOKING TWO PACKS DAILY, HAS CUT DOWN TO 1 PACK A DAY     INST PER ANESTHESIA PROTOCOL   Vaping Use   • Vaping Use: Former   Substance and Sexual Activity   • Alcohol use: Never   • Drug use: Never   • Sexual activity: Defer     Family History   Problem Relation Age of Onset   • Lung cancer Mother    • Diabetes Sister    • Breast cancer Sister    • Malig Hyperthermia Neg Hx        Objective   Physical Exam  Vitals reviewed. Exam conducted with a chaperone present.   Constitutional:       General: She is not in acute distress.     Appearance: Normal appearance.   Cardiovascular:      Rate and Rhythm: Normal rate and regular rhythm.      Heart sounds: Normal heart  sounds. No murmur heard.    No gallop.   Pulmonary:      Effort: Pulmonary effort is normal.      Breath sounds: Normal breath sounds.      Comments: Port-A-Cath  Abdominal:      General: Abdomen is flat. Bowel sounds are normal. There is no distension.      Palpations: Abdomen is soft.      Tenderness: There is no abdominal tenderness.   Musculoskeletal:      Cervical back: Neck supple.      Right lower leg: No edema.      Left lower leg: No edema.   Lymphadenopathy:      Cervical: No cervical adenopathy.   Neurological:      Mental Status: She is alert and oriented to person, place, and time.   Psychiatric:         Mood and Affect: Mood normal.         Behavior: Behavior normal.         Vitals:    12/28/22 0824   BP: 122/76   Pulse: 97   Resp: 16   Temp: 97.9 °F (36.6 °C)   TempSrc: Temporal   SpO2: 100%   Weight: 53.9 kg (118 lb 13.3 oz)   PainSc: 0-No pain     ECOG score: 2         PHQ-9 Total Score:                    Result Review :   The following data was reviewed by: Narayan Jonas MD on 12/28/2022:  Lab Results   Component Value Date    HGB 11.4 (L) 12/28/2022    HCT 34.2 12/28/2022    MCV 93.4 12/28/2022     12/28/2022    WBC 5.41 12/28/2022    NEUTROABS 3.81 12/28/2022    LYMPHSABS 0.78 12/28/2022    MONOSABS 0.79 12/28/2022    EOSABS 0.01 12/28/2022    BASOSABS 0.01 12/28/2022     Lab Results   Component Value Date    GLUCOSE 160 (H) 12/28/2022    BUN 11 12/28/2022    CREATININE 0.52 (L) 12/28/2022     12/28/2022    K 3.3 (L) 12/28/2022     12/28/2022    CO2 22.9 12/28/2022    CALCIUM 9.4 12/28/2022    PROTEINTOT 6.6 12/28/2022    ALBUMIN 4.2 12/28/2022    BILITOT 0.2 12/28/2022    ALKPHOS 53 12/28/2022    AST 17 12/28/2022    ALT 31 12/28/2022     Lab Results   Component Value Date    MG 1.8 10/30/2022    FREET4 1.54 02/23/2022    TSH 1.240 02/23/2022             Assessment and Plan    Diagnoses and all orders for this visit:    1. Malignant neoplasm of overlapping sites of right  lung (HCC) (Primary)  Assessment & Plan:  Patient is receiving concurrent chemo RT with radiation, carboplatin and pemetrexed.  She is due for cycle 2.  Lab work is adequate for treatment.  Proceed with cycle 2 as planned.  Continue XRT as per radiation oncology.  RTC 3 weeks for OV, cycle 3 with lab work prior to monitor for toxicities.    Orders:  -     Cancel: sodium chloride 0.9 % infusion 250 mL  -     Cancel: OLANZapine (zyPREXA) tablet 5 mg  -     Cancel: palonosetron (ALOXI) injection 0.25 mg  -     Cancel: fosaprepitant (EMEND) 150 mg in sodium chloride 0.9 % 100 mL IVPB  -     Cancel: PEMEtrexed (ALIMTA) 810 mg in sodium chloride 0.9 % 132.4 mL chemo IVPB  -     Cancel: CARBOplatin (PARAPLATIN) 610 mg in sodium chloride 0.9 % 311 mL chemo IVPB  -     CBC and Differential; Future  -     Comprehensive metabolic panel; Future    2. Anemia due to chemotherapy  Assessment & Plan:  Hemoglobin has decreased to 11.4 g/dL but no symptoms.  No intervention required at this point.  Repeat CBC next visit.      3. Hypokalemia  Assessment & Plan:  Potassium level is mildly decreased at 3.3.  This may be related to her carboplatin.  We discussed increasing potassium in the diet such as bananas, orange juice, tomatoes.  Potassium level will be repeated at next visit.  If it remains low, potassium supplementation would be appropriate.      Other orders  -     Cancel: Hydrocortisone Sod Suc (PF) (Solu-CORTEF) injection 100 mg  -     Cancel: diphenhydrAMINE (BENADRYL) injection 50 mg  -     Cancel: famotidine (PEPCID) injection 20 mg          Patient Follow Up: Cycle 3-day 1    Patient was given instructions and counseling regarding her condition or for health maintenance advice. Please see specific information pulled into the AVS if appropriate.     Narayan Jonas MD    12/28/2022

## 2022-12-28 NOTE — ASSESSMENT & PLAN NOTE
Potassium level is mildly decreased at 3.3.  This may be related to her carboplatin.  We discussed increasing potassium in the diet such as bananas, orange juice, tomatoes.  Potassium level will be repeated at next visit.  If it remains low, potassium supplementation would be appropriate.

## 2022-12-28 NOTE — PROGRESS NOTES
"Outpatient Nutrition Oncology Follow Up    Patient Name: Theresa M Gabehart  YOB: 1960  MRN: [id number]      Reason for Consult:  Radiation tx f/u       Today's Weight:  53.73 kg    Weight Change:  1 kg gain compared to last week (12/21/22)    Nutrition-related concerns: Esophagitis (has Junie's Magic potion, but only taking when needed; generally takes Mylanta, which reports good results)    Current PO intake:  Small/frequent meals of regular foods (when not experiencing esophageal pn/burning) and occasionally eats soft/moist foods     Current Nutrition Supplement intake: Boost Plus recommended at least BID     Consult or Intervention:  Pt was weighed in radiation therapy yesterday at 114#, which indicated a wt decline, however pt weighed at both infusion center & in radiation oncology again today (118# on both scales).  Pt reports good intake and was surprised to see her wt had declined yesterday.  She stated today she ate a good breakfast:  2 biscuits, 2 hash browns, however reports she has not had a BM today.  Pt was weighed without jacket on at both places.  She does have on snow boots, though.  Question accuracy of yesterday's wt (12/27/22).  Provided pt with more samples of Boost Plus today and handout on \"Sore Mouth or Throat\" for soft/moist/high kcal/high protein food ideas.    Nutrition Diagnosis: Moderate malnutrition related to increased nutrient needs due to catabolic disease as evidenced by physiological causes increasing nutrient needs., hypermetabolic state., muscle wasting. and fat loss.    Plan: Will follow up per oncology nutrition protocol              "

## 2022-12-28 NOTE — TELEPHONE ENCOUNTER
Spoke with patient to advised that her potassium was slightly decreased. Recommend increasing potassium in the diet such as bananas, orange juice, tomatoes. Potassium level will be repeated at next visit. Patient verbalized understanding of all information discussed.

## 2022-12-29 ENCOUNTER — HOSPITAL ENCOUNTER (OUTPATIENT)
Dept: RADIATION ONCOLOGY | Facility: HOSPITAL | Age: 62
Discharge: HOME | End: 2022-12-29

## 2022-12-29 LAB
RAD ONC ARIA COURSE ID: NORMAL
RAD ONC ARIA COURSE INTENT: NORMAL
RAD ONC ARIA COURSE LAST TREATMENT DATE: NORMAL
RAD ONC ARIA COURSE START DATE: NORMAL
RAD ONC ARIA COURSE TREATMENT ELAPSED DAYS: 22
RAD ONC ARIA FIRST TREATMENT DATE: NORMAL
RAD ONC ARIA PLAN FRACTIONS TREATED TO DATE: 15
RAD ONC ARIA PLAN ID: NORMAL
RAD ONC ARIA PLAN NAME: NORMAL
RAD ONC ARIA PLAN PRESCRIBED DOSE PER FRACTION: 2 GY
RAD ONC ARIA PLAN PRIMARY REFERENCE POINT: NORMAL
RAD ONC ARIA PLAN TOTAL FRACTIONS PRESCRIBED: 30
RAD ONC ARIA PLAN TOTAL PRESCRIBED DOSE: 6000 CGY
RAD ONC ARIA REFERENCE POINT DOSAGE GIVEN TO DATE: 30 GY
RAD ONC ARIA REFERENCE POINT ID: NORMAL
RAD ONC ARIA REFERENCE POINT SESSION DOSAGE GIVEN: 2 GY

## 2022-12-29 PROCEDURE — 77386: CPT | Performed by: RADIOLOGY

## 2022-12-29 PROCEDURE — 77014 CHG CT GUIDANCE RADIATION THERAPY FLDS PLACEMENT: CPT | Performed by: RADIOLOGY

## 2022-12-30 ENCOUNTER — HOSPITAL ENCOUNTER (OUTPATIENT)
Dept: RADIATION ONCOLOGY | Facility: HOSPITAL | Age: 62
Discharge: HOME | End: 2022-12-30

## 2022-12-30 LAB
RAD ONC ARIA COURSE ID: NORMAL
RAD ONC ARIA COURSE INTENT: NORMAL
RAD ONC ARIA COURSE LAST TREATMENT DATE: NORMAL
RAD ONC ARIA COURSE START DATE: NORMAL
RAD ONC ARIA COURSE TREATMENT ELAPSED DAYS: 23
RAD ONC ARIA FIRST TREATMENT DATE: NORMAL
RAD ONC ARIA PLAN FRACTIONS TREATED TO DATE: 16
RAD ONC ARIA PLAN ID: NORMAL
RAD ONC ARIA PLAN NAME: NORMAL
RAD ONC ARIA PLAN PRESCRIBED DOSE PER FRACTION: 2 GY
RAD ONC ARIA PLAN PRIMARY REFERENCE POINT: NORMAL
RAD ONC ARIA PLAN TOTAL FRACTIONS PRESCRIBED: 30
RAD ONC ARIA PLAN TOTAL PRESCRIBED DOSE: 6000 CGY
RAD ONC ARIA REFERENCE POINT DOSAGE GIVEN TO DATE: 32 GY
RAD ONC ARIA REFERENCE POINT ID: NORMAL
RAD ONC ARIA REFERENCE POINT SESSION DOSAGE GIVEN: 2 GY

## 2022-12-30 PROCEDURE — 77386: CPT | Performed by: RADIOLOGY

## 2022-12-30 PROCEDURE — 77014 CHG CT GUIDANCE RADIATION THERAPY FLDS PLACEMENT: CPT | Performed by: RADIOLOGY

## 2023-01-03 ENCOUNTER — HOSPITAL ENCOUNTER (OUTPATIENT)
Dept: RADIATION ONCOLOGY | Facility: HOSPITAL | Age: 63
Discharge: HOME | End: 2023-01-03
Payer: MEDICARE

## 2023-01-03 ENCOUNTER — HOSPITAL ENCOUNTER (OUTPATIENT)
Dept: RADIATION ONCOLOGY | Facility: HOSPITAL | Age: 63
Setting detail: RADIATION/ONCOLOGY SERIES
Discharge: HOME | End: 2023-01-03
Payer: MEDICARE

## 2023-01-03 ENCOUNTER — HOSPITAL ENCOUNTER (OUTPATIENT)
Dept: RADIATION ONCOLOGY | Facility: HOSPITAL | Age: 63
Setting detail: RADIATION/ONCOLOGY SERIES
End: 2023-01-03
Payer: MEDICARE

## 2023-01-03 VITALS
TEMPERATURE: 98.6 F | OXYGEN SATURATION: 100 % | WEIGHT: 116.18 LBS | BODY MASS INDEX: 19.84 KG/M2 | SYSTOLIC BLOOD PRESSURE: 120 MMHG | DIASTOLIC BLOOD PRESSURE: 82 MMHG | HEART RATE: 102 BPM | RESPIRATION RATE: 16 BRPM

## 2023-01-03 DIAGNOSIS — C34.81 MALIGNANT NEOPLASM OF OVERLAPPING SITES OF RIGHT LUNG: Primary | ICD-10-CM

## 2023-01-03 LAB
RAD ONC ARIA COURSE ID: NORMAL
RAD ONC ARIA COURSE INTENT: NORMAL
RAD ONC ARIA COURSE LAST TREATMENT DATE: NORMAL
RAD ONC ARIA COURSE START DATE: NORMAL
RAD ONC ARIA COURSE TREATMENT ELAPSED DAYS: 27
RAD ONC ARIA FIRST TREATMENT DATE: NORMAL
RAD ONC ARIA PLAN FRACTIONS TREATED TO DATE: 17
RAD ONC ARIA PLAN ID: NORMAL
RAD ONC ARIA PLAN NAME: NORMAL
RAD ONC ARIA PLAN PRESCRIBED DOSE PER FRACTION: 2 GY
RAD ONC ARIA PLAN PRIMARY REFERENCE POINT: NORMAL
RAD ONC ARIA PLAN TOTAL FRACTIONS PRESCRIBED: 30
RAD ONC ARIA PLAN TOTAL PRESCRIBED DOSE: 6000 CGY
RAD ONC ARIA REFERENCE POINT DOSAGE GIVEN TO DATE: 34 GY
RAD ONC ARIA REFERENCE POINT ID: NORMAL
RAD ONC ARIA REFERENCE POINT SESSION DOSAGE GIVEN: 2 GY

## 2023-01-03 PROCEDURE — 77386: CPT | Performed by: RADIOLOGY

## 2023-01-03 PROCEDURE — 77014 CHG CT GUIDANCE RADIATION THERAPY FLDS PLACEMENT: CPT | Performed by: RADIOLOGY

## 2023-01-03 PROCEDURE — 77427 RADIATION TX MANAGEMENT X5: CPT | Performed by: RADIOLOGY

## 2023-01-03 NOTE — PROGRESS NOTES
On Treatment Visit       Patient: Theresa M Gabehart   YOB: 1960   Medical Record Number: [id number]     Date of Visit  January 3, 2023   Primary Diagnosis:Malignant neoplasm of overlapping sites of right lung (HCC) [C34.81]  Cancer Staging: Cancer Staging   Malignant neoplasm of middle lobe of right lung (HCC)  Staging form: Lung, AJCC 8th Edition  - Clinical: Stage IIIA (cT2a, cN2, cM0) - Signed by Narayan Jonas MD on 11/30/2022        Ms.Gabehart was seen today for an on treatment visit.  She is receiving radiation therapy to the RUL lung. She  has received 3400 cGy in 17 fractions out of a planned dose of 6000 cGy in 30 fractions. She is currently receiving concurrent pemetrexed/carboplatin chemotherapy per Dr. Jonas.     Feels well overall but does have significant fatigue.  Persistent odynophagia that has not worsened.                                       Review of Systems:   Review of Systems   Constitutional: Positive for fatigue (5/10) and unexpected weight change (wt loss of 2 lbs in the last week). Negative for appetite change.   HENT: Positive for trouble swallowing (Mild burning, takes manny's magic or mylanta with relief  ) and voice change (Raspy). Negative for sore throat.    Respiratory: Negative for cough and shortness of breath.    Cardiovascular: Positive for chest pain (On left side, states that chest pain comes and goes it began after bronchoscopy.  Stated that pain only lasted for a second.). Negative for palpitations.   Gastrointestinal: Negative for constipation, diarrhea, nausea and vomiting.   Genitourinary: Positive for frequency (Comes and goes) and urgency (Comes and goes.). Negative for difficulty urinating, dysuria and hematuria.   Musculoskeletal: Positive for arthralgias (Ongoing), back pain (Ongoing) and gait problem (In motorized wheelchair.). Negative for joint swelling.        C/o right breast tenderness.     Skin: Negative for color change and rash.    Neurological: Positive for weakness (Noted after chemo   x last 5 days.). Negative for dizziness and headaches.   Psychiatric/Behavioral: Negative for sleep disturbance.       Vitals:     Vitals:    01/03/23 1431   BP: 120/82   Pulse: 102   Resp: 16   Temp: 98.6 °F (37 °C)   SpO2: 100%       Weight:   Wt Readings from Last 3 Encounters:   01/03/23 52.7 kg (116 lb 2.9 oz)   12/28/22 53.9 kg (118 lb 13.3 oz)   12/28/22 53.7 kg (118 lb 7.3 oz)      Pain:    Pain Score    01/03/23 1431   PainSc:   3         Physical Exam:  Gen: WD/WN; NAD  HEENT: MMM  Trachea: midline  Chest: symmetric  Resp: normal respiratory effort  Extr: warm, well-perfused  Neuro: awake and alert; no aphasia or neglect    Plan: I have reviewed treatment setup notes, checked and approved the daily guidance images.  I reviewed dose delivery, treatment parameters and deemed them appropriate. We plan to continue radiation therapy as prescribed.      Prescribed Junie's Magic potion    Radiation Oncology    Electronically signed by Shaw Turner MD 1/3/2023  16:08 EST

## 2023-01-04 ENCOUNTER — HOSPITAL ENCOUNTER (OUTPATIENT)
Dept: RADIATION ONCOLOGY | Facility: HOSPITAL | Age: 63
Discharge: HOME | End: 2023-01-04
Payer: MEDICARE

## 2023-01-04 VITALS — BODY MASS INDEX: 19.91 KG/M2 | WEIGHT: 116.62 LBS

## 2023-01-04 DIAGNOSIS — C34.2 MALIGNANT NEOPLASM OF MIDDLE LOBE OF RIGHT LUNG: ICD-10-CM

## 2023-01-04 LAB
RAD ONC ARIA COURSE ID: NORMAL
RAD ONC ARIA COURSE INTENT: NORMAL
RAD ONC ARIA COURSE LAST TREATMENT DATE: NORMAL
RAD ONC ARIA COURSE START DATE: NORMAL
RAD ONC ARIA COURSE TREATMENT ELAPSED DAYS: 28
RAD ONC ARIA FIRST TREATMENT DATE: NORMAL
RAD ONC ARIA PLAN FRACTIONS TREATED TO DATE: 18
RAD ONC ARIA PLAN ID: NORMAL
RAD ONC ARIA PLAN NAME: NORMAL
RAD ONC ARIA PLAN PRESCRIBED DOSE PER FRACTION: 2 GY
RAD ONC ARIA PLAN PRIMARY REFERENCE POINT: NORMAL
RAD ONC ARIA PLAN TOTAL FRACTIONS PRESCRIBED: 30
RAD ONC ARIA PLAN TOTAL PRESCRIBED DOSE: 6000 CGY
RAD ONC ARIA REFERENCE POINT DOSAGE GIVEN TO DATE: 36 GY
RAD ONC ARIA REFERENCE POINT ID: NORMAL
RAD ONC ARIA REFERENCE POINT SESSION DOSAGE GIVEN: 2 GY

## 2023-01-04 PROCEDURE — 77386: CPT | Performed by: RADIOLOGY

## 2023-01-04 PROCEDURE — 77014 CHG CT GUIDANCE RADIATION THERAPY FLDS PLACEMENT: CPT | Performed by: RADIOLOGY

## 2023-01-04 RX ORDER — DEXAMETHASONE 4 MG/1
TABLET ORAL
Qty: 6 TABLET | Refills: 3 | Status: CANCELLED | OUTPATIENT
Start: 2023-01-04

## 2023-01-05 ENCOUNTER — HOSPITAL ENCOUNTER (OUTPATIENT)
Dept: RADIATION ONCOLOGY | Facility: HOSPITAL | Age: 63
Discharge: HOME | End: 2023-01-05
Payer: MEDICARE

## 2023-01-05 LAB
RAD ONC ARIA COURSE ID: NORMAL
RAD ONC ARIA COURSE INTENT: NORMAL
RAD ONC ARIA COURSE LAST TREATMENT DATE: NORMAL
RAD ONC ARIA COURSE START DATE: NORMAL
RAD ONC ARIA COURSE TREATMENT ELAPSED DAYS: 29
RAD ONC ARIA FIRST TREATMENT DATE: NORMAL
RAD ONC ARIA PLAN FRACTIONS TREATED TO DATE: 19
RAD ONC ARIA PLAN ID: NORMAL
RAD ONC ARIA PLAN NAME: NORMAL
RAD ONC ARIA PLAN PRESCRIBED DOSE PER FRACTION: 2 GY
RAD ONC ARIA PLAN PRIMARY REFERENCE POINT: NORMAL
RAD ONC ARIA PLAN TOTAL FRACTIONS PRESCRIBED: 30
RAD ONC ARIA PLAN TOTAL PRESCRIBED DOSE: 6000 CGY
RAD ONC ARIA REFERENCE POINT DOSAGE GIVEN TO DATE: 38 GY
RAD ONC ARIA REFERENCE POINT ID: NORMAL
RAD ONC ARIA REFERENCE POINT SESSION DOSAGE GIVEN: 2 GY

## 2023-01-05 PROCEDURE — 77386: CPT | Performed by: RADIOLOGY

## 2023-01-05 PROCEDURE — 77014 CHG CT GUIDANCE RADIATION THERAPY FLDS PLACEMENT: CPT | Performed by: RADIOLOGY

## 2023-01-05 NOTE — PROGRESS NOTES
Outpatient Nutrition Oncology Follow Up    Patient Name: Theresa M Gabehart  YOB: 1960  MRN: [id number]      Reason for Consult:  Radiation tx f/u       Today's Weight:   52.9 kg 1/4/23    Weight Change:  1.7% total loss/difference in the past week, however pt had gained wt last week due to being constipated (ate too much cheese), then lost weight (takes olive oil to help bowels move), now gaining back weight    Nutrition-related concerns: Esophagitis and Other: decreased appetite    Current PO intake:  Eating high kcal/protein foods in small/frequent meals; dining out more due to busy with appointments     Current Nutrition Supplement intake:  No longer drinking Boost Plus due to unable to chocolate flavor since it aggravates her esophagus.  Pt reports she does not like strawberry or vanilla flavors.  Offered juice-type oral supplements (Boost Breeze or Ensure Enlive), but pt afraid acidity of these may aggravate her esophagus.     Consult or Intervention:  Pt reports to continue to make a good effort to eat well.  Encouraged homemade milkshakes if she is unable to tolerate oral nutrition shakes at this time.  We discussed several ideas for protein foods today.     Nutrition Diagnosis: Severe malnutrition related to increased nutrient needs due to catabolic disease as evidenced by physiological causes increasing nutrient needs., hypermetabolic state., muscle wasting. and fat loss.    Plan: Will follow up per oncology nutrition protocol

## 2023-01-06 ENCOUNTER — HOSPITAL ENCOUNTER (OUTPATIENT)
Dept: RADIATION ONCOLOGY | Facility: HOSPITAL | Age: 63
Discharge: HOME | End: 2023-01-06

## 2023-01-06 LAB
RAD ONC ARIA COURSE ID: NORMAL
RAD ONC ARIA COURSE INTENT: NORMAL
RAD ONC ARIA COURSE LAST TREATMENT DATE: NORMAL
RAD ONC ARIA COURSE START DATE: NORMAL
RAD ONC ARIA COURSE TREATMENT ELAPSED DAYS: 30
RAD ONC ARIA FIRST TREATMENT DATE: NORMAL
RAD ONC ARIA PLAN FRACTIONS TREATED TO DATE: 20
RAD ONC ARIA PLAN ID: NORMAL
RAD ONC ARIA PLAN NAME: NORMAL
RAD ONC ARIA PLAN PRESCRIBED DOSE PER FRACTION: 2 GY
RAD ONC ARIA PLAN PRIMARY REFERENCE POINT: NORMAL
RAD ONC ARIA PLAN TOTAL FRACTIONS PRESCRIBED: 30
RAD ONC ARIA PLAN TOTAL PRESCRIBED DOSE: 6000 CGY
RAD ONC ARIA REFERENCE POINT DOSAGE GIVEN TO DATE: 40 GY
RAD ONC ARIA REFERENCE POINT ID: NORMAL
RAD ONC ARIA REFERENCE POINT SESSION DOSAGE GIVEN: 2 GY

## 2023-01-06 PROCEDURE — 77014 CHG CT GUIDANCE RADIATION THERAPY FLDS PLACEMENT: CPT | Performed by: RADIOLOGY

## 2023-01-06 PROCEDURE — 77386: CPT | Performed by: RADIOLOGY

## 2023-01-06 PROCEDURE — 77336 RADIATION PHYSICS CONSULT: CPT | Performed by: RADIOLOGY

## 2023-01-09 ENCOUNTER — HOSPITAL ENCOUNTER (OUTPATIENT)
Dept: RADIATION ONCOLOGY | Facility: HOSPITAL | Age: 63
Discharge: HOME | End: 2023-01-09

## 2023-01-09 LAB
RAD ONC ARIA COURSE ID: NORMAL
RAD ONC ARIA COURSE INTENT: NORMAL
RAD ONC ARIA COURSE LAST TREATMENT DATE: NORMAL
RAD ONC ARIA COURSE START DATE: NORMAL
RAD ONC ARIA COURSE TREATMENT ELAPSED DAYS: 33
RAD ONC ARIA FIRST TREATMENT DATE: NORMAL
RAD ONC ARIA PLAN FRACTIONS TREATED TO DATE: 21
RAD ONC ARIA PLAN ID: NORMAL
RAD ONC ARIA PLAN NAME: NORMAL
RAD ONC ARIA PLAN PRESCRIBED DOSE PER FRACTION: 2 GY
RAD ONC ARIA PLAN PRIMARY REFERENCE POINT: NORMAL
RAD ONC ARIA PLAN TOTAL FRACTIONS PRESCRIBED: 30
RAD ONC ARIA PLAN TOTAL PRESCRIBED DOSE: 6000 CGY
RAD ONC ARIA REFERENCE POINT DOSAGE GIVEN TO DATE: 42 GY
RAD ONC ARIA REFERENCE POINT ID: NORMAL
RAD ONC ARIA REFERENCE POINT SESSION DOSAGE GIVEN: 2 GY

## 2023-01-09 PROCEDURE — 77014 CHG CT GUIDANCE RADIATION THERAPY FLDS PLACEMENT: CPT | Performed by: RADIOLOGY

## 2023-01-09 PROCEDURE — 77386: CPT | Performed by: RADIOLOGY

## 2023-01-10 ENCOUNTER — HOSPITAL ENCOUNTER (OUTPATIENT)
Dept: RADIATION ONCOLOGY | Facility: HOSPITAL | Age: 63
Discharge: HOME | End: 2023-01-10

## 2023-01-10 VITALS
RESPIRATION RATE: 16 BRPM | OXYGEN SATURATION: 100 % | BODY MASS INDEX: 20.25 KG/M2 | DIASTOLIC BLOOD PRESSURE: 77 MMHG | TEMPERATURE: 99 F | HEART RATE: 100 BPM | SYSTOLIC BLOOD PRESSURE: 124 MMHG | WEIGHT: 118.61 LBS

## 2023-01-10 DIAGNOSIS — C34.2 MALIGNANT NEOPLASM OF MIDDLE LOBE OF RIGHT LUNG: ICD-10-CM

## 2023-01-10 DIAGNOSIS — N64.4 BREAST PAIN: Primary | ICD-10-CM

## 2023-01-10 DIAGNOSIS — N63.11 UNSPECIFIED LUMP IN THE RIGHT BREAST, UPPER OUTER QUADRANT: ICD-10-CM

## 2023-01-10 DIAGNOSIS — C34.81 MALIGNANT NEOPLASM OF OVERLAPPING SITES OF RIGHT LUNG: Primary | ICD-10-CM

## 2023-01-10 DIAGNOSIS — Z12.31 ENCOUNTER FOR SCREENING MAMMOGRAM FOR MALIGNANT NEOPLASM OF BREAST: ICD-10-CM

## 2023-01-10 DIAGNOSIS — N63.10 MASS OF RIGHT BREAST, UNSPECIFIED QUADRANT: Primary | ICD-10-CM

## 2023-01-10 LAB
RAD ONC ARIA COURSE ID: NORMAL
RAD ONC ARIA COURSE INTENT: NORMAL
RAD ONC ARIA COURSE LAST TREATMENT DATE: NORMAL
RAD ONC ARIA COURSE START DATE: NORMAL
RAD ONC ARIA COURSE TREATMENT ELAPSED DAYS: 34
RAD ONC ARIA FIRST TREATMENT DATE: NORMAL
RAD ONC ARIA PLAN FRACTIONS TREATED TO DATE: 22
RAD ONC ARIA PLAN ID: NORMAL
RAD ONC ARIA PLAN NAME: NORMAL
RAD ONC ARIA PLAN PRESCRIBED DOSE PER FRACTION: 2 GY
RAD ONC ARIA PLAN PRIMARY REFERENCE POINT: NORMAL
RAD ONC ARIA PLAN TOTAL FRACTIONS PRESCRIBED: 30
RAD ONC ARIA PLAN TOTAL PRESCRIBED DOSE: 6000 CGY
RAD ONC ARIA REFERENCE POINT DOSAGE GIVEN TO DATE: 44 GY
RAD ONC ARIA REFERENCE POINT ID: NORMAL
RAD ONC ARIA REFERENCE POINT SESSION DOSAGE GIVEN: 2 GY

## 2023-01-10 PROCEDURE — 77427 RADIATION TX MANAGEMENT X5: CPT | Performed by: RADIOLOGY

## 2023-01-10 PROCEDURE — 77014 CHG CT GUIDANCE RADIATION THERAPY FLDS PLACEMENT: CPT | Performed by: RADIOLOGY

## 2023-01-10 PROCEDURE — 77386: CPT | Performed by: RADIOLOGY

## 2023-01-10 RX ORDER — DEXAMETHASONE 4 MG/1
TABLET ORAL
Qty: 21 TABLET | Refills: 3 | Status: SHIPPED | OUTPATIENT
Start: 2023-01-10

## 2023-01-10 NOTE — PROGRESS NOTES
On Treatment Visit       Patient: Theresa M Gabehart   YOB: 1960   Medical Record Number: [id number]     Date of Visit  January 11, 2023   Primary Diagnosis:Malignant neoplasm of overlapping sites of right lung (HCC) [C34.81]  Cancer Staging: Cancer Staging   Malignant neoplasm of middle lobe of right lung (HCC)  Staging form: Lung, AJCC 8th Edition  - Clinical: Stage IIIA (cT2a, cN2, cM0) - Signed by Narayan Jonas MD on 11/30/2022        Ms.Gabehart was seen today for an on treatment visit.  She is receiving radiation therapy to the RUL lung. She  has received 4400 cGy in 22 fractions out of a planned dose of 6000 cGy in 30 fractions. She is currently receiving concurrent pemetrexed/carboplatin chemotherapy per Dr. Jonas.     Persistent right-sided breast pain most notable at the nipple areolar complex; no associated mass.                                       Review of Systems:   Review of Systems   Constitutional: Negative for appetite change, fatigue and unexpected weight change.   HENT: Positive for trouble swallowing (Mild burning, takes manny's magic or mylanta with relief  ). Negative for sore throat and voice change.    Respiratory: Negative for cough and shortness of breath.    Cardiovascular: Negative for chest pain and palpitations.   Gastrointestinal: Negative for constipation, diarrhea, nausea and vomiting.   Genitourinary: Positive for frequency (Comes and goes) and urgency (Comes and goes.). Negative for difficulty urinating, dysuria and hematuria.   Musculoskeletal: Positive for arthralgias (Ongoing), back pain (Ongoing) and gait problem (In motorized wheelchair.). Negative for joint swelling.        C/o right breast tenderness.     Skin: Negative for color change and rash.   Neurological: Positive for weakness (Noted after chemoo) and headaches (relieved on its own). Negative for dizziness.   Psychiatric/Behavioral: Negative for sleep disturbance.       Vitals:     Vitals:     01/10/23 1534   BP: 124/77   Pulse: 100   Resp: 16   Temp: 99 °F (37.2 °C)   SpO2: 100%       Weight:   Wt Readings from Last 3 Encounters:   01/10/23 53.8 kg (118 lb 9.7 oz)   01/04/23 52.9 kg (116 lb 10 oz)   01/03/23 52.7 kg (116 lb 2.9 oz)      Pain:    Pain Score    01/10/23 1534   PainSc:   3   PainLoc: Breast  Comment: right         Physical Exam:  Gen: WD/WN; NAD  HEENT: MMM  Trachea: midline  Chest: symmetric; faint right NAC hyperpigmentation.  No right breast masses  Resp: normal respiratory effort  Extr: warm, well-perfused  Neuro: awake and alert; no aphasia or neglect    Plan: I have reviewed treatment setup notes, checked and approved the daily guidance images.  I reviewed dose delivery, treatment parameters and deemed them appropriate. We plan to continue radiation therapy as prescribed.      Counseled regarding recommendation for mammogram given she has not undergone mammogram since 2014.  Ms. Gabehart refused mammography but was agreeable to ultrasound.  Focused complete right breast ultrasound ordered.    Radiation Oncology    Electronically signed by Shaw Turner MD 1/10/2023  07:19 EST

## 2023-01-11 ENCOUNTER — HOSPITAL ENCOUNTER (OUTPATIENT)
Dept: RADIATION ONCOLOGY | Facility: HOSPITAL | Age: 63
Discharge: HOME | End: 2023-01-11

## 2023-01-11 VITALS — WEIGHT: 115.96 LBS | BODY MASS INDEX: 19.8 KG/M2

## 2023-01-11 LAB
RAD ONC ARIA COURSE ID: NORMAL
RAD ONC ARIA COURSE INTENT: NORMAL
RAD ONC ARIA COURSE LAST TREATMENT DATE: NORMAL
RAD ONC ARIA COURSE START DATE: NORMAL
RAD ONC ARIA COURSE TREATMENT ELAPSED DAYS: 35
RAD ONC ARIA FIRST TREATMENT DATE: NORMAL
RAD ONC ARIA PLAN FRACTIONS TREATED TO DATE: 23
RAD ONC ARIA PLAN ID: NORMAL
RAD ONC ARIA PLAN NAME: NORMAL
RAD ONC ARIA PLAN PRESCRIBED DOSE PER FRACTION: 2 GY
RAD ONC ARIA PLAN PRIMARY REFERENCE POINT: NORMAL
RAD ONC ARIA PLAN TOTAL FRACTIONS PRESCRIBED: 30
RAD ONC ARIA PLAN TOTAL PRESCRIBED DOSE: 6000 CGY
RAD ONC ARIA REFERENCE POINT DOSAGE GIVEN TO DATE: 46 GY
RAD ONC ARIA REFERENCE POINT ID: NORMAL
RAD ONC ARIA REFERENCE POINT SESSION DOSAGE GIVEN: 2 GY

## 2023-01-11 PROCEDURE — 77386: CPT | Performed by: RADIOLOGY

## 2023-01-11 PROCEDURE — 77014 CHG CT GUIDANCE RADIATION THERAPY FLDS PLACEMENT: CPT | Performed by: RADIOLOGY

## 2023-01-11 NOTE — PROGRESS NOTES
Outpatient Nutrition Oncology Follow Up    Patient Name: Theresa M Gabehart  YOB: 1960  MRN: [id number]      Reason for Consult:  Radiation tx f/u        Today's Weight:  52.6 kg    Weight Change:  1# loss since last week (<1% loss in a week)    Nutrition-related concerns: Other: mild odyonophagia/esophagitis    Current PO intake:  Pt reports to be eating well and no worsening of nutrition-related concerns.       Nutrition Diagnosis: Severe malnutrition related to increased nutrient needs due to catabolic disease as evidenced by physiological causes increasing nutrient needs., hypermetabolic state., muscle wasting. and fat loss.    Plan: Will follow up per oncology nutrition protocol

## 2023-01-12 ENCOUNTER — HOSPITAL ENCOUNTER (OUTPATIENT)
Dept: RADIATION ONCOLOGY | Facility: HOSPITAL | Age: 63
Discharge: HOME | End: 2023-01-12

## 2023-01-12 DIAGNOSIS — N64.4 BREAST PAIN: ICD-10-CM

## 2023-01-12 DIAGNOSIS — C34.81 MALIGNANT NEOPLASM OF OVERLAPPING SITES OF RIGHT LUNG: ICD-10-CM

## 2023-01-12 DIAGNOSIS — N63.10 MASS OF RIGHT BREAST, UNSPECIFIED QUADRANT: Primary | ICD-10-CM

## 2023-01-12 LAB
RAD ONC ARIA COURSE ID: NORMAL
RAD ONC ARIA COURSE INTENT: NORMAL
RAD ONC ARIA COURSE LAST TREATMENT DATE: NORMAL
RAD ONC ARIA COURSE START DATE: NORMAL
RAD ONC ARIA COURSE TREATMENT ELAPSED DAYS: 36
RAD ONC ARIA FIRST TREATMENT DATE: NORMAL
RAD ONC ARIA PLAN FRACTIONS TREATED TO DATE: 24
RAD ONC ARIA PLAN ID: NORMAL
RAD ONC ARIA PLAN NAME: NORMAL
RAD ONC ARIA PLAN PRESCRIBED DOSE PER FRACTION: 2 GY
RAD ONC ARIA PLAN PRIMARY REFERENCE POINT: NORMAL
RAD ONC ARIA PLAN TOTAL FRACTIONS PRESCRIBED: 30
RAD ONC ARIA PLAN TOTAL PRESCRIBED DOSE: 6000 CGY
RAD ONC ARIA REFERENCE POINT DOSAGE GIVEN TO DATE: 48 GY
RAD ONC ARIA REFERENCE POINT ID: NORMAL
RAD ONC ARIA REFERENCE POINT SESSION DOSAGE GIVEN: 2 GY

## 2023-01-12 PROCEDURE — 77386: CPT | Performed by: RADIOLOGY

## 2023-01-12 PROCEDURE — 77014 CHG CT GUIDANCE RADIATION THERAPY FLDS PLACEMENT: CPT | Performed by: RADIOLOGY

## 2023-01-13 ENCOUNTER — HOSPITAL ENCOUNTER (OUTPATIENT)
Dept: RADIATION ONCOLOGY | Facility: HOSPITAL | Age: 63
Discharge: HOME | End: 2023-01-13

## 2023-01-13 LAB
RAD ONC ARIA COURSE ID: NORMAL
RAD ONC ARIA COURSE INTENT: NORMAL
RAD ONC ARIA COURSE LAST TREATMENT DATE: NORMAL
RAD ONC ARIA COURSE START DATE: NORMAL
RAD ONC ARIA COURSE TREATMENT ELAPSED DAYS: 37
RAD ONC ARIA FIRST TREATMENT DATE: NORMAL
RAD ONC ARIA PLAN FRACTIONS TREATED TO DATE: 25
RAD ONC ARIA PLAN ID: NORMAL
RAD ONC ARIA PLAN NAME: NORMAL
RAD ONC ARIA PLAN PRESCRIBED DOSE PER FRACTION: 2 GY
RAD ONC ARIA PLAN PRIMARY REFERENCE POINT: NORMAL
RAD ONC ARIA PLAN TOTAL FRACTIONS PRESCRIBED: 30
RAD ONC ARIA PLAN TOTAL PRESCRIBED DOSE: 6000 CGY
RAD ONC ARIA REFERENCE POINT DOSAGE GIVEN TO DATE: 50 GY
RAD ONC ARIA REFERENCE POINT ID: NORMAL
RAD ONC ARIA REFERENCE POINT SESSION DOSAGE GIVEN: 2 GY

## 2023-01-13 PROCEDURE — 77336 RADIATION PHYSICS CONSULT: CPT | Performed by: RADIOLOGY

## 2023-01-13 PROCEDURE — 77386: CPT | Performed by: RADIOLOGY

## 2023-01-13 PROCEDURE — 77014 CHG CT GUIDANCE RADIATION THERAPY FLDS PLACEMENT: CPT | Performed by: RADIOLOGY

## 2023-01-16 ENCOUNTER — HOSPITAL ENCOUNTER (OUTPATIENT)
Dept: RADIATION ONCOLOGY | Facility: HOSPITAL | Age: 63
Discharge: HOME | End: 2023-01-16

## 2023-01-16 LAB
RAD ONC ARIA COURSE ID: NORMAL
RAD ONC ARIA COURSE INTENT: NORMAL
RAD ONC ARIA COURSE LAST TREATMENT DATE: NORMAL
RAD ONC ARIA COURSE START DATE: NORMAL
RAD ONC ARIA COURSE TREATMENT ELAPSED DAYS: 40
RAD ONC ARIA FIRST TREATMENT DATE: NORMAL
RAD ONC ARIA PLAN FRACTIONS TREATED TO DATE: 26
RAD ONC ARIA PLAN ID: NORMAL
RAD ONC ARIA PLAN NAME: NORMAL
RAD ONC ARIA PLAN PRESCRIBED DOSE PER FRACTION: 2 GY
RAD ONC ARIA PLAN PRIMARY REFERENCE POINT: NORMAL
RAD ONC ARIA PLAN TOTAL FRACTIONS PRESCRIBED: 30
RAD ONC ARIA PLAN TOTAL PRESCRIBED DOSE: 6000 CGY
RAD ONC ARIA REFERENCE POINT DOSAGE GIVEN TO DATE: 52 GY
RAD ONC ARIA REFERENCE POINT ID: NORMAL
RAD ONC ARIA REFERENCE POINT SESSION DOSAGE GIVEN: 2 GY

## 2023-01-16 PROCEDURE — 77386: CPT | Performed by: RADIOLOGY

## 2023-01-16 PROCEDURE — 77014 CHG CT GUIDANCE RADIATION THERAPY FLDS PLACEMENT: CPT | Performed by: RADIOLOGY

## 2023-01-17 ENCOUNTER — HOSPITAL ENCOUNTER (OUTPATIENT)
Dept: RADIATION ONCOLOGY | Facility: HOSPITAL | Age: 63
Discharge: HOME | End: 2023-01-17

## 2023-01-17 LAB
RAD ONC ARIA COURSE ID: NORMAL
RAD ONC ARIA COURSE INTENT: NORMAL
RAD ONC ARIA COURSE LAST TREATMENT DATE: NORMAL
RAD ONC ARIA COURSE START DATE: NORMAL
RAD ONC ARIA COURSE TREATMENT ELAPSED DAYS: 41
RAD ONC ARIA FIRST TREATMENT DATE: NORMAL
RAD ONC ARIA PLAN FRACTIONS TREATED TO DATE: 27
RAD ONC ARIA PLAN ID: NORMAL
RAD ONC ARIA PLAN NAME: NORMAL
RAD ONC ARIA PLAN PRESCRIBED DOSE PER FRACTION: 2 GY
RAD ONC ARIA PLAN PRIMARY REFERENCE POINT: NORMAL
RAD ONC ARIA PLAN TOTAL FRACTIONS PRESCRIBED: 30
RAD ONC ARIA PLAN TOTAL PRESCRIBED DOSE: 6000 CGY
RAD ONC ARIA REFERENCE POINT DOSAGE GIVEN TO DATE: 54 GY
RAD ONC ARIA REFERENCE POINT ID: NORMAL
RAD ONC ARIA REFERENCE POINT SESSION DOSAGE GIVEN: 2 GY

## 2023-01-17 PROCEDURE — 77014 CHG CT GUIDANCE RADIATION THERAPY FLDS PLACEMENT: CPT | Performed by: RADIOLOGY

## 2023-01-17 PROCEDURE — 77427 RADIATION TX MANAGEMENT X5: CPT | Performed by: RADIOLOGY

## 2023-01-17 PROCEDURE — 77386: CPT | Performed by: RADIOLOGY

## 2023-01-17 RX ORDER — DIPHENHYDRAMINE HYDROCHLORIDE 50 MG/ML
50 INJECTION INTRAMUSCULAR; INTRAVENOUS AS NEEDED
Status: CANCELLED | OUTPATIENT
Start: 2023-01-18

## 2023-01-17 RX ORDER — FAMOTIDINE 10 MG/ML
20 INJECTION, SOLUTION INTRAVENOUS AS NEEDED
Status: CANCELLED | OUTPATIENT
Start: 2023-01-18

## 2023-01-18 ENCOUNTER — HOSPITAL ENCOUNTER (OUTPATIENT)
Dept: ONCOLOGY | Facility: HOSPITAL | Age: 63
Setting detail: INFUSION SERIES
Discharge: HOME | End: 2023-01-18
Payer: MEDICARE

## 2023-01-18 ENCOUNTER — OFFICE VISIT (OUTPATIENT)
Dept: ONCOLOGY | Facility: HOSPITAL | Age: 63
End: 2023-01-18
Payer: MEDICARE

## 2023-01-18 ENCOUNTER — HOSPITAL ENCOUNTER (OUTPATIENT)
Dept: RADIATION ONCOLOGY | Facility: HOSPITAL | Age: 63
Discharge: HOME | End: 2023-01-18

## 2023-01-18 VITALS
RESPIRATION RATE: 18 BRPM | TEMPERATURE: 98.3 F | HEIGHT: 64 IN | BODY MASS INDEX: 20.25 KG/M2 | SYSTOLIC BLOOD PRESSURE: 112 MMHG | HEART RATE: 100 BPM | DIASTOLIC BLOOD PRESSURE: 77 MMHG | WEIGHT: 118.61 LBS | OXYGEN SATURATION: 100 %

## 2023-01-18 VITALS
RESPIRATION RATE: 18 BRPM | HEART RATE: 100 BPM | HEIGHT: 64 IN | BODY MASS INDEX: 20.25 KG/M2 | TEMPERATURE: 98.3 F | DIASTOLIC BLOOD PRESSURE: 77 MMHG | OXYGEN SATURATION: 100 % | SYSTOLIC BLOOD PRESSURE: 112 MMHG | WEIGHT: 118.61 LBS

## 2023-01-18 DIAGNOSIS — R07.89 RIGHT-SIDED CHEST WALL PAIN: ICD-10-CM

## 2023-01-18 DIAGNOSIS — Z45.2 ENCOUNTER FOR ADJUSTMENT OR MANAGEMENT OF VASCULAR ACCESS DEVICE: ICD-10-CM

## 2023-01-18 DIAGNOSIS — G57.93 NEUROPATHY INVOLVING BOTH LOWER EXTREMITIES: ICD-10-CM

## 2023-01-18 DIAGNOSIS — C34.81 MALIGNANT NEOPLASM OF OVERLAPPING SITES OF RIGHT LUNG: Primary | ICD-10-CM

## 2023-01-18 LAB
ALBUMIN SERPL-MCNC: 4.3 G/DL (ref 3.5–5.2)
ALBUMIN/GLOB SERPL: 1.7 G/DL
ALP SERPL-CCNC: 54 U/L (ref 39–117)
ALT SERPL W P-5'-P-CCNC: 25 U/L (ref 1–33)
ANION GAP SERPL CALCULATED.3IONS-SCNC: 10.1 MMOL/L (ref 5–15)
AST SERPL-CCNC: 14 U/L (ref 1–32)
BASOPHILS # BLD AUTO: 0.01 10*3/MM3 (ref 0–0.2)
BASOPHILS NFR BLD AUTO: 0.2 % (ref 0–1.5)
BILIRUB SERPL-MCNC: <0.2 MG/DL (ref 0–1.2)
BUN SERPL-MCNC: 17 MG/DL (ref 8–23)
BUN/CREAT SERPL: 30.4 (ref 7–25)
CALCIUM SPEC-SCNC: 9.4 MG/DL (ref 8.6–10.5)
CHLORIDE SERPL-SCNC: 103 MMOL/L (ref 98–107)
CO2 SERPL-SCNC: 23.9 MMOL/L (ref 22–29)
CREAT SERPL-MCNC: 0.56 MG/DL (ref 0.57–1)
DEPRECATED RDW RBC AUTO: 49.8 FL (ref 37–54)
EGFRCR SERPLBLD CKD-EPI 2021: 104 ML/MIN/1.73
EOSINOPHIL # BLD AUTO: 0 10*3/MM3 (ref 0–0.4)
EOSINOPHIL NFR BLD AUTO: 0 % (ref 0.3–6.2)
ERYTHROCYTE [DISTWIDTH] IN BLOOD BY AUTOMATED COUNT: 15.4 % (ref 12.3–15.4)
GLOBULIN UR ELPH-MCNC: 2.5 GM/DL
GLUCOSE SERPL-MCNC: 132 MG/DL (ref 65–99)
HCT VFR BLD AUTO: 31.8 % (ref 34–46.6)
HGB BLD-MCNC: 10.9 G/DL (ref 12–15.9)
IMM GRANULOCYTES # BLD AUTO: 0.03 10*3/MM3 (ref 0–0.05)
IMM GRANULOCYTES NFR BLD AUTO: 0.5 % (ref 0–0.5)
LYMPHOCYTES # BLD AUTO: 0.53 10*3/MM3 (ref 0.7–3.1)
LYMPHOCYTES NFR BLD AUTO: 9.3 % (ref 19.6–45.3)
MCH RBC QN AUTO: 32.2 PG (ref 26.6–33)
MCHC RBC AUTO-ENTMCNC: 34.3 G/DL (ref 31.5–35.7)
MCV RBC AUTO: 94.1 FL (ref 79–97)
MONOCYTES # BLD AUTO: 1.17 10*3/MM3 (ref 0.1–0.9)
MONOCYTES NFR BLD AUTO: 20.6 % (ref 5–12)
NEUTROPHILS NFR BLD AUTO: 3.95 10*3/MM3 (ref 1.7–7)
NEUTROPHILS NFR BLD AUTO: 69.4 % (ref 42.7–76)
PLATELET # BLD AUTO: 249 10*3/MM3 (ref 140–450)
PMV BLD AUTO: 8.6 FL (ref 6–12)
POTASSIUM SERPL-SCNC: 3.7 MMOL/L (ref 3.5–5.2)
PROT SERPL-MCNC: 6.8 G/DL (ref 6–8.5)
RAD ONC ARIA COURSE ID: NORMAL
RAD ONC ARIA COURSE INTENT: NORMAL
RAD ONC ARIA COURSE LAST TREATMENT DATE: NORMAL
RAD ONC ARIA COURSE START DATE: NORMAL
RAD ONC ARIA COURSE TREATMENT ELAPSED DAYS: 42
RAD ONC ARIA FIRST TREATMENT DATE: NORMAL
RAD ONC ARIA PLAN FRACTIONS TREATED TO DATE: 28
RAD ONC ARIA PLAN ID: NORMAL
RAD ONC ARIA PLAN NAME: NORMAL
RAD ONC ARIA PLAN PRESCRIBED DOSE PER FRACTION: 2 GY
RAD ONC ARIA PLAN PRIMARY REFERENCE POINT: NORMAL
RAD ONC ARIA PLAN TOTAL FRACTIONS PRESCRIBED: 30
RAD ONC ARIA PLAN TOTAL PRESCRIBED DOSE: 6000 CGY
RAD ONC ARIA REFERENCE POINT DOSAGE GIVEN TO DATE: 56 GY
RAD ONC ARIA REFERENCE POINT ID: NORMAL
RAD ONC ARIA REFERENCE POINT SESSION DOSAGE GIVEN: 2 GY
RBC # BLD AUTO: 3.38 10*6/MM3 (ref 3.77–5.28)
SODIUM SERPL-SCNC: 137 MMOL/L (ref 136–145)
WBC NRBC COR # BLD: 5.69 10*3/MM3 (ref 3.4–10.8)

## 2023-01-18 PROCEDURE — 85025 COMPLETE CBC W/AUTO DIFF WBC: CPT | Performed by: INTERNAL MEDICINE

## 2023-01-18 PROCEDURE — 96413 CHEMO IV INFUSION 1 HR: CPT

## 2023-01-18 PROCEDURE — 25010000002 FOSAPREPITANT PER 1 MG: Performed by: INTERNAL MEDICINE

## 2023-01-18 PROCEDURE — 25010000002 CARBOPLATIN PER 50 MG: Performed by: INTERNAL MEDICINE

## 2023-01-18 PROCEDURE — 96411 CHEMO IV PUSH ADDL DRUG: CPT

## 2023-01-18 PROCEDURE — 99214 OFFICE O/P EST MOD 30 MIN: CPT | Performed by: INTERNAL MEDICINE

## 2023-01-18 PROCEDURE — 25010000002 PEMETREXED 100 MG RECONSTITUTED SOLUTION 1 EACH VIAL: Performed by: INTERNAL MEDICINE

## 2023-01-18 PROCEDURE — 96375 TX/PRO/DX INJ NEW DRUG ADDON: CPT

## 2023-01-18 PROCEDURE — 25010000002 PALONOSETRON PER 25 MCG: Performed by: INTERNAL MEDICINE

## 2023-01-18 PROCEDURE — 25010000002 CYANOCOBALAMIN PER 1000 MCG: Performed by: INTERNAL MEDICINE

## 2023-01-18 PROCEDURE — 25010000002 PEMETREXED PER 10 MG: Performed by: INTERNAL MEDICINE

## 2023-01-18 PROCEDURE — 96409 CHEMO IV PUSH SNGL DRUG: CPT

## 2023-01-18 PROCEDURE — 77386: CPT | Performed by: RADIOLOGY

## 2023-01-18 PROCEDURE — 77014 CHG CT GUIDANCE RADIATION THERAPY FLDS PLACEMENT: CPT | Performed by: RADIOLOGY

## 2023-01-18 PROCEDURE — 80053 COMPREHEN METABOLIC PANEL: CPT | Performed by: INTERNAL MEDICINE

## 2023-01-18 PROCEDURE — 96372 THER/PROPH/DIAG INJ SC/IM: CPT

## 2023-01-18 PROCEDURE — 96367 TX/PROPH/DG ADDL SEQ IV INF: CPT

## 2023-01-18 PROCEDURE — 25010000002 HEPARIN LOCK FLUSH PER 10 UNITS: Performed by: INTERNAL MEDICINE

## 2023-01-18 RX ORDER — SODIUM CHLORIDE 9 MG/ML
250 INJECTION, SOLUTION INTRAVENOUS ONCE
Status: COMPLETED | OUTPATIENT
Start: 2023-01-18 | End: 2023-01-18

## 2023-01-18 RX ORDER — HEPARIN SODIUM (PORCINE) LOCK FLUSH IV SOLN 100 UNIT/ML 100 UNIT/ML
500 SOLUTION INTRAVENOUS AS NEEDED
Status: CANCELLED | OUTPATIENT
Start: 2023-01-18

## 2023-01-18 RX ORDER — OLANZAPINE 2.5 MG/1
5 TABLET ORAL ONCE
Status: COMPLETED | OUTPATIENT
Start: 2023-01-18 | End: 2023-01-18

## 2023-01-18 RX ORDER — SODIUM CHLORIDE 0.9 % (FLUSH) 0.9 %
20 SYRINGE (ML) INJECTION AS NEEDED
Status: CANCELLED | OUTPATIENT
Start: 2023-01-18

## 2023-01-18 RX ORDER — PALONOSETRON 0.05 MG/ML
0.25 INJECTION, SOLUTION INTRAVENOUS ONCE
Status: COMPLETED | OUTPATIENT
Start: 2023-01-18 | End: 2023-01-18

## 2023-01-18 RX ORDER — HEPARIN SODIUM (PORCINE) LOCK FLUSH IV SOLN 100 UNIT/ML 100 UNIT/ML
500 SOLUTION INTRAVENOUS AS NEEDED
Status: DISCONTINUED | OUTPATIENT
Start: 2023-01-18 | End: 2023-01-19 | Stop reason: HOSPADM

## 2023-01-18 RX ORDER — SODIUM CHLORIDE 0.9 % (FLUSH) 0.9 %
20 SYRINGE (ML) INJECTION AS NEEDED
Status: DISCONTINUED | OUTPATIENT
Start: 2023-01-18 | End: 2023-01-19 | Stop reason: HOSPADM

## 2023-01-18 RX ORDER — CYANOCOBALAMIN 1000 UG/ML
1000 INJECTION, SOLUTION INTRAMUSCULAR; SUBCUTANEOUS ONCE
Status: COMPLETED | OUTPATIENT
Start: 2023-01-18 | End: 2023-01-18

## 2023-01-18 RX ADMIN — SODIUM CHLORIDE 250 ML: 9 INJECTION, SOLUTION INTRAVENOUS at 10:15

## 2023-01-18 RX ADMIN — PEMETREXED DISODIUM 800 MG: 500 INJECTION, POWDER, LYOPHILIZED, FOR SOLUTION INTRAVENOUS at 10:59

## 2023-01-18 RX ADMIN — PALONOSETRON 0.25 MG: 0.05 INJECTION, SOLUTION INTRAVENOUS at 10:19

## 2023-01-18 RX ADMIN — FOSAPREPITANT 100 ML: 150 INJECTION, POWDER, LYOPHILIZED, FOR SOLUTION INTRAVENOUS at 10:28

## 2023-01-18 RX ADMIN — CYANOCOBALAMIN 1000 MCG: 1000 INJECTION, SOLUTION INTRAMUSCULAR at 10:21

## 2023-01-18 RX ADMIN — HEPARIN SODIUM (PORCINE) LOCK FLUSH IV SOLN 100 UNIT/ML 500 UNITS: 100 SOLUTION at 11:56

## 2023-01-18 RX ADMIN — OLANZAPINE 5 MG: 2.5 TABLET, FILM COATED ORAL at 10:19

## 2023-01-18 RX ADMIN — Medication 20 ML: at 11:55

## 2023-01-18 RX ADMIN — CARBOPLATIN 610 MG: 10 INJECTION, SOLUTION INTRAVENOUS at 11:15

## 2023-01-18 NOTE — ADDENDUM NOTE
Encounter addended by: Federica Guerrier RD on: 1/18/2023 1:19 PM   Actions taken: Clinical Note Signed

## 2023-01-18 NOTE — ADDENDUM NOTE
Encounter addended by: Federica Guerrier RD on: 1/18/2023 1:18 PM   Actions taken: Clinical Note Signed

## 2023-01-18 NOTE — PROGRESS NOTES
Patient  Theresa M Gabehart    Location  [facility] HEMATOLOGY & ONCOLOGY    Chief Complaint  Malignant neoplasm of overlapping sites of right lung (HCC)    Referring Provider: Narayan Jonas MD  PCP: Carrie Grace APRN    Subjective          Oncology/Hematology History Overview Note   11/18/2022  Lymph node, station 10 L, FNA:  - Negative for malignant cells  - Lymphoid tissue present  2. Lymph node, station 7, FNA:  - Positive for malignant cells  - Metastatic adenocarcinoma, lung primary  3. Lymph node, station 4R, FNA:  - Positive for malignant cells  - Metastatic adenocarcinoma, lung primary  4. Lymph node, station 10 R, FNA:  - Positive for malignant cells  - Metastatic adenocarcinoma, lung primary    11/30/22 orders written for concurrent carbo & alimta with XRT     Malignant neoplasm of overlapping sites of right lung (HCC)   11/30/2022 Initial Diagnosis    Malignant neoplasm of middle lobe of right lung (HCC)     11/30/2022 Cancer Staged    Staging form: Lung, AJCC 8th Edition  - Clinical: Stage IIIA (cT2a, cN2, cM0) - Signed by Narayan Jonas MD on 11/30/2022 12/7/2022 -  Chemotherapy    OP LUNG PEMEtrexed / CARBOplatin AUC=5 + XRT     3/1/2023 -  Chemotherapy    OP LUNG Durvalumab     Malignant neoplasm of upper lobe, right bronchus or lung (HCC) (Resolved)   12/5/2022 Initial Diagnosis    Malignant neoplasm of upper lobe, right bronchus or lung (HCC)     12/7/2022 -  Radiation    RADIATION THERAPY Treatment Details (Noted on 12/5/2022)  Site: Right Lung - Upper lobe  Technique: IMRT  Goal: No goal specified  Planned Treatment Start Date: 12/7/2022         HPI  Patient comes in today for toxicity check prior to cycle #3 of chemotherapy.  Her only significant side effect from chemotherapy is fatigue.  She reports that she was more severely fatigued this time than with prior cycles.  The severe fatigue lasted about 5 days.      She is concerned because she has developed  "some pain in her right breast.  The pain shoots through her breast on occasion.  She does not have any significant swelling of her breast, neck, or right arm.  Her port is on that side of her chest.  She tells me that the port was working well today and the nurses had no difficulty accessing, flushing, or drawing blood from the port.  She already discussed this issue with radiation oncology and the plan for breast ultrasound tomorrow.  She does not wish to have a mammogram at this time since she believes it will be very painful.  She would like to wait until the port is removed.    We discussed her history of neuropathy.  The neuropathy is due to history of back injuries and is chronic in nature.  It has not gotten any worse with treatment.  I recommended consideration of Cymbalta since she was unable to tolerate gabapentin.  She will think about this and discuss it with Dr. Jonas in the future.    Patient asked me about taking \"turkey tail supplement\" which is supposed to contain only the \"floating bodies\" above mushrooms.  It does not contain any other supplements or antioxidants.    Review of Systems   Constitutional: Positive for fatigue (3/10). Negative for appetite change, diaphoresis, fever, unexpected weight gain and unexpected weight loss.   HENT: Negative for hearing loss, mouth sores, sore throat, swollen glands, trouble swallowing and voice change.    Eyes: Negative for blurred vision.   Respiratory: Negative for cough, shortness of breath and wheezing.    Cardiovascular: Negative for chest pain and palpitations.   Gastrointestinal: Negative for abdominal pain, blood in stool, constipation, diarrhea, nausea and vomiting.   Endocrine: Negative for cold intolerance and heat intolerance.   Genitourinary: Positive for breast pain (u/s scheduled 01/19/2023). Negative for difficulty urinating, dysuria, frequency, hematuria and urinary incontinence.   Musculoskeletal: Negative for arthralgias, back pain and " myalgias.   Skin: Positive for color change (Nipple color darker). Negative for rash, skin lesions and wound.   Neurological: Negative for dizziness, seizures, weakness, numbness and headache.   Hematological: Does not bruise/bleed easily.   Psychiatric/Behavioral: Negative for depressed mood. The patient is not nervous/anxious.    All other systems reviewed and are negative.      Past Medical History:   Diagnosis Date   • Allergic rhinitis    • Anemia due to chemotherapy 2022   • Aortic aneurysm (HCC)     FOLLOWED BY GARCIA   • Arnold-Chiari syndrome (HCC)    • Bleeding disorder (HCC)     FREE BLEEDER, VON WILLIBRAND TEST NEGATIVE   • Carpal tunnel syndrome     RIGHT   • Colon polyps    • Colon polyps    • COPD (chronic obstructive pulmonary disease) (HCC)     NEBS/INHALER   • Dupuytren's contracture syndrome    • Dysphagia     ESOPHAGEAL STRICTURE   • Emphysema lung (HCC)    • Fibromyalgia    • Hiatal hernia    • History of fractured vertebra     18 fractured/HEALING,    • History of pelvic fracture      HEALED   • Hypokalemia 2022   • Lung cancer (HCC)     RIGHT   • Malignant neoplasm of middle lobe of right lung (HCC) 2022   • Malignant neoplasm of overlapping sites of right lung (HCC) 2022   • Neuropathy    • Osteoporosis    • Vertigo      Past Surgical History:   Procedure Laterality Date   • BRONCHOSCOPY N/A 2022    Procedure: BRONCHOSCOPY WITH ENDOBRONCHIAL ULTRASOUND AND FINE NEEDLE ASPIRATIONS;  Surgeon: Shaw Bonds DO;  Location: [facility] ENDOSCOPY;  Service: Pulmonary;  Laterality: N/A;  MEDIASTINAL ADENOPATHY,  LUNG NODULE   •  SECTION     • CHOLECYSTECTOMY     • COLONOSCOPY     • ENDOSCOPY     • HYSTERECTOMY     • KNEE ARTHROSCOPY     • ORTHOPEDIC SURGERY     • TONSILLECTOMY AND ADENOIDECTOMY     • UPPER GASTROINTESTINAL ENDOSCOPY     • VENOUS ACCESS DEVICE (PORT) INSERTION N/A 2022    Procedure: INSERTION OF PORTACATH;  Surgeon:  "Ishan Vargas MD;  [place] [facility] [place] [facility] [place]  Service: General;  Laterality: N/A;     Social History     Socioeconomic History   • Marital status:    Tobacco Use   • Smoking status: Every Day     Packs/day: 0.50     Years: 30.00     Pack years: 15.00     Types: Cigarettes     Start date: 1979   • Smokeless tobacco: Never   • Tobacco comments:     WAS SMOKING TWO PACKS DAILY, HAS CUT DOWN TO 1 PACK A DAY     INST PER ANESTHESIA PROTOCOL   Vaping Use   • Vaping Use: Former   Substance and Sexual Activity   • Alcohol use: Never   • Drug use: Never   • Sexual activity: Defer     Family History   Problem Relation Age of Onset   • Lung cancer Mother    • Diabetes Sister    • Breast cancer Sister    • Malig Hyperthermia Neg Hx        Objective   Physical Exam  General: Alert, cooperative, no acute distress  Eyes: Anicteric sclera, PERRLA  Respiratory: normal respiratory effort  Cardiovascular: RRR, no lower extremity edema  Chest: Right-sided port in place with no associated erythema or edema.  Right breast with no palpable masses or significant nipple changes.  Skin: Normal tone, no rash, no lesions  Psychiatric: Appropriate affect, intact judgment  Neurologic: No focal sensory or motor deficits, normal cognition   Musculoskeletal: Reduced muscle strength and tone, in wheelchair  Extremities: No clubbing, cyanosis, or deformities    Vitals:    01/18/23 0901   BP: 112/77   Pulse: 100   Resp: 18   Temp: 98.3 °F (36.8 °C)   SpO2: 100%   Weight: 53.8 kg (118 lb 9.7 oz)   Height: 163 cm (64.17\")   PainSc: 0-No pain     ECOG score: 2         PHQ-9 Total Score:         Result Review :   The following data was reviewed by: Fidelia Ley MD PhD on 01/18/2023:  Lab Results   Component Value Date    HGB 10.9 (L) 01/18/2023    HCT 31.8 (L) 01/18/2023    MCV 94.1 01/18/2023     01/18/2023    WBC 5.69 01/18/2023    NEUTROABS 3.95 01/18/2023    LYMPHSABS 0.53 (L) 01/18/2023    MONOSABS 1.17 (H) 01/18/2023    EOSABS " 0.00 01/18/2023    BASOSABS 0.01 01/18/2023     Lab Results   Component Value Date    GLUCOSE 132 (H) 01/18/2023    BUN 17 01/18/2023    CREATININE 0.56 (L) 01/18/2023     01/18/2023    K 3.7 01/18/2023     01/18/2023    CO2 23.9 01/18/2023    CALCIUM 9.4 01/18/2023    PROTEINTOT 6.8 01/18/2023    ALBUMIN 4.3 01/18/2023    BILITOT <0.2 01/18/2023    ALKPHOS 54 01/18/2023    AST 14 01/18/2023    ALT 25 01/18/2023          Assessment and Plan    Diagnoses and all orders for this visit:    1. Malignant neoplasm of overlapping sites of right lung (HCC) (Primary)    2. Encounter for adjustment or management of vascular access device  -     IR Port Study Including Fluoro; Future    3. Right-sided chest wall pain  -     IR Port Study Including Fluoro; Future    4. Neuropathy involving both lower extremities          Right lung cancer: Patient is currently receiving concurrent chemoradiation.  She has no evidence of significant toxicity with treatment.  I reviewed her labs today and she will proceed with cycle #3.  She will follow-up with Dr. Dee for the next cycle for repeat labs and toxicity check.    Lower extremity neuropathy: Chronic in nature and due to back injuries.  If the neuropathy worsens she could consider Cymbalta.    Right breast pain: Her radiation oncologist has ordered an ultrasound of the breast.  This time the patient does not want to have a mammogram but will do so after treatment.  I will add a portogram to the studies as well to rule out possible thrombosis associated with the port.        Patient was given instructions and counseling regarding her condition or for health maintenance advice. Please see specific information pulled into the AVS if appropriate.

## 2023-01-18 NOTE — PROGRESS NOTES
"Outpatient Nutrition Oncology Follow Up    Patient Name: Theresa M Gabehart  YOB: 1960  MRN: [id number]        Reason for Consult: RN Request / Pt Request (see below)  Dx: Lung CA  Cancer Tx: XRT to lung, Pemetrexed, Carboplatin    Today's Weight: 53.8kg      01/18/23  0849   Weight: 53.8 kg (118 lb 9.7 oz)         Consult or Intervention:   Brief Note:  Spoke with pt during infusion. Request made from RN regarding discussing iron rich foods with pt. Pt reports she consumes some iron rich foods throughout the day; was taking an iron supplement but no longer taking this.   Reviewed with pt heme and non-heme iron rich foods as well as appropriate use of ascorbic acid from food sources to aid absorption. Pt v/u. Written materials were provided (Iron content of foods handout).  Pt is being followed by oncology RD for further nutritional concerns throughout tx.     Nutrition Focused Physical Findings:      Estimated Nutrition Needs:   Estimated/Assessed Needs - Anthropometrics     Row Name 01/18/23 0849          Anthropometrics    Height 163 cm (64.17\")     Weight 53.8 kg (118 lb 9.7 oz)                 Nutrition Diagnosis: Increased nutrient needs related to increased nutrient needs due to catabolic disease as evidenced by physiological causes increasing nutrient needs.    Plan: Will follow up per oncology nutrition protocol    Electronically signed by:  Federica Guerrier RD  01/18/23 13:13 EST        "

## 2023-01-19 ENCOUNTER — HOSPITAL ENCOUNTER (OUTPATIENT)
Dept: INTERVENTIONAL RADIOLOGY/VASCULAR | Facility: HOSPITAL | Age: 63
Discharge: HOME | End: 2023-01-19
Payer: MEDICARE

## 2023-01-19 ENCOUNTER — HOSPITAL ENCOUNTER (OUTPATIENT)
Dept: ULTRASOUND IMAGING | Facility: HOSPITAL | Age: 63
Discharge: HOME | End: 2023-01-19
Payer: MEDICARE

## 2023-01-19 ENCOUNTER — HOSPITAL ENCOUNTER (OUTPATIENT)
Dept: RADIATION ONCOLOGY | Facility: HOSPITAL | Age: 63
Discharge: HOME | End: 2023-01-19

## 2023-01-19 DIAGNOSIS — Z45.2 ENCOUNTER FOR ADJUSTMENT OR MANAGEMENT OF VASCULAR ACCESS DEVICE: ICD-10-CM

## 2023-01-19 DIAGNOSIS — R07.89 RIGHT-SIDED CHEST WALL PAIN: ICD-10-CM

## 2023-01-19 DIAGNOSIS — N63.10 MASS OF RIGHT BREAST, UNSPECIFIED QUADRANT: ICD-10-CM

## 2023-01-19 DIAGNOSIS — C34.81 MALIGNANT NEOPLASM OF OVERLAPPING SITES OF RIGHT LUNG: ICD-10-CM

## 2023-01-19 DIAGNOSIS — N64.4 BREAST PAIN: ICD-10-CM

## 2023-01-19 LAB
RAD ONC ARIA COURSE ID: NORMAL
RAD ONC ARIA COURSE INTENT: NORMAL
RAD ONC ARIA COURSE LAST TREATMENT DATE: NORMAL
RAD ONC ARIA COURSE START DATE: NORMAL
RAD ONC ARIA COURSE TREATMENT ELAPSED DAYS: 43
RAD ONC ARIA FIRST TREATMENT DATE: NORMAL
RAD ONC ARIA PLAN FRACTIONS TREATED TO DATE: 29
RAD ONC ARIA PLAN ID: NORMAL
RAD ONC ARIA PLAN NAME: NORMAL
RAD ONC ARIA PLAN PRESCRIBED DOSE PER FRACTION: 2 GY
RAD ONC ARIA PLAN PRIMARY REFERENCE POINT: NORMAL
RAD ONC ARIA PLAN TOTAL FRACTIONS PRESCRIBED: 30
RAD ONC ARIA PLAN TOTAL PRESCRIBED DOSE: 6000 CGY
RAD ONC ARIA REFERENCE POINT DOSAGE GIVEN TO DATE: 58 GY
RAD ONC ARIA REFERENCE POINT ID: NORMAL
RAD ONC ARIA REFERENCE POINT SESSION DOSAGE GIVEN: 2 GY

## 2023-01-19 PROCEDURE — 0 IOPAMIDOL PER 1 ML: Performed by: INTERNAL MEDICINE

## 2023-01-19 PROCEDURE — 77014 CHG CT GUIDANCE RADIATION THERAPY FLDS PLACEMENT: CPT | Performed by: RADIOLOGY

## 2023-01-19 PROCEDURE — 77386: CPT | Performed by: RADIOLOGY

## 2023-01-19 PROCEDURE — 76642 ULTRASOUND BREAST LIMITED: CPT

## 2023-01-19 PROCEDURE — 36598 INJ W/FLUOR EVAL CV DEVICE: CPT

## 2023-01-19 RX ADMIN — IOPAMIDOL 50 ML: 755 INJECTION, SOLUTION INTRAVENOUS at 14:40

## 2023-01-20 ENCOUNTER — HOSPITAL ENCOUNTER (OUTPATIENT)
Dept: RADIATION ONCOLOGY | Facility: HOSPITAL | Age: 63
Discharge: HOME | End: 2023-01-20

## 2023-01-20 VITALS
HEART RATE: 93 BPM | DIASTOLIC BLOOD PRESSURE: 64 MMHG | SYSTOLIC BLOOD PRESSURE: 104 MMHG | WEIGHT: 119.49 LBS | BODY MASS INDEX: 20.4 KG/M2 | TEMPERATURE: 98.8 F | RESPIRATION RATE: 16 BRPM | OXYGEN SATURATION: 100 %

## 2023-01-20 DIAGNOSIS — C34.2 PRIMARY MALIGNANT NEOPLASM OF RIGHT MIDDLE LOBE OF LUNG: Primary | ICD-10-CM

## 2023-01-20 LAB
RAD ONC ARIA COURSE ID: NORMAL
RAD ONC ARIA COURSE INTENT: NORMAL
RAD ONC ARIA COURSE LAST TREATMENT DATE: NORMAL
RAD ONC ARIA COURSE START DATE: NORMAL
RAD ONC ARIA COURSE TREATMENT ELAPSED DAYS: 44
RAD ONC ARIA FIRST TREATMENT DATE: NORMAL
RAD ONC ARIA PLAN FRACTIONS TREATED TO DATE: 30
RAD ONC ARIA PLAN ID: NORMAL
RAD ONC ARIA PLAN NAME: NORMAL
RAD ONC ARIA PLAN PRESCRIBED DOSE PER FRACTION: 2 GY
RAD ONC ARIA PLAN PRIMARY REFERENCE POINT: NORMAL
RAD ONC ARIA PLAN TOTAL FRACTIONS PRESCRIBED: 30
RAD ONC ARIA PLAN TOTAL PRESCRIBED DOSE: 6000 CGY
RAD ONC ARIA REFERENCE POINT DOSAGE GIVEN TO DATE: 60 GY
RAD ONC ARIA REFERENCE POINT ID: NORMAL
RAD ONC ARIA REFERENCE POINT SESSION DOSAGE GIVEN: 2 GY

## 2023-01-20 PROCEDURE — 77336 RADIATION PHYSICS CONSULT: CPT | Performed by: RADIOLOGY

## 2023-01-20 PROCEDURE — 77014 CHG CT GUIDANCE RADIATION THERAPY FLDS PLACEMENT: CPT | Performed by: RADIOLOGY

## 2023-01-20 PROCEDURE — 77386: CPT | Performed by: RADIOLOGY

## 2023-01-20 NOTE — PROGRESS NOTES
On Treatment Visit       Patient: Theresa M Gabehart   YOB: 1960   Medical Record Number: [id number]     Date of Visit  January 20, 2023   Primary Diagnosis:Primary malignant neoplasm of right middle lobe of lung (HCC) [C34.2]  Cancer Staging: Cancer Staging   Malignant neoplasm of middle lobe of right lung (HCC)  Staging form: Lung, AJCC 8th Edition  - Clinical: Stage IIIA (cT2a, cN2, cM0) - Signed by Narayan Jonas MD on 11/30/2022        Ms.Gabehart was seen today for an on treatment visit.  She is receiving radiation therapy to the RUL lung. She  has received 6000 cGy in 30 fractions out of a planned dose of 6000 cGy in 30 fractions. She is currently receiving concurrent pemetrexed/carboplatin chemotherapy per Dr. Jonas.     Doing well overall                                       Review of Systems:   Review of Systems   Constitutional: Negative for appetite change, fatigue and unexpected weight change.   HENT: Negative for sore throat, trouble swallowing and voice change.    Respiratory: Negative for cough and shortness of breath.    Cardiovascular: Negative for chest pain and palpitations.   Gastrointestinal: Negative for constipation, diarrhea, nausea and vomiting.   Genitourinary: Positive for frequency (Comes and goes) and urgency (Comes and goes.). Negative for difficulty urinating, dysuria and hematuria.   Musculoskeletal: Positive for arthralgias (Ongoing), back pain (Ongoing) and gait problem (In motorized wheelchair.). Negative for joint swelling.        C/o right breast tenderness.     Skin: Negative for color change and rash.   Neurological: Positive for weakness (Noted after chemoo). Negative for dizziness and headaches (relieved on its own).   Psychiatric/Behavioral: Negative for sleep disturbance.       Vitals:     Vitals:    01/20/23 1425   BP: 104/64   Pulse: 93   Resp: 16   Temp: 98.8 °F (37.1 °C)   SpO2: 100%       Weight:   Wt Readings from Last 3 Encounters:   01/20/23  54.2 kg (119 lb 7.8 oz)   01/18/23 53.8 kg (118 lb 9.7 oz)   01/18/23 53.8 kg (118 lb 9.7 oz)      Pain:    Pain Score    01/20/23 1425   PainSc: 3  Comment: bone pain         Physical Exam:  Gen: WD/WN; NAD  HEENT: MMM  Trachea: midline  Chest: symmetric; faint right NAC hyperpigmentation.  No right breast masses  Resp: normal respiratory effort  Extr: warm, well-perfused  Neuro: awake and alert; no aphasia or neglect    Plan: I have reviewed treatment setup notes, checked and approved the daily guidance images.  I reviewed dose delivery, treatment parameters and deemed them appropriate. We plan to continue radiation therapy as prescribed.      Follow up in one month    Radiation Oncology    Electronically signed by Shaw Turner MD 1/20/2023  16:58 EST

## 2023-02-01 ENCOUNTER — PATIENT ROUNDING (BHMG ONLY) (OUTPATIENT)
Dept: RADIATION ONCOLOGY | Facility: HOSPITAL | Age: 63
End: 2023-02-01
Payer: MEDICARE

## 2023-02-01 NOTE — PROGRESS NOTES
"Patient completed radiation treatment for lung cancer on 01/20/2023. Following up with patient regarding any concerns the patient may have at this time and receiving feedback in regards to patient over all care while under treatment.     Patient asked about symptoms and side effects that continue to be bothersome, she denies any cough or shortness of air. She states that she was having some burning of her esophagus or heartburn but states that it has since subsided.    Patient states that Pain is at 5 on scale of 0/10. She states that this is her normal everyday aches and pains and this is a tolerable pain for her. Patient states medications have not changed.    Patient was asked if there was anything that could've made their experience better at [facility] while they were under treatment. Patient states: \"No.\"    Patient encouraged to call the office if any concerns arise. Patient reminded of Follow up appointment on 02/23/2023.  "

## 2023-02-08 ENCOUNTER — HOSPITAL ENCOUNTER (OUTPATIENT)
Dept: ONCOLOGY | Facility: HOSPITAL | Age: 63
Discharge: HOME | End: 2023-02-08
Admitting: INTERNAL MEDICINE
Payer: MEDICARE

## 2023-02-08 ENCOUNTER — OFFICE VISIT (OUTPATIENT)
Dept: ONCOLOGY | Facility: HOSPITAL | Age: 63
End: 2023-02-08
Payer: MEDICARE

## 2023-02-08 VITALS
WEIGHT: 123.02 LBS | SYSTOLIC BLOOD PRESSURE: 122 MMHG | HEART RATE: 99 BPM | OXYGEN SATURATION: 100 % | TEMPERATURE: 99 F | RESPIRATION RATE: 18 BRPM | DIASTOLIC BLOOD PRESSURE: 85 MMHG | BODY MASS INDEX: 21 KG/M2

## 2023-02-08 VITALS
TEMPERATURE: 99 F | BODY MASS INDEX: 21 KG/M2 | HEART RATE: 99 BPM | OXYGEN SATURATION: 100 % | SYSTOLIC BLOOD PRESSURE: 122 MMHG | RESPIRATION RATE: 18 BRPM | DIASTOLIC BLOOD PRESSURE: 85 MMHG | HEIGHT: 64 IN | WEIGHT: 123.02 LBS

## 2023-02-08 DIAGNOSIS — C34.81 MALIGNANT NEOPLASM OF OVERLAPPING SITES OF RIGHT LUNG: Primary | ICD-10-CM

## 2023-02-08 DIAGNOSIS — Z45.2 ENCOUNTER FOR ADJUSTMENT OR MANAGEMENT OF VASCULAR ACCESS DEVICE: ICD-10-CM

## 2023-02-08 LAB
ALBUMIN SERPL-MCNC: 4.1 G/DL (ref 3.5–5.2)
ALBUMIN/GLOB SERPL: 1.8 G/DL
ALP SERPL-CCNC: 50 U/L (ref 39–117)
ALT SERPL W P-5'-P-CCNC: 31 U/L (ref 1–33)
ANION GAP SERPL CALCULATED.3IONS-SCNC: 9.7 MMOL/L (ref 5–15)
AST SERPL-CCNC: 21 U/L (ref 1–32)
BASOPHILS # BLD AUTO: 0.01 10*3/MM3 (ref 0–0.2)
BASOPHILS NFR BLD AUTO: 0.2 % (ref 0–1.5)
BILIRUB SERPL-MCNC: <0.2 MG/DL (ref 0–1.2)
BUN SERPL-MCNC: 15 MG/DL (ref 8–23)
BUN/CREAT SERPL: 26.8 (ref 7–25)
CALCIUM SPEC-SCNC: 9.2 MG/DL (ref 8.6–10.5)
CHLORIDE SERPL-SCNC: 103 MMOL/L (ref 98–107)
CO2 SERPL-SCNC: 24.3 MMOL/L (ref 22–29)
CREAT SERPL-MCNC: 0.56 MG/DL (ref 0.57–1)
DEPRECATED RDW RBC AUTO: 66 FL (ref 37–54)
EGFRCR SERPLBLD CKD-EPI 2021: 104 ML/MIN/1.73
EOSINOPHIL # BLD AUTO: 0 10*3/MM3 (ref 0–0.4)
EOSINOPHIL NFR BLD AUTO: 0 % (ref 0.3–6.2)
ERYTHROCYTE [DISTWIDTH] IN BLOOD BY AUTOMATED COUNT: 18.7 % (ref 12.3–15.4)
GLOBULIN UR ELPH-MCNC: 2.3 GM/DL
GLUCOSE SERPL-MCNC: 141 MG/DL (ref 65–99)
HCT VFR BLD AUTO: 29.1 % (ref 34–46.6)
HGB BLD-MCNC: 9.9 G/DL (ref 12–15.9)
IMM GRANULOCYTES # BLD AUTO: 0.03 10*3/MM3 (ref 0–0.05)
IMM GRANULOCYTES NFR BLD AUTO: 0.6 % (ref 0–0.5)
LYMPHOCYTES # BLD AUTO: 0.37 10*3/MM3 (ref 0.7–3.1)
LYMPHOCYTES NFR BLD AUTO: 8 % (ref 19.6–45.3)
MCH RBC QN AUTO: 33.6 PG (ref 26.6–33)
MCHC RBC AUTO-ENTMCNC: 34 G/DL (ref 31.5–35.7)
MCV RBC AUTO: 98.6 FL (ref 79–97)
MONOCYTES # BLD AUTO: 0.71 10*3/MM3 (ref 0.1–0.9)
MONOCYTES NFR BLD AUTO: 15.4 % (ref 5–12)
NEUTROPHILS NFR BLD AUTO: 3.5 10*3/MM3 (ref 1.7–7)
NEUTROPHILS NFR BLD AUTO: 75.8 % (ref 42.7–76)
PLATELET # BLD AUTO: 169 10*3/MM3 (ref 140–450)
PMV BLD AUTO: 9.2 FL (ref 6–12)
POTASSIUM SERPL-SCNC: 3.6 MMOL/L (ref 3.5–5.2)
PROT SERPL-MCNC: 6.4 G/DL (ref 6–8.5)
RBC # BLD AUTO: 2.95 10*6/MM3 (ref 3.77–5.28)
SODIUM SERPL-SCNC: 137 MMOL/L (ref 136–145)
WBC NRBC COR # BLD: 4.62 10*3/MM3 (ref 3.4–10.8)

## 2023-02-08 PROCEDURE — 63710000001 OLANZAPINE 2.5 MG TABLET: Performed by: INTERNAL MEDICINE

## 2023-02-08 PROCEDURE — 96375 TX/PRO/DX INJ NEW DRUG ADDON: CPT

## 2023-02-08 PROCEDURE — 25010000002 CARBOPLATIN PER 50 MG: Performed by: INTERNAL MEDICINE

## 2023-02-08 PROCEDURE — 80053 COMPREHEN METABOLIC PANEL: CPT | Performed by: INTERNAL MEDICINE

## 2023-02-08 PROCEDURE — 25010000002 PEMETREXED 100 MG RECONSTITUTED SOLUTION 1 EACH VIAL: Performed by: INTERNAL MEDICINE

## 2023-02-08 PROCEDURE — 96413 CHEMO IV INFUSION 1 HR: CPT

## 2023-02-08 PROCEDURE — 96411 CHEMO IV PUSH ADDL DRUG: CPT

## 2023-02-08 PROCEDURE — 99214 OFFICE O/P EST MOD 30 MIN: CPT | Performed by: INTERNAL MEDICINE

## 2023-02-08 PROCEDURE — 85025 COMPLETE CBC W/AUTO DIFF WBC: CPT | Performed by: INTERNAL MEDICINE

## 2023-02-08 PROCEDURE — 25010000002 FOSAPREPITANT PER 1 MG: Performed by: INTERNAL MEDICINE

## 2023-02-08 PROCEDURE — A9270 NON-COVERED ITEM OR SERVICE: HCPCS | Performed by: INTERNAL MEDICINE

## 2023-02-08 PROCEDURE — 25010000002 PEMETREXED PER 10 MG: Performed by: INTERNAL MEDICINE

## 2023-02-08 PROCEDURE — 25010000002 PALONOSETRON PER 25 MCG: Performed by: INTERNAL MEDICINE

## 2023-02-08 PROCEDURE — 96367 TX/PROPH/DG ADDL SEQ IV INF: CPT

## 2023-02-08 PROCEDURE — 25010000002 HEPARIN LOCK FLUSH PER 10 UNITS: Performed by: INTERNAL MEDICINE

## 2023-02-08 RX ORDER — SODIUM CHLORIDE 9 MG/ML
250 INJECTION, SOLUTION INTRAVENOUS ONCE
Status: CANCELLED | OUTPATIENT
Start: 2023-02-08

## 2023-02-08 RX ORDER — SODIUM CHLORIDE 0.9 % (FLUSH) 0.9 %
20 SYRINGE (ML) INJECTION AS NEEDED
Status: DISCONTINUED | OUTPATIENT
Start: 2023-02-08 | End: 2023-02-09 | Stop reason: HOSPADM

## 2023-02-08 RX ORDER — OLANZAPINE 5 MG/1
5 TABLET ORAL ONCE
Status: CANCELLED | OUTPATIENT
Start: 2023-02-08 | End: 2023-02-08

## 2023-02-08 RX ORDER — PALONOSETRON 0.05 MG/ML
0.25 INJECTION, SOLUTION INTRAVENOUS ONCE
Status: COMPLETED | OUTPATIENT
Start: 2023-02-08 | End: 2023-02-08

## 2023-02-08 RX ORDER — SODIUM CHLORIDE 0.9 % (FLUSH) 0.9 %
20 SYRINGE (ML) INJECTION AS NEEDED
Status: CANCELLED | OUTPATIENT
Start: 2023-02-08

## 2023-02-08 RX ORDER — DIPHENHYDRAMINE HYDROCHLORIDE 50 MG/ML
50 INJECTION INTRAMUSCULAR; INTRAVENOUS AS NEEDED
Status: CANCELLED | OUTPATIENT
Start: 2023-02-08

## 2023-02-08 RX ORDER — OLANZAPINE 2.5 MG/1
5 TABLET ORAL ONCE
Status: COMPLETED | OUTPATIENT
Start: 2023-02-08 | End: 2023-02-08

## 2023-02-08 RX ORDER — PALONOSETRON 0.05 MG/ML
0.25 INJECTION, SOLUTION INTRAVENOUS ONCE
Status: CANCELLED | OUTPATIENT
Start: 2023-02-08

## 2023-02-08 RX ORDER — FAMOTIDINE 10 MG/ML
20 INJECTION, SOLUTION INTRAVENOUS AS NEEDED
Status: CANCELLED | OUTPATIENT
Start: 2023-02-08

## 2023-02-08 RX ORDER — SODIUM CHLORIDE 9 MG/ML
250 INJECTION, SOLUTION INTRAVENOUS ONCE
Status: COMPLETED | OUTPATIENT
Start: 2023-02-08 | End: 2023-02-08

## 2023-02-08 RX ORDER — HEPARIN SODIUM (PORCINE) LOCK FLUSH IV SOLN 100 UNIT/ML 100 UNIT/ML
500 SOLUTION INTRAVENOUS AS NEEDED
Status: DISCONTINUED | OUTPATIENT
Start: 2023-02-08 | End: 2023-02-09 | Stop reason: HOSPADM

## 2023-02-08 RX ORDER — HEPARIN SODIUM (PORCINE) LOCK FLUSH IV SOLN 100 UNIT/ML 100 UNIT/ML
500 SOLUTION INTRAVENOUS AS NEEDED
Status: CANCELLED | OUTPATIENT
Start: 2023-02-08

## 2023-02-08 RX ADMIN — Medication 20 ML: at 12:45

## 2023-02-08 RX ADMIN — CARBOPLATIN 590 MG: 10 INJECTION, SOLUTION INTRAVENOUS at 12:08

## 2023-02-08 RX ADMIN — PEMETREXED DISODIUM 800 MG: 500 INJECTION, POWDER, LYOPHILIZED, FOR SOLUTION INTRAVENOUS at 11:54

## 2023-02-08 RX ADMIN — PALONOSETRON 0.25 MG: 0.05 INJECTION, SOLUTION INTRAVENOUS at 11:07

## 2023-02-08 RX ADMIN — OLANZAPINE 5 MG: 2.5 TABLET, FILM COATED ORAL at 11:08

## 2023-02-08 RX ADMIN — HEPARIN SODIUM (PORCINE) LOCK FLUSH IV SOLN 100 UNIT/ML 500 UNITS: 100 SOLUTION at 12:45

## 2023-02-08 RX ADMIN — FOSAPREPITANT 100 ML: 150 INJECTION, POWDER, LYOPHILIZED, FOR SOLUTION INTRAVENOUS at 11:13

## 2023-02-08 RX ADMIN — SODIUM CHLORIDE 250 ML: 9 INJECTION, SOLUTION INTRAVENOUS at 11:06

## 2023-02-08 NOTE — ASSESSMENT & PLAN NOTE
Patient due for cycle 4 of concurrent carboplatin and pemetrexed along with radiation.  She recently completed the radiation portion of her regimen.  Lab work today is adequate for treatment.  Proceed with cycle 4 as planned.  I will see her back in a month for follow-up with lab work prior to ensure that all acute toxicities are resolving well with PET scan to assess response to therapy.  Assuming good response to treatment, we discussed maintenance durvalumab which is given for 1 year.  This decreases the risk of recurrence of her lung cancer.  Side effects discussed including infusion reactions, allergic reactions, liver dysfunction, kidney dysfunction, thyroid dysfunction, diarrhea, nausea, vomiting, rash, arthritis, pneumonitis, rare instance of neurologic dysfunction.  Written teaching information provided.  Patient is agreeable to therapy as outlined.  I will plan to initiate treatment at her next visit as long as the PET scan looks good.

## 2023-02-08 NOTE — PROGRESS NOTES
Chief Complaint  Chemotherapy    Carrie Grace APRN Subjective          Theresa M Gabehart presents to [facility] for ongoing treatment of her lung cancer.  She is on concurrent chemo RT with radiation, carboplatin, pemetrexed.  She is due for cycle 4.  Tolerating her treatment well.  She denies shortness of breath, cough, hemoptysis.  She reports adequate appetite and energy level.  She denies new masses or adenopathy.  No issues from her Port-A-Cath.    Oncology/Hematology History Overview Note   11/18/2022  Lymph node, station 10 L, FNA:  - Negative for malignant cells  - Lymphoid tissue present  2. Lymph node, station 7, FNA:  - Positive for malignant cells  - Metastatic adenocarcinoma, lung primary  3. Lymph node, station 4R, FNA:  - Positive for malignant cells  - Metastatic adenocarcinoma, lung primary  4. Lymph node, station 10 R, FNA:  - Positive for malignant cells  - Metastatic adenocarcinoma, lung primary    11/30/22 orders written for concurrent carbo & alimta with XRT     Malignant neoplasm of overlapping sites of right lung (HCC)   11/30/2022 Initial Diagnosis    Malignant neoplasm of middle lobe of right lung (HCC)     11/30/2022 Cancer Staged    Staging form: Lung, AJCC 8th Edition  - Clinical: Stage IIIA (cT2a, cN2, cM0) - Signed by Narayan Jonas MD on 11/30/2022 12/7/2022 -  Chemotherapy    OP LUNG PEMEtrexed / CARBOplatin AUC=5 + XRT     3/1/2023 -  Chemotherapy    OP LUNG Durvalumab     Malignant neoplasm of upper lobe, right bronchus or lung (HCC) (Resolved)   12/5/2022 Initial Diagnosis    Malignant neoplasm of upper lobe, right bronchus or lung (HCC)     12/7/2022 -  Radiation    RADIATION THERAPY Treatment Details (Noted on 12/5/2022)  Site: Right Lung - Upper lobe  Technique: IMRT  Goal: No goal specified  Planned Treatment Start Date: 12/7/2022         Review of Systems   Constitutional: Positive for fatigue (3/10). Negative for  appetite change, diaphoresis, fever, unexpected weight gain and unexpected weight loss.   HENT: Negative for hearing loss, mouth sores, sore throat, swollen glands, trouble swallowing and voice change.    Eyes: Negative for blurred vision.   Respiratory: Negative for cough, shortness of breath and wheezing.    Cardiovascular: Negative for chest pain and palpitations.   Gastrointestinal: Negative for abdominal pain, blood in stool, constipation, diarrhea, nausea and vomiting.   Endocrine: Negative for cold intolerance and heat intolerance.   Genitourinary: Negative for difficulty urinating, dysuria, frequency, hematuria and urinary incontinence.   Musculoskeletal: Negative for arthralgias, back pain and myalgias.   Skin: Negative for rash, skin lesions and wound.   Neurological: Negative for dizziness, seizures, weakness, numbness and headache.   Hematological: Does not bruise/bleed easily.   Psychiatric/Behavioral: Negative for depressed mood. The patient is not nervous/anxious.      Current Outpatient Medications on File Prior to Visit   Medication Sig Dispense Refill   • albuterol sulfate  (90 Base) MCG/ACT inhaler Inhale 2 puffs Every 4 (Four) Hours As Needed for Wheezing. 18 g 11   • Aspirin 81 MG capsule Take 81 mg by mouth Daily. LAST DOSE 11/24/22     • [employer] Calcium 600 MG tablet Take 1 tablet by mouth 2 (Two) Times a Day With Meals.     • dexamethasone (DECADRON) 4 MG tablet Take 1 tablet oral twice a day for 3 consecutive days beginning the day before chemotherapy and continue for 6 doses.Take with food. 21 tablet 3   • fluticasone (FLONASE) 50 MCG/ACT nasal spray 1 spray into the nostril(s) as directed by provider Daily As Needed.     • folic acid (FOLVITE) 1 MG tablet Take 1 tablet by mouth Daily. Start 7 days prior to chemotherapy until at least 3 weeks after all chemotherapy. 30 tablet 3   • HYDROcodone-acetaminophen (Norco) 5-325 MG per tablet Take 1 tablet by mouth Every 6 (Six) Hours As Needed  for Moderate Pain (NOTE: You can take two tablets instead of one tablet every four hours instead of every six hours if needed for pain). 8 tablet 0   • ipratropium-albuterol (DUO-NEB) 0.5-2.5 mg/3 ml nebulizer Take 3 mL by nebulization 4 (Four) Times a Day As Needed for Wheezing. 360 mL 3   • L-Lysine 500 MG tablet tablet Take  by mouth Daily.     • Linzess 145 MCG capsule capsule Take 145 mcg by mouth As Needed.     • MISC NATURAL PRODUCTS PO Take  by mouth. Turkey Tail Mushroom Supplement     • Nystatin-Diphenhydramine-Hydrocortisone-Lidocaine Take 5 mL by mouth Every 3 (Three) Hours As Needed (Sore throat, trouble swallowing). Swish and swallow. 300 mL 2   • Nystatin-Diphenhydramine-Hydrocortisone-Lidocaine Swish and swallow 5 mL Every 3 (Three) Hours As Needed. 300 mL 2   • ondansetron (ZOFRAN) 8 MG tablet Take 1 tablet by mouth 3 (Three) Times a Day As Needed for Nausea or Vomiting. 30 tablet 5   • Probiotic Product (PROBIOTIC BLEND PO) Take 1 tablet by mouth Daily.     • prochlorperazine (COMPAZINE) 10 MG tablet Take 1 tablet by mouth Every 6 (Six) Hours As Needed for Nausea or Vomiting. 20 tablet 3   • Symbicort 160-4.5 MCG/ACT inhaler 2 puffs 2 (Two) Times a Day As Needed.     • tiotropium bromide monohydrate (SPIRIVA RESPIMAT) 2.5 MCG/ACT aerosol solution inhaler Inhale 2 puffs Daily. 1 each 11   • vitamin D (ERGOCALCIFEROL) 1.25 MG (96398 UT) capsule capsule Take 50,000 Units by mouth Every 7 (Seven) Days.     • Zinc Sulfate (ZINC 15 PO) Take 1 each by mouth Every Other Day.       Current Facility-Administered Medications on File Prior to Visit   Medication Dose Route Frequency Provider Last Rate Last Admin   • [COMPLETED] CARBOplatin (PARAPLATIN) 590 mg in sodium chloride 0.9 % 334 mL chemo IVPB  590 mg Intravenous Once Narayan Jonas MD   Stopped at 02/08/23 1238   • [COMPLETED] FOSAPREPITANT 150 MG/100ML NORMAL SALINE (CBC) IVPB 100 mL 100 mL  150 mg Intravenous Once Narayan Jonas MD   Stopped at  02/08/23 1143   • heparin injection 500 Units  500 Units Intravenous PRN Narayan Jonas MD   500 Units at 02/08/23 1245   • [COMPLETED] OLANZapine (zyPREXA) tablet 5 mg  5 mg Oral Once Narayan Jonas MD   5 mg at 02/08/23 1108   • [COMPLETED] palonosetron (ALOXI) injection 0.25 mg  0.25 mg Intravenous Once Narayan Jonas MD   0.25 mg at 02/08/23 1107   • [COMPLETED] PEMEtrexed (ALIMTA) 800 mg in sodium chloride 0.9 % 100 mL chemo IVPB  800 mg Intravenous Once Narayan Jonas MD   Stopped at 02/08/23 1204   • sodium chloride 0.9 % flush 20 mL  20 mL Intravenous PRN Narayan Jonas MD   20 mL at 02/08/23 1245   • [COMPLETED] sodium chloride 0.9 % infusion 250 mL  250 mL Intravenous Once Narayan Jonas MD   Stopped at 02/08/23 1245       Allergies   Allergen Reactions   • Clarithromycin Rash   • Metronidazole Unknown - High Severity   • Doxycycline Calcium Rash   • Gabapentin Mental Status Change     Past Medical History:   Diagnosis Date   • Allergic rhinitis    • Anemia due to chemotherapy 12/28/2022   • Aortic aneurysm ([facility])     FOLLOWED BY GARCIA   • Arnold-Chiari syndrome ([facility])    • Bleeding disorder ([facility])     FREE BLUE DUGGAN TEST NEGATIVE   • Carpal tunnel syndrome     RIGHT   • Colon polyps    • Colon polyps    • COPD (chronic obstructive pulmonary disease) ([facility])     NEBS/INHALER   • Dupuytren's contracture syndrome    • Dysphagia     ESOPHAGEAL STRICTURE   • Emphysema lung ([facility])    • Fibromyalgia    • Hiatal hernia    • History of fractured vertebra     18 fractured/HEALING, 2021   • History of pelvic fracture     2021 HEALED   • Hypokalemia 12/28/2022   • Lung cancer (HCC)     RIGHT   • Malignant neoplasm of middle lobe of right lung (HCC) 11/30/2022   • Malignant neoplasm of overlapping sites of right lung (HCC) 11/30/2022   • Neuropathy    • Osteoporosis    • Vertigo      Past Surgical History:   Procedure Laterality Date   • BRONCHOSCOPY N/A 11/18/2022    Procedure:  BRONCHOSCOPY WITH ENDOBRONCHIAL ULTRASOUND AND FINE NEEDLE ASPIRATIONS;  Surgeon: Shaw Bonds DO;  Location: [facility] ENDOSCOPY;  Service: Pulmonary;  Laterality: N/A;  MEDIASTINAL ADENOPATHY,  LUNG NODULE   •  SECTION     • CHOLECYSTECTOMY     • COLONOSCOPY     • ENDOSCOPY     • HYSTERECTOMY     • KNEE ARTHROSCOPY     • ORTHOPEDIC SURGERY     • TONSILLECTOMY AND ADENOIDECTOMY     • UPPER GASTROINTESTINAL ENDOSCOPY     • VENOUS ACCESS DEVICE (PORT) INSERTION N/A 2022    Procedure: INSERTION OF PORTACATH;  Surgeon: Ishan Vargas MD;  Location: [facility] OR [facility];  Service: General;  Laterality: N/A;     Social History     Socioeconomic History   • Marital status:    Tobacco Use   • Smoking status: Every Day     Packs/day: 0.50     Years: 30.00     Pack years: 15.00     Types: Cigarettes     Start date:    • Smokeless tobacco: Never   • Tobacco comments:     WAS SMOKING TWO PACKS DAILY, HAS CUT DOWN TO 1 PACK A DAY     INST PER ANESTHESIA PROTOCOL   Vaping Use   • Vaping Use: Former   Substance and Sexual Activity   • Alcohol use: Never   • Drug use: Never   • Sexual activity: Defer     Family History   Problem Relation Age of Onset   • Lung cancer Mother    • Diabetes Sister    • Breast cancer Sister    • Malig Hyperthermia Neg Hx        Objective   Physical Exam  Vitals reviewed. Exam conducted with a chaperone present.   Constitutional:       General: She is not in acute distress.     Appearance: Normal appearance.   Cardiovascular:      Rate and Rhythm: Normal rate and regular rhythm.      Heart sounds: Normal heart sounds. No murmur heard.    No gallop.   Pulmonary:      Effort: Pulmonary effort is normal.      Breath sounds: Normal breath sounds.      Comments: Port-A-Cath  Abdominal:      General: Abdomen is flat. Bowel sounds are normal.      Palpations: Abdomen is soft.   Musculoskeletal:      Cervical back: Neck supple.      Right lower leg: No edema.      Left lower leg:  No edema.   Lymphadenopathy:      Cervical: No cervical adenopathy.   Neurological:      Mental Status: She is alert and oriented to person, place, and time.   Psychiatric:         Mood and Affect: Mood normal.         Behavior: Behavior normal.         Vitals:    02/08/23 0921   BP: 122/85   Pulse: 99   Resp: 18   Temp: 99 °F (37.2 °C)   TempSrc: Temporal   SpO2: 100%   Weight: 55.8 kg (123 lb 0.3 oz)   PainSc: 0-No pain     ECOG score: 2         PHQ-9 Total Score:                    Result Review :   The following data was reviewed by: Narayan Jonas MD on 02/08/2023:  Lab Results   Component Value Date    HGB 9.9 (L) 02/08/2023    HCT 29.1 (L) 02/08/2023    MCV 98.6 (H) 02/08/2023     02/08/2023    WBC 4.62 02/08/2023    NEUTROABS 3.50 02/08/2023    LYMPHSABS 0.37 (L) 02/08/2023    MONOSABS 0.71 02/08/2023    EOSABS 0.00 02/08/2023    BASOSABS 0.01 02/08/2023     Lab Results   Component Value Date    GLUCOSE 141 (H) 02/08/2023    BUN 15 02/08/2023    CREATININE 0.56 (L) 02/08/2023     02/08/2023    K 3.6 02/08/2023     02/08/2023    CO2 24.3 02/08/2023    CALCIUM 9.2 02/08/2023    PROTEINTOT 6.4 02/08/2023    ALBUMIN 4.1 02/08/2023    BILITOT <0.2 02/08/2023    ALKPHOS 50 02/08/2023    AST 21 02/08/2023    ALT 31 02/08/2023     Lab Results   Component Value Date    MG 1.8 10/30/2022    FREET4 1.54 02/23/2022    TSH 1.240 02/23/2022             Assessment and Plan    Diagnoses and all orders for this visit:    1. Malignant neoplasm of overlapping sites of right lung (HCC) (Primary)  Assessment & Plan:  Patient due for cycle 4 of concurrent carboplatin and pemetrexed along with radiation.  She recently completed the radiation portion of her regimen.  Lab work today is adequate for treatment.  Proceed with cycle 4 as planned.  I will see her back in a month for follow-up with lab work prior to ensure that all acute toxicities are resolving well with PET scan to assess response to  therapy.  Assuming good response to treatment, we discussed maintenance durvalumab which is given for 1 year.  This decreases the risk of recurrence of her lung cancer.  Side effects discussed including infusion reactions, allergic reactions, liver dysfunction, kidney dysfunction, thyroid dysfunction, diarrhea, nausea, vomiting, rash, arthritis, pneumonitis, rare instance of neurologic dysfunction.  Written teaching information provided.  Patient is agreeable to therapy as outlined.  I will plan to initiate treatment at her next visit as long as the PET scan looks good.    Orders:  -     CBC & Differential; Future  -     Comprehensive Metabolic Panel; Future  -     NM PET/CT Skull Base to Mid Thigh; Future  -     Cancel: sodium chloride 0.9 % infusion 250 mL  -     Cancel: OLANZapine (zyPREXA) tablet 5 mg  -     Cancel: palonosetron (ALOXI) injection 0.25 mg  -     Cancel: fosaprepitant (EMEND) 150 mg in sodium chloride 0.9 % 100 mL IVPB  -     Cancel: PEMEtrexed (ALIMTA) 810 mg in sodium chloride 0.9 % 132.4 mL chemo IVPB  -     Cancel: CARBOplatin (PARAPLATIN) 590 mg in sodium chloride 0.9 % 309 mL chemo IVPB    Other orders  -     Cancel: Hydrocortisone Sod Suc (PF) (Solu-CORTEF) injection 100 mg  -     Cancel: diphenhydrAMINE (BENADRYL) injection 50 mg  -     Cancel: famotidine (PEPCID) injection 20 mg          Patient Follow Up: 1 month    Patient was given instructions and counseling regarding her condition or for health maintenance advice. Please see specific information pulled into the AVS if appropriate.     Narayan Jonas MD    2/8/2023

## 2023-02-13 ENCOUNTER — TELEPHONE (OUTPATIENT)
Dept: RADIATION ONCOLOGY | Facility: HOSPITAL | Age: 63
End: 2023-02-13
Payer: MEDICARE

## 2023-02-13 ENCOUNTER — TELEPHONE (OUTPATIENT)
Dept: ONCOLOGY | Facility: HOSPITAL | Age: 63
End: 2023-02-13

## 2023-02-13 NOTE — TELEPHONE ENCOUNTER
Caller: Gabehart, Theresa M    Relationship: Self    Best call back number: [phone]    What is the best time to reach you: ANYTIME     Who are you requesting to speak with (clinical staff, provider,  specific staff member): SCHEDULING     What was the call regarding: PT CALLING TO REMOVE CANCEL CT SCAN 2/14 THAT DR SANDOVAL HAS CHANGED THAT TO A PET SCAN ON 3/7 AT 12;30    Do you require a callback: N/A

## 2023-02-13 NOTE — TELEPHONE ENCOUNTER
Pt Theresa Gabehart called to change appt on 2/23 to be moved after PET scan Dr. Jonas ordered. Moved pt appt to 3/10.

## 2023-03-02 DIAGNOSIS — J43.9 PULMONARY EMPHYSEMA, UNSPECIFIED EMPHYSEMA TYPE: ICD-10-CM

## 2023-03-02 RX ORDER — IPRATROPIUM BROMIDE AND ALBUTEROL SULFATE 2.5; .5 MG/3ML; MG/3ML
SOLUTION RESPIRATORY (INHALATION)
Qty: 120 ML | Refills: 11 | Status: SHIPPED | OUTPATIENT
Start: 2023-03-02

## 2023-03-07 ENCOUNTER — TELEPHONE (OUTPATIENT)
Dept: RADIATION ONCOLOGY | Facility: HOSPITAL | Age: 63
End: 2023-03-07
Payer: MEDICARE

## 2023-03-07 ENCOUNTER — HOSPITAL ENCOUNTER (OUTPATIENT)
Dept: PET IMAGING | Facility: HOSPITAL | Age: 63
Discharge: HOME | End: 2023-03-07
Payer: MEDICARE

## 2023-03-07 DIAGNOSIS — C34.81 MALIGNANT NEOPLASM OF OVERLAPPING SITES OF RIGHT LUNG: ICD-10-CM

## 2023-03-07 PROBLEM — Z79.899 ENCOUNTER FOR LONG-TERM (CURRENT) USE OF MEDICATIONS: Status: ACTIVE | Noted: 2023-03-07

## 2023-03-07 PROCEDURE — A9552 F18 FDG: HCPCS | Performed by: INTERNAL MEDICINE

## 2023-03-07 PROCEDURE — 0 FLUDEOXYGLUCOSE F18 SOLUTION: Performed by: INTERNAL MEDICINE

## 2023-03-07 PROCEDURE — 78815 PET IMAGE W/CT SKULL-THIGH: CPT

## 2023-03-07 RX ADMIN — FLUDEOXYGLUCOSE F18 1 DOSE: 300 INJECTION INTRAVENOUS at 12:54

## 2023-03-07 NOTE — TELEPHONE ENCOUNTER
PT CALLED AND LVM ON RADIATION THERAPIST PHONE, THEY NOTIFIED ME OF PT CANCELLING APPT DUE TO FRACTURED SPINE. CALLED BACK MS. GABEHART TO SEE IF EVERYTHING WAS OKAY AND TO RESCHEDULE. NO VM SET UP. CALLED 2X TODAY.

## 2023-03-08 ENCOUNTER — HOSPITAL ENCOUNTER (OUTPATIENT)
Dept: ONCOLOGY | Facility: HOSPITAL | Age: 63
Discharge: HOME | End: 2023-03-08
Payer: MEDICARE

## 2023-03-08 DIAGNOSIS — Z79.899 ENCOUNTER FOR LONG-TERM (CURRENT) USE OF MEDICATIONS: Primary | ICD-10-CM

## 2023-03-08 DIAGNOSIS — C34.81 MALIGNANT NEOPLASM OF OVERLAPPING SITES OF RIGHT LUNG: ICD-10-CM

## 2023-03-22 ENCOUNTER — HOSPITAL ENCOUNTER (OUTPATIENT)
Dept: ONCOLOGY | Facility: HOSPITAL | Age: 63
Discharge: HOME | End: 2023-03-22

## 2023-03-22 ENCOUNTER — HOSPITAL ENCOUNTER (OUTPATIENT)
Dept: ONCOLOGY | Facility: HOSPITAL | Age: 63
Discharge: HOME | End: 2023-03-22
Payer: MEDICARE

## 2023-03-29 ENCOUNTER — HOSPITAL ENCOUNTER (EMERGENCY)
Facility: HOSPITAL | Age: 63
Discharge: HOME | End: 2023-03-29
Attending: EMERGENCY MEDICINE | Admitting: EMERGENCY MEDICINE
Payer: MEDICARE

## 2023-03-29 ENCOUNTER — APPOINTMENT (OUTPATIENT)
Dept: GENERAL RADIOLOGY | Facility: HOSPITAL | Age: 63
End: 2023-03-29
Payer: MEDICARE

## 2023-03-29 VITALS
HEART RATE: 96 BPM | WEIGHT: 116 LBS | DIASTOLIC BLOOD PRESSURE: 83 MMHG | TEMPERATURE: 97.9 F | BODY MASS INDEX: 18.21 KG/M2 | OXYGEN SATURATION: 94 % | RESPIRATION RATE: 18 BRPM | HEIGHT: 67 IN | SYSTOLIC BLOOD PRESSURE: 118 MMHG

## 2023-03-29 DIAGNOSIS — R07.9 CHEST PAIN, UNSPECIFIED TYPE: Primary | ICD-10-CM

## 2023-03-29 LAB
ALBUMIN SERPL-MCNC: 4 G/DL (ref 3.5–5.2)
ALBUMIN/GLOB SERPL: 1.4 G/DL
ALP SERPL-CCNC: 68 U/L (ref 39–117)
ALT SERPL W P-5'-P-CCNC: 19 U/L (ref 1–33)
ANION GAP SERPL CALCULATED.3IONS-SCNC: 11.5 MMOL/L (ref 5–15)
AST SERPL-CCNC: 18 U/L (ref 1–32)
BASOPHILS # BLD AUTO: 0.03 10*3/MM3 (ref 0–0.2)
BASOPHILS NFR BLD AUTO: 0.5 % (ref 0–1.5)
BILIRUB SERPL-MCNC: 0.2 MG/DL (ref 0–1.2)
BUN SERPL-MCNC: 11 MG/DL (ref 8–23)
BUN/CREAT SERPL: 18 (ref 7–25)
CALCIUM SPEC-SCNC: 9.1 MG/DL (ref 8.6–10.5)
CHLORIDE SERPL-SCNC: 102 MMOL/L (ref 98–107)
CO2 SERPL-SCNC: 25.5 MMOL/L (ref 22–29)
CREAT SERPL-MCNC: 0.61 MG/DL (ref 0.57–1)
DEPRECATED RDW RBC AUTO: 50 FL (ref 37–54)
EGFRCR SERPLBLD CKD-EPI 2021: 101.9 ML/MIN/1.73
EOSINOPHIL # BLD AUTO: 0.04 10*3/MM3 (ref 0–0.4)
EOSINOPHIL NFR BLD AUTO: 0.6 % (ref 0.3–6.2)
ERYTHROCYTE [DISTWIDTH] IN BLOOD BY AUTOMATED COUNT: 13.5 % (ref 12.3–15.4)
GLOBULIN UR ELPH-MCNC: 2.8 GM/DL
GLUCOSE SERPL-MCNC: 106 MG/DL (ref 65–99)
HCT VFR BLD AUTO: 37.3 % (ref 34–46.6)
HGB BLD-MCNC: 12.9 G/DL (ref 12–15.9)
HOLD SPECIMEN: NORMAL
HOLD SPECIMEN: NORMAL
IMM GRANULOCYTES # BLD AUTO: 0.02 10*3/MM3 (ref 0–0.05)
IMM GRANULOCYTES NFR BLD AUTO: 0.3 % (ref 0–0.5)
LIPASE SERPL-CCNC: 11 U/L (ref 13–60)
LYMPHOCYTES # BLD AUTO: 0.84 10*3/MM3 (ref 0.7–3.1)
LYMPHOCYTES NFR BLD AUTO: 13.2 % (ref 19.6–45.3)
MAGNESIUM SERPL-MCNC: 1.6 MG/DL (ref 1.6–2.4)
MCH RBC QN AUTO: 34.9 PG (ref 26.6–33)
MCHC RBC AUTO-ENTMCNC: 34.6 G/DL (ref 31.5–35.7)
MCV RBC AUTO: 100.8 FL (ref 79–97)
MONOCYTES # BLD AUTO: 0.6 10*3/MM3 (ref 0.1–0.9)
MONOCYTES NFR BLD AUTO: 9.4 % (ref 5–12)
NEUTROPHILS NFR BLD AUTO: 4.83 10*3/MM3 (ref 1.7–7)
NEUTROPHILS NFR BLD AUTO: 76 % (ref 42.7–76)
NRBC BLD AUTO-RTO: 0 /100 WBC (ref 0–0.2)
NT-PROBNP SERPL-MCNC: 37.3 PG/ML (ref 0–900)
PLATELET # BLD AUTO: 230 10*3/MM3 (ref 140–450)
PMV BLD AUTO: 8.6 FL (ref 6–12)
POTASSIUM SERPL-SCNC: 3.6 MMOL/L (ref 3.5–5.2)
PROT SERPL-MCNC: 6.8 G/DL (ref 6–8.5)
RBC # BLD AUTO: 3.7 10*6/MM3 (ref 3.77–5.28)
SODIUM SERPL-SCNC: 139 MMOL/L (ref 136–145)
TROPONIN T SERPL HS-MCNC: 7 NG/L
WBC NRBC COR # BLD: 6.36 10*3/MM3 (ref 3.4–10.8)
WHOLE BLOOD HOLD COAG: NORMAL
WHOLE BLOOD HOLD SPECIMEN: NORMAL

## 2023-03-29 PROCEDURE — 99283 EMERGENCY DEPT VISIT LOW MDM: CPT

## 2023-03-29 PROCEDURE — 84484 ASSAY OF TROPONIN QUANT: CPT | Performed by: EMERGENCY MEDICINE

## 2023-03-29 PROCEDURE — 85025 COMPLETE CBC W/AUTO DIFF WBC: CPT

## 2023-03-29 PROCEDURE — 93010 ELECTROCARDIOGRAM REPORT: CPT | Performed by: INTERNAL MEDICINE

## 2023-03-29 PROCEDURE — 93005 ELECTROCARDIOGRAM TRACING: CPT | Performed by: EMERGENCY MEDICINE

## 2023-03-29 PROCEDURE — 80053 COMPREHEN METABOLIC PANEL: CPT | Performed by: EMERGENCY MEDICINE

## 2023-03-29 PROCEDURE — 93005 ELECTROCARDIOGRAM TRACING: CPT

## 2023-03-29 PROCEDURE — 83690 ASSAY OF LIPASE: CPT | Performed by: EMERGENCY MEDICINE

## 2023-03-29 PROCEDURE — 83880 ASSAY OF NATRIURETIC PEPTIDE: CPT | Performed by: EMERGENCY MEDICINE

## 2023-03-29 PROCEDURE — 71045 X-RAY EXAM CHEST 1 VIEW: CPT

## 2023-03-29 PROCEDURE — 83735 ASSAY OF MAGNESIUM: CPT | Performed by: EMERGENCY MEDICINE

## 2023-03-29 RX ORDER — SODIUM CHLORIDE 0.9 % (FLUSH) 0.9 %
10 SYRINGE (ML) INJECTION AS NEEDED
Status: DISCONTINUED | OUTPATIENT
Start: 2023-03-29 | End: 2023-03-29 | Stop reason: HOSPADM

## 2023-03-29 RX ORDER — ASPIRIN 81 MG/1
324 TABLET, CHEWABLE ORAL ONCE
Status: DISCONTINUED | OUTPATIENT
Start: 2023-03-29 | End: 2023-03-29 | Stop reason: HOSPADM

## 2023-03-29 NOTE — DISCHARGE INSTRUCTIONS
Take medications as directed.  Return for worsening symptoms.  Follow-up with your doctor in 1 to 2 days if no better.

## 2023-03-29 NOTE — ED PROVIDER NOTES
"Time: 2:06 PM EDT  Date of encounter:  3/29/2023  Independent Historian/Clinical History and Information was obtained by:   Patient  Chief Complaint   Patient presents with   • Chest Pain       History is limited by: N/A    History of Present Illness:  Patient is a 62 y.o. year old female who presents to the emergency department for evaluation of chest pain.  Patient states began having chest pain this morning that is located on the left side and is radiating towards the left shoulder.  Patient denies any shortness of breath.  Patient states \"at first I thought it was because I was panicking\" but now she states the pain has not resolved and it feels like something is \"wrong\".  (Provider in triage, Valente Ordoñez PA-C)  Patient reports 7/10 pain at rest and with activity. Patient states that the anterior aspect of her left chest has tightness and pressure and the pain is constant. Patient states that symptoms were onset after waking up. Patient states that she had lung cancer. Patient denies cough, fever, and chills. Patient states that nothing triggers the pain. Patient denies abdominal pain.    HPI    Patient Care Team  Primary Care Provider: Carrie Grace APRN    Past Medical History:     Allergies   Allergen Reactions   • Clarithromycin Rash   • Metronidazole Unknown - High Severity   • Doxycycline Calcium Rash   • Gabapentin Mental Status Change     Past Medical History:   Diagnosis Date   • Allergic rhinitis    • Anemia due to chemotherapy 12/28/2022   • Aortic aneurysm ([facility])     FOLLOWED BY GARCIA   • Arnold-Chiari syndrome ([facility])    • Bleeding disorder ([facility])     FREE BLEEDERBLUE TEST NEGATIVE   • Carpal tunnel syndrome     RIGHT   • Colon polyps    • Colon polyps    • COPD (chronic obstructive pulmonary disease) ([facility])     NEBS/INHALER   • Dupuytren's contracture syndrome    • Dysphagia     ESOPHAGEAL STRICTURE   • Emphysema lung ([facility])    • Fibromyalgia    • Hiatal hernia    • History " of fractured vertebra     18 fractured/HEALING,    • History of pelvic fracture      HEALED   • Hypokalemia 2022   • Lung cancer ([facility])     RIGHT   • Malignant neoplasm of middle lobe of right lung ([facility]CC) 2022   • Malignant neoplasm of overlapping sites of right lung (HCC) 2022   • Neuropathy    • Osteoporosis    • Vertigo      Past Surgical History:   Procedure Laterality Date   • BRONCHOSCOPY N/A 2022    Procedure: BRONCHOSCOPY WITH ENDOBRONCHIAL ULTRASOUND AND FINE NEEDLE ASPIRATIONS;  Surgeon: Shaw Bonds DO;  Location: [facility] ENDOSCOPY;  Service: Pulmonary;  Laterality: N/A;  MEDIASTINAL ADENOPATHY,  LUNG NODULE   •  SECTION     • CHOLECYSTECTOMY     • COLONOSCOPY     • ENDOSCOPY     • HYSTERECTOMY     • KNEE ARTHROSCOPY     • ORTHOPEDIC SURGERY     • TONSILLECTOMY AND ADENOIDECTOMY     • UPPER GASTROINTESTINAL ENDOSCOPY     • VENOUS ACCESS DEVICE (PORT) INSERTION N/A 2022    Procedure: INSERTION OF PORTACATH;  Surgeon: Ishan Vargas MD;  Location[place] [facility] [place] [facility] [place];  Service: General;  Laterality: N/A;     Family History   Problem Relation Age of Onset   • Lung cancer Mother    • Diabetes Sister    • Breast cancer Sister    • Malig Hyperthermia Neg Hx        Home Medications:  Prior to Admission medications    Medication Sig Start Date End Date Taking? Authorizing Provider   albuterol sulfate  (90 Base) MCG/ACT inhaler Inhale 2 puffs Every 4 (Four) Hours As Needed for Wheezing. 3/18/22   Janett Chavarria APRN   Aspirin 81 MG capsule Take 81 mg by mouth Daily. LAST DOSE 22    Greg Linn MD   [facility] Calcium 600 MG tablet Take 1 tablet by mouth 2 (Two) Times a Day With Meals. 3/7/22   Greg Linn MD   dexamethasone (DECADRON) 4 MG tablet Take 1 tablet oral twice a day for 3 consecutive days beginning the day before chemotherapy and continue for 6 doses.Take with food. 1/10/23   Narayan Jonas MD   fluticasone (FLONASE) 50  MCG/ACT nasal spray 1 spray into the nostril(s) as directed by provider Daily As Needed.    Greg Linn MD   folic acid (FOLVITE) 1 MG tablet Take 1 tablet by mouth Daily. Start 7 days prior to chemotherapy until at least 3 weeks after all chemotherapy. 11/30/22   Narayan Jonas MD   HYDROcodone-acetaminophen (Norco) 5-325 MG per tablet Take 1 tablet by mouth Every 6 (Six) Hours As Needed for Moderate Pain (NOTE: You can take two tablets instead of one tablet every four hours instead of every six hours if needed for pain). 12/2/22   Ishan Vargas MD   ipratropium-albuterol (DUO-NEB) 0.5-2.5 mg/3 ml nebulizer USE 1 VIAL IN NEBULIZER EVERY 4 TO 6 HOURS 3/2/23   Shaw Bonds DO   L-Lysine 500 MG tablet tablet Take  by mouth Daily.    Greg Linn MD   Linzess 145 MCG capsule capsule Take 145 mcg by mouth As Needed. 4/27/22   Greg Linn MD   MISC NATURAL PRODUCTS PO Take  by mouth. Turkey Tail Mushroom Supplement    ProviderGreg MD   Nystatin-Diphenhydramine-Hydrocortisone-Lidocaine Take 5 mL by mouth Every 3 (Three) Hours As Needed (Sore throat, trouble swallowing). Swish and swallow. 12/20/22   Shaw Turner MD   Nystatin-Diphenhydramine-Hydrocortisone-Lidocaine Swish and swallow 5 mL Every 3 (Three) Hours As Needed. 12/20/22   Shaw Turner MD   ondansetron (ZOFRAN) 8 MG tablet Take 1 tablet by mouth 3 (Three) Times a Day As Needed for Nausea or Vomiting. 11/30/22   Narayan Jonas MD   Probiotic Product (PROBIOTIC BLEND PO) Take 1 tablet by mouth Daily.    Greg Linn MD   prochlorperazine (COMPAZINE) 10 MG tablet Take 1 tablet by mouth Every 6 (Six) Hours As Needed for Nausea or Vomiting. 11/30/22   Narayan Jonas MD   Symbicort 160-4.5 MCG/ACT inhaler 2 puffs 2 (Two) Times a Day As Needed. 10/31/21   Greg Linn MD   tiotropium bromide monohydrate (SPIRIVA RESPIMAT) 2.5 MCG/ACT aerosol solution inhaler Inhale 2 puffs  "Daily. 11/23/22   Shaw Bonds, DO   vitamin D (ERGOCALCIFEROL) 1.25 MG (49879 UT) capsule capsule Take 50,000 Units by mouth Every 7 (Seven) Days. 9/2/21   ProviderGreg MD   Zinc Sulfate (ZINC 15 PO) Take 1 each by mouth Every Other Day.    ProviderGreg MD        Social History:   Social History     Tobacco Use   • Smoking status: Every Day     Packs/day: 0.50     Years: 30.00     Pack years: 15.00     Types: Cigarettes     Start date: 1979   • Smokeless tobacco: Never   • Tobacco comments:     WAS SMOKING TWO PACKS DAILY, HAS CUT DOWN TO 1 PACK A DAY     INST PER ANESTHESIA PROTOCOL   Vaping Use   • Vaping Use: Former   Substance Use Topics   • Alcohol use: Never   • Drug use: Never         Review of Systems:  Review of Systems   Constitutional: Negative for chills and fever.   HENT: Negative for congestion, rhinorrhea and sore throat.    Eyes: Negative for pain and visual disturbance.   Respiratory: Positive for chest tightness. Negative for apnea, cough and shortness of breath.    Cardiovascular: Positive for chest pain. Negative for palpitations.   Gastrointestinal: Negative for abdominal pain, diarrhea, nausea and vomiting.   Genitourinary: Negative for difficulty urinating and dysuria.   Musculoskeletal: Positive for arthralgias. Negative for joint swelling and myalgias.   Skin: Negative for color change.   Neurological: Negative for seizures and headaches.   Psychiatric/Behavioral: Negative.    All other systems reviewed and are negative.       Physical Exam:  /83 (BP Location: Left arm, Patient Position: Lying)   Pulse 96   Temp 97.9 °F (36.6 °C) (Oral)   Resp 18   Ht 170.2 cm (67\")   Wt 52.6 kg (116 lb)   SpO2 94%   BMI 18.17 kg/m²     Physical Exam  Vitals and nursing note reviewed.   Constitutional:       General: She is not in acute distress.     Appearance: Normal appearance. She is not toxic-appearing.   HENT:      Head: Normocephalic and atraumatic.      " Jaw: There is normal jaw occlusion.      Right Ear: Tympanic membrane, ear canal and external ear normal. Tympanic membrane is not scarred or perforated.      Left Ear: Tympanic membrane, ear canal and external ear normal. Tympanic membrane is not scarred or perforated.      Mouth/Throat:      Mouth: Mucous membranes are moist.   Eyes:      General: Lids are normal.      Extraocular Movements: Extraocular movements intact.      Conjunctiva/sclera: Conjunctivae normal.      Pupils: Pupils are equal, round, and reactive to light.   Cardiovascular:      Rate and Rhythm: Normal rate and regular rhythm.      Pulses: Normal pulses.      Heart sounds: Normal heart sounds.   Pulmonary:      Effort: Pulmonary effort is normal. No respiratory distress.      Breath sounds: Normal breath sounds. No wheezing or rhonchi.   Abdominal:      General: Abdomen is flat. There is no distension.      Palpations: Abdomen is soft.      Tenderness: There is no abdominal tenderness. There is no guarding or rebound.   Musculoskeletal:         General: Normal range of motion.      Left shoulder: Tenderness (left posterior) present.      Cervical back: Normal range of motion and neck supple.      Right lower leg: No edema.      Left lower leg: No edema.      Comments: Tenderness along left supraspinatus muscle    Skin:     General: Skin is warm and dry.   Neurological:      General: No focal deficit present.      Mental Status: She is alert and oriented to person, place, and time. Mental status is at baseline.   Psychiatric:         Mood and Affect: Mood normal.         Behavior: Behavior normal.                  Procedures:  Procedures      Medical Decision Making:      Comorbidities that affect care:    Cancer, COPD, Smoking    External Notes reviewed:    Previous Clinic Note: oncology note      The following orders were placed and all results were independently analyzed by me:  Orders Placed This Encounter   Procedures   • XR Chest 1 View    • [place] Draw   • High Sensitivity Troponin T   • Comprehensive Metabolic Panel   • Lipase   • BNP   • Magnesium   • CBC Auto Differential   • Undress & Gown   • Continuous Pulse Oximetry   • CBC & Differential   • Green Top (Gel)   • Lavender Top   • Gold Top - SST   • Light Blue Top       Medications Given in the Emergency Department:  Medications - No data to display     ED Course:    The patient was initially evaluated in the triage area where orders were placed. The patient was later dispositioned by Kiran Riley DO.      The patient was advised to stay for completion of workup which includes but is not limited to communication of labs and radiological results, reassessment and plan. The patient was advised that leaving prior to disposition by a provider could result in critical findings that are not communicated to the patient.          Labs:    Labs Reviewed   COMPREHENSIVE METABOLIC PANEL - Abnormal; Notable for the following components:       Result Value    Glucose 106 (*)     All other components within normal limits    Narrative:     GFR Normal >60  Chronic Kidney Disease <60  Kidney Failure <15     LIPASE - Abnormal; Notable for the following components:    Lipase 11 (*)     All other components within normal limits   CBC WITH AUTO DIFFERENTIAL - Abnormal; Notable for the following components:    RBC 3.70 (*)     .8 (*)     MCH 34.9 (*)     Lymphocyte % 13.2 (*)     All other components within normal limits   TROPONIN - Normal    Narrative:     High Sensitive Troponin T Reference Range:  <10.0 ng/L- Negative Female for AMI  <15.0 ng/L- Negative Male for AMI  >=10 - Abnormal Female indicating possible myocardial injury.  >=15 - Abnormal Male indicating possible myocardial injury.   Clinicians would have to utilize clinical acumen, EKG, Troponin, and serial changes to determine if it is an Acute Myocardial Infarction or myocardial injury due to an underlying chronic condition.        BNP  (IN-HOUSE) - Normal    Narrative:     Among patients with dyspnea, NT-proBNP is highly sensitive for the detection of acute congestive heart failure. In addition NT-proBNP of <300 pg/ml effectively rules out acute congestive heart failure with 99% negative predictive value.     MAGNESIUM - Normal   RAINBOW DRAW    Narrative:     The following orders were created for panel order [place] Draw.  Procedure                               Abnormality         Status                     ---------                               -----------         ------                     Green Top (Gel)[[id number]]                                  Final result               Lavender Top[[id number]]                                     Final result               Gold Top - SST[[id number]]                                   Final result               Light Blue Top[[id number]]                                   Final result                 Please view results for these tests on the individual orders.   CBC AND DIFFERENTIAL    Narrative:     The following orders were created for panel order CBC & Differential.  Procedure                               Abnormality         Status                     ---------                               -----------         ------                     CBC Auto Differential[[id number]]        Abnormal            Final result                 Please view results for these tests on the individual orders.   GREEN TOP   LAVENDER TOP   GOLD TOP - SST   LIGHT BLUE TOP         Lab Results (last 24 hours)     ** No results found for the last 24 hours. **             EKG: Sinus rhythm with rate 86 bpm  Left atrial enlargement  Normal MI interval and normal QRS  Normal axis normal ST segments        Imaging:      XR Chest 1 View    Result Date: 3/29/2023  Narrative: PROCEDURE: XR CHEST 1 VW  COMPARISON: [facility], CR, XR CHEST 1 VW, 12/02/2022, 11:50.  INDICATIONS: CHEST PAIN  FINDINGS:   The lungs are well-expanded.  The heart and pulmonary vasculature are within normal limits. No pleural effusions are identified.  3 cm mass in the periphery of the right midlung field appears smaller on today's examination.  Right IJ Port-A-Cath tips in the region of the distal SVC.  No evidence of pneumothorax.  IMPRESSION:  3 cm mass in the periphery of the right midlung field appears smaller on today's examination.  Otherwise stable exam.  VIVIEN YO MD       Electronically Signed and Approved By: VIVIEN YO MD on 3/29/2023 at 14:53             NM PET/CT Skull Base to Mid Thigh    Result Date: 3/7/2023  Narrative: PROCEDURE: NM PET SKULL BASE TO MID THIGH  COMPARISON: [facility], PET, NM PET/CT SKULL BASE TO MID THIGH, 11/09/2022, 10:44.  INDICATIONS: lung cancer  TECHNIQUE: After obtaining the patient's consent, F-18 FDG was administered intravenously.  PET/CT imaging was performed from skull to thigh with multi-planar imaging without oral or intravenous contrast material, using a dedicated integrated PET/CT scanner.   RADIONUCLIDE:     11.68 MCI   F18 FDG- I.V. LABS:                          Blood Glucose 92 mg/dl UPTAKE TIME:        63 mins MEDIASTINAL BACKGROUND UPTAKE:  Mediastinum 2.14, liver 2.42  FINDINGS:  The evaluation of the head and neck soft tissues demonstrates no significant focal abnormal radiopharmaceutical accumulation to indicate malignancy or metastatic disease.  The evaluation of the thoracic soft tissues demonstrates interval decrease in radiopharmaceutical accumulation corresponding to the nodule within the right upper lobe.  Minimal residual activity is seen with a SUV maximum of 1.4.  The nodule measures 1.9 x 0.8 cm on today's study and has decreased in size compared to a prior measurement on the previous PET-CT of 2.8 x 1.9 cm.  No additional abnormal activity is seen throughout the remainder of the chest.  No definitive metabolically active malignant lymphadenopathy is observed.  The  metabolically active lymphadenopathy seen on the prior PET-CT has resolved.   The evaluation of the abdominal and pelvic soft tissues demonstrates no significant focal abnormal radiopharmaceutical accumulation to indicate malignancy or metastatic disease. The expected physiologic activity throughout the liver and spleen, GI tract, and collecting system is noted.  The evaluation of the proximal appendicular and axial skeleton demonstrates no significant focal abnormal radiopharmaceutical accumulation to indicate osseous malignancy or metastatic disease. Likewise, the evaluation of the peripheral soft tissues demonstrates no significant focal abnormal radiopharmaceutical accumulation to indicate peripheral soft tissue malignancy or metastatic disease.  The review of the CT images demonstrates a small right pleural effusion.  There is mild atelectasis in the lung bases.  Changes of emphysema/COPD are noted.  Coronary artery calcifications are observed.  An ascending thoracic aortic aneurysm is noted measuring 4 cm in transverse dimension.  This finding is stable.      Impression:   1. Evidence for interval decrease in size of the pulmonary nodule in the right upper lobe with marked interval decrease in abnormal radiopharmaceutical accumulation.  There has also been resolution of metabolically active malignant lymphadenopathy involving the right hilum and mediastinum.  The findings indicate an appropriate response to interval therapy.  Continued follow-up is recommended. 2. No evidence for additional abnormal activity to indicate additional potential sites of malignancy or metastatic disease. 3. A small right pleural effusion is seen. 4. Coronary artery calcifications are noted. 5. Stable ascending thoracic aortic aneurysm.     ARMIN LING MD       Electronically Signed and Approved By: ARMIN LING MD on 3/07/2023 at 16:32               No Radiology Exams Resulted Within Past 24 Hours      Differential Diagnosis  and Discussion:      Chest Pain:  Based on the patient's signs and symptoms, I considered aortic dissection, myocardial infaction, pulmonary embolism, cardiac tamponade, pericarditis, pneumothorax, musculoskeletal chest pain and other differential diagnosis as an etiology of the patient's chest pain.     All labs were reviewed and interpreted by me.  EKG was interpreted by me.    MDM  Number of Diagnoses or Management Options     Amount and/or Complexity of Data Reviewed  Clinical lab tests: reviewed  Tests in the radiology section of CPT®: reviewed  Tests in the medicine section of CPT®: reviewed    Patient Progress  Patient progress: stable (15:44 EDT Updated patient on results and plan. Patient expressed understanding and agreement. All questions answered at this time.   15:44 EDT Updated patient on results and plan for discharge. Patient expressed understanding and agreement. All questions answered at this time.  )           Patient Care Considerations:          Consultants/Shared Management Plan:    None    Social Determinants of Health:    Patient is independent, reliable, and has access to care.       Disposition and Care Coordination:    Discharged: The patient is suitable and stable for discharge with no need for consideration of observation or admission.    I have explained discharge medications and the need for follow up with the patient/caretakers. This was also printed in the discharge instructions. Patient was discharged with the following medications and follow up:      Medication List      No changes were made to your prescriptions during this visit.      Carrie Grace, APRN  [address] [facility] [address]  [phone]    In 1 day  Call for appointment       Final diagnoses:   Chest pain, unspecified type        ED Disposition     ED Disposition   Discharge    Condition   Stable    Comment   --             This medical record created using voice recognition  software.    Documentation assistance provided by Bela Butler acting as scribe for Kiran Elaine DO. Information recorded by the scribe was done at my direction and has been verified and validated by me.            Bela Butler  03/29/23 1538       Bela Butler  03/29/23 1544       Kiran Riley DO  03/30/23 [id number]

## 2023-03-30 LAB — QT INTERVAL: 359 MS

## 2023-04-05 ENCOUNTER — HOSPITAL ENCOUNTER (OUTPATIENT)
Dept: ONCOLOGY | Facility: HOSPITAL | Age: 63
Discharge: HOME | End: 2023-04-05
Admitting: INTERNAL MEDICINE
Payer: MEDICARE

## 2023-04-05 ENCOUNTER — OFFICE VISIT (OUTPATIENT)
Dept: ONCOLOGY | Facility: HOSPITAL | Age: 63
End: 2023-04-05
Payer: MEDICARE

## 2023-04-05 VITALS
WEIGHT: 118.17 LBS | HEART RATE: 116 BPM | TEMPERATURE: 99 F | OXYGEN SATURATION: 98 % | DIASTOLIC BLOOD PRESSURE: 76 MMHG | RESPIRATION RATE: 20 BRPM | SYSTOLIC BLOOD PRESSURE: 111 MMHG | HEIGHT: 64 IN | BODY MASS INDEX: 20.17 KG/M2

## 2023-04-05 VITALS
WEIGHT: 118.17 LBS | OXYGEN SATURATION: 98 % | HEART RATE: 116 BPM | DIASTOLIC BLOOD PRESSURE: 76 MMHG | RESPIRATION RATE: 20 BRPM | SYSTOLIC BLOOD PRESSURE: 111 MMHG | BODY MASS INDEX: 20.17 KG/M2 | TEMPERATURE: 99 F

## 2023-04-05 DIAGNOSIS — C34.81 MALIGNANT NEOPLASM OF OVERLAPPING SITES OF RIGHT LUNG: ICD-10-CM

## 2023-04-05 DIAGNOSIS — Z45.2 ENCOUNTER FOR ADJUSTMENT OR MANAGEMENT OF VASCULAR ACCESS DEVICE: Primary | ICD-10-CM

## 2023-04-05 DIAGNOSIS — Z45.2 ENCOUNTER FOR ADJUSTMENT OR MANAGEMENT OF VASCULAR ACCESS DEVICE: ICD-10-CM

## 2023-04-05 DIAGNOSIS — Z79.899 ENCOUNTER FOR LONG-TERM (CURRENT) USE OF MEDICATIONS: Primary | ICD-10-CM

## 2023-04-05 PROBLEM — M54.9 BACK PAIN: Status: ACTIVE | Noted: 2023-04-05

## 2023-04-05 PROBLEM — R10.30 LOWER ABDOMINAL PAIN: Status: ACTIVE | Noted: 2023-04-05
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
## 2023-04-05 PROBLEM — K59.00 CONSTIPATION: Status: ACTIVE | Noted: 2023-04-05
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
## 2023-04-05 PROBLEM — D02.20: Status: RESOLVED | Noted: 2022-12-01 | Resolved: 2023-04-05

## 2023-04-05 PROBLEM — IMO0002 COMPRESSION FRACTURE: Status: ACTIVE | Noted: 2023-04-05

## 2023-04-05 PROBLEM — R07.89 ATYPICAL CHEST PAIN: Status: ACTIVE | Noted: 2023-04-05

## 2023-04-05 PROBLEM — K52.9 ACUTE COLITIS: Status: ACTIVE | Noted: 2023-04-05

## 2023-04-05 LAB
ALBUMIN SERPL-MCNC: 4.3 G/DL (ref 3.5–5.2)
ALBUMIN/GLOB SERPL: 1.6 G/DL
ALP SERPL-CCNC: 68 U/L (ref 39–117)
ALT SERPL W P-5'-P-CCNC: 14 U/L (ref 1–33)
ANION GAP SERPL CALCULATED.3IONS-SCNC: 12.4 MMOL/L (ref 5–15)
AST SERPL-CCNC: 18 U/L (ref 1–32)
BASOPHILS # BLD AUTO: 0.02 10*3/MM3 (ref 0–0.2)
BASOPHILS NFR BLD AUTO: 0.2 % (ref 0–1.5)
BILIRUB SERPL-MCNC: 0.4 MG/DL (ref 0–1.2)
BUN SERPL-MCNC: 10 MG/DL (ref 8–23)
BUN/CREAT SERPL: 18.9 (ref 7–25)
CALCIUM SPEC-SCNC: 9.8 MG/DL (ref 8.6–10.5)
CHLORIDE SERPL-SCNC: 101 MMOL/L (ref 98–107)
CO2 SERPL-SCNC: 23.6 MMOL/L (ref 22–29)
CREAT SERPL-MCNC: 0.53 MG/DL (ref 0.57–1)
DEPRECATED RDW RBC AUTO: 48 FL (ref 37–54)
EGFRCR SERPLBLD CKD-EPI 2021: 105.4 ML/MIN/1.73
EOSINOPHIL # BLD AUTO: 0.09 10*3/MM3 (ref 0–0.4)
EOSINOPHIL NFR BLD AUTO: 1.1 % (ref 0.3–6.2)
ERYTHROCYTE [DISTWIDTH] IN BLOOD BY AUTOMATED COUNT: 12.8 % (ref 12.3–15.4)
GLOBULIN UR ELPH-MCNC: 2.7 GM/DL
GLUCOSE SERPL-MCNC: 151 MG/DL (ref 65–99)
HCT VFR BLD AUTO: 38.4 % (ref 34–46.6)
HGB BLD-MCNC: 13 G/DL (ref 12–15.9)
IMM GRANULOCYTES # BLD AUTO: 0.01 10*3/MM3 (ref 0–0.05)
IMM GRANULOCYTES NFR BLD AUTO: 0.1 % (ref 0–0.5)
LYMPHOCYTES # BLD AUTO: 1.09 10*3/MM3 (ref 0.7–3.1)
LYMPHOCYTES NFR BLD AUTO: 13.5 % (ref 19.6–45.3)
MCH RBC QN AUTO: 34.1 PG (ref 26.6–33)
MCHC RBC AUTO-ENTMCNC: 33.9 G/DL (ref 31.5–35.7)
MCV RBC AUTO: 100.8 FL (ref 79–97)
MONOCYTES # BLD AUTO: 0.77 10*3/MM3 (ref 0.1–0.9)
MONOCYTES NFR BLD AUTO: 9.5 % (ref 5–12)
NEUTROPHILS NFR BLD AUTO: 6.1 10*3/MM3 (ref 1.7–7)
NEUTROPHILS NFR BLD AUTO: 75.6 % (ref 42.7–76)
PLATELET # BLD AUTO: 238 10*3/MM3 (ref 140–450)
PMV BLD AUTO: 8.4 FL (ref 6–12)
POTASSIUM SERPL-SCNC: 3.3 MMOL/L (ref 3.5–5.2)
PROT SERPL-MCNC: 7 G/DL (ref 6–8.5)
RBC # BLD AUTO: 3.81 10*6/MM3 (ref 3.77–5.28)
SODIUM SERPL-SCNC: 137 MMOL/L (ref 136–145)
T4 FREE SERPL-MCNC: 1.76 NG/DL (ref 0.93–1.7)
TSH SERPL DL<=0.05 MIU/L-ACNC: 1.24 UIU/ML (ref 0.27–4.2)
WBC NRBC COR # BLD: 8.08 10*3/MM3 (ref 3.4–10.8)

## 2023-04-05 PROCEDURE — 25010000002 HEPARIN LOCK FLUSH PER 10 UNITS: Performed by: INTERNAL MEDICINE

## 2023-04-05 PROCEDURE — 1160F RVW MEDS BY RX/DR IN RCRD: CPT | Performed by: INTERNAL MEDICINE

## 2023-04-05 PROCEDURE — 84439 ASSAY OF FREE THYROXINE: CPT | Performed by: INTERNAL MEDICINE

## 2023-04-05 PROCEDURE — 25010000002 DURVALUMAB 500 MG/10ML SOLUTION 10 ML VIAL: Performed by: INTERNAL MEDICINE

## 2023-04-05 PROCEDURE — 85025 COMPLETE CBC W/AUTO DIFF WBC: CPT | Performed by: INTERNAL MEDICINE

## 2023-04-05 PROCEDURE — 99214 OFFICE O/P EST MOD 30 MIN: CPT | Performed by: INTERNAL MEDICINE

## 2023-04-05 PROCEDURE — 1125F AMNT PAIN NOTED PAIN PRSNT: CPT | Performed by: INTERNAL MEDICINE

## 2023-04-05 PROCEDURE — 1159F MED LIST DOCD IN RCRD: CPT | Performed by: INTERNAL MEDICINE

## 2023-04-05 PROCEDURE — 96413 CHEMO IV INFUSION 1 HR: CPT

## 2023-04-05 PROCEDURE — 84443 ASSAY THYROID STIM HORMONE: CPT | Performed by: INTERNAL MEDICINE

## 2023-04-05 PROCEDURE — 80053 COMPREHEN METABOLIC PANEL: CPT | Performed by: INTERNAL MEDICINE

## 2023-04-05 RX ORDER — SODIUM CHLORIDE 0.9 % (FLUSH) 0.9 %
20 SYRINGE (ML) INJECTION AS NEEDED
Status: DISCONTINUED | OUTPATIENT
Start: 2023-04-05 | End: 2023-04-06 | Stop reason: HOSPADM

## 2023-04-05 RX ORDER — SODIUM CHLORIDE 9 MG/ML
250 INJECTION, SOLUTION INTRAVENOUS ONCE
Status: COMPLETED | OUTPATIENT
Start: 2023-04-05 | End: 2023-04-05

## 2023-04-05 RX ORDER — SODIUM CHLORIDE 0.9 % (FLUSH) 0.9 %
20 SYRINGE (ML) INJECTION AS NEEDED
OUTPATIENT
Start: 2023-04-05

## 2023-04-05 RX ORDER — MELATONIN: COMMUNITY

## 2023-04-05 RX ORDER — BACLOFEN 10 MG/1
1 TABLET ORAL EVERY 12 HOURS SCHEDULED
COMMUNITY
Start: 2023-02-23

## 2023-04-05 RX ORDER — LIDOCAINE AND PRILOCAINE 25; 25 MG/G; MG/G
1 CREAM TOPICAL AS NEEDED
Qty: 30 G | Refills: 1 | Status: SHIPPED | OUTPATIENT
Start: 2023-04-05

## 2023-04-05 RX ORDER — MECLIZINE HYDROCHLORIDE 25 MG/1
TABLET ORAL
COMMUNITY

## 2023-04-05 RX ORDER — HEPARIN SODIUM (PORCINE) LOCK FLUSH IV SOLN 100 UNIT/ML 100 UNIT/ML
500 SOLUTION INTRAVENOUS AS NEEDED
OUTPATIENT
Start: 2023-04-05

## 2023-04-05 RX ORDER — HEPARIN SODIUM (PORCINE) LOCK FLUSH IV SOLN 100 UNIT/ML 100 UNIT/ML
500 SOLUTION INTRAVENOUS AS NEEDED
Status: DISCONTINUED | OUTPATIENT
Start: 2023-04-05 | End: 2023-04-06 | Stop reason: HOSPADM

## 2023-04-05 RX ORDER — SODIUM CHLORIDE 9 MG/ML
250 INJECTION, SOLUTION INTRAVENOUS ONCE
Status: CANCELLED | OUTPATIENT
Start: 2023-04-05

## 2023-04-05 RX ADMIN — SODIUM CHLORIDE 500 MG: 9 INJECTION, SOLUTION INTRAVENOUS at 10:00

## 2023-04-05 RX ADMIN — SODIUM CHLORIDE 250 ML: 9 INJECTION, SOLUTION INTRAVENOUS at 09:58

## 2023-04-05 RX ADMIN — HEPARIN SODIUM (PORCINE) LOCK FLUSH IV SOLN 100 UNIT/ML 500 UNITS: 100 SOLUTION at 11:09

## 2023-04-05 RX ADMIN — Medication 20 ML: at 11:09

## 2023-04-05 NOTE — ASSESSMENT & PLAN NOTE
Patient has had an excellent response to concurrent chemoRT based on her recent PET scan with no further hypermetabolic activity noted.  Previously we had discussed maintenance durvalumab given for 1 year.  She had a few concerns about the medication but after discussion, she is agreeable to therapy.  Her lab work today is adequate for treatment.  Proceed with cycle 1 today as planned.  RTC 2 weeks for cycle 2, RTC 4 weeks for OV, cycle 3 with lab work prior to monitor for toxicities.

## 2023-04-05 NOTE — PROGRESS NOTES
Chief Complaint  Malignant neoplasm of overlapping sites of right lung    Carrie Grace, BLANCA Pike          Theresa M Gabehart presents to [facility] for consideration of maintenance immunotherapy.  She has completed concurrent chemoRT.  Since her last visit, she states that she had a spinal fracture requiring bed rest for the last several weeks.  She notes it still somewhat painful but improved.  She denies chest pain, shortness of breath, cough or hemoptysis.  She reports adequate appetite and energy level.  She continues to smoke but is actively trying to quit and is down to a few cigarettes per day.    Oncology/Hematology History Overview Note   11/18/2022  Lymph node, station 10 L, FNA:  - Negative for malignant cells  - Lymphoid tissue present  2. Lymph node, station 7, FNA:  - Positive for malignant cells  - Metastatic adenocarcinoma, lung primary  3. Lymph node, station 4R, FNA:  - Positive for malignant cells  - Metastatic adenocarcinoma, lung primary  4. Lymph node, station 10 R, FNA:  - Positive for malignant cells  - Metastatic adenocarcinoma, lung primary    11/30/22 orders written for concurrent carbo & alimta with XRT     Malignant neoplasm of overlapping sites of right lung   11/30/2022 Initial Diagnosis    Malignant neoplasm of middle lobe of right lung (HCC)     11/30/2022 Cancer Staged    Staging form: Lung, AJCC 8th Edition  - Clinical: Stage IIIA (cT2a, cN2, cM0) - Signed by Narayan Jonas MD on 11/30/2022 12/7/2022 - 2/8/2023 Chemotherapy    OP LUNG PEMEtrexed / CARBOplatin AUC=5 + XRT     4/5/2023 -  Chemotherapy    OP LUNG Durvalumab     Malignant neoplasm of upper lobe, right bronchus or lung (Resolved)   12/5/2022 Initial Diagnosis    Malignant neoplasm of upper lobe, right bronchus or lung (HCC)     12/7/2022 -  Radiation    RADIATION THERAPY Treatment Details (Noted on 12/5/2022)  Site: Right Lung - Upper lobe  Technique:  IMRT  Goal: No goal specified  Planned Treatment Start Date: 12/7/2022         Review of Systems   Constitutional: Positive for fatigue. Negative for appetite change, diaphoresis, fever, unexpected weight gain and unexpected weight loss.   HENT: Negative for hearing loss, sore throat and voice change.    Eyes: Negative for blurred vision, double vision, pain, redness and visual disturbance.   Respiratory: Negative for cough, shortness of breath and wheezing.    Cardiovascular: Positive for chest pain (PT had chest pain last week and went to the hospital ). Negative for palpitations and leg swelling.   Endocrine: Negative for cold intolerance, heat intolerance, polydipsia and polyuria.   Genitourinary: Negative for decreased urine volume, difficulty urinating, frequency and urinary incontinence.   Musculoskeletal: Positive for back pain (PT fractured spine in Feb ). Negative for arthralgias, joint swelling and myalgias.   Skin: Negative for color change, rash, skin lesions and wound.   Neurological: Positive for weakness. Negative for dizziness, seizures, numbness and headache.   Hematological: Negative for adenopathy. Does not bruise/bleed easily.   Psychiatric/Behavioral: Negative for depressed mood. The patient is not nervous/anxious.      Current Outpatient Medications on File Prior to Visit   Medication Sig Dispense Refill   • albuterol sulfate  (90 Base) MCG/ACT inhaler Inhale 2 puffs Every 4 (Four) Hours As Needed for Wheezing. 18 g 11   • Aspirin 81 MG capsule Take 81 mg by mouth Daily. LAST DOSE 11/24/22     • baclofen (LIORESAL) 10 MG tablet Take 1 tablet by mouth Every 12 (Twelve) Hours.     • cholecalciferol (VITAMIN D3) 25 MCG (1000 UT) tablet Daily     • [employer] Calcium 600 MG tablet Take 1 tablet by mouth 2 (Two) Times a Day With Meals.     • dexamethasone (DECADRON) 4 MG tablet Take 1 tablet oral twice a day for 3 consecutive days beginning the day before chemotherapy and continue for 6 doses.Take  with food. 21 tablet 3   • fluticasone (FLONASE) 50 MCG/ACT nasal spray 1 spray into the nostril(s) as directed by provider Daily As Needed.     • folic acid (FOLVITE) 1 MG tablet Take 1 tablet by mouth Daily. Start 7 days prior to chemotherapy until at least 3 weeks after all chemotherapy. 30 tablet 3   • HYDROcodone-acetaminophen (Norco) 5-325 MG per tablet Take 1 tablet by mouth Every 6 (Six) Hours As Needed for Moderate Pain (NOTE: You can take two tablets instead of one tablet every four hours instead of every six hours if needed for pain). 8 tablet 0   • ipratropium-albuterol (DUO-NEB) 0.5-2.5 mg/3 ml nebulizer USE 1 VIAL IN NEBULIZER EVERY 4 TO 6 HOURS 120 mL 11   • L-Lysine 500 MG tablet tablet Take  by mouth Daily.     • Linzess 145 MCG capsule capsule Take 1 capsule by mouth As Needed.     • meclizine (ANTIVERT) 25 MG tablet      • MISC NATURAL PRODUCTS PO Take  by mouth. Turkey Tail Mushroom Supplement     • Nystatin-Diphenhydramine-Hydrocortisone-Lidocaine Take 5 mL by mouth Every 3 (Three) Hours As Needed (Sore throat, trouble swallowing). Swish and swallow. 300 mL 2   • Nystatin-Diphenhydramine-Hydrocortisone-Lidocaine Swish and swallow 5 mL Every 3 (Three) Hours As Needed. 300 mL 2   • ondansetron (ZOFRAN) 8 MG tablet Take 1 tablet by mouth 3 (Three) Times a Day As Needed for Nausea or Vomiting. 30 tablet 5   • Probiotic Product (PROBIOTIC BLEND PO) Take 1 tablet by mouth Daily.     • prochlorperazine (COMPAZINE) 10 MG tablet Take 1 tablet by mouth Every 6 (Six) Hours As Needed for Nausea or Vomiting. 20 tablet 3   • Symbicort 160-4.5 MCG/ACT inhaler 2 puffs 2 (Two) Times a Day As Needed.     • tiotropium bromide monohydrate (SPIRIVA RESPIMAT) 2.5 MCG/ACT aerosol solution inhaler Inhale 2 puffs Daily. 1 each 11   • vitamin D (ERGOCALCIFEROL) 1.25 MG (94058 UT) capsule capsule Take 1 capsule by mouth Every 7 (Seven) Days.     • Zinc Sulfate (ZINC 15 PO) Take 1 each by mouth Every Other Day.        Current Facility-Administered Medications on File Prior to Visit   Medication Dose Route Frequency Provider Last Rate Last Admin   • [COMPLETED] Durvalumab 500 mg in sodium chloride 0.9 % 285 mL chemo IVPB  500 mg Intravenous Once Narayan Jonas MD   Stopped at 04/05/23 1100   • heparin injection 500 Units  500 Units Intravenous PRN Narayan Jonas MD   500 Units at 04/05/23 1109   • sodium chloride 0.9 % flush 20 mL  20 mL Intravenous PRN Narayan Jonas MD   20 mL at 04/05/23 1109   • [COMPLETED] sodium chloride 0.9 % infusion 250 mL  250 mL Intravenous Once Narayan Jonas MD   Stopped at 04/05/23 1109       Allergies   Allergen Reactions   • Clarithromycin Rash   • Metronidazole Unknown - High Severity   • Doxycycline Calcium Rash   • Gabapentin Mental Status Change     Past Medical History:   Diagnosis Date   • Allergic rhinitis    • Anemia due to chemotherapy 12/28/2022   • Aortic aneurysm     FOLLOWED BY GARCIA   • Arnold-Chiari syndrome    • Bleeding disorder     FREE BLEEDER, VON WILLICAM TEST NEGATIVE   • Carpal tunnel syndrome     RIGHT   • Colon polyps    • Colon polyps    • COPD (chronic obstructive pulmonary disease)     NEBS/INHALER   • Dupuytren's contracture syndrome    • Dysphagia     ESOPHAGEAL STRICTURE   • Emphysema lung    • Fibromyalgia    • Hiatal hernia    • History of fractured vertebra     18 fractured/HEALING, 2021   • History of pelvic fracture     2021 HEALED   • Hypokalemia 12/28/2022   • Lung cancer     RIGHT   • Malignant neoplasm of middle lobe of right lung 11/30/2022   • Malignant neoplasm of overlapping sites of right lung 11/30/2022   • Neuropathy    • Osteoporosis    • Vertigo      Past Surgical History:   Procedure Laterality Date   • BRONCHOSCOPY N/A 11/18/2022    Procedure: BRONCHOSCOPY WITH ENDOBRONCHIAL ULTRASOUND AND FINE NEEDLE ASPIRATIONS;  Surgeon: Shaw Bonds DO;  Location: [facility] ENDOSCOPY;  Service: Pulmonary;  Laterality: N/A;   MEDIASTINAL ADENOPATHY,  LUNG NODULE   •  SECTION     • CHOLECYSTECTOMY     • COLONOSCOPY     • ENDOSCOPY     • HYSTERECTOMY     • KNEE ARTHROSCOPY     • ORTHOPEDIC SURGERY     • TONSILLECTOMY AND ADENOIDECTOMY     • UPPER GASTROINTESTINAL ENDOSCOPY     • VENOUS ACCESS DEVICE (PORT) INSERTION N/A 2022    Procedure: INSERTION OF PORTACATH;  Surgeon: Ishan Vargas MD;  [facility]: [facility];  Service: General;  Laterality: N/A;     Social History     Socioeconomic History   • Marital status:    Tobacco Use   • Smoking status: Every Day     Packs/day: 0.50     Years: 30.00     Pack years: 15.00     Types: Cigarettes     Start date:    • Smokeless tobacco: Never   • Tobacco comments:     WAS SMOKING TWO PACKS DAILY, HAS CUT DOWN TO 1 PACK A DAY     INST PER ANESTHESIA PROTOCOL   Vaping Use   • Vaping Use: Former   Substance and Sexual Activity   • Alcohol use: Never   • Drug use: Never   • Sexual activity: Defer     Family History   Problem Relation Age of Onset   • Lung cancer Mother    • Diabetes Sister    • Breast cancer Sister    • Malig Hyperthermia Neg Hx        Objective   Physical Exam  Vitals reviewed. Exam conducted with a chaperone present.   Constitutional:       General: She is not in acute distress.     Appearance: Normal appearance.   Cardiovascular:      Rate and Rhythm: Normal rate and regular rhythm.      Heart sounds: Normal heart sounds. No murmur heard.    No gallop.   Pulmonary:      Effort: Pulmonary effort is normal.      Breath sounds: Normal breath sounds.      Comments: Port-A-Cath  Abdominal:      General: Abdomen is flat. Bowel sounds are normal.      Palpations: Abdomen is soft.   Musculoskeletal:      Cervical back: Neck supple.      Right lower leg: No edema.      Left lower leg: No edema.   Lymphadenopathy:      Cervical: No cervical adenopathy.   Neurological:      Mental Status: She is alert and oriented to person, place, and time.   Psychiatric:          Mood and Affect: Mood normal.         Behavior: Behavior normal.         Vitals:    04/05/23 0855   BP: 111/76   Pulse: 116   Resp: 20   Temp: 99 °F (37.2 °C)   SpO2: 98%   Weight: 53.6 kg (118 lb 2.7 oz)   PainSc:   6   PainLoc: Back     ECOG score: 2         PHQ-9 Total Score:                    Result Review :   The following data was reviewed by: Narayan Jonas MD on 04/05/2023:  Lab Results   Component Value Date    HGB 13.0 04/05/2023    HCT 38.4 04/05/2023    .8 (H) 04/05/2023     04/05/2023    WBC 8.08 04/05/2023    NEUTROABS 6.10 04/05/2023    LYMPHSABS 1.09 04/05/2023    MONOSABS 0.77 04/05/2023    EOSABS 0.09 04/05/2023    BASOSABS 0.02 04/05/2023     Lab Results   Component Value Date    GLUCOSE 151 (H) 04/05/2023    BUN 10 04/05/2023    CREATININE 0.53 (L) 04/05/2023     04/05/2023    K 3.3 (L) 04/05/2023     04/05/2023    CO2 23.6 04/05/2023    CALCIUM 9.8 04/05/2023    PROTEINTOT 7.0 04/05/2023    ALBUMIN 4.3 04/05/2023    BILITOT 0.4 04/05/2023    ALKPHOS 68 04/05/2023    AST 18 04/05/2023    ALT 14 04/05/2023     Lab Results   Component Value Date    MG 1.6 03/29/2023    FREET4 1.76 (H) 04/05/2023    TSH 1.240 04/05/2023     No results found for: IRON, LABIRON, TRANSFERRIN, TIBC  No results found for: LDH, FERRITIN, [id number], FOLATE  No results found for: PSA, CEA, AFP, ,     Data reviewed: Radiologic studies PET scan reviewed      Assessment and Plan    Diagnoses and all orders for this visit:    1. Encounter for adjustment or management of vascular access device (Primary)  -     lidocaine-prilocaine (EMLA) 2.5-2.5 % cream; Apply 1 application topically to the appropriate area as directed As Needed for Mild Pain.  Dispense: 30 g; Refill: 1    2. Malignant neoplasm of overlapping sites of right lung  Assessment & Plan:  Patient has had an excellent response to concurrent chemoRT based on her recent PET scan with no further hypermetabolic activity noted.   Previously we had discussed maintenance durvalumab given for 1 year.  She had a few concerns about the medication but after discussion, she is agreeable to therapy.  Her lab work today is adequate for treatment.  Proceed with cycle 1 today as planned.  RTC 2 weeks for cycle 2, RTC 4 weeks for OV, cycle 3 with lab work prior to monitor for toxicities.    Orders:  -     Cancel: sodium chloride 0.9 % infusion 250 mL  -     Cancel: durvalumab (IMFINZI) 510 mg in sodium chloride 0.9 % 260.2 mL chemo IVPB          Patient Follow Up: Cycle 3-day 1    Patient was given instructions and counseling regarding her condition or for health maintenance advice. Please see specific information pulled into the AVS if appropriate.     Narayan Jonas MD    4/5/2023

## 2023-04-05 NOTE — PROGRESS NOTES
"Presents for cycle #1 [id number] mg/kg every 2 weeks.    Ht: 163 cm  Wt: 53.6  kg  BSA:1.57 m2    Dose verified using today's parameters and is within acceptable limits of variation and rounding.    Labs reviewed and are within Epic comm parameter limits to proceed. Baseline TFTs: T4=1.76 and TSH=1.24.    UpToDate and MedGuide patient information printed and provided to patient and key information verbally highlighted including: Overview of regimen including infusion time; Recognition and management of allergic/infusion reactions; N/V management; Recognition and management of immune MINH's; \"call your doctor right away\" parameters.    All questions were answered in kind and no outstanding issues are noted at this time.  RTC in 2 weeks for cycle #2 with labs prior.  "

## 2023-04-19 ENCOUNTER — HOSPITAL ENCOUNTER (OUTPATIENT)
Dept: ONCOLOGY | Facility: HOSPITAL | Age: 63
Discharge: HOME | End: 2023-04-19
Payer: MEDICARE

## 2023-04-19 ENCOUNTER — HOSPITAL ENCOUNTER (OUTPATIENT)
Dept: CARDIOLOGY | Facility: HOSPITAL | Age: 63
Discharge: HOME | End: 2023-04-19
Payer: MEDICARE

## 2023-04-19 ENCOUNTER — OFFICE VISIT (OUTPATIENT)
Dept: ONCOLOGY | Facility: HOSPITAL | Age: 63
End: 2023-04-19
Payer: MEDICARE

## 2023-04-19 ENCOUNTER — TELEPHONE (OUTPATIENT)
Dept: ONCOLOGY | Facility: HOSPITAL | Age: 63
End: 2023-04-19
Payer: MEDICARE

## 2023-04-19 VITALS
SYSTOLIC BLOOD PRESSURE: 118 MMHG | HEART RATE: 114 BPM | WEIGHT: 115.74 LBS | DIASTOLIC BLOOD PRESSURE: 70 MMHG | OXYGEN SATURATION: 98 % | BODY MASS INDEX: 19.76 KG/M2 | TEMPERATURE: 99.5 F | RESPIRATION RATE: 16 BRPM

## 2023-04-19 DIAGNOSIS — Z79.899 ENCOUNTER FOR LONG-TERM (CURRENT) USE OF MEDICATIONS: Primary | ICD-10-CM

## 2023-04-19 DIAGNOSIS — C34.81 MALIGNANT NEOPLASM OF OVERLAPPING SITES OF RIGHT LUNG: ICD-10-CM

## 2023-04-19 DIAGNOSIS — M79.601 PAIN IN ANTERIOR RIGHT UPPER EXTREMITY: ICD-10-CM

## 2023-04-19 DIAGNOSIS — C34.81 MALIGNANT NEOPLASM OF OVERLAPPING SITES OF RIGHT LUNG: Primary | ICD-10-CM

## 2023-04-19 LAB
BH CV UPPER VENOUS RIGHT AXILLARY AUGMENT: NORMAL
BH CV UPPER VENOUS RIGHT AXILLARY COMPRESS: NORMAL
BH CV UPPER VENOUS RIGHT AXILLARY PHASIC: NORMAL
BH CV UPPER VENOUS RIGHT AXILLARY SPONT: NORMAL
BH CV UPPER VENOUS RIGHT BASILIC FOREARM COMPRESS: NORMAL
BH CV UPPER VENOUS RIGHT BASILIC UPPER COMPRESS: NORMAL
BH CV UPPER VENOUS RIGHT BRACHIAL COMPRESS: NORMAL
BH CV UPPER VENOUS RIGHT CEPHALIC FOREARM COMPRESS: NORMAL
BH CV UPPER VENOUS RIGHT CEPHALIC UPPER COMPRESS: NORMAL
BH CV UPPER VENOUS RIGHT INTERNAL JUGULAR COLOR: 1
BH CV UPPER VENOUS RIGHT INTERNAL JUGULAR COMPRESS: NORMAL
BH CV UPPER VENOUS RIGHT INTERNAL JUGULAR THROMBUS: NORMAL
BH CV UPPER VENOUS RIGHT RADIAL COMPRESS: NORMAL
BH CV UPPER VENOUS RIGHT SUBCLAVIAN AUGMENT: NORMAL
BH CV UPPER VENOUS RIGHT SUBCLAVIAN COLOR: 1
BH CV UPPER VENOUS RIGHT SUBCLAVIAN COMPRESS: NORMAL
BH CV UPPER VENOUS RIGHT SUBCLAVIAN PHASIC: NORMAL
BH CV UPPER VENOUS RIGHT SUBCLAVIAN SPONT: NORMAL
BH CV UPPER VENOUS RIGHT SUBCLAVIAN THROMBUS: NORMAL
BH CV UPPER VENOUS RIGHT ULNAR COMPRESS: NORMAL
BH CV VAS PRELIMINARY FINDINGS SCRIPTING: 1
MAXIMAL PREDICTED HEART RATE: 159 BPM
STRESS TARGET HR: 135 BPM

## 2023-04-19 PROCEDURE — G0463 HOSPITAL OUTPT CLINIC VISIT: HCPCS | Performed by: INTERNAL MEDICINE

## 2023-04-19 PROCEDURE — 93971 EXTREMITY STUDY: CPT

## 2023-04-19 NOTE — ASSESSMENT & PLAN NOTE
Patient describes a new uncomfortable/full sensation in the right upper arm.  Nothing obviously abnormal on examination however I would be concerned about the possibility of DVT.  I will order Doppler ultrasound which she will have today.

## 2023-04-19 NOTE — ASSESSMENT & PLAN NOTE
Patient is on maintenance durvalumab.  She is due for next cycle however she is not feeling well and would like to defer.  Given issues with port I think is reasonable.  I will move her cycle to to next week.  She will have lab work at that time.  I will see her back for cycle 3-day 1 with lab work prior to monitor for toxicities.

## 2023-04-19 NOTE — TELEPHONE ENCOUNTER
TRIED TO REACH PATIENT IN REGARDS TO APPOINTMENTS TODAY, NO VOICEMAIL. PATIENT WAS SCHEDULED FOR 0800 AM INFUSION, WE WERE CALLING TO SEE IF SHE WANTED TO RESCHEDULE.

## 2023-04-19 NOTE — PROGRESS NOTES
"Chief Complaint  Lung Cancer    Carrie Grace, APRN    Subjective          Theresa M Gabehart presents to [facility] for consideration of ongoing treatment for her lung cancer.  She just started maintenance durvalumab.  She states the infusion went well but she had some troubles with her Port-A-Cath.  She feels like the right armpit and upper extremity are slightly swollen and \"heavy\".  She describes it as uncomfortable although not overtly painful.  She states that her breathing is at her baseline.  She denies productive cough, hemoptysis, masses or adenopathy.  She reports adequate appetite.    Oncology/Hematology History Overview Note   11/18/2022  Lymph node, station 10 L, FNA:  - Negative for malignant cells  - Lymphoid tissue present  2. Lymph node, station 7, FNA:  - Positive for malignant cells  - Metastatic adenocarcinoma, lung primary  3. Lymph node, station 4R, FNA:  - Positive for malignant cells  - Metastatic adenocarcinoma, lung primary  4. Lymph node, station 10 R, FNA:  - Positive for malignant cells  - Metastatic adenocarcinoma, lung primary    11/30/22 orders written for concurrent carbo & alimta with XRT     Malignant neoplasm of overlapping sites of right lung   11/30/2022 Initial Diagnosis    Malignant neoplasm of middle lobe of right lung (HCC)     11/30/2022 Cancer Staged    Staging form: Lung, AJCC 8th Edition  - Clinical: Stage IIIA (cT2a, cN2, cM0) - Signed by Narayan Jonas MD on 11/30/2022 12/7/2022 - 2/8/2023 Chemotherapy    OP LUNG PEMEtrexed / CARBOplatin AUC=5 + XRT     4/5/2023 -  Chemotherapy    OP LUNG Durvalumab     Malignant neoplasm of upper lobe, right bronchus or lung (Resolved)   12/5/2022 Initial Diagnosis    Malignant neoplasm of upper lobe, right bronchus or lung (HCC)     12/7/2022 -  Radiation    RADIATION THERAPY Treatment Details (Noted on 12/5/2022)  Site: Right Lung - Upper lobe  Technique: IMRT  Goal: No goal " specified  Planned Treatment Start Date: 12/7/2022         Review of Systems   Constitutional: Positive for fatigue. Negative for appetite change, diaphoresis, fever, unexpected weight gain and unexpected weight loss.   HENT: Negative for hearing loss, mouth sores, sore throat, swollen glands, trouble swallowing and voice change.    Eyes: Negative for blurred vision.   Respiratory: Negative for cough, shortness of breath and wheezing.    Cardiovascular: Negative for chest pain and palpitations.   Gastrointestinal: Negative for abdominal pain, blood in stool, constipation, diarrhea, nausea and vomiting.   Endocrine: Negative for cold intolerance and heat intolerance.   Genitourinary: Negative for difficulty urinating, dysuria, frequency, hematuria and urinary incontinence.   Musculoskeletal: Negative for arthralgias, back pain and myalgias.   Skin: Negative for rash, skin lesions and wound.   Neurological: Negative for dizziness, seizures, weakness, numbness and headache.   Hematological: Does not bruise/bleed easily.   Psychiatric/Behavioral: Negative for depressed mood. The patient is nervous/anxious.      Current Outpatient Medications on File Prior to Visit   Medication Sig Dispense Refill   • albuterol sulfate  (90 Base) MCG/ACT inhaler Inhale 2 puffs Every 4 (Four) Hours As Needed for Wheezing. 18 g 11   • Aspirin 81 MG capsule Take 81 mg by mouth Daily. LAST DOSE 11/24/22     • baclofen (LIORESAL) 10 MG tablet Take 1 tablet by mouth Every 12 (Twelve) Hours.     • cholecalciferol (VITAMIN D3) 25 MCG (1000 UT) tablet Daily     • [employer] Calcium 600 MG tablet Take 1 tablet by mouth 2 (Two) Times a Day With Meals.     • dexamethasone (DECADRON) 4 MG tablet Take 1 tablet oral twice a day for 3 consecutive days beginning the day before chemotherapy and continue for 6 doses.Take with food. 21 tablet 3   • fluticasone (FLONASE) 50 MCG/ACT nasal spray 1 spray into the nostril(s) as directed by provider Daily As  Needed.     • folic acid (FOLVITE) 1 MG tablet Take 1 tablet by mouth Daily. Start 7 days prior to chemotherapy until at least 3 weeks after all chemotherapy. 30 tablet 3   • HYDROcodone-acetaminophen (Norco) 5-325 MG per tablet Take 1 tablet by mouth Every 6 (Six) Hours As Needed for Moderate Pain (NOTE: You can take two tablets instead of one tablet every four hours instead of every six hours if needed for pain). 8 tablet 0   • ipratropium-albuterol (DUO-NEB) 0.5-2.5 mg/3 ml nebulizer USE 1 VIAL IN NEBULIZER EVERY 4 TO 6 HOURS 120 mL 11   • L-Lysine 500 MG tablet tablet Take  by mouth Daily.     • lidocaine-prilocaine (EMLA) 2.5-2.5 % cream Apply 1 application topically to the appropriate area as directed As Needed for Mild Pain. 30 g 1   • Linzess 145 MCG capsule capsule Take 1 capsule by mouth As Needed.     • meclizine (ANTIVERT) 25 MG tablet      • MISC NATURAL PRODUCTS PO Take  by mouth. Turkey Tail Mushroom Supplement     • Nystatin-Diphenhydramine-Hydrocortisone-Lidocaine Take 5 mL by mouth Every 3 (Three) Hours As Needed (Sore throat, trouble swallowing). Swish and swallow. 300 mL 2   • Nystatin-Diphenhydramine-Hydrocortisone-Lidocaine Swish and swallow 5 mL Every 3 (Three) Hours As Needed. 300 mL 2   • ondansetron (ZOFRAN) 8 MG tablet Take 1 tablet by mouth 3 (Three) Times a Day As Needed for Nausea or Vomiting. 30 tablet 5   • Probiotic Product (PROBIOTIC BLEND PO) Take 1 tablet by mouth Daily.     • prochlorperazine (COMPAZINE) 10 MG tablet Take 1 tablet by mouth Every 6 (Six) Hours As Needed for Nausea or Vomiting. 20 tablet 3   • Symbicort 160-4.5 MCG/ACT inhaler 2 puffs 2 (Two) Times a Day As Needed.     • tiotropium bromide monohydrate (SPIRIVA RESPIMAT) 2.5 MCG/ACT aerosol solution inhaler Inhale 2 puffs Daily. 1 each 11   • vitamin D (ERGOCALCIFEROL) 1.25 MG (29311 UT) capsule capsule Take 1 capsule by mouth Every 7 (Seven) Days.     • Zinc Sulfate (ZINC 15 PO) Take 1 each by mouth Every Other  Day.       No current facility-administered medications on file prior to visit.       Allergies   Allergen Reactions   • Clarithromycin Rash   • Metronidazole Unknown - High Severity   • Doxycycline Calcium Rash   • Gabapentin Mental Status Change     Past Medical History:   Diagnosis Date   • Allergic rhinitis    • Anemia due to chemotherapy 2022   • Aortic aneurysm     FOLLOWED BY GARCIA   • Arnold-Chiari syndrome    • Bleeding disorder     FREE BLUE DUGGAN TEST NEGATIVE   • Carpal tunnel syndrome     RIGHT   • Colon polyps    • Colon polyps    • COPD (chronic obstructive pulmonary disease)     NEBS/INHALER   • Dupuytren's contracture syndrome    • Dysphagia     ESOPHAGEAL STRICTURE   • Emphysema lung    • Fibromyalgia    • Hiatal hernia    • History of fractured vertebra     18 fractured/HEALING,    • History of pelvic fracture      HEALED   • Hypokalemia 2022   • Lung cancer     RIGHT   • Malignant neoplasm of middle lobe of right lung 2022   • Malignant neoplasm of overlapping sites of right lung 2022   • Neuropathy    • Osteoporosis    • Vertigo      Past Surgical History:   Procedure Laterality Date   • BRONCHOSCOPY N/A 2022    Procedure: BRONCHOSCOPY WITH ENDOBRONCHIAL ULTRASOUND AND FINE NEEDLE ASPIRATIONS;  Surgeon: Shaw Bonds DO;  Location: [facility] ENDOSCOPY;  Service: Pulmonary;  Laterality: N/A;  MEDIASTINAL ADENOPATHY,  LUNG NODULE   •  SECTION     • CHOLECYSTECTOMY     • COLONOSCOPY     • ENDOSCOPY     • HYSTERECTOMY     • KNEE ARTHROSCOPY     • ORTHOPEDIC SURGERY     • TONSILLECTOMY AND ADENOIDECTOMY     • UPPER GASTROINTESTINAL ENDOSCOPY     • VENOUS ACCESS DEVICE (PORT) INSERTION N/A 2022    Procedure: INSERTION OF PORTACATH;  Surgeon: Ishan Vargas MD;  [place] [facility] [place] [facility] [place]  Service: General;  Laterality: N/A;     Social History     Socioeconomic History   • Marital status:    Tobacco Use   • Smoking  status: Every Day     Packs/day: 0.50     Years: 30.00     Pack years: 15.00     Types: Cigarettes     Start date: 1979   • Smokeless tobacco: Never   • Tobacco comments:     WAS SMOKING TWO PACKS DAILY, HAS CUT DOWN TO 1 PACK A DAY     INST PER ANESTHESIA PROTOCOL   Vaping Use   • Vaping Use: Former   Substance and Sexual Activity   • Alcohol use: Never   • Drug use: Never   • Sexual activity: Defer     Family History   Problem Relation Age of Onset   • Lung cancer Mother    • Diabetes Sister    • Breast cancer Sister    • Malig Hyperthermia Neg Hx        Objective   Physical Exam  Vitals reviewed. Exam conducted with a chaperone present.   Constitutional:       General: She is not in acute distress.     Appearance: Normal appearance.   Cardiovascular:      Rate and Rhythm: Normal rate and regular rhythm.      Heart sounds: Normal heart sounds. No murmur heard.    No gallop.   Pulmonary:      Effort: Pulmonary effort is normal.      Breath sounds: Normal breath sounds.      Comments: Port-A-Cath  Abdominal:      General: Abdomen is flat. Bowel sounds are normal.      Palpations: Abdomen is soft.   Musculoskeletal:      Cervical back: Neck supple.      Right lower leg: No edema.      Left lower leg: No edema.      Comments: Right upper extremity without obvious cord   Lymphadenopathy:      Cervical: No cervical adenopathy.   Neurological:      Mental Status: She is alert and oriented to person, place, and time.   Psychiatric:         Mood and Affect: Mood normal.         Behavior: Behavior normal.         Vitals:    04/19/23 0921   BP: 118/70   Pulse: 114   Resp: 16   Temp: 99.5 °F (37.5 °C)   TempSrc: Temporal   SpO2: 98%   Weight: 52.5 kg (115 lb 11.9 oz)   PainSc:   5   PainLoc: Chest  Comment: port area     ECOG score: 2         PHQ-9 Total Score:                    Result Review :   The following data was reviewed by: Narayan Jonas MD on 04/19/2023:  Lab Results   Component Value Date    HGB 13.0 04/05/2023     HCT 38.4 04/05/2023    .8 (H) 04/05/2023     04/05/2023    WBC 8.08 04/05/2023    NEUTROABS 6.10 04/05/2023    LYMPHSABS 1.09 04/05/2023    MONOSABS 0.77 04/05/2023    EOSABS 0.09 04/05/2023    BASOSABS 0.02 04/05/2023     Lab Results   Component Value Date    GLUCOSE 151 (H) 04/05/2023    BUN 10 04/05/2023    CREATININE 0.53 (L) 04/05/2023     04/05/2023    K 3.3 (L) 04/05/2023     04/05/2023    CO2 23.6 04/05/2023    CALCIUM 9.8 04/05/2023    PROTEINTOT 7.0 04/05/2023    ALBUMIN 4.3 04/05/2023    BILITOT 0.4 04/05/2023    ALKPHOS 68 04/05/2023    AST 18 04/05/2023    ALT 14 04/05/2023     Lab Results   Component Value Date    MG 1.6 03/29/2023    FREET4 1.76 (H) 04/05/2023    TSH 1.240 04/05/2023     No results found for: IRON, LABIRON, TRANSFERRIN, TIBC  No results found for: LDH, FERRITIN, [id number], FOLATE  No results found for: PSA, CEA, AFP, ,           Assessment and Plan    Diagnoses and all orders for this visit:    1. Malignant neoplasm of overlapping sites of right lung (Primary)  Assessment & Plan:  Patient is on maintenance durvalumab.  She is due for next cycle however she is not feeling well and would like to defer.  Given issues with port I think is reasonable.  I will move her cycle to to next week.  She will have lab work at that time.  I will see her back for cycle 3-day 1 with lab work prior to monitor for toxicities.      2. Pain in anterior right upper extremity  Assessment & Plan:  Patient describes a new uncomfortable/full sensation in the right upper arm.  Nothing obviously abnormal on examination however I would be concerned about the possibility of DVT.  I will order Doppler ultrasound which she will have today.    Orders:  -     Cancel: US Venous Doppler Upper Extremity Right (duplex); Future  -     Duplex Venous Upper Extremity - Right CAR; Future    Other orders  -     apixaban (ELIQUIS) 5 MG tablet tablet; Take 2 tablets twice a day for 1 week.   Then begin 1 tablet twice daily.  Dispense: 84 tablet; Refill: 0      Addendum: Call received from vascular lab.  Right upper extremity positive for DVT.  Patient will be started on Eliquis 10 mg twice daily for 1 week, then 5 mg twice daily.  Prescription sent to pharmacy.  She will pick it up and start today.        Patient Follow Up: Cycle 3-day 1    Patient was given instructions and counseling regarding her condition or for health maintenance advice. Please see specific information pulled into the AVS if appropriate.     Narayan Jonas MD    4/19/2023

## 2023-04-25 ENCOUNTER — TELEPHONE (OUTPATIENT)
Dept: ONCOLOGY | Facility: HOSPITAL | Age: 63
End: 2023-04-25
Payer: MEDICARE

## 2023-04-25 NOTE — TELEPHONE ENCOUNTER
The pt called and states that her port still hurts. The pt states that she will not be coming in for tx tomorrow. When questioned the pt states that she is still taking her Eliquis as directed.     Note: The pt was dx with a DVT in her jugular and subclavian on 4/19/23.

## 2023-05-04 ENCOUNTER — OFFICE VISIT (OUTPATIENT)
Dept: PULMONOLOGY | Facility: CLINIC | Age: 63
End: 2023-05-04
Payer: MEDICARE

## 2023-05-04 VITALS
WEIGHT: 113 LBS | HEART RATE: 94 BPM | OXYGEN SATURATION: 96 % | TEMPERATURE: 98.7 F | HEIGHT: 65 IN | SYSTOLIC BLOOD PRESSURE: 114 MMHG | DIASTOLIC BLOOD PRESSURE: 76 MMHG | BODY MASS INDEX: 18.83 KG/M2 | RESPIRATION RATE: 17 BRPM

## 2023-05-04 DIAGNOSIS — J43.2 CENTRILOBULAR EMPHYSEMA: Primary | ICD-10-CM

## 2023-05-04 DIAGNOSIS — C34.81 MALIGNANT NEOPLASM OF OVERLAPPING SITES OF RIGHT LUNG: ICD-10-CM

## 2023-05-05 NOTE — PROGRESS NOTES
"Chief Complaint  Malignant neoplasm of overlapping sites of right lung , Centrilobular emphysema , COPD, and Follow-up (3 Month )    Subjective        Theresa M Gabehart presents to [facility]  History of Present Illness  Follow-up lung cancer  Has undergone treatment  Still receiving immunotherapy  [place] about stopping immunotherapy  Complains of no shortness of breath  Has difficulties with ambulation due to recent back fracture  Objective   Vital Signs:  /76 (BP Location: Right arm, Patient Position: Sitting, Cuff Size: Small Adult)   Pulse 94   Temp 98.7 °F (37.1 °C) (Temporal)   Resp 17   Ht 165.1 cm (65\")   Wt 51.3 kg (113 lb)   SpO2 96% Comment: room air  BMI 18.80 kg/m²   Estimated body mass index is 18.8 kg/m² as calculated from the following:    Height as of this encounter: 165.1 cm (65\").    Weight as of this encounter: 51.3 kg (113 lb).       BMI is within normal parameters. No other follow-up for BMI required.      Physical Exam   Chronically ill-appearing female  Lungs are clear no wheezes  Alert and oriented x3    Result Review :                   Assessment and Plan   Diagnoses and all orders for this visit:    1. Centrilobular emphysema (Primary)  Assessment & Plan:  Continue as needed albuterol  No evidence of COPD on PFTs but does have emphysema which is nonobstructive  Continue as needed DuoNebs        2. Malignant neoplasm of overlapping sites of right lung  Assessment & Plan:  Oncology following currently on Imfinzi             Follow Up   Return in about 6 months (around 11/4/2023).  Patient was given instructions and counseling regarding her condition or for health maintenance advice. Please see specific information pulled into the AVS if appropriate.       "

## 2023-05-05 NOTE — ASSESSMENT & PLAN NOTE
Continue as needed albuterol  No evidence of COPD on PFTs but does have emphysema which is nonobstructive  Continue as needed Jackie

## 2023-05-08 ENCOUNTER — TELEPHONE (OUTPATIENT)
Dept: ONCOLOGY | Facility: HOSPITAL | Age: 63
End: 2023-05-08

## 2023-05-08 NOTE — TELEPHONE ENCOUNTER
Caller: Gabehart, Theresa M    Relationship: Self    Best call back number: [phone]    What is the best time to reach you: ANYTIME    Who are you requesting to speak with (clinical staff, provider,  specific staff member): SCHEDULING    What was the call regarding: PT REQUESTING TO CANCEL APPOINTMENTS FOR THE NEXT MONTH, SHE IS STILL RECOVERING FROM BREAKING HER SPINE AND IS IN BED   PLEASE CANCEL 5-10 AND 5-24    Do you require a callback: YES

## 2023-07-20 PROBLEM — I31.39 PERICARDIAL EFFUSION: Status: ACTIVE | Noted: 2023-07-20

## 2023-07-20 PROBLEM — G93.5 CHIARI I MALFORMATION: Status: ACTIVE | Noted: 2023-07-20

## 2023-07-20 PROBLEM — J90 PLEURAL EFFUSION: Status: ACTIVE | Noted: 2023-07-20

## 2023-07-20 PROBLEM — F17.210 SMOKES WITH GREATER THAN 40 PACK YEAR HISTORY: Status: ACTIVE | Noted: 2023-07-20

## 2023-07-20 PROBLEM — D72.829 LEUKOCYTOSIS: Status: ACTIVE | Noted: 2023-07-20

## 2023-07-25 ENCOUNTER — TELEPHONE (OUTPATIENT)
Dept: ONCOLOGY | Facility: HOSPITAL | Age: 63
End: 2023-07-25
Payer: MEDICARE

## 2023-07-25 NOTE — TELEPHONE ENCOUNTER
The pt called and c/o severe diarrhea since Saturday, 7/22/23, and weight loss. The pt was started on Doxycycline on 7/20/23. When questioned the pt states that she has only taken Peptobysmol for her D. When questioned the pt states that she stopped taking the antibiotic after the 5th pill due to D. When questioned the pt states that she is drinking a lot of water and Body Armor. This nurse instructed the pt to continue to force PO fluids, take her daily probiotic, and take 2 Imodium tabs upon her next lose stool and 1 tabs with each stool thereafter up to 8 tabs a day. This nurse instructed the pt to call this nurse back if her D does not improve or worsens. The pt voices understanding.

## 2023-07-27 ENCOUNTER — APPOINTMENT (OUTPATIENT)
Dept: GENERAL RADIOLOGY | Facility: HOSPITAL | Age: 63
DRG: 871 | End: 2023-07-27
Payer: MEDICARE

## 2023-07-27 ENCOUNTER — APPOINTMENT (OUTPATIENT)
Dept: CT IMAGING | Facility: HOSPITAL | Age: 63
DRG: 871 | End: 2023-07-27
Payer: MEDICARE

## 2023-07-27 ENCOUNTER — HOSPITAL ENCOUNTER (INPATIENT)
Facility: HOSPITAL | Age: 63
LOS: 8 days | Discharge: HOME HEALTH | DRG: 871 | End: 2023-08-04
Attending: EMERGENCY MEDICINE | Admitting: HOSPITALIST
Payer: MEDICARE

## 2023-07-27 DIAGNOSIS — J90 PLEURAL EFFUSION: ICD-10-CM

## 2023-07-27 DIAGNOSIS — K52.9 COLITIS: ICD-10-CM

## 2023-07-27 DIAGNOSIS — C34.31 MALIGNANT NEOPLASM OF LOWER LOBE OF RIGHT LUNG: Primary | ICD-10-CM

## 2023-07-27 DIAGNOSIS — R26.2 DIFFICULTY WALKING: ICD-10-CM

## 2023-07-27 DIAGNOSIS — R13.10 DYSPHAGIA, UNSPECIFIED TYPE: ICD-10-CM

## 2023-07-27 DIAGNOSIS — R91.8 LUNG MASS: ICD-10-CM

## 2023-07-27 DIAGNOSIS — M54.50 ACUTE LOW BACK PAIN, UNSPECIFIED BACK PAIN LATERALITY, UNSPECIFIED WHETHER SCIATICA PRESENT: ICD-10-CM

## 2023-07-27 DIAGNOSIS — J44.9 CHRONIC OBSTRUCTIVE PULMONARY DISEASE, UNSPECIFIED COPD TYPE: ICD-10-CM

## 2023-07-27 LAB
ALBUMIN SERPL-MCNC: 3 G/DL (ref 3.5–5.2)
ALBUMIN/GLOB SERPL: 0.9 G/DL
ALP SERPL-CCNC: 48 U/L (ref 39–117)
ALT SERPL W P-5'-P-CCNC: 10 U/L (ref 1–33)
ANION GAP SERPL CALCULATED.3IONS-SCNC: 16 MMOL/L (ref 5–15)
APPEARANCE FLD: CLEAR
AST SERPL-CCNC: 10 U/L (ref 1–32)
B PARAPERT DNA SPEC QL NAA+PROBE: NOT DETECTED
B PERT DNA SPEC QL NAA+PROBE: NOT DETECTED
BASOPHILS # BLD AUTO: 0.07 10*3/MM3 (ref 0–0.2)
BASOPHILS NFR BLD AUTO: 0.5 % (ref 0–1.5)
BILIRUB SERPL-MCNC: 0.3 MG/DL (ref 0–1.2)
BUN SERPL-MCNC: 8 MG/DL (ref 8–23)
BUN/CREAT SERPL: 17.8 (ref 7–25)
C PNEUM DNA NPH QL NAA+NON-PROBE: NOT DETECTED
CALCIUM SPEC-SCNC: 8.9 MG/DL (ref 8.6–10.5)
CHLORIDE SERPL-SCNC: 93 MMOL/L (ref 98–107)
CO2 SERPL-SCNC: 23 MMOL/L (ref 22–29)
COLOR FLD: YELLOW
CREAT SERPL-MCNC: 0.45 MG/DL (ref 0.57–1)
D-LACTATE SERPL-SCNC: 1.1 MMOL/L (ref 0.5–2)
DEPRECATED RDW RBC AUTO: 43.9 FL (ref 37–54)
EGFRCR SERPLBLD CKD-EPI 2021: 109.6 ML/MIN/1.73
EOSINOPHIL # BLD AUTO: 0.23 10*3/MM3 (ref 0–0.4)
EOSINOPHIL NFR BLD AUTO: 1.7 % (ref 0.3–6.2)
EOSINOPHIL NFR FLD MANUAL: 3 %
ERYTHROCYTE [DISTWIDTH] IN BLOOD BY AUTOMATED COUNT: 13.6 % (ref 12.3–15.4)
FLUAV SUBTYP SPEC NAA+PROBE: NOT DETECTED
FLUBV RNA ISLT QL NAA+PROBE: NOT DETECTED
GLOBULIN UR ELPH-MCNC: 3.3 GM/DL
GLUCOSE SERPL-MCNC: 122 MG/DL (ref 65–99)
HADV DNA SPEC NAA+PROBE: NOT DETECTED
HCOV 229E RNA SPEC QL NAA+PROBE: NOT DETECTED
HCOV HKU1 RNA SPEC QL NAA+PROBE: NOT DETECTED
HCOV NL63 RNA SPEC QL NAA+PROBE: NOT DETECTED
HCOV OC43 RNA SPEC QL NAA+PROBE: NOT DETECTED
HCT VFR BLD AUTO: 40.2 % (ref 34–46.6)
HEMOCCULT STL QL IA: POSITIVE
HGB BLD-MCNC: 14.1 G/DL (ref 12–15.9)
HMPV RNA NPH QL NAA+NON-PROBE: NOT DETECTED
HOLD SPECIMEN: NORMAL
HOLD SPECIMEN: NORMAL
HPIV1 RNA ISLT QL NAA+PROBE: NOT DETECTED
HPIV2 RNA SPEC QL NAA+PROBE: NOT DETECTED
HPIV3 RNA NPH QL NAA+PROBE: NOT DETECTED
HPIV4 P GENE NPH QL NAA+PROBE: NOT DETECTED
IMM GRANULOCYTES # BLD AUTO: 0.06 10*3/MM3 (ref 0–0.05)
IMM GRANULOCYTES NFR BLD AUTO: 0.4 % (ref 0–0.5)
L PNEUMO1 AG UR QL IA: NEGATIVE
LDH FLD-CCNC: 120 U/L
LYMPHOCYTES # BLD AUTO: 0.69 10*3/MM3 (ref 0.7–3.1)
LYMPHOCYTES NFR BLD AUTO: 5 % (ref 19.6–45.3)
LYMPHOCYTES NFR FLD MANUAL: 38 %
M PNEUMO IGG SER IA-ACNC: NOT DETECTED
MACROPHAGE FLUID: 11 %
MAGNESIUM SERPL-MCNC: 1.7 MG/DL (ref 1.6–2.4)
MCH RBC QN AUTO: 30.9 PG (ref 26.6–33)
MCHC RBC AUTO-ENTMCNC: 35.1 G/DL (ref 31.5–35.7)
MCV RBC AUTO: 88 FL (ref 79–97)
MONOCYTES # BLD AUTO: 1.42 10*3/MM3 (ref 0.1–0.9)
MONOCYTES NFR BLD AUTO: 10.3 % (ref 5–12)
MONOCYTES NFR FLD: 5 %
MRSA DNA SPEC QL NAA+PROBE: NORMAL
NEUTROPHILS NFR BLD AUTO: 11.33 10*3/MM3 (ref 1.7–7)
NEUTROPHILS NFR BLD AUTO: 82.1 % (ref 42.7–76)
NEUTROPHILS NFR FLD MANUAL: 43 %
NRBC BLD AUTO-RTO: 0 /100 WBC (ref 0–0.2)
NT-PROBNP SERPL-MCNC: 137.8 PG/ML (ref 0–900)
NUC CELL # FLD: 462 /MM3
PHOSPHATE SERPL-MCNC: 3.7 MG/DL (ref 2.5–4.5)
PLATELET # BLD AUTO: 422 10*3/MM3 (ref 140–450)
PMV BLD AUTO: 8.4 FL (ref 6–12)
POTASSIUM SERPL-SCNC: 3.1 MMOL/L (ref 3.5–5.2)
PROCALCITONIN SERPL-MCNC: 0.13 NG/ML (ref 0–0.25)
PROT SERPL-MCNC: 6.3 G/DL (ref 6–8.5)
RBC # BLD AUTO: 4.57 10*6/MM3 (ref 3.77–5.28)
RBC # FLD AUTO: <2000 /MM3
RHINOVIRUS RNA SPEC NAA+PROBE: NOT DETECTED
RSV RNA NPH QL NAA+NON-PROBE: NOT DETECTED
S PNEUM AG SPEC QL LA: NEGATIVE
SARS-COV-2 RNA RESP QL NAA+PROBE: NOT DETECTED
SODIUM SERPL-SCNC: 132 MMOL/L (ref 136–145)
TROPONIN T SERPL HS-MCNC: 11 NG/L
WBC NRBC COR # BLD: 13.8 10*3/MM3 (ref 3.4–10.8)
WHOLE BLOOD HOLD COAG: NORMAL
WHOLE BLOOD HOLD SPECIMEN: NORMAL

## 2023-07-27 PROCEDURE — 87070 CULTURE OTHR SPECIMN AEROBIC: CPT | Performed by: HOSPITALIST

## 2023-07-27 PROCEDURE — 94640 AIRWAY INHALATION TREATMENT: CPT

## 2023-07-27 PROCEDURE — 94799 UNLISTED PULMONARY SVC/PX: CPT

## 2023-07-27 PROCEDURE — 88305 TISSUE EXAM BY PATHOLOGIST: CPT | Performed by: INTERNAL MEDICINE

## 2023-07-27 PROCEDURE — 99223 1ST HOSP IP/OBS HIGH 75: CPT | Performed by: HOSPITALIST

## 2023-07-27 PROCEDURE — 0W993ZZ DRAINAGE OF RIGHT PLEURAL CAVITY, PERCUTANEOUS APPROACH: ICD-10-PCS | Performed by: INTERNAL MEDICINE

## 2023-07-27 PROCEDURE — 99285 EMERGENCY DEPT VISIT HI MDM: CPT

## 2023-07-27 PROCEDURE — 87205 SMEAR GRAM STAIN: CPT | Performed by: HOSPITALIST

## 2023-07-27 PROCEDURE — 93005 ELECTROCARDIOGRAM TRACING: CPT

## 2023-07-27 PROCEDURE — 25510000001 IOPAMIDOL PER 1 ML: Performed by: EMERGENCY MEDICINE

## 2023-07-27 PROCEDURE — 82945 GLUCOSE OTHER FLUID: CPT | Performed by: INTERNAL MEDICINE

## 2023-07-27 PROCEDURE — 83605 ASSAY OF LACTIC ACID: CPT | Performed by: EMERGENCY MEDICINE

## 2023-07-27 PROCEDURE — 25010000002 MAGNESIUM SULFATE 2 GM/50ML SOLUTION: Performed by: FAMILY MEDICINE

## 2023-07-27 PROCEDURE — 84157 ASSAY OF PROTEIN OTHER: CPT | Performed by: INTERNAL MEDICINE

## 2023-07-27 PROCEDURE — 83986 ASSAY PH BODY FLUID NOS: CPT | Performed by: INTERNAL MEDICINE

## 2023-07-27 PROCEDURE — 87040 BLOOD CULTURE FOR BACTERIA: CPT | Performed by: EMERGENCY MEDICINE

## 2023-07-27 PROCEDURE — 84100 ASSAY OF PHOSPHORUS: CPT | Performed by: HOSPITALIST

## 2023-07-27 PROCEDURE — 85025 COMPLETE CBC W/AUTO DIFF WBC: CPT

## 2023-07-27 PROCEDURE — 93010 ELECTROCARDIOGRAM REPORT: CPT | Performed by: INTERNAL MEDICINE

## 2023-07-27 PROCEDURE — 99291 CRITICAL CARE FIRST HOUR: CPT | Performed by: STUDENT IN AN ORGANIZED HEALTH CARE EDUCATION/TRAINING PROGRAM

## 2023-07-27 PROCEDURE — 0202U NFCT DS 22 TRGT SARS-COV-2: CPT | Performed by: HOSPITALIST

## 2023-07-27 PROCEDURE — 76604 US EXAM CHEST: CPT | Performed by: INTERNAL MEDICINE

## 2023-07-27 PROCEDURE — 83880 ASSAY OF NATRIURETIC PEPTIDE: CPT

## 2023-07-27 PROCEDURE — 74177 CT ABD & PELVIS W/CONTRAST: CPT

## 2023-07-27 PROCEDURE — 82042 OTHER SOURCE ALBUMIN QUAN EA: CPT | Performed by: INTERNAL MEDICINE

## 2023-07-27 PROCEDURE — 87015 SPECIMEN INFECT AGNT CONCNTJ: CPT | Performed by: INTERNAL MEDICINE

## 2023-07-27 PROCEDURE — 93005 ELECTROCARDIOGRAM TRACING: CPT | Performed by: EMERGENCY MEDICINE

## 2023-07-27 PROCEDURE — 87206 SMEAR FLUORESCENT/ACID STAI: CPT | Performed by: INTERNAL MEDICINE

## 2023-07-27 PROCEDURE — 87641 MR-STAPH DNA AMP PROBE: CPT | Performed by: HOSPITALIST

## 2023-07-27 PROCEDURE — 84145 PROCALCITONIN (PCT): CPT | Performed by: HOSPITALIST

## 2023-07-27 PROCEDURE — 80053 COMPREHEN METABOLIC PANEL: CPT

## 2023-07-27 PROCEDURE — 88341 IMHCHEM/IMCYTCHM EA ADD ANTB: CPT | Performed by: INTERNAL MEDICINE

## 2023-07-27 PROCEDURE — 88342 IMHCHEM/IMCYTCHM 1ST ANTB: CPT | Performed by: INTERNAL MEDICINE

## 2023-07-27 PROCEDURE — 89051 BODY FLUID CELL COUNT: CPT | Performed by: INTERNAL MEDICINE

## 2023-07-27 PROCEDURE — 25010000002 VANCOMYCIN 5 G RECONSTITUTED SOLUTION: Performed by: HOSPITALIST

## 2023-07-27 PROCEDURE — 25010000002 PIPERACILLIN SOD-TAZOBACTAM PER 1 G: Performed by: EMERGENCY MEDICINE

## 2023-07-27 PROCEDURE — 71045 X-RAY EXAM CHEST 1 VIEW: CPT

## 2023-07-27 PROCEDURE — 87205 SMEAR GRAM STAIN: CPT | Performed by: INTERNAL MEDICINE

## 2023-07-27 PROCEDURE — 87116 MYCOBACTERIA CULTURE: CPT | Performed by: INTERNAL MEDICINE

## 2023-07-27 PROCEDURE — 87102 FUNGUS ISOLATION CULTURE: CPT | Performed by: INTERNAL MEDICINE

## 2023-07-27 PROCEDURE — 87070 CULTURE OTHR SPECIMN AEROBIC: CPT | Performed by: INTERNAL MEDICINE

## 2023-07-27 PROCEDURE — 82274 ASSAY TEST FOR BLOOD FECAL: CPT | Performed by: EMERGENCY MEDICINE

## 2023-07-27 PROCEDURE — 84484 ASSAY OF TROPONIN QUANT: CPT

## 2023-07-27 PROCEDURE — 99223 1ST HOSP IP/OBS HIGH 75: CPT | Performed by: INTERNAL MEDICINE

## 2023-07-27 PROCEDURE — 94761 N-INVAS EAR/PLS OXIMETRY MLT: CPT

## 2023-07-27 PROCEDURE — 32555 ASPIRATE PLEURA W/ IMAGING: CPT | Performed by: INTERNAL MEDICINE

## 2023-07-27 PROCEDURE — 88108 CYTOPATH CONCENTRATE TECH: CPT | Performed by: INTERNAL MEDICINE

## 2023-07-27 PROCEDURE — 83735 ASSAY OF MAGNESIUM: CPT | Performed by: HOSPITALIST

## 2023-07-27 PROCEDURE — 94664 DEMO&/EVAL PT USE INHALER: CPT

## 2023-07-27 PROCEDURE — 83615 LACTATE (LD) (LDH) ENZYME: CPT | Performed by: INTERNAL MEDICINE

## 2023-07-27 PROCEDURE — 87449 NOS EACH ORGANISM AG IA: CPT | Performed by: HOSPITALIST

## 2023-07-27 PROCEDURE — 87075 CULTR BACTERIA EXCEPT BLOOD: CPT | Performed by: INTERNAL MEDICINE

## 2023-07-27 RX ORDER — ONDANSETRON 4 MG/1
4 TABLET, FILM COATED ORAL EVERY 6 HOURS PRN
Status: DISCONTINUED | OUTPATIENT
Start: 2023-07-27 | End: 2023-08-04 | Stop reason: HOSPADM

## 2023-07-27 RX ORDER — POLYETHYLENE GLYCOL 3350 17 G/17G
17 POWDER, FOR SOLUTION ORAL DAILY PRN
Status: DISCONTINUED | OUTPATIENT
Start: 2023-07-27 | End: 2023-07-28

## 2023-07-27 RX ORDER — SODIUM CHLORIDE 0.9 % (FLUSH) 0.9 %
10 SYRINGE (ML) INJECTION AS NEEDED
Status: DISCONTINUED | OUTPATIENT
Start: 2023-07-27 | End: 2023-07-27

## 2023-07-27 RX ORDER — BISACODYL 10 MG
10 SUPPOSITORY, RECTAL RECTAL DAILY PRN
Status: DISCONTINUED | OUTPATIENT
Start: 2023-07-27 | End: 2023-07-28

## 2023-07-27 RX ORDER — BUDESONIDE 0.5 MG/2ML
0.5 INHALANT ORAL
Status: DISCONTINUED | OUTPATIENT
Start: 2023-07-27 | End: 2023-08-04 | Stop reason: HOSPADM

## 2023-07-27 RX ORDER — SODIUM CHLORIDE 0.9 % (FLUSH) 0.9 %
10 SYRINGE (ML) INJECTION AS NEEDED
Status: DISCONTINUED | OUTPATIENT
Start: 2023-07-27 | End: 2023-08-04 | Stop reason: HOSPADM

## 2023-07-27 RX ORDER — PANTOPRAZOLE SODIUM 40 MG/10ML
40 INJECTION, POWDER, LYOPHILIZED, FOR SOLUTION INTRAVENOUS ONCE
Status: DISCONTINUED | OUTPATIENT
Start: 2023-07-27 | End: 2023-07-28

## 2023-07-27 RX ORDER — POTASSIUM CHLORIDE 750 MG/1
40 CAPSULE, EXTENDED RELEASE ORAL ONCE
Status: COMPLETED | OUTPATIENT
Start: 2023-07-27 | End: 2023-07-27

## 2023-07-27 RX ORDER — BISACODYL 5 MG/1
5 TABLET, DELAYED RELEASE ORAL DAILY PRN
Status: DISCONTINUED | OUTPATIENT
Start: 2023-07-27 | End: 2023-07-28

## 2023-07-27 RX ORDER — SACCHAROMYCES BOULARDII 250 MG
500 CAPSULE ORAL 2 TIMES DAILY
Status: DISCONTINUED | OUTPATIENT
Start: 2023-07-27 | End: 2023-08-02

## 2023-07-27 RX ORDER — SODIUM CHLORIDE 9 MG/ML
40 INJECTION, SOLUTION INTRAVENOUS AS NEEDED
Status: DISCONTINUED | OUTPATIENT
Start: 2023-07-27 | End: 2023-08-04 | Stop reason: HOSPADM

## 2023-07-27 RX ORDER — BACLOFEN 10 MG/1
10 TABLET ORAL EVERY 12 HOURS SCHEDULED
Status: DISCONTINUED | OUTPATIENT
Start: 2023-07-27 | End: 2023-07-28

## 2023-07-27 RX ORDER — AMOXICILLIN 250 MG
2 CAPSULE ORAL 2 TIMES DAILY
Status: DISCONTINUED | OUTPATIENT
Start: 2023-07-27 | End: 2023-07-28

## 2023-07-27 RX ORDER — HYDROCODONE BITARTRATE AND ACETAMINOPHEN 7.5; 325 MG/1; MG/1
1 TABLET ORAL EVERY 6 HOURS PRN
Status: ON HOLD | COMMUNITY
Start: 2023-07-20 | End: 2023-08-04 | Stop reason: SDUPTHER

## 2023-07-27 RX ORDER — IPRATROPIUM BROMIDE AND ALBUTEROL SULFATE 2.5; .5 MG/3ML; MG/3ML
3 SOLUTION RESPIRATORY (INHALATION)
Status: DISCONTINUED | OUTPATIENT
Start: 2023-07-27 | End: 2023-07-30

## 2023-07-27 RX ORDER — PANTOPRAZOLE SODIUM 40 MG/1
40 TABLET, DELAYED RELEASE ORAL
Status: DISCONTINUED | OUTPATIENT
Start: 2023-07-28 | End: 2023-08-04 | Stop reason: HOSPADM

## 2023-07-27 RX ORDER — NICOTINE 21 MG/24HR
1 PATCH, TRANSDERMAL 24 HOURS TRANSDERMAL
Status: DISCONTINUED | OUTPATIENT
Start: 2023-07-27 | End: 2023-08-04 | Stop reason: HOSPADM

## 2023-07-27 RX ORDER — SODIUM CHLORIDE 0.9 % (FLUSH) 0.9 %
10 SYRINGE (ML) INJECTION EVERY 12 HOURS SCHEDULED
Status: DISCONTINUED | OUTPATIENT
Start: 2023-07-27 | End: 2023-08-04 | Stop reason: HOSPADM

## 2023-07-27 RX ORDER — MAGNESIUM SULFATE HEPTAHYDRATE 40 MG/ML
2 INJECTION, SOLUTION INTRAVENOUS ONCE
Status: COMPLETED | OUTPATIENT
Start: 2023-07-27 | End: 2023-07-27

## 2023-07-27 RX ORDER — NOREPINEPHRINE BITARTRATE 0.03 MG/ML
.02-.3 INJECTION, SOLUTION INTRAVENOUS
Status: DISCONTINUED | OUTPATIENT
Start: 2023-07-27 | End: 2023-07-28

## 2023-07-27 RX ORDER — ERGOCALCIFEROL 1.25 MG/1
50000 CAPSULE ORAL
Status: DISCONTINUED | OUTPATIENT
Start: 2023-07-28 | End: 2023-08-04 | Stop reason: HOSPADM

## 2023-07-27 RX ORDER — ZINC SULFATE 50(220)MG
220 CAPSULE ORAL DAILY
Status: DISCONTINUED | OUTPATIENT
Start: 2023-07-27 | End: 2023-08-04 | Stop reason: HOSPADM

## 2023-07-27 RX ORDER — HYDROCODONE BITARTRATE AND ACETAMINOPHEN 7.5; 325 MG/1; MG/1
1 TABLET ORAL EVERY 6 HOURS PRN
Status: DISCONTINUED | OUTPATIENT
Start: 2023-07-27 | End: 2023-08-04 | Stop reason: HOSPADM

## 2023-07-27 RX ORDER — ARFORMOTEROL TARTRATE 15 UG/2ML
15 SOLUTION RESPIRATORY (INHALATION)
Status: DISCONTINUED | OUTPATIENT
Start: 2023-07-27 | End: 2023-08-04 | Stop reason: HOSPADM

## 2023-07-27 RX ORDER — ONDANSETRON 2 MG/ML
4 INJECTION INTRAMUSCULAR; INTRAVENOUS EVERY 6 HOURS PRN
Status: DISCONTINUED | OUTPATIENT
Start: 2023-07-27 | End: 2023-08-04 | Stop reason: HOSPADM

## 2023-07-27 RX ADMIN — HYDROCODONE BITARTRATE AND ACETAMINOPHEN 1 TABLET: 7.5; 325 TABLET ORAL at 18:55

## 2023-07-27 RX ADMIN — BUDESONIDE 0.5 MG: 0.5 SUSPENSION RESPIRATORY (INHALATION) at 19:12

## 2023-07-27 RX ADMIN — SODIUM CHLORIDE 40 ML: 9 INJECTION, SOLUTION INTRAVENOUS at 18:49

## 2023-07-27 RX ADMIN — PIPERACILLIN AND TAZOBACTAM 3.38 G: 3; .375 INJECTION, POWDER, LYOPHILIZED, FOR SOLUTION INTRAVENOUS at 20:03

## 2023-07-27 RX ADMIN — SODIUM CHLORIDE 1000 ML: 9 INJECTION, SOLUTION INTRAVENOUS at 12:59

## 2023-07-27 RX ADMIN — Medication 10 ML: at 20:04

## 2023-07-27 RX ADMIN — Medication 500 MG: at 20:04

## 2023-07-27 RX ADMIN — POTASSIUM CHLORIDE 40 MEQ: 750 CAPSULE, EXTENDED RELEASE ORAL at 12:59

## 2023-07-27 RX ADMIN — IPRATROPIUM BROMIDE AND ALBUTEROL SULFATE 3 ML: .5; 3 SOLUTION RESPIRATORY (INHALATION) at 16:46

## 2023-07-27 RX ADMIN — IOPAMIDOL 100 ML: 755 INJECTION, SOLUTION INTRAVENOUS at 12:45

## 2023-07-27 RX ADMIN — MAGNESIUM SULFATE IN WATER 2 G: 40 INJECTION, SOLUTION INTRAVENOUS at 20:40

## 2023-07-27 RX ADMIN — VANCOMYCIN HYDROCHLORIDE 1000 MG: 5 INJECTION, POWDER, LYOPHILIZED, FOR SOLUTION INTRAVENOUS at 18:50

## 2023-07-27 RX ADMIN — ZINC SULFATE 220 MG (50 MG) CAPSULE 220 MG: CAPSULE at 20:04

## 2023-07-27 RX ADMIN — ARFORMOTEROL TARTRATE 15 MCG: 15 SOLUTION RESPIRATORY (INHALATION) at 19:12

## 2023-07-27 NOTE — PROCEDURES
Bedside thoracic ultrasound:    Left showed B lines, curtain sign seen.    Right side showed right effusion with anechoic space between lung and diaphragm.     Site marked for thoracentesis.    Images stored online.

## 2023-07-27 NOTE — CONSULTS
Pulmonary / Critical Care Consult Note      Patient Name: Theresa M Gabehart  : 1960  MRN: [id number]  Primary Care Physician:  Carrie Castelan APRN  Referring Physician: Tyler Thorne DO  Date of admission: 2023    Subjective   Subjective     Reason for Consult/ Chief Complaint:   Resistant hypotension    HPI:  Theresa M Gabehart is a 62 y.o. female with history of adenocarcinoma of lung, stage IIIa, diagnosed 2022 status post 4 rounds of chemotherapy and 30 rounds of XRT presented to the ED with abdominal pain.  Of note she was supposed to see us for pleural effusion but has not been able to do so outpatient.  Abdominal CT showed colitis and moderate right-sided pleural effusion.  Pulmonary service was initially consulted for management of her pleural effusion.  She had a thoracentesis done today with 900 cc output.  She verbalized cough without much sputum production.  Since admission systolic blood pressure has been in the 80s  and diastolic  50s to 60s.  She was initiated on broad-spectrum antibiotics with vancomycin and PIP/Tazo.  Thus far respiratory viral panel has been negative.  Blood culture has been drawn and pending.  For her hypotension, she was given 2 or 3L bolus normal saline without significant response.  She was transferred to ICU to start norepinephrine.    Review of Systems  Constitutional symptoms:  +weight loss; Denied complaints   Ear, nose, throat: Denied complaints  Cardiovascular:  Denied complaints  Respiratory: +cough, sputum; +dyspnea; Denied complaints  Gastrointestinal: +abdominal pain; Denied complaints  Musculoskeletal: Denied complaints  Genitourinary: Denied complaints  Allergy / Immunology: Denied complaints  Hematologic: Denied complaints  Neurologic: Denied complaints  Skin: Denied complaints  Endocrine: Denied complaints  Psychiatric: Denied complaints      Personal History     Past Medical History:   Diagnosis Date    Allergic rhinitis     Anemia due to  chemotherapy 2022    Aortic aneurysm     FOLLOWED BY GARCIA    Arnold-Chiari syndrome     Bleeding disorder     FREE BLUE DUGGAN TEST NEGATIVE    Carpal tunnel syndrome     RIGHT    Colon polyps     Colon polyps     COPD (chronic obstructive pulmonary disease)     NEBS/INHALER    Dupuytren's contracture syndrome     Dysphagia     ESOPHAGEAL STRICTURE    Emphysema lung     Fibromyalgia     Hiatal hernia     History of fractured vertebra     18 fractured/HEALING,     History of pelvic fracture      HEALED    Hypokalemia 2022    Lung cancer     RIGHT    Malignant neoplasm of middle lobe of right lung 2022    Malignant neoplasm of overlapping sites of right lung 2022    Neuropathy     Osteoporosis     Vertigo        Past Surgical History:   Procedure Laterality Date    BRONCHOSCOPY N/A 2022    Procedure: BRONCHOSCOPY WITH ENDOBRONCHIAL ULTRASOUND AND FINE NEEDLE ASPIRATIONS;  Surgeon: Shaw Bonds DO;  Location: [facility] ENDOSCOPY;  Service: Pulmonary;  Laterality: N/A;  MEDIASTINAL ADENOPATHY,  LUNG NODULE     SECTION      CHOLECYSTECTOMY      COLONOSCOPY      ENDOSCOPY      HYSTERECTOMY      KNEE ARTHROSCOPY      ORTHOPEDIC SURGERY      TONSILLECTOMY AND ADENOIDECTOMY      UPPER GASTROINTESTINAL ENDOSCOPY      VENOUS ACCESS DEVICE (PORT) INSERTION N/A 2022    Procedure: INSERTION OF PORTACATH;  Surgeon: Ishan Vargas MD;  [place] [facility] [place] [facility] [place]  Service: General;  Laterality: N/A;       Family History: family history includes Breast cancer in her sister; Diabetes in her sister; Lung cancer in her mother. Otherwise pertinent FHx was reviewed and not pertinent to current issue.    Social History:  reports that she has been smoking cigarettes. She started smoking about 44 years ago. She has a 15.00 pack-year smoking history. She has never used smokeless tobacco. She reports that she does not drink alcohol and does not use drugs.    Home  Medications:  HYDROcodone-acetaminophen, L-Lysine, Probiotic Product, Zinc Sulfate, albuterol sulfate HFA, baclofen, calcium, doxycycline, ipratropium-albuterol, tiotropium bromide monohydrate, and vitamin D    Allergies:  Allergies   Allergen Reactions    Clarithromycin Rash    Metronidazole Unknown - High Severity    Doxycycline Calcium Rash    Gabapentin Mental Status Change       Objective    Objective     Vitals:   Temp:  [98.6 øF (37 øC)] 98.6 øF (37 øC)  Heart Rate:  [] 115  Resp:  [20] 20  BP: ()/() 92/67    Physical Exam:  Vital Signs Reviewed   General:  WDWN, Alert, NAD. Chronically ill appearing female, lying in bed  HEENT:  PERRL, EOMI.  OP, nares clear  Neck:  Supple, no JVD, no thyromegaly  Chest:  good aeration, clear to auscultation bilaterally, tympanic to percussion bilaterally, no work of breathing noted on room air  CV: RRR, no MGR, pulses 2+, equal.  Abd:  Soft, NT, ND, + BS, no HSM  EXT:  no clubbing, no cyanosis, no edema  Neuro:  A&Ox3, CN grossly intact, no focal deficits.  Skin: No rashes or lesions noted    Result Review    Result Review:  I have personally reviewed the results from the time of this admission to 7/27/2023 16:31 EDT and agree with these findings:  []  Laboratory  []  Microbiology  []  Radiology  []  EKG/Telemetry   []  Cardiology/Vascular   []  Pathology  []  Old records  []  Other:  Most notable findings include:   Assessment & Plan   Assessment / Plan     Active Hospital Problems:  Active Hospital Problems    Diagnosis     **Colitis        Impression:  Septic shock  Recently diagnosed adenocarcinoma of the lung, stage IIIa status post chemo, XRT  Bilateral pleural effusion status post thoracentesis 7/27  Colitis  Hyponatremia  Hypokalemia  History of tobacco use    Plan:  -Can start norepinephrine to keep MAP greater than 65  -check LA and procalc  -Status post thoracentesis 7/27 w/ 900cc output today; cultures and cytology pending  -Pending post  procedure CXR  -2D echo ordered  -Continue broad-spectrum antibiotic for now with Vanco and PIP/Tazo; follow cultures and de-escalate when appropriate  -Resp viral panel negative; will check MRSA nares; follow sputum cultures  -Cont aspiration precautions. Keep HOB 30 deg.   -Replace electrolytes PRN to keep K 4.0, Mag 2.0, Phos 4.0.  -Keep glucose 140-180 while in ICU. Cont SSI.  -Transfuse to keep Hgb >7  -Continue PPI  -Continue nebs and bronchopulmonary hygiene  -Consider oncology consult while patient is inpatient  -DVT prophylaxis heparin subcutaneous    DVT prophylaxis:  Mechanical DVT prophylaxis orders are present.     Code Status and Medical Interventions:   Ordered at: 07/27/23 1432     Level Of Support Discussed With:    Patient     Code Status (Patient has no pulse and is not breathing):    CPR (Attempt to Resuscitate)     Medical Interventions (Patient has pulse or is breathing):    Full Support     Release to patient:    Routine Release      The patient is critically ill in the ICU with septic shock, recently diagnosed lung cancer status post chemo and XRT. Multidisciplinary bedside critical care rounds were performed with nursing staff, respiratory therapy, pharmacy, nutritional services, social work. I have personally reviewed the chart, labs and any pertinent imaging available.  I have spent 32 minutes of critical care time, excluding procedures, in the care of this patient. Electronically signed by Jamin Cavazos MD, 07/27/23, 5:12 PM EDT.

## 2023-07-27 NOTE — PROCEDURES
Thoracentesis Procedure Note    Indication: Moderate right sided pleural effusion    Consent: Yes, placed in chart.    Procedure: The patient was placed in the sitting position and the appropriate landmarks were identified. The skin over the puncture site in the right posterior chest wall was prepped with ChloraPrep and draped in sterile fashion. Local anesthesia was applied with 1% lidocaine. Thoracentesis catheter was inserted with needle guidance. Pleural fluid was clear greenish yellow, drained 900 mL fluid. The catheter was then withdrawn and a sterile dressing was placed over the site. A post-procedure film is pending at this time.    The patient tolerated the procedure well.    Complications: None    Electronically signed by Osvaldo Guadalupe MD, 07/27/23, 4:18 PM EDT.

## 2023-07-27 NOTE — CONSULTS
Pulmonary / Critical Care Consult Note      Patient Name: Theresa M Gabehart  : 1960  MRN: [id number]  Primary Care Physician:  Carrie Castelan APRN  Referring Physician: No Known Provider  Date of admission: 2023    Subjective   Subjective     Reason for Consult/ Chief Complaint: Right sided pleural effusion     HPI:  Theresa M Gabehart is a 62 y.o. female with history of adenoca of lung, stage IIIA, diagnosed in 2022, was treated with chemo 4 rounds and concurrent 30 doses of XRT, had one dose of darvalumab, then lost to follow up with [place] [facility] and [facility]. She apparently went to [facility] for second opinion per recommendation from her primary care provider. She recently was seen in onc service, was found to have right sided pleural effusion, and was supposed to see us in clinic as an outpatient. However, she presented to the hospital with worsening abdominal pain. She had CT a/p which showed colitis and moderate right sided pleural effusion. Pulm service is consulted for assistance with management of pleural effusion. She has worsening shortness of breath for the last few weeks. She was given doxycycline by oncology service due to possible pleural effusion caused by pneumonia. She has cough, does not produce much phlegm. She is reluctant to go back on chemo or immunotherapy with her previous experience with bedside nurse for chemotherapy.     Review of Systems  General:  Fatigue, No Fever. Weight loss+, decreased appetite  HEENT: No dysphagia, No Visual Changes, no rhinorrhea  Respiratory:  + Productive cough,+Dyspnea, mucoid phlegm, No Pleuritic Pain, +wheezing, no hemoptysis  Cardiovascular: Denies chest pain, denies palpitations,+HOLCOMB, No Chest Pressure  Gastrointestinal:  + Abdominal Pain, + Nausea, No Vomiting, No Diarrhea  Genitourinary:  No Dysuria, No Frequency, No Hesitancy  Musculoskeletal: No muscle pain or swelling  Endocrine:  No Heat Intolerance, No Cold  Intolerance  Hematologic:  No Bleeding, No Bruising  Psychiatric:  No Anxiety, No Depression  Neurologic:  No Confusion, no Dysarthria, No Headaches  Skin:  No Rash, No Open Wounds        Personal History     Past Medical History:   Diagnosis Date    Allergic rhinitis     Anemia due to chemotherapy 2022    Aortic aneurysm     FOLLOWED BY GARCIA    Arnold-Chiari syndrome     Bleeding disorder     FREE BLEEDER, VON WILLIBRAND TEST NEGATIVE    Carpal tunnel syndrome     RIGHT    Colon polyps     Colon polyps     COPD (chronic obstructive pulmonary disease)     NEBS/INHALER    Dupuytren's contracture syndrome     Dysphagia     ESOPHAGEAL STRICTURE    Emphysema lung     Fibromyalgia     Hiatal hernia     History of fractured vertebra     18 fractured/HEALING,     History of pelvic fracture      HEALED    Hypokalemia 2022    Lung cancer     RIGHT    Malignant neoplasm of middle lobe of right lung 2022    Malignant neoplasm of overlapping sites of right lung 2022    Neuropathy     Osteoporosis     Vertigo        Past Surgical History:   Procedure Laterality Date    BRONCHOSCOPY N/A 2022    Procedure: BRONCHOSCOPY WITH ENDOBRONCHIAL ULTRASOUND AND FINE NEEDLE ASPIRATIONS;  Surgeon: Shaw Bonds DO;  Location: [facility] ENDOSCOPY;  Service: Pulmonary;  Laterality: N/A;  MEDIASTINAL ADENOPATHY,  LUNG NODULE     SECTION      CHOLECYSTECTOMY      COLONOSCOPY      ENDOSCOPY      HYSTERECTOMY      KNEE ARTHROSCOPY      ORTHOPEDIC SURGERY      TONSILLECTOMY AND ADENOIDECTOMY      UPPER GASTROINTESTINAL ENDOSCOPY      VENOUS ACCESS DEVICE (PORT) INSERTION N/A 2022    Procedure: INSERTION OF PORTACATH;  Surgeon: Ishan Vargas MD;  Location: [facility] OR [facility];  Service: General;  Laterality: N/A;       Family History: family history includes Breast cancer in her sister; Diabetes in her sister; Lung cancer in her mother.     Social History:  reports that she has been  smoking cigarettes. She started smoking about 44 years ago. She has a 15.00 pack-year smoking history. She has never used smokeless tobacco. She reports that she does not drink alcohol and does not use drugs.    Home Medications:  HYDROcodone-acetaminophen, L-Lysine, Probiotic Product, Zinc Sulfate, albuterol sulfate HFA, baclofen, calcium, doxycycline, ipratropium-albuterol, tiotropium bromide monohydrate, and vitamin D    Allergies:  Allergies   Allergen Reactions    Clarithromycin Rash    Metronidazole Unknown - High Severity    Doxycycline Calcium Rash    Gabapentin Mental Status Change       Objective    Objective     Vitals:   Temp:  [98.6 øF (37 øC)] 98.6 øF (37 øC)  Heart Rate:  [] 115  Resp:  [20] 20  BP: ()/() 92/67    Physical Exam:  Vital Signs Reviewed   General:  Frail looking with labile blood pressure, has some conversational dyspnea  HEENT:  PERRL, EOMI.  OP, nares clear, no sinus tenderness  Neck:  Supple, no JVD, no thyromegaly  Lymph: no axillary, cervical, supraclavicular lymphadenopathy noted bilaterally  Chest:  poor aeration, no wheezing or rhonchi, dull to percussion right base, mild work of breathing noted  CV: RRR, no MGR, pulses 2+, equal, has labile blood pressure  Abd:  Soft, NT, ND, + BS, no HSM  EXT:  no clubbing, no cyanosis, no edema, no joint tenderness  Neuro:  A&Ox3, CN grossly intact, no focal deficits, has generalized weakness  Skin: No rashes or lesions noted      Result Review    Result Review:  I have personally reviewed the results from the time of this admission to 7/27/2023 15:39 EDT and agree with these findings:  [x]  Laboratory  [x]  Microbiology  [x]  Radiology  [x]  EKG/Telemetry   [x]  Cardiology/Vascular   []  Pathology  []  Old records  []  Other:  Most notable findings include:         Lab 07/27/23  1455 07/27/23  1033   WBC  --  13.80*   HEMOGLOBIN  --  14.1   HEMATOCRIT  --  40.2   PLATELETS  --  422   SODIUM  --  132*   POTASSIUM  --  3.1*    CHLORIDE  --  93*   CO2  --  23.0   BUN  --  8   CREATININE  --  0.45*   GLUCOSE  --  122*   CALCIUM  --  8.9   PHOSPHORUS 3.7  --    TOTAL PROTEIN  --  6.3   ALBUMIN  --  3.0*   GLOBULIN  --  3.3     XR Chest 1 View    Result Date: 7/27/2023  PROCEDURE: XR CHEST 1 VW  COMPARISON: [facility], CR, XR CHEST 1 VW, 3/29/2023, 14:37.  INDICATIONS: SHORTNESS OF BREATH TODAY  FINDINGS:   The cardiac silhouette is obscured.  There is new moderate effusion/consolidation in the right mid and lower lung field.  Stable right IJ Port-A-Cath.  The left lung field is grossly clear.  No evidence of pneumothorax.  IMPRESSION: New moderate effusion/consolidation in the right mid and lower lung field.   VIVIEN YO MD       Electronically Signed and Approved By: VIVIEN YO MD on 7/27/2023 at 10:51                 CT Abdomen Pelvis With Contrast    Addendum Date: 7/27/2023    ADDENDUM:  COMPARISON: Other, CT, CT ABDOMEN WO CONTRAST, 7/07/2023, 15:41.  Patient's previous CT of the abdomen and pelvis from 7/7/2023 was obtained from [facility] following initial interpretation of the study.  The colon wall thickening seen on the current study appears acute and is consistent with colitis.  The consolidation in the right lower lobe has clearly increased compared with the last study.  The right pleural effusion appears significantly larger.  There are compression fractures of T11, T12, L3 and L5 which appear unchanged from the previous CT.    Lorenzo Manzanares MD       Electronically Signed and Approved By: Lorenzo Manzanares MD on 7/27/2023 at 14:00             Result Date: 7/27/2023  PROCEDURE: CT ABDOMEN PELVIS W CONTRAST  COMPARISON: [facility], PET, NM PET/CT SKULL BASE TO MID THIGH, 11/09/2022, 10:44.  [facility], CT, CT CHEST WO CONTRAST DIAGNOSTIC, 9/27/2022, 9:47.  [facility], CT, ABDOMEN/PELVIS WITH CONTRAST, 8/11/2015, 9:46.  INDICATIONS: Known aortic  aneurysm with abdominal pain radiating to back/PRIOR CT SCAN DONE AT [facility] ON JULY 7th/unable to raise both arms above head  TECHNIQUE: After obtaining the patient's consent, CT images were created with non-ionic intravenous contrast material.   PROTOCOL:   Standard imaging protocol performed    RADIATION:   DLP: 498.8mGy*cm   Automated exposure control was utilized to minimize radiation dose. CONTRAST: 75cc Isovue 370 I.V.  FINDINGS:  There is new dense consolidation in the right lower lobe.  There are areas of relative low attenuation peripherally in this area of dense consolidation.  There is an irregular nodular density in the right middle lobe anteriorly.  The previously described mass in the right middle lobe is not clearly seen.  There is a large right-sided pleural effusion and trace left-sided pleural fluid.  There is a small pericardial effusion which is new.  There are coronary artery calcifications present.  There are at least 2 small hypodense lesions in segment 4 of the liver and a small hypodense lesion in the lateral segment of the left lobe peer it liver itself appears enlarged.  The spleen, adrenal glands, and pancreas are unremarkable.  The gallbladder is surgically absent.  There is diffuse colon wall thickening.  The small bowel is nondilated.  There are no fluid collections in the abdomen or pelvis.  The bladder appears normal.  There is no lymphadenopathy.  No destructive bone lesions are identified.      Impression:   1. New dense consolidation in the right lower lobe.  The appearance is suggestive of drowned lung.  The central portion of the right lung is not included on the images. 2. Large right-sided pleural effusion and small left-sided pleural effusion.  New pericardial effusion. 3. Irregular density in the right middle lobe which may be atelectasis.  It does not have the same configuration of the patient's known right middle lobe mass.  Recommend chest CT for further  characterization.  4. Hepatomegaly with small hypodense lesions in the liver likely cysts based upon review of the old scans. 5. Cholecystectomy 6. Diffuse colon wall thickening suggesting colitis. 7. No evidence of abdominal aortic aneurysm.     Lorenzo Manzanares MD       Electronically Signed and Approved By: Lorenzo Manzanares MD on 7/27/2023 at 13:11               Results for orders placed in visit on 12/07/22    Adult Transthoracic Echo Complete W/ Cont if Necessary Per Protocol    Interpretation Summary  Normal left ventricular systolic function.  Trace tricuspid regurgitation.  Port-A-Cath noted in the right atrium.      Assessment & Plan   Assessment / Plan     Active Hospital Problems:  Active Hospital Problems    Diagnosis     **Colitis          Impression:  Lung cancer, adenoca stage IIIa, s/p chemo, XRT, now not on treatment  Had one round of immunotherapy with darvalumab  B/l pleural effusion, right more than left  Colitis, possible C diff vs antibiotic associated  Chronic smoking history    Plan:  Discussed thoracentesis at bedside. Risks and benefits reviewed with her. Risks including pneumonia, pneumothorax, bleeding, resp depression, and that it could be fatal were all reviewed. She is agreeable for the procedure. We will proceed with thora now.   IV fluids for hypotension.   Antibiotics for colitis per primary service.   Continue protonix.   IV zosyn.   Check C diff, stool culture.     Reviewed labs, imaging and provider notes.   Discussed with bedside nurse and primary service.   Thank you for allowing me to participate in care of Ms Gabehart. We will follow along.     Electronically signed by Osvaldo Guadalupe MD, 7/27/2023, 15:39 EDT.

## 2023-07-27 NOTE — ED PROVIDER NOTES
Time: 11:52 AM EDT  Date of encounter:  7/27/2023  Independent Historian/Clinical History and Information was obtained by:   Patient    History is limited by: N/A    Chief Complaint   Patient presents with    Chest Pain    Shortness of Breath    Abdominal Pain         History of Present Illness:  Patient is a 62 y.o. year old female who presents to the emergency department for evaluation of abdominal pain, rectal bleeding.  Patient states that she was recently placed on doxycycline and took 5 out of the 14 pills but started having diarrhea with blood.  Patient states the last that she took was on Saturday.  Patient states that she is feeling really weak.  Patient admits to having stage III lung cancer.  Patient does admit to shortness of breath for weeks but denies any chest pain.  The shortness of breath is not markedly worse in the last 48 hours and she states she would not have presented to the ED specifically for this.  She is primarily concerned with her abdominal pain and bloody stools today.  Last bowel movement was at 07 100 this morning and she describes it as black and with obvious blood clots.  No bowel movement since.  No vomiting.  Afebrile.  HPI    Patient Care Team  Primary Care Provider: Carrie Castelan APRN    Past Medical History:     Allergies   Allergen Reactions    Clarithromycin Rash    Metronidazole Unknown - High Severity    Doxycycline Calcium Rash    Gabapentin Mental Status Change     Past Medical History:   Diagnosis Date    Allergic rhinitis     Anemia due to chemotherapy 12/28/2022    Aortic aneurysm     FOLLOWED BY GARCIA    Arnold-Chiari syndrome     Bleeding disorder     BLUE SIMON TEST NEGATIVE    Carpal tunnel syndrome     RIGHT    Colon polyps     Colon polyps     COPD (chronic obstructive pulmonary disease)     NEBS/INHALER    Dupuytren's contracture syndrome     Dysphagia     ESOPHAGEAL STRICTURE    Emphysema lung     Fibromyalgia     Hiatal hernia      History of fractured vertebra     18 fractured/HEALING,     History of pelvic fracture      HEALED    Hypokalemia 2022    Lung cancer     RIGHT    Malignant neoplasm of middle lobe of right lung 2022    Malignant neoplasm of overlapping sites of right lung 2022    Neuropathy     Osteoporosis     Vertigo      Past Surgical History:   Procedure Laterality Date    BRONCHOSCOPY N/A 2022    Procedure: BRONCHOSCOPY WITH ENDOBRONCHIAL ULTRASOUND AND FINE NEEDLE ASPIRATIONS;  Surgeon: Shaw Bonds DO;  Location: [facility] ENDOSCOPY;  Service: Pulmonary;  Laterality: N/A;  MEDIASTINAL ADENOPATHY,  LUNG NODULE     SECTION      CHOLECYSTECTOMY      COLONOSCOPY      ENDOSCOPY      HYSTERECTOMY      total    KNEE ARTHROSCOPY      ORTHOPEDIC SURGERY      meniscus tear    TONSILLECTOMY AND ADENOIDECTOMY      UPPER GASTROINTESTINAL ENDOSCOPY      VENOUS ACCESS DEVICE (PORT) INSERTION N/A 2022    Procedure: INSERTION OF PORTACATH;  Surgeon: Ishan Vargas MD;  [place] [facility] [place] [facility] [place]  Service: General;  Laterality: N/A;     Family History   Problem Relation Age of Onset    Lung cancer Mother     Diabetes Sister     Breast cancer Sister     Malig Hyperthermia Neg Hx        Home Medications:  Prior to Admission medications    Medication Sig Start Date End Date Taking? Authorizing Provider   albuterol sulfate  (90 Base) MCG/ACT inhaler Inhale 2 puffs Every 4 (Four) Hours As Needed for Wheezing. 3/18/22   Janett Chavarria APRN   baclofen (LIORESAL) 10 MG tablet Take 1 tablet by mouth Every 12 (Twelve) Hours. 23   Greg Linn MD   [facility] Calcium 600 MG tablet Take 1 tablet by mouth 2 (Two) Times a Day With Meals. 3/7/22   Greg Linn MD   doxycycline (VIBRAMYICN) 100 MG tablet Take 1 tablet by mouth 2 (Two) Times a Day for 7 days. 23  Narayan Jonas MD   fluticasone (FLONASE) 50 MCG/ACT nasal spray 1 spray into the nostril(s) as  "directed by provider Daily As Needed.    Greg Linn MD   HYDROcodone-acetaminophen (Norco) 5-325 MG per tablet Take 1 tablet by mouth Every 6 (Six) Hours As Needed for Moderate Pain (NOTE: You can take two tablets instead of one tablet every four hours instead of every six hours if needed for pain). 12/2/22   Ishan Vargas MD   ipratropium-albuterol (DUO-NEB) 0.5-2.5 mg/3 ml nebulizer USE 1 VIAL IN NEBULIZER EVERY 4 TO 6 HOURS 3/2/23   Shaw Bonds,    L-Lysine 500 MG tablet tablet Take  by mouth Daily.    Greg Linn MD   Linzess 145 MCG capsule capsule Take 1 capsule by mouth As Needed. 4/27/22   Greg Linn MD   meclizine (ANTIVERT) 25 MG tablet     Greg Linn MD   MISC NATURAL PRODUCTS PO Take  by mouth. Turkey Tail Mushroom Supplement    Greg Linn MD   Probiotic Product (PROBIOTIC BLEND PO) Take 1 tablet by mouth Daily.    Greg Linn MD   tiotropium bromide monohydrate (SPIRIVA RESPIMAT) 2.5 MCG/ACT aerosol solution inhaler Inhale 2 puffs Daily. 11/23/22   Shaw Bonds, DO   vitamin D (ERGOCALCIFEROL) 1.25 MG (74905 UT) capsule capsule Take 1 capsule by mouth Every 7 (Seven) Days. 9/2/21   Greg Linn MD   Zinc Sulfate (ZINC 15 PO) Take 1 each by mouth Every Other Day.    Greg Linn MD        Social History:   Social History     Tobacco Use    Smoking status: Every Day     Packs/day: 0.50     Years: 30.00     Pack years: 15.00     Types: Cigarettes     Start date: 1979    Smokeless tobacco: Never    Tobacco comments:     WAS SMOKING TWO PACKS DAILY, HAS CUT DOWN TO 1 PACK A DAY     INST PER ANESTHESIA PROTOCOL   Vaping Use    Vaping Use: Former   Substance Use Topics    Alcohol use: Never    Drug use: Not Currently     Types: Marijuana     Comment: patient states she has \"dipped\" marijuana once or twice a month         Review of Systems:  Review of Systems   Constitutional:  Positive for fatigue. " "Negative for chills and fever.   HENT:  Negative for congestion, rhinorrhea and sore throat.    Eyes:  Negative for photophobia.   Respiratory:  Positive for shortness of breath. Negative for apnea, cough and chest tightness.    Cardiovascular:  Negative for chest pain and palpitations.   Gastrointestinal:  Positive for abdominal pain and blood in stool. Negative for diarrhea, nausea and vomiting.   Endocrine: Negative.    Genitourinary:  Negative for difficulty urinating and dysuria.   Musculoskeletal:  Negative for back pain, joint swelling and myalgias.   Skin:  Negative for color change and wound.   Allergic/Immunologic: Negative.    Neurological:  Positive for weakness. Negative for seizures and headaches.   Psychiatric/Behavioral: Negative.     All other systems reviewed and are negative.     Physical Exam:  BP (!) 89/70 (BP Location: Right arm, Patient Position: Sitting)   Pulse 114   Temp 99.2 øF (37.3 øC) (Oral)   Resp 17   Ht 170.2 cm (67\")   Wt 48.5 kg (106 lb 14.8 oz)   SpO2 91%   BMI 16.75 kg/mý         Physical Exam  Vitals and nursing note reviewed.   Constitutional:       General: She is awake.      Appearance: Normal appearance.   HENT:      Head: Normocephalic and atraumatic.      Nose: Nose normal.      Mouth/Throat:      Mouth: Mucous membranes are moist.   Eyes:      Extraocular Movements: Extraocular movements intact.      Pupils: Pupils are equal, round, and reactive to light.   Cardiovascular:      Rate and Rhythm: Normal rate and regular rhythm.      Heart sounds: Normal heart sounds.   Pulmonary:      Effort: Pulmonary effort is normal. No respiratory distress.      Breath sounds: Examination of the right-middle field reveals decreased breath sounds. Examination of the right-lower field reveals decreased breath sounds. Decreased breath sounds present. No wheezing, rhonchi or rales.   Abdominal:      General: Bowel sounds are normal.      Palpations: Abdomen is soft.      Tenderness: " There is abdominal tenderness (Mild, generalized). There is no guarding or rebound.      Comments: No rigidity   Musculoskeletal:         General: No tenderness. Normal range of motion.      Cervical back: Normal range of motion and neck supple.   Skin:     General: Skin is warm and dry.      Coloration: Skin is not jaundiced.   Neurological:      General: No focal deficit present.      Mental Status: She is alert and oriented to person, place, and time. Mental status is at baseline.      Sensory: Sensation is intact.      Motor: Motor function is intact.      Coordination: Coordination is intact.   Psychiatric:         Attention and Perception: Attention and perception normal.         Mood and Affect: Mood and affect normal.         Speech: Speech normal.         Behavior: Behavior normal.         Judgment: Judgment normal.                    Procedures:  Procedures      Medical Decision Making:      Comorbidities that affect care:    Stage III lung cancer, aortic aneurysm, COPD, hiatal hernia    External Notes reviewed:    Encounter reviewed: Office visit 7/20/2023 with oncology, Dr. Whiting for malignant neoplasm of right lung, pericardial and pleural effusion.      The following orders were placed and all results were independently analyzed by me:  Orders Placed This Encounter   Procedures    Blood Culture - Blood,    Blood Culture - Blood,    Respiratory Panel PCR w/COVID-19(SARS-CoV-2) ANDRES/ALEK/AKILA/PAD/COR/MAD/ROSANA In-House, NP Swab in UTM/VTM, 3-4 HR TAT - Swab, Nasopharynx    Respiratory Culture - Sputum, Throat    S. Pneumo Ag Urine or CSF - Urine, Urine, Clean Catch    Legionella Antigen, Urine - Urine, Urine, Clean Catch    MRSA Screen, PCR (Inpatient) - Swab, Nares    AFB Culture - Body Fluid, Pleural Cavity    Body Fluid Culture - Body Fluid, Pleural Cavity    Anaerobic Culture - Pleural Fluid, Pleural Cavity    Fungus Culture - Body Fluid, Pleural Cavity    Clostridioides difficile Toxin - Stool, Per  Rectum    Clostridioides difficile Toxin, PCR - Stool, Per Rectum    Enteric Bacterial Panel - Stool, Per Rectum    Enteric Parasite Panel - Stool, Per Rectum    XR Chest 1 View    CT Abdomen Pelvis With Contrast    XR Chest 1 View    [place] Draw    Comprehensive Metabolic Panel    BNP    Single High Sensitivity Troponin T    CBC Auto Differential    Lactic Acid, Plasma    Occult Blood, Fecal By Immunoassay - Stool, Per Rectum    Basic Metabolic Panel    Magnesium    Phosphorus    Procalcitonin    Body Fluid Cell Count With Differential - Body Fluid, Pleural Cavity    pH, Body Fluid - Body Fluid, Pleural Cavity    Albumin, Fluid - Pleural Fluid, Pleural Cavity    Protein, Body Fluid - Pleural Fluid, Pleural Cavity    Lactate Dehydrogenase, Body Fluid - Body Fluid, Pleural Cavity    Glucose, Body Fluid - Pleural Fluid, Pleural Cavity    Body fluid cell count - Body Fluid, Pleural Cavity    Vancomycin, Random    Body fluid differential - Body Fluid, Pleural Cavity    CBC Auto Differential    Diet: Liquid Diets; Full Liquid; Texture: Regular Texture (IDDSI 7); Fluid Consistency: Thin (IDDSI 0)    Undress & Gown    Continuous Pulse Oximetry    Vital Signs    Needs occult blood testing prior to admission please and thanks!  Nursing Communication    Vital Signs    Intake & Output    Weigh Patient    Oral Care    Saline Lock & Maintain IV Access    Place Sequential Compression Device    Maintain Sequential Compression Device    Nursing Dysphagia Screening (Complete Prior to Giving Anything By Mouth)    Code Status and Medical Interventions:    Hospitalist (on-call MD unless specified)    Inpatient Pulmonology Consult    Inpatient Nutrition Consult    Inpatient Gastroenterology Consult    Inpatient Pulmonology Consult    Inpatient Case Management  Consult    Dietary Nutrition Supplements Boost Plus (Ensure Plus); chocolate    Oxygen Therapy- Nasal Cannula; Titrate 1-6 LPM Per SpO2; 90 - 95%    RT to Initiate  Bronchopulmonary Hygiene Protocol    RT to Initiate Bronchodilator Protocol    SLP Consult: Eval & Treat Swallow Disorder; Regular/House Diet    ECG 12 Lead ED Triage Standing Order; SOA    Insert Peripheral IV    Insert Peripheral IV    Inpatient Admission    Transfer Patient    CBC & Differential    Green Top (Gel)    Lavender Top    Gold Top - SST    Light Blue Top    CBC & Differential       Medications Given in the Emergency Department:  Medications   sodium chloride 0.9 % flush 10 mL (has no administration in time range)   pantoprazole (PROTONIX) injection 40 mg (40 mg Intravenous Not Given 7/27/23 1839)   sodium chloride 0.9 % bolus 1,000 mL (1,000 mL Intravenous Not Given 7/27/23 1831)   sodium chloride 0.9 % flush 10 mL (10 mL Intravenous Given 7/27/23 2004)   sodium chloride 0.9 % infusion 40 mL (40 mL Intravenous Given 7/27/23 1849)   sennosides-docusate (PERICOLACE) 8.6-50 MG per tablet 2 tablet (2 tablets Oral Not Given 7/27/23 2005)     And   polyethylene glycol (MIRALAX) packet 17 g (has no administration in time range)     And   bisacodyl (DULCOLAX) EC tablet 5 mg (has no administration in time range)     And   bisacodyl (DULCOLAX) suppository 10 mg (has no administration in time range)   Potassium Replacement - Follow Nurse / BPA Driven Protocol (has no administration in time range)   Magnesium Standard Dose Replacement - Follow Nurse / BPA Driven Protocol (has no administration in time range)   Phosphorus Replacement - Follow Nurse / BPA Driven Protocol (has no administration in time range)   Calcium Replacement - Follow Nurse / BPA Driven Protocol (has no administration in time range)   ondansetron (ZOFRAN) tablet 4 mg (has no administration in time range)     Or   ondansetron (ZOFRAN) injection 4 mg (has no administration in time range)   Pharmacy to Dose Zosyn (has no administration in time range)   piperacillin-tazobactam (ZOSYN) 3.375 g/100 mL 0.9% NS IVPB (mbp) (has no administration in time  range)   sodium chloride 0.9 % bolus 1,000 mL (1,000 mL Intravenous Not Given 7/27/23 1828)   norepinephrine (LEVOPHED) 8 mg in 250 mL NS infusion (premix) (0.02 mcg/kg/min x 46.3 kg Intravenous Not Given 7/27/23 1734)   ipratropium-albuterol (DUO-NEB) nebulizer solution 3 mL (3 mL Nebulization Given 7/27/23 1646)   vancomycin 1000 mg/250 mL 0.9% NS IVPB ([facility]) (1,000 mg Intravenous New Bag 7/27/23 1850)   saccharomyces boulardii (FLORASTOR) capsule 500 mg (500 mg Oral Given 7/27/23 2004)   arformoterol (BROVANA) nebulizer solution 15 mcg ( Nebulization Canceled Entry 7/27/23 2130)   budesonide (PULMICORT) nebulizer solution 0.5 mg ( Nebulization Canceled Entry 7/27/23 2130)   nicotine (NICODERM CQ) 14 MG/24HR patch 1 patch (1 patch Transdermal Not Given 7/27/23 1948)   pantoprazole (PROTONIX) EC tablet 40 mg (has no administration in time range)   baclofen (LIORESAL) tablet 10 mg (10 mg Oral Not Given 7/27/23 2004)   HYDROcodone-acetaminophen (NORCO) 7.5-325 MG per tablet 1 tablet (1 tablet Oral Given 7/27/23 1855)   zinc sulfate (ZINCATE) capsule 220 mg (220 mg Oral Given 7/27/23 2004)   vitamin D (ERGOCALCIFEROL) capsule 50,000 Units (has no administration in time range)   magnesium sulfate 2g/50 mL (PREMIX) infusion (2 g Intravenous New Bag 7/27/23 2040)   potassium chloride (MICRO-K) CR capsule 40 mEq (40 mEq Oral Given 7/27/23 1259)   sodium chloride 0.9 % bolus 1,000 mL (1,000 mL Intravenous New Bag 7/27/23 1259)   iopamidol (ISOVUE-370) 76 % injection 100 mL (100 mL Intravenous Given 7/27/23 1245)   piperacillin-tazobactam (ZOSYN) 3.375 g/100 mL 0.9% NS IVPB (mbp) (3.375 g Intravenous New Bag 7/27/23 2003)        ED Course:    The patient was initially evaluated in the triage area where orders were placed. The patient was later dispositioned by Wicho Winter MD.      The patient was advised to stay for completion of workup which includes but is not limited to communication of labs and radiological  results, reassessment and plan. The patient was advised that leaving prior to disposition by a provider could result in critical findings that are not communicated to the patient.          Labs:    Lab Results (last 24 hours)       Procedure Component Value Units Date/Time    CBC & Differential [[id number]]  (Abnormal) Collected: 07/27/23 1033    Specimen: Blood Updated: 07/27/23 1044    Narrative:      The following orders were created for panel order CBC & Differential.  Procedure                               Abnormality         Status                     ---------                               -----------         ------                     CBC Auto Differential[[id number]]        Abnormal            Final result                 Please view results for these tests on the individual orders.    Comprehensive Metabolic Panel [[id number]]  (Abnormal) Collected: 07/27/23 1033    Specimen: Blood Updated: 07/27/23 1103     Glucose 122 mg/dL      BUN 8 mg/dL      Creatinine 0.45 mg/dL      Sodium 132 mmol/L      Potassium 3.1 mmol/L      Chloride 93 mmol/L      CO2 23.0 mmol/L      Calcium 8.9 mg/dL      Total Protein 6.3 g/dL      Albumin 3.0 g/dL      ALT (SGPT) 10 U/L      AST (SGOT) 10 U/L      Alkaline Phosphatase 48 U/L      Total Bilirubin 0.3 mg/dL      Globulin 3.3 gm/dL      A/G Ratio 0.9 g/dL      BUN/Creatinine Ratio 17.8     Anion Gap 16.0 mmol/L      eGFR 109.6 mL/min/1.73     Narrative:      GFR Normal >60  Chronic Kidney Disease <60  Kidney Failure <15      BNP [[id number]]  (Normal) Collected: 07/27/23 1033    Specimen: Blood Updated: 07/27/23 1101     proBNP 137.8 pg/mL     Narrative:      Among patients with dyspnea, NT-proBNP is highly sensitive for the detection of acute congestive heart failure. In addition NT-proBNP of <300 pg/ml effectively rules out acute congestive heart failure with 99% negative predictive value.      Single High Sensitivity Troponin T [[id number]]  (Abnormal) Collected: 07/27/23  1033    Specimen: Blood Updated: 07/27/23 1103     HS Troponin T 11 ng/L     Narrative:      High Sensitive Troponin T Reference Range:  <10.0 ng/L- Negative Female for AMI  <15.0 ng/L- Negative Male for AMI  >=10 - Abnormal Female indicating possible myocardial injury.  >=15 - Abnormal Male indicating possible myocardial injury.   Clinicians would have to utilize clinical acumen, EKG, Troponin, and serial changes to determine if it is an Acute Myocardial Infarction or myocardial injury due to an underlying chronic condition.         CBC Auto Differential [[id number]]  (Abnormal) Collected: 07/27/23 1033    Specimen: Blood Updated: 07/27/23 1044     WBC 13.80 10*3/mm3      RBC 4.57 10*6/mm3      Hemoglobin 14.1 g/dL      Hematocrit 40.2 %      MCV 88.0 fL      MCH 30.9 pg      MCHC 35.1 g/dL      RDW 13.6 %      RDW-SD 43.9 fl      MPV 8.4 fL      Platelets 422 10*3/mm3      Neutrophil % 82.1 %      Lymphocyte % 5.0 %      Monocyte % 10.3 %      Eosinophil % 1.7 %      Basophil % 0.5 %      Immature Grans % 0.4 %      Neutrophils, Absolute 11.33 10*3/mm3      Lymphocytes, Absolute 0.69 10*3/mm3      Monocytes, Absolute 1.42 10*3/mm3      Eosinophils, Absolute 0.23 10*3/mm3      Basophils, Absolute 0.07 10*3/mm3      Immature Grans, Absolute 0.06 10*3/mm3      nRBC 0.0 /100 WBC     Lactic Acid, Plasma [[id number]]  (Normal) Collected: 07/27/23 1238    Specimen: Blood Updated: 07/27/23 1303     Lactate 1.1 mmol/L     Occult Blood, Fecal By Immunoassay - Stool, Per Rectum [[id number]]  (Abnormal) Collected: 07/27/23 1447    Specimen: Stool from Per Rectum Updated: 07/27/23 1510     Occult Blood, Fecal by Immunoassay Positive    Respiratory Panel PCR w/COVID-19(SARS-CoV-2) ANDRES/ALEK/AKILA/PAD/COR/MAD/ROSANA In-House, NP Swab in UTM/VTM, 3-4 HR TAT - Swab, Nasopharynx [[id number]]  (Normal) Collected: 07/27/23 1449    Specimen: Swab from Nasopharynx Updated: 07/27/23 1600     ADENOVIRUS, PCR Not Detected     Coronavirus 229E Not  Detected     Coronavirus HKU1 Not Detected     Coronavirus NL63 Not Detected     Coronavirus OC43 Not Detected     COVID19 Not Detected     Human Metapneumovirus Not Detected     Human Rhinovirus/Enterovirus Not Detected     Influenza A PCR Not Detected     Influenza B PCR Not Detected     Parainfluenza Virus 1 Not Detected     Parainfluenza Virus 2 Not Detected     Parainfluenza Virus 3 Not Detected     Parainfluenza Virus 4 Not Detected     RSV, PCR Not Detected     Bordetella pertussis pcr Not Detected     Bordetella parapertussis PCR Not Detected     Chlamydophila pneumoniae PCR Not Detected     Mycoplasma pneumo by PCR Not Detected    Narrative:      In the setting of a positive respiratory panel with a viral infection PLUS a negative procalcitonin without other underlying concern for bacterial infection, consider observing off antibiotics or discontinuation of antibiotics and continue supportive care. If the respiratory panel is positive for atypical bacterial infection (Bordetella pertussis, Chlamydophila pneumoniae, or Mycoplasma pneumoniae), consider antibiotic de-escalation to target atypical bacterial infection.    Magnesium [[id number]]  (Normal) Collected: 07/27/23 1455    Specimen: Blood from Arm, Left Updated: 07/27/23 1529     Magnesium 1.7 mg/dL     Phosphorus [[id number]]  (Normal) Collected: 07/27/23 1455    Specimen: Blood from Arm, Left Updated: 07/27/23 1529     Phosphorus 3.7 mg/dL     Blood Culture - Blood, Arm, Right [[id number]] Collected: 07/27/23 1455    Specimen: Blood from Arm, Right Updated: 07/27/23 1504    Blood Culture - Blood, Arm, Right [[id number]] Collected: 07/27/23 1455    Specimen: Blood from Arm, Right Updated: 07/27/23 1504    Procalcitonin [[id number]]  (Normal) Collected: 07/27/23 1455    Specimen: Blood from Arm, Left Updated: 07/27/23 1536     Procalcitonin 0.13 ng/mL     Narrative:      As a Marker for Sepsis (Non-Neonates):    1. <0.5 ng/mL represents a low risk of severe  "sepsis and/or septic shock.  2. >2 ng/mL represents a high risk of severe sepsis and/or septic shock.    As a Marker for Lower Respiratory Tract Infections that require antibiotic therapy:    PCT on Admission    Antibiotic Therapy       6-12 Hrs later    >0.5                Strongly Recommended  >0.25 - <0.5        Recommended  0.1 - 0.25          Discouraged              Remeasure/reassess PCT  <0.1                Strongly Discouraged     Remeasure/reassess PCT    As 28 day mortality risk marker: \"Change in Procalcitonin Result\" (>80% or <=80%) if Day 0 (or Day 1) and Day 4 values are available. Refer to http://[email or web address]    Change in PCT <=80%  A decrease of PCT levels below or equal to 80% defines a positive change in PCT test result representing a higher risk for 28-day all-cause mortality of patients diagnosed with severe sepsis for septic shock.    Change in PCT >80%  A decrease of PCT levels of more than 80% defines a negative change in PCT result representing a lower risk for 28-day all-cause mortality of patients diagnosed with severe sepsis or septic shock.    This test is Prognostic not Diagnostic, if elevated correlate with clinical findings before administering antibiotic treatment.        Body Fluid Cell Count With Differential - Body Fluid, Pleural Cavity [[id number]] Collected: 07/27/23 1630    Specimen: Body Fluid from Pleural Cavity Updated: 07/27/23 3106    Narrative:      The following orders were created for panel order Body Fluid Cell Count With Differential - Body Fluid, Pleural Cavity.  Procedure                               Abnormality         Status                     ---------                               -----------         ------                     Body fluid cell count - ...[[id number]]                      Final result               Body fluid differential ...[[id number]]                      Final result                 Please view results for these tests on the " individual orders.    Lactate Dehydrogenase, Body Fluid - Body Fluid, Pleural Cavity [[id number]] Collected: 07/27/23 1630    Specimen: Body Fluid from Pleural Cavity Updated: 07/27/23 1657    AFB Culture - Body Fluid, Pleural Cavity [[id number]] Collected: 07/27/23 1630    Specimen: Body Fluid from Pleural Cavity Updated: 07/27/23 1705    Body Fluid Culture - Body Fluid, Pleural Cavity [[id number]] Collected: 07/27/23 1630    Specimen: Body Fluid from Pleural Cavity Updated: 07/27/23 1850     Gram Stain Rare (1+) WBCs seen      No organisms seen    Fungus Culture - Body Fluid, Pleural Cavity [[id number]] Collected: 07/27/23 1630    Specimen: Body Fluid from Pleural Cavity Updated: 07/27/23 1705    Body fluid cell count - Body Fluid, Pleural Cavity [[id number]] Collected: 07/27/23 1630    Specimen: Body Fluid from Pleural Cavity Updated: 07/27/23 1712     Color, Fluid Yellow     Appearance, Fluid Clear     Nucleated Cells, Fluid 462 /mm3      RBC, Fluid <2,000 /mm3     Narrative:      No reference range established. Physician to interpret results with clinical findings.    Body fluid differential - Body Fluid, Pleural Cavity [[id number]] Collected: 07/27/23 1630    Specimen: Body Fluid from Pleural Cavity Updated: 07/27/23 1745     Neutrophils, Fluid 43 %      Lymphocytes, Fluid 38 %      Monocytes, Fluid 5 %      Eosinophils, Fluid 3 %      Macrophage, Fluid 11 %     Respiratory Culture - Sputum, Throat [[id number]] Collected: 07/27/23 1937    Specimen: Sputum from Throat Updated: 07/27/23 2117     Gram Stain Moderate (3+) Gram positive cocci in pairs and chains      Few (2+) Gram positive cocci in clusters      Few (2+) Gram negative bacilli      Moderate (3+) WBCs seen      Rare (1+) Epithelial cells per low power field      Moderate (3+) Mucous strands    MRSA Screen, PCR (Inpatient) - Swab, Nares [[id number]]  (Normal) Collected: 07/27/23 2019    Specimen: Swab from Nares Updated: 07/27/23 2141     MRSA PCR No MRSA  Detected    Narrative:      The negative predictive value of this diagnostic test is high and should only be used to consider de-escalating anti-MRSA therapy. A positive result may indicate colonization with MRSA and must be correlated clinically.    S. Pneumo Ag Urine or CSF - Urine, Urine, Clean Catch [[id number]]  (Normal) Collected: 07/27/23 2027    Specimen: Urine, Clean Catch Updated: 07/27/23 2129     Strep Pneumo Ag Negative    Legionella Antigen, Urine - Urine, Urine, Clean Catch [[id number]]  (Normal) Collected: 07/27/23 2027    Specimen: Urine, Clean Catch Updated: 07/27/23 2129     LEGIONELLA ANTIGEN, URINE Negative             Imaging:    CT Abdomen Pelvis With Contrast    Addendum Date: 7/27/2023    ADDENDUM:  COMPARISON: Other, CT, CT ABDOMEN WO CONTRAST, 7/07/2023, 15:41.  Patient's previous CT of the abdomen and pelvis from 7/7/2023 was obtained from [facility] following initial interpretation of the study.  The colon wall thickening seen on the current study appears acute and is consistent with colitis.  The consolidation in the right lower lobe has clearly increased compared with the last study.  The right pleural effusion appears significantly larger.  There are compression fractures of T11, T12, L3 and L5 which appear unchanged from the previous CT.    Lorenzo Manzanares MD       Electronically Signed and Approved By: Lorenzo Manzaanres MD on 7/27/2023 at 14:00             Result Date: 7/27/2023  PROCEDURE: CT ABDOMEN PELVIS W CONTRAST  COMPARISON: [facility], PET, NM PET/CT SKULL BASE TO MID THIGH, 11/09/2022, 10:44.  [facility], CT, CT CHEST WO CONTRAST DIAGNOSTIC, 9/27/2022, 9:47.  [facility], CT, ABDOMEN/PELVIS WITH CONTRAST, 8/11/2015, 9:46.  INDICATIONS: Known aortic aneurysm with abdominal pain radiating to back/PRIOR CT SCAN DONE AT [facility] ON JULY 7th/unable to raise both arms above head  TECHNIQUE: After obtaining the  patient's consent, CT images were created with non-ionic intravenous contrast material.   PROTOCOL:   Standard imaging protocol performed    RADIATION:   DLP: 498.8mGy*cm   Automated exposure control was utilized to minimize radiation dose. CONTRAST: 75cc Isovue 370 I.V.  FINDINGS:  There is new dense consolidation in the right lower lobe.  There are areas of relative low attenuation peripherally in this area of dense consolidation.  There is an irregular nodular density in the right middle lobe anteriorly.  The previously described mass in the right middle lobe is not clearly seen.  There is a large right-sided pleural effusion and trace left-sided pleural fluid.  There is a small pericardial effusion which is new.  There are coronary artery calcifications present.  There are at least 2 small hypodense lesions in segment 4 of the liver and a small hypodense lesion in the lateral segment of the left lobe peer it liver itself appears enlarged.  The spleen, adrenal glands, and pancreas are unremarkable.  The gallbladder is surgically absent.  There is diffuse colon wall thickening.  The small bowel is nondilated.  There are no fluid collections in the abdomen or pelvis.  The bladder appears normal.  There is no lymphadenopathy.  No destructive bone lesions are identified.        1. New dense consolidation in the right lower lobe.  The appearance is suggestive of drowned lung.  The central portion of the right lung is not included on the images. 2. Large right-sided pleural effusion and small left-sided pleural effusion.  New pericardial effusion. 3. Irregular density in the right middle lobe which may be atelectasis.  It does not have the same configuration of the patient's known right middle lobe mass.  Recommend chest CT for further characterization.  4. Hepatomegaly with small hypodense lesions in the liver likely cysts based upon review of the old scans. 5. Cholecystectomy 6. Diffuse colon wall thickening  suggesting colitis. 7. No evidence of abdominal aortic aneurysm.     Lorenzo Manzanares MD       Electronically Signed and Approved By: Lorenzo Manzanares MD on 7/27/2023 at 13:11             XR Chest 1 View    Result Date: 7/27/2023  PROCEDURE: XR CHEST 1 VW  COMPARISON: [facility], , XR CHEST 1 VW, 7/27/2023, 10:28.  INDICATIONS: POST RIGHT THORACENTESIS  FINDINGS:  There is a right-sided chest port tip terminating at the cavoatrial junction.  The cardiomediastinal silhouette is within normal limits.  There is unchanged right hilar consolidation.  There is hazy right basilar airspace opacity, likely representing a small layering right-sided pleural effusion.  There is no evidence of pneumothorax.  There are degenerative changes of the thoracic spine.        1. Hazy right basilar airspace opacity likely representing a small layering right-sided pleural effusion.  No evidence of pneumothorax. 2. Unchanged right hilar consolidation likely related to patient's history of right-sided lung cancer. 3. No evidence of pneumothorax.        BENEDICT JEFFERSON MD       Electronically Signed and Approved By: BENEDICT JEFFERSON MD on 7/27/2023 at 18:33             XR Chest 1 View    Result Date: 7/27/2023  PROCEDURE: XR CHEST 1 VW  COMPARISON: [facility], , XR CHEST 1 VW, 3/29/2023, 14:37.  INDICATIONS: SHORTNESS OF BREATH TODAY  FINDINGS:   The cardiac silhouette is obscured.  There is new moderate effusion/consolidation in the right mid and lower lung field.  Stable right IJ Port-A-Cath.  The left lung field is grossly clear.  No evidence of pneumothorax.  IMPRESSION: New moderate effusion/consolidation in the right mid and lower lung field.   VIVIEN YO MD       Electronically Signed and Approved By: VIVIEN OY MD on 7/27/2023 at 10:51                Differential Diagnosis and Discussion:      Abdominal Pain: Based on the patient's signs and symptoms, I considered abdominal aortic aneurysm,  small bowel obstruction, pancreatitis, acute cholecystitis, acute appendecitis, peptic ulcer disease, gastritis, colitis, endocrine disorders, irritable bowel syndrome and other differential diagnosis an etiology of the patient's abdominal pain.  Chest Pain:  Based on the patient's signs and symptoms, I considered aortic dissection, myocardial infaction, pulmonary embolism, cardiac tamponade, pericarditis, pneumothorax, musculoskeletal chest pain and other differential diagnosis as an etiology of the patient's chest pain.   Dyspnea: Differential diagnosis includes but is not limited to metabolic acidosis, neurological disorders, psychogenic, asthma, pneumothorax, upper airway obstruction, COPD, pneumonia, noncardiogenic pulmonary edema, interstitial lung disease, anemia, congestive heart failure, and pulmonary embolism    All labs were reviewed and interpreted by me.  All X-rays impressions were independently interpreted by me.  EKG was interpreted by me.  CT scan radiology impression was interpreted by me.    MDM           Patient Care Considerations:          Consultants/Shared Management Plan:    Hospitalist: I have discussed the case with Dr. Quiroga who agrees to accept the patient for admission.    Social Determinants of Health:    Patient is independent, reliable, and has access to care.       Disposition and Care Coordination:    Admit:   Through independent evaluation of the patient's history, physical, and imperical data, the patient meets criteria for observation/admission to the hospital.        Final diagnoses:   Malignant neoplasm of lower lobe of right lung   Colitis        ED Disposition       ED Disposition   Decision to Admit    Condition   --    Comment   Level of Care: Telemetry [5]   Diagnosis: Colitis [[id number]]   Admitting Physician: JOANNE QUIROGA [[id number]]   Attending Physician: JOANNE QUIROGA [[id number]]   Certification: I Certify That Inpatient Hospital Services Are Medically Necessary For Greater  Than 2 Midnights                 This medical record created using voice recognition software.             Wicho Winter MD  07/27/23

## 2023-07-27 NOTE — H&P
[facility]  [facility]   Patient Name: Theresa M Gabehart  : 1960  MRN: [id number]  Primary Care Physician:  Carrie Castelan APRN  Date of admission: 2023    Subjective chest pain, shortness of breath, abdominal pain  Subjective   Chief Complaint: Chest pain, shortness of breath, abdominal pain    HPI: Patient is a 62-year-old female who presents to the emergency room for abdominal pain and rectal bleeding.  She also reports shortness of breath and chest pain.    Patient has malignant neoplasm of the right lung.  She is status post concurrent chemo RT and 1 dose of maintenance durvalumab.  Patient was seen by Dr. Dee who noticed that she had a leukocytosis and an increased productive cough therefore he treated her with with doxycycline.    On arrival to the ED, patient had a temperature of 98.6, pulse of 122, respiratory rate of 20, blood pressure of 97/79 and she was saturating 91% on room air.  Most recent blood pressure is 92/67 and she saturating 93% on room air.    Chest x-ray shows new moderate effusion/consolidation of the right mid and lower lung field.    CT of her abdomen and pelvis shows new dense consolidation in the right lower lobe.  The appearance is suggestive of a drowned lung.  The central portion of the lung is not included in the images.  There is an irregular density in the middle right lobe which may be atelectasis.  It does not have the same configuration as the patient's known right middle lobe mass.  Hepatomegaly with small hypodense lesions in the liver likely cysts based upon review of the old scans.  Cholecystectomy.  Diffuse colon wall thickening suggesting colitis.    On labs, patient has a phosphorus of 3.7, magnesium of 1.7.  Procalcitonin 0.13.  Occult fecal blood is positive.  Lactate is 1.1.  Sodium is 132.  Potassium is 3.1.  Chloride is 93.  White blood cell count is 13.80.  Troponin is 11.  Personal History     Past  Medical History:  Past Medical History:   Diagnosis Date    Allergic rhinitis     Anemia due to chemotherapy 2022    Aortic aneurysm     FOLLOWED BY GARCIA    Arnold-Chiari syndrome     Bleeding disorder     FREE BLUE DUGGAN TEST NEGATIVE    Carpal tunnel syndrome     RIGHT    Colon polyps     Colon polyps     COPD (chronic obstructive pulmonary disease)     NEBS/INHALER    Dupuytren's contracture syndrome     Dysphagia     ESOPHAGEAL STRICTURE    Emphysema lung     Fibromyalgia     Hiatal hernia     History of fractured vertebra     18 fractured/HEALING,     History of pelvic fracture      HEALED    Hypokalemia 2022    Lung cancer     RIGHT    Malignant neoplasm of middle lobe of right lung 2022    Malignant neoplasm of overlapping sites of right lung 2022    Neuropathy     Osteoporosis     Vertigo          Past Surgical History:  Past Surgical History:   Procedure Laterality Date    BRONCHOSCOPY N/A 2022    Procedure: BRONCHOSCOPY WITH ENDOBRONCHIAL ULTRASOUND AND FINE NEEDLE ASPIRATIONS;  Surgeon: Shaw Bonds DO;  Location: [facility] ENDOS[facility];  Service: Pulmonary;  Laterality: N/A;  MEDIASTINAL ADENOPATHY,  LUNG NODULE     SECTION      CHOLECYSTECTOMY      COLONOSCOPY      ENDOSCOPY      HYSTERECTOMY      KNEE ARTHROSCOPY      ORTHOPEDIC SURGERY      TONSILLECTOMY AND ADENOIDECTOMY      UPPER GASTROINTESTINAL ENDOSCOPY      VENOUS ACCESS DEVICE (PORT) INSERTION N/A 2022    Procedure: INSERTION OF PORTACATH;  Surgeon: Ishan Vargas MD;  [place] [facility] [place] [facility] [place]  Service: General;  Laterality: N/A;        Family History:   Family History   Problem Relation Age of Onset    Lung cancer Mother     Diabetes Sister     Breast cancer Sister     Malig Hyperthermia Neg Hx         Social History:   Social History     Socioeconomic History    Marital status:    Tobacco Use    Smoking status: Every  Day     Packs/day: 0.50     Years: 30.00     Pack years: 15.00     Types: Cigarettes     Start date: 1979    Smokeless tobacco: Never    Tobacco comments:     WAS SMOKING TWO PACKS DAILY, HAS CUT DOWN TO 1 PACK A DAY     INST PER ANESTHESIA PROTOCOL   Vaping Use    Vaping Use: Former   Substance and Sexual Activity    Alcohol use: Never    Drug use: Never    Sexual activity: Defer         Home Medications:  HYDROcodone-acetaminophen, L-Lysine, Misc Natural Products, Probiotic Product, Zinc Sulfate, albuterol sulfate HFA, baclofen, calcium, doxycycline, fluticasone, ipratropium-albuterol, linaclotide, meclizine, tiotropium bromide monohydrate, and vitamin D    Allergies:  Allergies   Allergen Reactions    Clarithromycin Rash    Metronidazole Unknown - High Severity    Doxycycline Calcium Rash    Gabapentin Mental Status Change       Review of Systems   All systems were reviewed and negative except for: Abdominal pain, chest pain, SOB    Objective   Objective     Vitals:   Temp:  [98.6 øF (37 øC)] 98.6 øF (37 øC)  Heart Rate:  [] 101  Resp:  [20] 20  BP: ()/() 93/66    Physical Exam    Constitutional: Awake, alert, no acute distress   Eyes: Pupils equal, sclerae anicteric, no conjunctival injection   HENT: NCAT, mucous membranes moist   Neck: Supple, no thyromegaly, no lymphadenopathy, trachea midline   Respiratory: Clear to auscultation bilaterally, nonlabored respirations    Cardiovascular: RRR, no murmurs, rubs, or gallops, palpable pedal pulses bilaterally   Gastrointestinal: Positive bowel sounds, soft, nontender, nondistended   Musculoskeletal: No bilateral ankle edema, no clubbing or cyanosis to extremities   Psychiatric: Appropriate affect, cooperative   Neurologic: Oriented x 3, strength symmetric in all extremities, Cranial Nerves grossly intact to confrontation, speech clear   Skin: No rashes     Result Review    Result Review:  I have personally reviewed the results from the  time of this admission to 7/27/2023 14:36 EDT and agree with these findings:  [x]  Laboratory  []  Microbiology  [x]  Radiology  []  EKG/Telemetry   []  Cardiology/Vascular   []  Pathology  [x]  Old records  []  Other:      Assessment & Plan   Assessment / Plan   #1  Patient meets criteria for sepsis and that she has leukocytosis, tachycardia, hypotension.  -Started on Zosyn and vancomycin  -IV fluids.  3 L given.  Continue IV fluids.  -Levophed ordered if needed.  -Patient will need ICU admission.     #2 lung cancer status post radiation chemotherapy  -Follows with Dr. Jonas    #3 bilateral pleural effusions right greater than the left  -Status post 900 mL removed via thoracentesis on 7/27/2023  -Complete respiratory panel ordered, follow-up bloody fluids    #4 colitis  -Patient recently treated with doxycycline.  She was on day 7 of 14.  -Enteric stool bacterial and parasite stool panel ordered.  Additionally will check C. difficile.  -patient is on immunotherapy has had one round. Durvalumab can cause colitis. Will consult Dr. Siddiqui to stage the colitis and to give further recommendations.     -Full liquid diet.  Will advance as patient is able to tolerate.  -continue home probiotics.      #5 Tobacco abuse-nicotine patch ordered.  Patient is cutting back.    #6 COPD-DuoNeb, Brovana, Pulmicort.  She follows with Dr. Bonds.    #7 BMI 15.98.  -Nutrition consulted.  Start dietary supplements.  -We will also get a swallow eval to ensure not choking.      DVT prophylaxis:  Mechanical DVT prophylaxis orders are present.    CODE STATUS:    Level Of Support Discussed With: Patient  Code Status (Patient has no pulse and is not breathing): CPR (Attempt to Resuscitate)  Medical Interventions (Patient has pulse or is breathing): Full Support  Release to patient: Routine Release      Admission Status:  I believe this patient meets inpatient status.    Electronically signed by Tyler Thorne DO, 07/27/23, 2:36 PM  EDT.

## 2023-07-27 NOTE — PROGRESS NOTES
"[facility] Clinical Pharmacy Services: Vancomycin Pharmacokinetic Initial Consult Note    Theresa M Gabehart is a 62 y.o. female who is on day 1 of pharmacy to dose vancomycin for Pneumonia and Sepsis.    Consult Information  Consulting Provider: JOANNE QUIROGA  Planned Duration of Therapy: 5 DAYS  Was Patient Receiving Prior to Admission/Consult?: No    PK/PD Target: -600 mg/L.hr   Relevant ID History: NA  Other Antimicrobials: Piperacillin/Tazobactam    Imaging Reviewed?: Yes    Microbiology Data  MRSA PCR performed: 23; Result: Pending  Culture/Source:   23: RESPIRATORY PANEL PCR W/ COVID-19: NOT DETECTED    Vitals/Labs  Ht: 170.2 cm (67\"); Wt: 46.3 KG     Temp (24hrs), Av.6 øF (37 øC), Min:98.6 øF (37 øC), Max:98.6 øF (37 øC)    Estimated Creatinine Clearance: 96 mL/min (A) (by C-G formula based on SCr of 0.45 mg/dL (L)).       Results from last 7 days   Lab Units 23  1033   CREATININE mg/dL 0.45*   WBC 10*3/mm3 13.80*     Assessment/Plan:    Vancomycin Dose:  1000 mg IV every 12 hours; which provides the following predicted parameters:  AUC24,ss: 530 mg/L.hr  PAUC*: 77 %  Ctrough,ss: 14.8 mg/L  Pconc*: 28 %  Tox.: 10 %  Vanc Random ordered for tomorrow at 0600  Patient has order for Basic Metabolic Panel    Pharmacy will follow patient's kidney function and will adjust doses and obtain levels as necessary. Thank you for involving pharmacy in this patient's care. Please contact pharmacy with any questions or concerns.                           Kasey Crowley  Clinical Pharmacist    "

## 2023-07-28 LAB
ALBUMIN FLD-MCNC: 2 G/DL
ANION GAP SERPL CALCULATED.3IONS-SCNC: 10.2 MMOL/L (ref 5–15)
BASOPHILS # BLD AUTO: 0.06 10*3/MM3 (ref 0–0.2)
BASOPHILS NFR BLD AUTO: 0.5 % (ref 0–1.5)
BUN SERPL-MCNC: 6 MG/DL (ref 8–23)
BUN/CREAT SERPL: 15.8 (ref 7–25)
CALCIUM SPEC-SCNC: 7.9 MG/DL (ref 8.6–10.5)
CHLORIDE SERPL-SCNC: 99 MMOL/L (ref 98–107)
CO2 SERPL-SCNC: 22.8 MMOL/L (ref 22–29)
CREAT SERPL-MCNC: 0.38 MG/DL (ref 0.57–1)
DEPRECATED RDW RBC AUTO: 45.3 FL (ref 37–54)
EGFRCR SERPLBLD CKD-EPI 2021: 114.2 ML/MIN/1.73
EOSINOPHIL # BLD AUTO: 0.55 10*3/MM3 (ref 0–0.4)
EOSINOPHIL NFR BLD AUTO: 4.6 % (ref 0.3–6.2)
ERYTHROCYTE [DISTWIDTH] IN BLOOD BY AUTOMATED COUNT: 13.6 % (ref 12.3–15.4)
GLUCOSE FLD-MCNC: 96 MG/DL
GLUCOSE SERPL-MCNC: 111 MG/DL (ref 65–99)
HCT VFR BLD AUTO: 33.2 % (ref 34–46.6)
HGB BLD-MCNC: 11.1 G/DL (ref 12–15.9)
IMM GRANULOCYTES # BLD AUTO: 0.04 10*3/MM3 (ref 0–0.05)
IMM GRANULOCYTES NFR BLD AUTO: 0.3 % (ref 0–0.5)
LDH SERPL-CCNC: 129 U/L (ref 135–214)
LYMPHOCYTES # BLD AUTO: 0.79 10*3/MM3 (ref 0.7–3.1)
LYMPHOCYTES NFR BLD AUTO: 6.6 % (ref 19.6–45.3)
MAGNESIUM SERPL-MCNC: 1.9 MG/DL (ref 1.6–2.4)
MCH RBC QN AUTO: 30.3 PG (ref 26.6–33)
MCHC RBC AUTO-ENTMCNC: 33.4 G/DL (ref 31.5–35.7)
MCV RBC AUTO: 90.7 FL (ref 79–97)
MONOCYTES # BLD AUTO: 1.41 10*3/MM3 (ref 0.1–0.9)
MONOCYTES NFR BLD AUTO: 11.8 % (ref 5–12)
NEUTROPHILS NFR BLD AUTO: 76.2 % (ref 42.7–76)
NEUTROPHILS NFR BLD AUTO: 9.11 10*3/MM3 (ref 1.7–7)
NRBC BLD AUTO-RTO: 0 /100 WBC (ref 0–0.2)
PH FLD: 8 [PH]
PHOSPHATE SERPL-MCNC: 3 MG/DL (ref 2.5–4.5)
PLATELET # BLD AUTO: 350 10*3/MM3 (ref 140–450)
PMV BLD AUTO: 8.5 FL (ref 6–12)
POTASSIUM SERPL-SCNC: 3.5 MMOL/L (ref 3.5–5.2)
POTASSIUM SERPL-SCNC: 4.5 MMOL/L (ref 3.5–5.2)
PROT FLD-MCNC: 3.3 G/DL
RBC # BLD AUTO: 3.66 10*6/MM3 (ref 3.77–5.28)
SODIUM SERPL-SCNC: 132 MMOL/L (ref 136–145)
VANCOMYCIN SERPL-MCNC: 6.4 MCG/ML (ref 5–40)
WBC NRBC COR # BLD: 11.96 10*3/MM3 (ref 3.4–10.8)

## 2023-07-28 PROCEDURE — 94799 UNLISTED PULMONARY SVC/PX: CPT

## 2023-07-28 PROCEDURE — 85025 COMPLETE CBC W/AUTO DIFF WBC: CPT | Performed by: HOSPITALIST

## 2023-07-28 PROCEDURE — 83735 ASSAY OF MAGNESIUM: CPT | Performed by: HOSPITALIST

## 2023-07-28 PROCEDURE — 25010000002 PIPERACILLIN SOD-TAZOBACTAM PER 1 G: Performed by: HOSPITALIST

## 2023-07-28 PROCEDURE — 92610 EVALUATE SWALLOWING FUNCTION: CPT

## 2023-07-28 PROCEDURE — 80048 BASIC METABOLIC PNL TOTAL CA: CPT | Performed by: HOSPITALIST

## 2023-07-28 PROCEDURE — 99233 SBSQ HOSP IP/OBS HIGH 50: CPT | Performed by: STUDENT IN AN ORGANIZED HEALTH CARE EDUCATION/TRAINING PROGRAM

## 2023-07-28 PROCEDURE — 84100 ASSAY OF PHOSPHORUS: CPT | Performed by: HOSPITALIST

## 2023-07-28 PROCEDURE — 94761 N-INVAS EAR/PLS OXIMETRY MLT: CPT

## 2023-07-28 PROCEDURE — 83615 LACTATE (LD) (LDH) ENZYME: CPT | Performed by: STUDENT IN AN ORGANIZED HEALTH CARE EDUCATION/TRAINING PROGRAM

## 2023-07-28 PROCEDURE — 99233 SBSQ HOSP IP/OBS HIGH 50: CPT | Performed by: FAMILY MEDICINE

## 2023-07-28 PROCEDURE — 99222 1ST HOSP IP/OBS MODERATE 55: CPT | Performed by: INTERNAL MEDICINE

## 2023-07-28 PROCEDURE — 84132 ASSAY OF SERUM POTASSIUM: CPT | Performed by: HOSPITALIST

## 2023-07-28 PROCEDURE — 36415 COLL VENOUS BLD VENIPUNCTURE: CPT | Performed by: HOSPITALIST

## 2023-07-28 PROCEDURE — 80202 ASSAY OF VANCOMYCIN: CPT | Performed by: HOSPITALIST

## 2023-07-28 PROCEDURE — 25010000002 VANCOMYCIN 5 G RECONSTITUTED SOLUTION: Performed by: HOSPITALIST

## 2023-07-28 PROCEDURE — 94664 DEMO&/EVAL PT USE INHALER: CPT

## 2023-07-28 RX ORDER — BACLOFEN 10 MG/1
10 TABLET ORAL EVERY 12 HOURS PRN
Status: DISCONTINUED | OUTPATIENT
Start: 2023-07-28 | End: 2023-08-04 | Stop reason: HOSPADM

## 2023-07-28 RX ORDER — POTASSIUM CHLORIDE 20 MEQ/1
40 TABLET, EXTENDED RELEASE ORAL EVERY 4 HOURS
Status: COMPLETED | OUTPATIENT
Start: 2023-07-28 | End: 2023-07-28

## 2023-07-28 RX ORDER — CHOLECALCIFEROL (VITAMIN D3) 125 MCG
10 CAPSULE ORAL NIGHTLY PRN
Status: DISCONTINUED | OUTPATIENT
Start: 2023-07-28 | End: 2023-08-04 | Stop reason: HOSPADM

## 2023-07-28 RX ADMIN — HYDROCODONE BITARTRATE AND ACETAMINOPHEN 1 TABLET: 7.5; 325 TABLET ORAL at 21:33

## 2023-07-28 RX ADMIN — VANCOMYCIN HYDROCHLORIDE 1250 MG: 5 INJECTION, POWDER, LYOPHILIZED, FOR SOLUTION INTRAVENOUS at 06:33

## 2023-07-28 RX ADMIN — POTASSIUM CHLORIDE 40 MEQ: 1500 TABLET, EXTENDED RELEASE ORAL at 04:20

## 2023-07-28 RX ADMIN — ERGOCALCIFEROL 50000 UNITS: 1.25 CAPSULE ORAL at 08:56

## 2023-07-28 RX ADMIN — Medication 500 MG: at 08:56

## 2023-07-28 RX ADMIN — ARFORMOTEROL TARTRATE 15 MCG: 15 SOLUTION RESPIRATORY (INHALATION) at 18:50

## 2023-07-28 RX ADMIN — PIPERACILLIN SODIUM AND TAZOBACTAM SODIUM 3.38 G: 3; .375 INJECTION, POWDER, LYOPHILIZED, FOR SOLUTION INTRAVENOUS at 03:01

## 2023-07-28 RX ADMIN — ARFORMOTEROL TARTRATE 15 MCG: 15 SOLUTION RESPIRATORY (INHALATION) at 07:17

## 2023-07-28 RX ADMIN — PIPERACILLIN SODIUM AND TAZOBACTAM SODIUM 3.38 G: 3; .375 INJECTION, POWDER, LYOPHILIZED, FOR SOLUTION INTRAVENOUS at 21:34

## 2023-07-28 RX ADMIN — PIPERACILLIN SODIUM AND TAZOBACTAM SODIUM 3.38 G: 3; .375 INJECTION, POWDER, LYOPHILIZED, FOR SOLUTION INTRAVENOUS at 12:34

## 2023-07-28 RX ADMIN — Medication 10 ML: at 08:56

## 2023-07-28 RX ADMIN — BUDESONIDE 0.5 MG: 0.5 SUSPENSION RESPIRATORY (INHALATION) at 18:51

## 2023-07-28 RX ADMIN — ZINC SULFATE 220 MG (50 MG) CAPSULE 220 MG: CAPSULE at 08:56

## 2023-07-28 RX ADMIN — POTASSIUM CHLORIDE 40 MEQ: 1500 TABLET, EXTENDED RELEASE ORAL at 08:56

## 2023-07-28 RX ADMIN — IPRATROPIUM BROMIDE AND ALBUTEROL SULFATE 3 ML: .5; 3 SOLUTION RESPIRATORY (INHALATION) at 07:16

## 2023-07-28 RX ADMIN — IPRATROPIUM BROMIDE AND ALBUTEROL SULFATE 3 ML: .5; 3 SOLUTION RESPIRATORY (INHALATION) at 15:40

## 2023-07-28 RX ADMIN — IPRATROPIUM BROMIDE AND ALBUTEROL SULFATE 3 ML: .5; 3 SOLUTION RESPIRATORY (INHALATION) at 18:50

## 2023-07-28 RX ADMIN — Medication 10 ML: at 21:34

## 2023-07-28 RX ADMIN — HYDROCODONE BITARTRATE AND ACETAMINOPHEN 1 TABLET: 7.5; 325 TABLET ORAL at 08:56

## 2023-07-28 RX ADMIN — BUDESONIDE 0.5 MG: 0.5 SUSPENSION RESPIRATORY (INHALATION) at 07:16

## 2023-07-28 RX ADMIN — Medication 500 MG: at 21:34

## 2023-07-28 NOTE — CONSULTS
Nutrition Services    Patient Name: Theresa M Gabehart  YOB: 1960  MRN: [id number]  Admission date: 7/27/2023      CLINICAL NUTRITION ASSESSMENT      Reason for Assessment  BMI, Physician consult   H&P:    Past Medical History:   Diagnosis Date    Allergic rhinitis     Anemia due to chemotherapy 12/28/2022    Aortic aneurysm     FOLLOWED BY GARCIA    Arnold-Chiari syndrome     Bleeding disorder     FREE BLEEDER, BLUE POWERS TEST NEGATIVE    Carpal tunnel syndrome     RIGHT    Colon polyps     Colon polyps     COPD (chronic obstructive pulmonary disease)     NEBS/INHALER    Dupuytren's contracture syndrome     Dysphagia     ESOPHAGEAL STRICTURE    Emphysema lung     Fibromyalgia     Hiatal hernia     History of fractured vertebra     18 fractured/HEALING, 2021    History of pelvic fracture     2021 HEALED    Hypokalemia 12/28/2022    Lung cancer     RIGHT    Malignant neoplasm of middle lobe of right lung 11/30/2022    Malignant neoplasm of overlapping sites of right lung 11/30/2022    Neuropathy     Osteoporosis     Vertigo         Current Problems:   Active Hospital Problems    Diagnosis     **Colitis         Nutrition/Diet History         Narrative     RD consulted for chronic poor intake, low BMI. Pt admitted with colitis, in CCU for possible pressor support. Medical hx significant for lung CA. Pt has finished chemo and XRT.    Thoracentesis yesterday removing 900 cc fluid.     Over the past 3 months, pt with 7.8% decrease in weight, clinically significant. Pt receiving FLD, nursing documentation records 100% intake x 1. MD ordered Boost Plus TID    SLP eval this morning, cleared for regular diet. RD went to visit patient after lunch, moved to 4th floor. Pt sitting up in bed during visit eating lunch. Minimal po intake noted. Pt reports everything starts to have a bitter taste or she can't taste the food.     Pt does not care for the Boost Plus, reports drinking the Boost VHC at home. RD will  "modify order. NFPE preformed finding severe fat and muscle wasting. Meets criteria for malnutrition.      Anthropometrics        Current Height, Weight Height: 170.2 cm (67\")  Weight: 48.5 kg (106 lb 14.8 oz)   Current BMI Body mass index is 16.75 kg/mý.       Weight Hx  Wt Readings from Last 30 Encounters:   07/27/23 2000 48.5 kg (106 lb 14.8 oz)   07/20/23 1004 46.3 kg (102 lb)   07/13/23 1214 46.3 kg (102 lb)   05/04/23 1306 51.3 kg (113 lb)   04/19/23 0921 52.5 kg (115 lb 11.9 oz)   04/05/23 0841 53.6 kg (118 lb 2.7 oz)   04/05/23 0855 53.6 kg (118 lb 2.7 oz)   03/29/23 1407 52.6 kg (116 lb)   02/08/23 0914 55.8 kg (123 lb 0.3 oz)   02/08/23 0921 55.8 kg (123 lb 0.3 oz)   01/20/23 1425 54.2 kg (119 lb 7.8 oz)   01/18/23 0849 53.8 kg (118 lb 9.7 oz)   01/18/23 0901 53.8 kg (118 lb 9.7 oz)   01/11/23 1500 52.6 kg (115 lb 15.4 oz)   01/10/23 1534 53.8 kg (118 lb 9.7 oz)   01/04/23 1300 52.9 kg (116 lb 10 oz)   01/03/23 1431 52.7 kg (116 lb 2.9 oz)   12/28/22 0834 53.9 kg (118 lb 13.3 oz)   12/28/22 1100 53.7 kg (118 lb 7.3 oz)   12/28/22 0824 53.9 kg (118 lb 13.3 oz)   12/27/22 1546 51.8 kg (114 lb 3.2 oz)   12/21/22 1500 52.8 kg (116 lb 6.5 oz)   12/20/22 1520 52.4 kg (115 lb 8.3 oz)   12/16/22 1323 51.3 kg (113 lb)   12/14/22 1500 51 kg (112 lb 7 oz)   12/13/22 1532 51.7 kg (113 lb 15.7 oz)   12/08/22 0900 52 kg (114 lb 10.2 oz)   12/07/22 0942 51.2 kg (112 lb 14 oz)   12/07/22 1538 50.8 kg (112 lb)   12/05/22 1425 51.9 kg (114 lb 6.7 oz)            Wt Change Observation -7.8% x 3 months, clinically significant  -10.9% x 6 months, clinically significant     Estimated/Assessed Needs       Energy Requirements 35-40 kcal/kg CBW (48.5 kg)   EST Needs (kcal/day) 1066-1770 kcal       Protein Requirements 1.0-1.2 g/kg    EST Daily Needs (g/day) 49-58 g       Fluid Requirements 25 ml/kg IBW    Estimated Needs (mL/day) 1540 ml     Labs/Medications         Pertinent Labs Reviewed.   Results from last 7 days   Lab Units " 07/28/23  0301 07/27/23  1033   SODIUM mmol/L 132* 132*   POTASSIUM mmol/L 3.5 3.1*   CHLORIDE mmol/L 99 93*   CO2 mmol/L 22.8 23.0   BUN mg/dL 6* 8   CREATININE mg/dL 0.38* 0.45*   CALCIUM mg/dL 7.9* 8.9   BILIRUBIN mg/dL  --  0.3   ALK PHOS U/L  --  48   ALT (SGPT) U/L  --  10   AST (SGOT) U/L  --  10   GLUCOSE mg/dL 111* 122*     Results from last 7 days   Lab Units 07/28/23  0301 07/27/23  1455   MAGNESIUM mg/dL 1.9 1.7   PHOSPHORUS mg/dL 3.0 3.7   HEMOGLOBIN g/dL 11.1*  --    HEMATOCRIT % 33.2*  --      COVID19   Date Value Ref Range Status   07/27/2023 Not Detected Not Detected - Ref. Range Final     No results found for: HGBA1C      Pertinent Medications Reviewed.     Current Nutrition Orders & Evaluation of Intake       Oral Nutrition     Current PO Diet Diet: Regular/House Diet; Texture: Regular Texture (IDDSI 7); Fluid Consistency: Thin (IDDSI 0)   Supplement Orders Placed This Encounter      Dietary Nutrition Supplements Boost Plus (Ensure Plus); chocolate       Malnutrition Severity Assessment           Malnutrition Severity Assessment      Patient meets criteria for : Severe Malnutrition  Malnutrition Type (last 8 hours)       Malnutrition Severity Assessment       Row Name 07/28/23 1344       Malnutrition Severity Assessment    Malnutrition Type Chronic Disease - Related Malnutrition      Row Name 07/28/23 1344       Unintentional Weight Loss     Unintentional Weight Loss Findings Severe    Unintentional Weight Loss  Weight loss greater than 10% in six months      Row Name 07/28/23 1344       Muscle Loss    Loss of Muscle Mass Findings Severe    [place] Severe - deep hollowing/scooping, lack of muscle to touch, facial bones well defined    Clavicle Bone Region Severe - protruding prominent bone    Acromion Bone Region Severe - squared shoulders, bones, and acromion process protrusion prominent    Scapular Bone Region Severe - prominent bones, depressions easily visible between ribs, scapula,  spine, shoulders    Dorsal Hand Region Severe - prominent depression    Patellar Region Severe - prominent bone, square looking, very little muscle definition    Anterior Thigh Region Severe - line/depression along thigh, obviously thin    Posterior Calf Region Severe - thin with very little definition/firmness      Row Name 07/28/23 1344       Fat Loss    Subcutaneous Fat Loss Findings Severe    Orbital Region  Severe - pronounced hollowness/depression, dark circles, loose saggy skin    Upper Arm Region Severe - mostly skin, very little space between folds, fingers touch    Thoracic & Lumbar Region Severe - ribs visible with prominent depressions, iliac crest very prominent      Row Name 07/28/23 1349       Criteria Met (Must meet criteria for severity in at least 2 of these categories: M Wasting, Fat Loss, Fluid, Secondary Signs, Wt. Status, Intake)    Patient meets criteria for  Severe Malnutrition                          Nutrition Diagnosis         Nutrition Dx Problem 1 Severe malnutrition related to decreased ability to consume sufficient energy as evidenced by decreased appetite., unintended wt change., body composition changes., patient report., and report of minimal PO intake.     Nutrition Intervention         D/C Boost Plus TID per patient request  Order Boost VHC strawberry TID (1590 kcal, 66 g pro)     Medical Nutrition Therapy/Nutrition Education          Learner     Readiness Patient  Acceptance     Method     Response Explanation  Verbalizes understanding     Monitor/Evaluation        Monitor Per protocol, PO intake, Supplement intake, Pertinent labs, Weight, Hemodynamic stability     Nutrition Discharge Plan         To be determined     Electronically signed by:  Candy Shankar RD  07/28/23 08:37 EDT

## 2023-07-28 NOTE — PLAN OF CARE
"Goal Outcome Evaluation:         2L n/c applied while sleeping, VSS otherwise stable without pressors. Urine, MRSA swab, and Sputum collected; waiting for BM. Electrolytes replaced as needed. PRN pain medication given for right side \"lung\" pain from thoracentesis; minimal swelling noted but no crepitus or bruising noted.              "

## 2023-07-28 NOTE — THERAPY EVALUATION
Acute Care - Speech Language Pathology   Swallow Initial Evaluation KELVIN [facility]     Patient Name: Theresa M Gabehart  : 1960  MRN: [id number]  Today's Date: 2023               Admit Date: 2023    Visit Dx:     ICD-10-CM ICD-9-CM   1. Malignant neoplasm of lower lobe of right lung  C34.31 162.5   2. Colitis  K52.9 558.9   3. Dysphagia, unspecified type  R13.10 787.20     Patient Active Problem List   Diagnosis    Age related osteoporosis    Annular tear of lumbar disc    Anxiety disorder    Arthritis    Atopic neurodermatitis    Carpal tunnel syndrome of right wrist    Chronic idiopathic constipation    Chronic obstructive pulmonary disease    Compression of brain    Fibromyalgia    Herniated lumbar intervertebral disc    Rotator cuff arthropathy of right shoulder    Seasonal allergies    Simple chronic bronchitis    Solitary pulmonary nodule    Tobacco user    Von Willebrand disease    Lung mass    Centrilobular emphysema    Malignant neoplasm of overlapping sites of right lung    Weight loss    Encounter for adjustment or management of vascular access device    Anemia due to chemotherapy    Hypokalemia    Neuropathy involving both lower extremities    Encounter for long-term (current) use of medications    Acute colitis    Atypical chest pain    Back pain    Compression fracture    Constipation    Fall    Lower abdominal pain    Tick bites    Pain in anterior right upper extremity    Chiari I malformation    Smokes with greater than 40 pack year history    Pleural effusion    Pericardial effusion    Leukocytosis    Colitis     Past Medical History:   Diagnosis Date    Allergic rhinitis     Anemia due to chemotherapy 2022    Aortic aneurysm     FOLLOWED BY GARCIA    Arnold-Chiari syndrome     Bleeding disorder     FREE BLUE DUGGAN TEST NEGATIVE    Carpal tunnel syndrome     RIGHT    Colon polyps     Colon polyps     COPD (chronic obstructive pulmonary disease)     NEBS/INHALER     Dupuytren's contracture syndrome     Dysphagia     ESOPHAGEAL STRICTURE    Emphysema lung     Fibromyalgia     Hiatal hernia     History of fractured vertebra     18 fractured/HEALING,     History of pelvic fracture      HEALED    Hypokalemia 2022    Lung cancer     RIGHT    Malignant neoplasm of middle lobe of right lung 2022    Malignant neoplasm of overlapping sites of right lung 2022    Neuropathy     Osteoporosis     Vertigo      Past Surgical History:   Procedure Laterality Date    BRONCHOSCOPY N/A 2022    Procedure: BRONCHOSCOPY WITH ENDOBRONCHIAL ULTRASOUND AND FINE NEEDLE ASPIRATIONS;  Surgeon: Shaw Bonds DO;  Location: [facility] ENDOSCOPY;  Service: Pulmonary;  Laterality: N/A;  MEDIASTINAL ADENOPATHY,  LUNG NODULE     SECTION      CHOLECYSTECTOMY      COLONOSCOPY      ENDOSCOPY      HYSTERECTOMY      total    KNEE ARTHROSCOPY      ORTHOPEDIC SURGERY      meniscus tear    TONSILLECTOMY AND ADENOIDECTOMY      UPPER GASTROINTESTINAL ENDOSCOPY      VENOUS ACCESS DEVICE (PORT) INSERTION N/A 2022    Procedure: INSERTION OF PORTACATH;  Surgeon: Ishan Vargas MD;  Location: [facility] OR [facility];  Service: General;  Laterality: N/A;       Inpatient Speech Pathology Dysphagia Evaluation        PAIN SCALE: None indicated.    PRECAUTIONS/CONTRAINDICATIONS: Contact    SUSPECTED ABUSE/NEGLECT/EXPLOITATION: None indicated.    SOCIAL/PSYCHOLOGICAL NEEDS/BARRIERS: None indicated.    PAST SOCIAL HISTORY: 62-year-old female lives at home with her     PRIOR FUNCTION: Independent    PATIENT GOALS/EXPECTATIONS: Return home    HISTORY: 62-year-old female the above diagnosis is referred for speech therapy evaluation assess for swallowing.  Patient stating no complaint of difficulty.  Patient denies nausea.  Patient stated thinks she might need to have her esophagus stretched though denied difficulty with swallowing solid.    CURRENT DIET LEVEL: Full  liquid    OBJECTIVE:    TEST ADMINISTERED: Clinical dysphagia evaluation    COGNITION/SAFETY AWARENESS: Appears intact, patient followed directions and answered questions without difficulty.    BEHAVIORAL OBSERVATIONS: Alert and cooperative    ORAL MOTOR EXAM:Within functional limits    VOICE QUALITY: Adequate    REFLEX EXAM: Deferred    POSTURE: Sitting upright in bed    FEEDING/SWALLOWING FUNCTION: Assessed with  thin liquids, pur‚ed solids, crunchy solid.    CLINICAL OBSERVATIONS:  Thin liquid by cup and by straw appeared timely with vocal quality remaining clear to cervical auscultation.  Pur‚e solid with swallow completed with laryngeal elevation noted to palpation.  Crunchy solid with adequate chewing followed by swallow completed clearing the oral cavity.    DYSPHAGIA CRITERIA: Swallow appears functional for nutritional needs.  No overt clinical signs or symptoms of aspiration were noted at the bedside.    FUNCTIONAL ASSESSMENT INSTRUMENT: Patient currently scored a level 7 of 7 on Functional Communication Measures for swallowing indicating a 0% limitation in function.    ASSESSMENT/ PLAN OF CARE:  No direct speech therapy is recommended at this time for dysphagia. Recommend rereferral should patient demonstrate change in status.    RECOMMENDATIONS:   1.   DIET: Regular solids cut small, thin liquid.    2.  POSITION: Positioning fully upright for all p.o. intake and 30 minutes following.    3.  COMPENSATORY STRATEGIES: Alternate small bites and small sips of solids and liquids at a slow rate.    Pt/responsible party agrees with plan of care and has been informed of all alternatives, risks and benefits.                              Anticipated Discharge Disposition (SLP): home with assist (07/28/23 6813)                                                                  EDUCATION  The patient has been educated in the following areas:   Modified Diet Instruction.              Time Calculation:    Time  Calculation- SLP       Row Name 07/28/23 0927             Time Calculation- SLP    SLP Start Time 0645  -TB      SLP Stop Time 0745  -TB      SLP Time Calculation (min) 60 min  -TB      SLP Received On 07/28/23  -TB         Untimed Charges    SLP Eval/Re-eval  ST Eval Oral Pharyng Swallow - 55639  -TB      75664-SB Eval Oral Pharyng Swallow Minutes 60  -TB         Total Minutes    Untimed Charges Total Minutes 60  -TB       Total Minutes 60  -TB                User Key  (r) = Recorded By, (t) = Taken By, (c) = Cosigned By      Initials Name Provider Type    TB Aparna Alfonso SLP Speech and Language Pathologist                    Therapy Charges for Today       Code Description Service Date Service Provider Modifiers Qty    [id number] HC ST EVAL ORAL PHARYNG SWALLOW 4 7/28/2023 Aparna Alfonso SLP GN 1                 KOLTON Campa  7/28/2023

## 2023-07-28 NOTE — PLAN OF CARE
Goal Outcome Evaluation:  Plan of Care Reviewed With: patient         DYSPHAGIA CRITERIA: Swallow appears functional for nutritional needs.  No overt clinical signs or symptoms of aspiration were noted at the bedside.    FUNCTIONAL ASSESSMENT INSTRUMENT: Patient currently scored a level 7 of 7 on Functional Communication Measures for swallowing indicating a 0% limitation in function.    ASSESSMENT/ PLAN OF CARE:  No direct speech therapy is recommended at this time for dysphagia. Recommend rereferral should patient demonstrate change in status.    RECOMMENDATIONS:   1.   DIET: Regular solids cut small, thin liquid.    2.  POSITION: Positioning fully upright for all p.o. intake and 30 minutes following.    3.  COMPENSATORY STRATEGIES: Alternate small bites and small sips of solids and liquids at a slow rate.

## 2023-07-28 NOTE — PROGRESS NOTES
Pulmonary / Critical Care Progress Note      Patient Name: Theresa M Gabehart  : 1960  MRN: [id number]  Attending:  Mohsen Pizarro MD   Date of admission: 2023    Subjective   Subjective   Patient critically ill with hypotension    Over past 24 hours:  Doing well this morning  On 2 L nasal cannula  May 400 cc over last 24 hours  900 cc output from [facility]; cultures pending;   No complaints morning    Review of Systems  Constitutional symptoms:  Denied complaints   Ear, nose, throat: Denied complaints  Cardiovascular:  Denied complaints  Respiratory: Denied complaints  Gastrointestinal: Denied complaints  Musculoskeletal: Denied complaints  Neurologic: Denied complaints  Skin: Denied complaints        Objective   Objective     Vitals:   Vital signs for last 24 hours:  Temp:  [98.1 øF (36.7 øC)-99.2 øF (37.3 øC)] 98.4 øF (36.9 øC)  Heart Rate:  [] 106  Resp:  [17-23] 23  BP: ()/(54-91) 101/70    Intake/Output last 3 shifts:  I/O last 3 completed shifts:  In: 871 [P.O.:240; I.V.:631]  Out: 400 [Urine:400]  Intake/Output this shift:  I/O this shift:  In: 479 [I.V.:479]  Out: -     Vent settings for last 24 hours:       Hemodynamic parameters for last 24 hours:       Physical Exam   Vital Signs Reviewed   General:  Alert, NAD.  Chronically ill-appearing femaleHEENT:  PERRL, EOMI.  OP  Chest:  Clear to auscultation bilaterally, no work of breathing noted on 2 L  CV: RRR, no MGR, pulses 2+, equal.  Abd:  Soft, NT, ND, + BS  EXT:  no clubbing, no cyanosis, no edema  Neuro:  A&Ox3, CN grossly intact, no focal deficits.  Skin: No rashes or lesions noted      Result Review    Result Review:  I have personally reviewed the results from the time of this admission to 2023 13:01 EDT and agree with these findings:  []  Laboratory  []  Microbiology  []  Radiology  []  EKG/Telemetry   []  Cardiology/Vascular   []  Pathology  []  Old records  []  Other:  Most notable findings include:    -    Assessment & Plan   Assessment / Plan     Active Hospital Problems:  Active Hospital Problems    Diagnosis     **Colitis          Impression:  Septic shock, resolved  Recently diagnosed adenocarcinoma of the lung, stage IIIa status post chemo, XRT  Bilateral pleural effusion status post thoracentesis 7/27  Colitis  Hyponatremia  Hypokalemia  History of tobacco use     Plan:  -Currently on 2 L nasal cannula;  -Pressors not needed at this time  -Status post thoracentesis 7/27 w/ 900cc output today; cultures and cytology pending; ; pending serum LDH  -2D echo ordered  -We will DC vancomycin as MRSA PCR negative; continue Zosyn for now; follow cultures and de-escalate when appropriate  -Resp viral panel negative; follow sputum cultures  -Abdominal discomfort improving, tolerating some PO  -Cont aspiration precautions. Keep HOB 30 deg.   -Replace electrolytes PRN to keep K 4.0, Mag 2.0, Phos 4.0.  -Keep glucose 140-180 while in ICU. Cont SSI.  -Transfuse to keep Hgb >7  -Continue PPI  -Continue nebs and bronchopulmonary hygiene  -Consider oncology consult while patient is inpatient  -DVT prophylaxis heparin subcutaneous    DVT prophylaxis:  Mechanical DVT prophylaxis orders are present.    CODE STATUS:   Level Of Support Discussed With: Patient  Code Status (Patient has no pulse and is not breathing): CPR (Attempt to Resuscitate)  Medical Interventions (Patient has pulse or is breathing): Full Support  Release to patient: Routine Release  Electronically signed by Jamin Cavazos MD, 07/28/23, 1:04 PM EDT.

## 2023-07-28 NOTE — PLAN OF CARE
Goal Outcome Evaluation:  Plan of Care Reviewed With: patient        Progress: no change  Outcome Evaluation: Pt received from CCU this shift, no diarrhea or rectal bleeding noted since arrival. IV antibiotics as ordered. VSS. Pt assist x1 to BSC. SCD's in place.

## 2023-07-28 NOTE — CONSULTS
[facility]  Initial Inpatient Consult Note    Referring Provider: Dr. Thorne    Reason for Consultation: colitis    Subjective     History of present illness:    62 y.o. female with history of COPD, adenocarcinoma of the lung who presented with c/o diarrhea, rectal bleeding, and abd pain.  Pt reports symptoms started last week after starting doxycycline for a respiratory infection.  She reports she took the antibiotic 2 days then stopped on Saturday due to diarrhea.  Her diarrhea has persisted but reports symptoms improved this AM.  She also reports rectal bleeding prior to presentation but last couple bowel movements with no blood per patient.  She reports multiple bowel movements daily.  + associated diffuse abd cramping.  No fever, chills.  Pt had chemo/radiation and one dose of darvalumab.  Pt reports darvalumab was in April.  Labs on presentation with WBC 13.8, Hgb 14.1, Plt 350.  CT abd/pelvis w/ contrast with new dense consolidation in RLL, large right sided pleural effusion, hepatomegaly, diffuse colon wall thickening suggesting colitis.  Pt is s/p thoracentesis and now on abx.    Past Medical History:  Past Medical History:   Diagnosis Date    Allergic rhinitis     Anemia due to chemotherapy 12/28/2022    Aortic aneurysm     FOLLOWED BY GARCIA    Arnold-Chiari syndrome     Bleeding disorder     FREE BLEEDER, BLUE AUGUSTBRAND TEST NEGATIVE    Carpal tunnel syndrome     RIGHT    Colon polyps     Colon polyps     COPD (chronic obstructive pulmonary disease)     NEBS/INHALER    Dupuytren's contracture syndrome     Dysphagia     ESOPHAGEAL STRICTURE    Emphysema lung     Fibromyalgia     Hiatal hernia     History of fractured vertebra     18 fractured/HEALING, 2021    History of pelvic fracture     2021 HEALED    Hypokalemia 12/28/2022    Lung cancer     RIGHT    Malignant neoplasm of middle lobe of right lung 11/30/2022    Malignant neoplasm of overlapping sites of right lung 11/30/2022     Neuropathy     Osteoporosis     Vertigo      Past Surgical History:  Past Surgical History:   Procedure Laterality Date    BRONCHOSCOPY N/A 2022    Procedure: BRONCHOSCOPY WITH ENDOBRONCHIAL ULTRASOUND AND FINE NEEDLE ASPIRATIONS;  Surgeon: Shaw Bonds DO;  Location: [facility] ENDOSCOPY;  Service: Pulmonary;  Laterality: N/A;  MEDIASTINAL ADENOPATHY,  LUNG NODULE     SECTION      CHOLECYSTECTOMY      COLONOSCOPY      ENDOSCOPY      HYSTERECTOMY      total    KNEE ARTHROSCOPY      ORTHOPEDIC SURGERY      meniscus tear    TONSILLECTOMY AND ADENOIDECTOMY      UPPER GASTROINTESTINAL ENDOSCOPY      VENOUS ACCESS DEVICE (PORT) INSERTION N/A 2022    Procedure: INSERTION OF PORTACATH;  Surgeon: Ishan Vargas MD;  Location: [facility] OR [facility];  Service: General;  Laterality: N/A;      Social History:   Social History     Tobacco Use    Smoking status: Every Day     Packs/day: 0.50     Years: 30.00     Pack years: 15.00     Types: Cigarettes     Start date:     Smokeless tobacco: Never    Tobacco comments:     WAS SMOKING TWO PACKS DAILY, HAS CUT DOWN TO 1 PACK A DAY     INST PER ANESTHESIA PROTOCOL   Substance Use Topics    Alcohol use: Never      Family History:  Family History   Problem Relation Age of Onset    Lung cancer Mother     Diabetes Sister     Breast cancer Sister     Malig Hyperthermia Neg Hx        Home Meds:  Medications Prior to Admission   Medication Sig Dispense Refill Last Dose    baclofen (LIORESAL) 10 MG tablet Take 1 tablet by mouth Every 12 (Twelve) Hours.   Past Month    [employer] Calcium 600 MG tablet Take 1 tablet by mouth 2 (Two) Times a Day With Meals.   Past Month    [] doxycycline (VIBRAMYICN) 100 MG tablet Take 1 tablet by mouth 2 (Two) Times a Day for 7 days. 14 tablet 0 Past Month    HYDROcodone-acetaminophen (NORCO) 7.5-325 MG per tablet Take 1 tablet by mouth Every 6 (Six) Hours As Needed.   2023    ipratropium-albuterol (DUO-NEB) 0.5-2.5 mg/3 ml  nebulizer USE 1 VIAL IN NEBULIZER EVERY 4 TO 6 HOURS (Patient taking differently: Take 3 mL by nebulization Every 4 (Four) to 6 (Six) Hours As Needed.) 120 mL 11 Past Month    L-Lysine 500 MG tablet tablet Take  by mouth Daily.   Past Month    Probiotic Product (PROBIOTIC BLEND PO) Take 1 tablet by mouth Daily.   7/26/2023    tiotropium bromide monohydrate (SPIRIVA RESPIMAT) 2.5 MCG/ACT aerosol solution inhaler Inhale 2 puffs Daily. 1 each 11 Past Week    vitamin D (ERGOCALCIFEROL) 1.25 MG (69085 UT) capsule capsule Take 1 capsule by mouth Every 7 (Seven) Days.   Past Month    Zinc Sulfate (ZINC 15 PO) Take 1 each by mouth Every Other Day.   Past Month    albuterol sulfate  (90 Base) MCG/ACT inhaler Inhale 2 puffs Every 4 (Four) Hours As Needed for Wheezing. 18 g 11 More than a month     Current Meds:   arformoterol, 15 mcg, Nebulization, BID - RT  budesonide, 0.5 mg, Nebulization, BID - RT  ipratropium-albuterol, 3 mL, Nebulization, Q4H - RT  nicotine, 1 patch, Transdermal, Q24H  pantoprazole, 40 mg, Oral, Q AM  piperacillin-tazobactam, 3.375 g, Intravenous, Q8H  saccharomyces boulardii, 500 mg, Oral, BID  sodium chloride, 1,000 mL, Intravenous, Once  sodium chloride, 1,000 mL, Intravenous, Once  sodium chloride, 10 mL, Intravenous, Q12H  vitamin D, 50,000 Units, Oral, Q7 Days  zinc sulfate, 220 mg, Oral, Daily      Allergies:  Allergies   Allergen Reactions    Clarithromycin Rash    Metronidazole Unknown - High Severity    Doxycycline Calcium Rash    Gabapentin Mental Status Change     Review of Systems  Pertinent items are noted in HPI, all other systems reviewed and negative     Objective     Vital Signs  Temp:  [98.1 øF (36.7 øC)-99.2 øF (37.3 øC)] 98.4 øF (36.9 øC)  Heart Rate:  [] 106  Resp:  [17-23] 23  BP: ()/(57-91) 101/70  Physical Exam:  General Appearance:    Alert, cooperative, in no acute distress   Head:    Normocephalic, without obvious abnormality, atraumatic   Eyes:           conjunctivae and sclerae normal, no icterus   Throat:   no thrush, oral mucosa moist   Neck:   Supple, no adenopathy   Lungs:     Breathing unlabored    Heart:    No chest tenderness    Chest Wall:    No abnormalities observed   Abdomen:     Soft, nondistended, diffuse mild TTP   Extremities:   no edema, no redness   Skin:   No bruising or rash   Psychiatric:  normal mood and insight     Results Review:   I reviewed the patient's new clinical results.    Results from last 7 days   Lab Units 07/28/23  0301 07/27/23  1033   WBC 10*3/mm3 11.96* 13.80*   HEMOGLOBIN g/dL 11.1* 14.1   HEMATOCRIT % 33.2* 40.2   PLATELETS 10*3/mm3 350 422     Results from last 7 days   Lab Units 07/28/23  1306 07/28/23  0301 07/27/23  1033   SODIUM mmol/L  --  132* 132*   POTASSIUM mmol/L 4.5 3.5 3.1*   CHLORIDE mmol/L  --  99 93*   CO2 mmol/L  --  22.8 23.0   BUN mg/dL  --  6* 8   CREATININE mg/dL  --  0.38* 0.45*   CALCIUM mg/dL  --  7.9* 8.9   BILIRUBIN mg/dL  --   --  0.3   ALK PHOS U/L  --   --  48   ALT (SGPT) U/L  --   --  10   AST (SGOT) U/L  --   --  10   GLUCOSE mg/dL  --  111* 122*         Lab Results   Lab Value Date/Time    LIPASE 11 (L) 03/29/2023 1425    LIPASE 17 10/30/2022 1637       Radiology:  XR Chest 1 View   Final Result       1. Hazy right basilar airspace opacity likely representing a small layering right-sided pleural    effusion.  No evidence of pneumothorax.   2. Unchanged right hilar consolidation likely related to patient's history of right-sided lung    cancer.   3. No evidence of pneumothorax.                          BENEDICT JEFFERSON MD          Electronically Signed and Approved By: BENEDICT JEFFERSON MD on 7/27/2023 at 18:33                           CT Abdomen Pelvis With Contrast   Final Result   Addendum (preliminary) 1 of 1   ADDENDUM:        COMPARISON: Other, CT, CT ABDOMEN WO CONTRAST, 7/07/2023, 15:41.       Patient's previous CT of the abdomen and pelvis from 7/7/2023 was obtained    from [facility] following initial interpretation of the study.       The colon wall thickening seen on the current study appears acute and is    consistent with colitis.     The consolidation in the right lower lobe has clearly increased compared    with the last study.  The    right pleural effusion appears significantly larger.       There are compression fractures of T11, T12, L3 and L5 which appear    unchanged from the previous CT.            Lorenzo Manzanares MD          Electronically Signed and Approved By: Lorenzo Manzanares MD on 7/27/2023 at    14:00                           Final       1. New dense consolidation in the right lower lobe.  The appearance is suggestive of drowned lung.     The central portion of the right lung is not included on the images.   2. Large right-sided pleural effusion and small left-sided pleural effusion.  New pericardial    effusion.   3. Irregular density in the right middle lobe which may be atelectasis.  It does not have the same    configuration of the patient's known right middle lobe mass.  Recommend chest CT for further    characterization.     4. Hepatomegaly with small hypodense lesions in the liver likely cysts based upon review of the old    scans.   5. Cholecystectomy   6. Diffuse colon wall thickening suggesting colitis.   7. No evidence of abdominal aortic aneurysm.                Lorenzo Manzanares MD          Electronically Signed and Approved By: Lorenzo Manzanares MD on 7/27/2023 at 13:11                           XR Chest 1 View   Final Result          Assessment & Plan     Colitis       Assessment:  Colitis  Rectal bleeding    Plan:  Differential would include infectious vs inflammatory vs ischemic vs other  No recent immunotherapy per patient  Stool studies pending  Leukocytosis improved; continue to monitor Hgb  On Zosyn  Continue supportive care; await stool studies      I discussed the patients findings and my recommendations with patient.    Malou Raygoza  MD Artur

## 2023-07-28 NOTE — PROGRESS NOTES
[facility]   Hospitalist Progress Note  Date: 2023  Patient Name: Theresa M Gabehart  : 1960  MRN: [id number]  Date of admission: 2023      Subjective   Subjective     Chief complaint: Shortness of breath, abdominal pain    Summary:  62-year-old female with history of anemia due to chemotherapy, abdominal aortic aneurysm, Arnold-Chiari syndrome, COPD, dysphagia secondary to esophageal stricture, fibromyalgia, lung cancer, neuropathy, current smoker, chronic opioid user, hospitalized on 2023 worth sepsis, colitis, enteric stool panel is pending, colitis etiology uncertain, work-up pursued, transferred to the ICU on 2023 for concerns of resistant hypertension, with failure to improve with initial volume replacement, status post bedside thoracentesis with bilateral pleural effusions on imaging, hypotension improved, transferred out of ICU 2023    Interval follow-up: Patient seen and examined this morning, no acute distress, no acute major overnight events, has intermittent pain where area thoracentesis was performed, blood pressures are better, she is oxygenating well on 2 L nasal cannula, blood pressures though are doing better are still soft but stable.  400 mL of urine output, creatinine 0.3, sodium 132, hemoglobin 11.1, BUN 6.  Potassium 3.5.  Telemetry reviewed, no acute major rhythmic events.  No diarrhea since hospitalization, significant change from previous where she had nonstop diarrhea.  White blood cell count remains elevated at 11,000.    Review of systems:  All systems reviewed negative except generalized fatigue, generalized weakness abdominal cramping    Objective   Objective     Vitals:   Temp:  [98.1 øF (36.7 øC)-99.2 øF (37.3 øC)] 98.4 øF (36.9 øC)  Heart Rate:  [] 106  Resp:  [17-23] 23  BP: ()/(54-91) 101/70  Flow (L/min):  [2] 2  Physical Exam               Constitutional: Awake, alert, no acute distress thin, cachectic appearing female with fat  loss, muscle wasting, decreased  strength              Eyes: Pupils equal, sclerae anicteric, no conjunctival injection              HENT: NCAT, mucous membranes moist              Neck: Supple, no thyromegaly, no lymphadenopathy, trachea midline              Respiratory: Clear to auscultation bilaterally, nonlabored respirations               Cardiovascular: RRR, no murmurs, rubs, or gallops, palpable pedal pulses bilaterally              Gastrointestinal: Positive bowel sounds, soft, mild tenderness on palpation, mildly distended              Musculoskeletal: No bilateral ankle edema, no clubbing or cyanosis to extremities              Psychiatric: Appropriate affect, cooperative              Neurologic: Oriented x 3, strength symmetric in all extremities, Cranial Nerves grossly intact to confrontation, speech clear              Skin: No rashes          Result Review    Result Review:  I have personally reviewed the pertinent results from the past 24 hours to 7/28/2023 13:27 EDT and agree with these findings:  [x]  Laboratory   CBC          7/20/2023    10:33 7/27/2023    10:33 7/28/2023    03:01   CBC   WBC 13.21  13.80  11.96    RBC 4.46  4.57  3.66    Hemoglobin 13.5  14.1  11.1    Hematocrit 40.7  40.2  33.2    MCV 91.3  88.0  90.7    MCH 30.3  30.9  30.3    MCHC 33.2  35.1  33.4    RDW 13.6  13.6  13.6    Platelets 412  422  350      BMP          7/20/2023    10:33 7/27/2023    10:33 7/28/2023    03:01   BMP   BUN 8  8  6    Creatinine 0.41  0.45  0.38    Sodium 133  132  132    Potassium 3.6  3.1  3.5    Chloride 98  93  99    CO2 23.8  23.0  22.8    Calcium 9.0  8.9  7.9      LIVER FUNCTION TESTS:      Lab 07/27/23  1033   TOTAL PROTEIN 6.3   ALBUMIN 3.0*   GLOBULIN 3.3   ALT (SGPT) 10   AST (SGOT) 10   BILIRUBIN 0.3   ALK PHOS 48       [x]  Microbiology No results found for: ACANTHNAEG, AFBCX, BPERTUSSISCX, BLOODCX  No results found for: BCIDPCR, CXREFLEX, CSFCX, CULTURETIS  No results found for:  CULTURES, HSVCX, URCX  No results found for: EYECULTURE, GCCX, HSVCULTURE, LABHSV  No results found for: LEGIONELLA, MRSACX, MUMPSCX, MYCOPLASCX  No results found for: NOCARDIACX, STOOLCX  No results found for: THROATCX, UNSTIMCULT, URINECX, CULTURE, VZVCULTUR  No results found for: VIRALCULTU, WOUNDCX    [x]  Radiology CT Abdomen Pelvis With Contrast    Addendum Date: 7/27/2023    ADDENDUM:  COMPARISON: Other, CT, CT ABDOMEN WO CONTRAST, 7/07/2023, 15:41.  Patient's previous CT of the abdomen and pelvis from 7/7/2023 was obtained from [facility] following initial interpretation of the study.  The colon wall thickening seen on the current study appears acute and is consistent with colitis.  The consolidation in the right lower lobe has clearly increased compared with the last study.  The right pleural effusion appears significantly larger.  There are compression fractures of T11, T12, L3 and L5 which appear unchanged from the previous CT.    Lorenzo Manzanares MD       Electronically Signed and Approved By: Lorenzo Manzanares MD on 7/27/2023 at 14:00             Result Date: 7/27/2023  PROCEDURE: CT ABDOMEN PELVIS W CONTRAST  COMPARISON: [facility], PET, NM PET/CT SKULL BASE TO MID THIGH, 11/09/2022, 10:44.  [facility], CT, CT CHEST WO CONTRAST DIAGNOSTIC, 9/27/2022, 9:47.  [facility], CT, ABDOMEN/PELVIS WITH CONTRAST, 8/11/2015, 9:46.  INDICATIONS: Known aortic aneurysm with abdominal pain radiating to back/PRIOR CT SCAN DONE AT [facility] ON JULY 7th/unable to raise both arms above head  TECHNIQUE: After obtaining the patient's consent, CT images were created with non-ionic intravenous contrast material.   PROTOCOL:   Standard imaging protocol performed    RADIATION:   DLP: 498.8mGy*cm   Automated exposure control was utilized to minimize radiation dose. CONTRAST: 75cc Isovue 370 I.V.  FINDINGS:  There is new dense consolidation in the right lower lobe.   There are areas of relative low attenuation peripherally in this area of dense consolidation.  There is an irregular nodular density in the right middle lobe anteriorly.  The previously described mass in the right middle lobe is not clearly seen.  There is a large right-sided pleural effusion and trace left-sided pleural fluid.  There is a small pericardial effusion which is new.  There are coronary artery calcifications present.  There are at least 2 small hypodense lesions in segment 4 of the liver and a small hypodense lesion in the lateral segment of the left lobe peer it liver itself appears enlarged.  The spleen, adrenal glands, and pancreas are unremarkable.  The gallbladder is surgically absent.  There is diffuse colon wall thickening.  The small bowel is nondilated.  There are no fluid collections in the abdomen or pelvis.  The bladder appears normal.  There is no lymphadenopathy.  No destructive bone lesions are identified.        1. New dense consolidation in the right lower lobe.  The appearance is suggestive of drowned lung.  The central portion of the right lung is not included on the images. 2. Large right-sided pleural effusion and small left-sided pleural effusion.  New pericardial effusion. 3. Irregular density in the right middle lobe which may be atelectasis.  It does not have the same configuration of the patient's known right middle lobe mass.  Recommend chest CT for further characterization.  4. Hepatomegaly with small hypodense lesions in the liver likely cysts based upon review of the old scans. 5. Cholecystectomy 6. Diffuse colon wall thickening suggesting colitis. 7. No evidence of abdominal aortic aneurysm.     Lorenzo Manzanares MD       Electronically Signed and Approved By: Lorenzo Manzanares MD on 7/27/2023 at 13:11             XR Chest 1 View    Result Date: 7/27/2023  PROCEDURE: XR CHEST 1 VW  COMPARISON: [facility], CR, XR CHEST 1 VW, 7/27/2023, 10:28.  INDICATIONS: POST  RIGHT THORACENTESIS  FINDINGS:  There is a right-sided chest port tip terminating at the cavoatrial junction.  The cardiomediastinal silhouette is within normal limits.  There is unchanged right hilar consolidation.  There is hazy right basilar airspace opacity, likely representing a small layering right-sided pleural effusion.  There is no evidence of pneumothorax.  There are degenerative changes of the thoracic spine.        1. Hazy right basilar airspace opacity likely representing a small layering right-sided pleural effusion.  No evidence of pneumothorax. 2. Unchanged right hilar consolidation likely related to patient's history of right-sided lung cancer. 3. No evidence of pneumothorax.        BENEDICT JEFFERSON MD       Electronically Signed and Approved By: BENEDICT JEFFERSON MD on 7/27/2023 at 18:33             XR Chest 1 View    Result Date: 7/27/2023  PROCEDURE: XR CHEST 1 VW  COMPARISON: [facility], , XR CHEST 1 VW, 3/29/2023, 14:37.  INDICATIONS: SHORTNESS OF BREATH TODAY  FINDINGS:   The cardiac silhouette is obscured.  There is new moderate effusion/consolidation in the right mid and lower lung field.  Stable right IJ Port-A-Cath.  The left lung field is grossly clear.  No evidence of pneumothorax.  IMPRESSION: New moderate effusion/consolidation in the right mid and lower lung field.   VIVIEN YO MD       Electronically Signed and Approved By: VIVIEN YO MD on 7/27/2023 at 10:51               [x]  EKG/Telemetry   []  Cardiology/Vascular   []  Pathology  [x]  Old records  []  Other:    Assessment & Plan   Assessment / Plan     Assessment/Plan:  Assessment:  Colitis, infectious versus inflammatory versus drug-induced  Right lower lobe consolidation  Bilateral pleural effusions  Pericardial effusion  Severe sepsis secondary to colitis  Severe malnutrition  Adenocarcinoma stage IIIa, status post chemo, XRT  Hyponatremia of clinical significance  Hypokalemia  Current tobacco  smoker  AAA  COPD  Fibromyalgia  Chronic opioid use  Fecal occult blood positive likely secondary to colitis    Plan:  Labs and imaging reviewed  Transfer out of ICU to monitored floor  Continue telemetry monitoring  Replacement magnesium 2 g x 1 run  This should help correct potassium as well  Potassium replacement 40 mill equivalents orally x2 doses today  Continue Zosyn  Oral intake per tolerance  Try to still obtain stool samples for enteric panel  Avoid bowel regimen and patient with colitis  Critical care consulted discussed with Dr. Cavazos, recommendations appreciated  Brovana pulmonary nebs twice daily  Nicotine patch  Protonix 40 mg daily  Florastor for probiotic  A.m. labs  Full code  DVT prophylaxis with SCDs in the setting of Hemoccult positive stools  Clinical course to dictate further management  Discussed with nurse at the bedside    DVT prophylaxis:  Mechanical DVT prophylaxis orders are present.    CODE STATUS:   Level Of Support Discussed With: Patient  Code Status (Patient has no pulse and is not breathing): CPR (Attempt to Resuscitate)  Medical Interventions (Patient has pulse or is breathing): Full Support  Release to patient: Routine Release        Electronically signed by Mohsen Pizarro MD, 07/28/23, 1:27 PM EDT.    Portions of this documentation were transcribed electronically from a voice recognition software.  I confirm all data accurately represents the service(s) I performed at today's visit.

## 2023-07-29 PROBLEM — E43 SEVERE MALNUTRITION: Status: ACTIVE | Noted: 2023-07-29

## 2023-07-29 LAB
ALBUMIN SERPL-MCNC: 2.3 G/DL (ref 3.5–5.2)
ALP SERPL-CCNC: 39 U/L (ref 39–117)
ALT SERPL W P-5'-P-CCNC: 10 U/L (ref 1–33)
ANION GAP SERPL CALCULATED.3IONS-SCNC: 8.8 MMOL/L (ref 5–15)
AST SERPL-CCNC: 9 U/L (ref 1–32)
BACTERIA SPEC RESP CULT: NORMAL
BASOPHILS # BLD AUTO: 0.07 10*3/MM3 (ref 0–0.2)
BASOPHILS NFR BLD AUTO: 0.7 % (ref 0–1.5)
BILIRUB CONJ SERPL-MCNC: <0.2 MG/DL (ref 0–0.3)
BILIRUB INDIRECT SERPL-MCNC: ABNORMAL MG/DL
BILIRUB SERPL-MCNC: <0.2 MG/DL (ref 0–1.2)
BUN SERPL-MCNC: 4 MG/DL (ref 8–23)
BUN/CREAT SERPL: 11.1 (ref 7–25)
CALCIUM SPEC-SCNC: 8 MG/DL (ref 8.6–10.5)
CHLORIDE SERPL-SCNC: 100 MMOL/L (ref 98–107)
CO2 SERPL-SCNC: 23.2 MMOL/L (ref 22–29)
CREAT SERPL-MCNC: 0.36 MG/DL (ref 0.57–1)
DEPRECATED RDW RBC AUTO: 44.9 FL (ref 37–54)
EGFRCR SERPLBLD CKD-EPI 2021: 115.7 ML/MIN/1.73
EOSINOPHIL # BLD AUTO: 0.49 10*3/MM3 (ref 0–0.4)
EOSINOPHIL NFR BLD AUTO: 4.7 % (ref 0.3–6.2)
ERYTHROCYTE [DISTWIDTH] IN BLOOD BY AUTOMATED COUNT: 13.5 % (ref 12.3–15.4)
GLUCOSE SERPL-MCNC: 92 MG/DL (ref 65–99)
GRAM STN SPEC: NORMAL
HCT VFR BLD AUTO: 30.5 % (ref 34–46.6)
HGB BLD-MCNC: 10.3 G/DL (ref 12–15.9)
IMM GRANULOCYTES # BLD AUTO: 0.06 10*3/MM3 (ref 0–0.05)
IMM GRANULOCYTES NFR BLD AUTO: 0.6 % (ref 0–0.5)
LYMPHOCYTES # BLD AUTO: 0.76 10*3/MM3 (ref 0.7–3.1)
LYMPHOCYTES NFR BLD AUTO: 7.2 % (ref 19.6–45.3)
MAGNESIUM SERPL-MCNC: 2.1 MG/DL (ref 1.6–2.4)
MCH RBC QN AUTO: 30.7 PG (ref 26.6–33)
MCHC RBC AUTO-ENTMCNC: 33.8 G/DL (ref 31.5–35.7)
MCV RBC AUTO: 90.8 FL (ref 79–97)
MONOCYTES # BLD AUTO: 1.19 10*3/MM3 (ref 0.1–0.9)
MONOCYTES NFR BLD AUTO: 11.3 % (ref 5–12)
NEUTROPHILS NFR BLD AUTO: 7.95 10*3/MM3 (ref 1.7–7)
NEUTROPHILS NFR BLD AUTO: 75.5 % (ref 42.7–76)
NRBC BLD AUTO-RTO: 0 /100 WBC (ref 0–0.2)
PHOSPHATE SERPL-MCNC: 3 MG/DL (ref 2.5–4.5)
PLATELET # BLD AUTO: 372 10*3/MM3 (ref 140–450)
PMV BLD AUTO: 8.7 FL (ref 6–12)
POTASSIUM SERPL-SCNC: 3.8 MMOL/L (ref 3.5–5.2)
PROT SERPL-MCNC: 4.8 G/DL (ref 6–8.5)
QT INTERVAL: 268 MS
RBC # BLD AUTO: 3.36 10*6/MM3 (ref 3.77–5.28)
SODIUM SERPL-SCNC: 132 MMOL/L (ref 136–145)
WBC NRBC COR # BLD: 10.52 10*3/MM3 (ref 3.4–10.8)

## 2023-07-29 PROCEDURE — 99232 SBSQ HOSP IP/OBS MODERATE 35: CPT | Performed by: FAMILY MEDICINE

## 2023-07-29 PROCEDURE — 80076 HEPATIC FUNCTION PANEL: CPT | Performed by: FAMILY MEDICINE

## 2023-07-29 PROCEDURE — 84100 ASSAY OF PHOSPHORUS: CPT | Performed by: FAMILY MEDICINE

## 2023-07-29 PROCEDURE — 94799 UNLISTED PULMONARY SVC/PX: CPT

## 2023-07-29 PROCEDURE — 83735 ASSAY OF MAGNESIUM: CPT | Performed by: FAMILY MEDICINE

## 2023-07-29 PROCEDURE — 99232 SBSQ HOSP IP/OBS MODERATE 35: CPT | Performed by: STUDENT IN AN ORGANIZED HEALTH CARE EDUCATION/TRAINING PROGRAM

## 2023-07-29 PROCEDURE — 80048 BASIC METABOLIC PNL TOTAL CA: CPT | Performed by: FAMILY MEDICINE

## 2023-07-29 PROCEDURE — 25010000002 PIPERACILLIN SOD-TAZOBACTAM PER 1 G: Performed by: HOSPITALIST

## 2023-07-29 PROCEDURE — 85025 COMPLETE CBC W/AUTO DIFF WBC: CPT | Performed by: FAMILY MEDICINE

## 2023-07-29 PROCEDURE — 94664 DEMO&/EVAL PT USE INHALER: CPT

## 2023-07-29 RX ADMIN — BUDESONIDE 0.5 MG: 0.5 SUSPENSION RESPIRATORY (INHALATION) at 07:26

## 2023-07-29 RX ADMIN — Medication 500 MG: at 09:10

## 2023-07-29 RX ADMIN — HYDROCODONE BITARTRATE AND ACETAMINOPHEN 1 TABLET: 7.5; 325 TABLET ORAL at 15:34

## 2023-07-29 RX ADMIN — IPRATROPIUM BROMIDE AND ALBUTEROL SULFATE 3 ML: .5; 3 SOLUTION RESPIRATORY (INHALATION) at 22:10

## 2023-07-29 RX ADMIN — BUDESONIDE 0.5 MG: 0.5 SUSPENSION RESPIRATORY (INHALATION) at 18:53

## 2023-07-29 RX ADMIN — HYDROCODONE BITARTRATE AND ACETAMINOPHEN 1 TABLET: 7.5; 325 TABLET ORAL at 22:00

## 2023-07-29 RX ADMIN — HYDROCODONE BITARTRATE AND ACETAMINOPHEN 1 TABLET: 7.5; 325 TABLET ORAL at 09:10

## 2023-07-29 RX ADMIN — PIPERACILLIN SODIUM AND TAZOBACTAM SODIUM 3.38 G: 3; .375 INJECTION, POWDER, LYOPHILIZED, FOR SOLUTION INTRAVENOUS at 20:58

## 2023-07-29 RX ADMIN — PANTOPRAZOLE SODIUM 40 MG: 40 TABLET, DELAYED RELEASE ORAL at 05:19

## 2023-07-29 RX ADMIN — PIPERACILLIN SODIUM AND TAZOBACTAM SODIUM 3.38 G: 3; .375 INJECTION, POWDER, LYOPHILIZED, FOR SOLUTION INTRAVENOUS at 12:17

## 2023-07-29 RX ADMIN — ZINC SULFATE 220 MG (50 MG) CAPSULE 220 MG: CAPSULE at 09:10

## 2023-07-29 RX ADMIN — Medication 10 ML: at 12:17

## 2023-07-29 RX ADMIN — ARFORMOTEROL TARTRATE 15 MCG: 15 SOLUTION RESPIRATORY (INHALATION) at 18:53

## 2023-07-29 RX ADMIN — ARFORMOTEROL TARTRATE 15 MCG: 15 SOLUTION RESPIRATORY (INHALATION) at 07:26

## 2023-07-29 RX ADMIN — IPRATROPIUM BROMIDE AND ALBUTEROL SULFATE 3 ML: .5; 3 SOLUTION RESPIRATORY (INHALATION) at 14:44

## 2023-07-29 RX ADMIN — Medication 500 MG: at 22:00

## 2023-07-29 RX ADMIN — PIPERACILLIN SODIUM AND TAZOBACTAM SODIUM 3.38 G: 3; .375 INJECTION, POWDER, LYOPHILIZED, FOR SOLUTION INTRAVENOUS at 05:19

## 2023-07-29 RX ADMIN — IPRATROPIUM BROMIDE AND ALBUTEROL SULFATE 3 ML: .5; 3 SOLUTION RESPIRATORY (INHALATION) at 07:26

## 2023-07-29 NOTE — PROGRESS NOTES
[facility]   Hospitalist Progress Note  Date: 2023  Patient Name: Theresa M Gabehart  : 1960  MRN: [id number]  Date of admission: 2023      Subjective   Subjective     Chief complaint: Shortness of breath, abdominal pain    Summary:  62-year-old female with history of anemia due to chemotherapy, abdominal aortic aneurysm, Arnold-Chiari syndrome, COPD, dysphagia secondary to esophageal stricture, fibromyalgia, lung cancer, neuropathy, current smoker, chronic opioid user, hospitalized on 2023 worth sepsis, colitis, enteric stool panel is pending, colitis etiology uncertain, work-up pursued, transferred to the ICU on 2023 for concerns of resistant hypertension, with failure to improve with initial volume replacement, status post bedside thoracentesis with bilateral pleural effusions on imaging, hypotension improved, transferred out of ICU 2023    Interval follow-up: Patient seen and examined this morning, no acute distress, no acute major overnight events, No diarrhea since hospitalization, abdominal rumbling.  Remains weak and fatigued.  No chest pain or palpitations.  Telemetry reviewed, no acute major overnight events.  Potassium 3.8, sodium 132, creatinine 0.36, white blood cell count 10,000, continues to improve.    Review of systems:  All systems reviewed negative except generalized fatigue, generalized weakness      Objective   Objective     Vitals:   Temp:  [98.6 øF (37 øC)-99.3 øF (37.4 øC)] 98.8 øF (37.1 øC)  Heart Rate:  [102-118] 104  Resp:  [16-18] 18  BP: ()/(58-67) 92/60  Physical Exam                 Constitutional: Awake, alert, no acute distress thin, cachectic               Eyes: Pupils equal, sclerae anicteric, no conjunctival injection              HENT: NCAT, mucous membranes moist              Neck: Supple, no thyromegaly, no lymphadenopathy, trachea midline              Respiratory: Clear to auscultation bilaterally, nonlabored respirations                Cardiovascular: RRR, no murmurs, rubs, or gallops, palpable pedal pulses bilaterally              Gastrointestinal: Positive bowel sounds, soft, no tenderness on palpation, no distended              Musculoskeletal: No bilateral ankle edema, no clubbing or cyanosis to extremities              Psychiatric: Appropriate affect, cooperative              Neurologic: Oriented x 3, strength symmetric in all extremities, Cranial Nerves grossly intact to confrontation, speech clear              Skin: No rashes        Result Review    Result Review:  I have personally reviewed the pertinent results from the past 24 hours to 7/29/2023 15:22 EDT and agree with these findings:  [x]  Laboratory   CBC          7/27/2023    10:33 7/28/2023    03:01 7/29/2023    05:02   CBC   WBC 13.80  11.96  10.52    RBC 4.57  3.66  3.36    Hemoglobin 14.1  11.1  10.3    Hematocrit 40.2  33.2  30.5    MCV 88.0  90.7  90.8    MCH 30.9  30.3  30.7    MCHC 35.1  33.4  33.8    RDW 13.6  13.6  13.5    Platelets 422  350  372      BMP          7/27/2023    10:33 7/28/2023    03:01 7/28/2023    13:06 7/29/2023    05:02   BMP   BUN 8  6   4    Creatinine 0.45  0.38   0.36    Sodium 132  132   132    Potassium 3.1  3.5  4.5  3.8    Chloride 93  99   100    CO2 23.0  22.8   23.2    Calcium 8.9  7.9   8.0    LIVER FUNCTION TESTS:      Lab 07/29/23  0502 07/27/23  1033   TOTAL PROTEIN 4.8* 6.3   ALBUMIN 2.3* 3.0*   GLOBULIN  --  3.3   ALT (SGPT) 10 10   AST (SGOT) 9 10   BILIRUBIN <0.2 0.3   BILIRUBIN DIRECT <0.2  --    ALK PHOS 39 48         [x]  Microbiology No results found for: ACANTHNAEG, AFBCX, BPERTUSSISCX, BLOODCX  No results found for: BCIDPCR, CXREFLEX, CSFCX, CULTURETIS  No results found for: CULTURES, HSVCX, URCX  No results found for: EYECULTURE, GCCX, HSVCULTURE, LABHSV  No results found for: LEGIONELLA, MRSACX, MUMPSCX, MYCOPLASCX  No results found for: NOCARDIACX, STOOLCX  No results found for: THROATCX, UNSTIMCULT, URINECX, CULTURE,  VZVCULTUR  No results found for: VIRALCULTU, WOUNDCX    [x]  Radiology CT Abdomen Pelvis With Contrast    Addendum Date: 7/27/2023    ADDENDUM:  COMPARISON: Other, CT, CT ABDOMEN WO CONTRAST, 7/07/2023, 15:41.  Patient's previous CT of the abdomen and pelvis from 7/7/2023 was obtained from [facility] following initial interpretation of the study.  The colon wall thickening seen on the current study appears acute and is consistent with colitis.  The consolidation in the right lower lobe has clearly increased compared with the last study.  The right pleural effusion appears significantly larger.  There are compression fractures of T11, T12, L3 and L5 which appear unchanged from the previous CT.    Lorenzo Manzanares MD       Electronically Signed and Approved By: Lorenzo Manzanares MD on 7/27/2023 at 14:00             Result Date: 7/27/2023  PROCEDURE: CT ABDOMEN PELVIS W CONTRAST  COMPARISON: [facility], PET, NM PET/CT SKULL BASE TO MID THIGH, 11/09/2022, 10:44.  [facility], CT, CT CHEST WO CONTRAST DIAGNOSTIC, 9/27/2022, 9:47.  [facility], CT, ABDOMEN/PELVIS WITH CONTRAST, 8/11/2015, 9:46.  INDICATIONS: Known aortic aneurysm with abdominal pain radiating to back/PRIOR CT SCAN DONE AT [facility] ON JULY 7th/unable to raise both arms above head  TECHNIQUE: After obtaining the patient's consent, CT images were created with non-ionic intravenous contrast material.   PROTOCOL:   Standard imaging protocol performed    RADIATION:   DLP: 498.8mGy*cm   Automated exposure control was utilized to minimize radiation dose. CONTRAST: 75cc Isovue 370 I.V.  FINDINGS:  There is new dense consolidation in the right lower lobe.  There are areas of relative low attenuation peripherally in this area of dense consolidation.  There is an irregular nodular density in the right middle lobe anteriorly.  The previously described mass in the right middle lobe is not clearly seen.   There is a large right-sided pleural effusion and trace left-sided pleural fluid.  There is a small pericardial effusion which is new.  There are coronary artery calcifications present.  There are at least 2 small hypodense lesions in segment 4 of the liver and a small hypodense lesion in the lateral segment of the left lobe peer it liver itself appears enlarged.  The spleen, adrenal glands, and pancreas are unremarkable.  The gallbladder is surgically absent.  There is diffuse colon wall thickening.  The small bowel is nondilated.  There are no fluid collections in the abdomen or pelvis.  The bladder appears normal.  There is no lymphadenopathy.  No destructive bone lesions are identified.        1. New dense consolidation in the right lower lobe.  The appearance is suggestive of drowned lung.  The central portion of the right lung is not included on the images. 2. Large right-sided pleural effusion and small left-sided pleural effusion.  New pericardial effusion. 3. Irregular density in the right middle lobe which may be atelectasis.  It does not have the same configuration of the patient's known right middle lobe mass.  Recommend chest CT for further characterization.  4. Hepatomegaly with small hypodense lesions in the liver likely cysts based upon review of the old scans. 5. Cholecystectomy 6. Diffuse colon wall thickening suggesting colitis. 7. No evidence of abdominal aortic aneurysm.     Lorenzo Manzanares MD       Electronically Signed and Approved By: Lorenzo Manzanares MD on 7/27/2023 at 13:11             XR Chest 1 View    Result Date: 7/27/2023  PROCEDURE: XR CHEST 1 VW  COMPARISON: [facility], CR, XR CHEST 1 VW, 7/27/2023, 10:28.  INDICATIONS: POST RIGHT THORACENTESIS  FINDINGS:  There is a right-sided chest port tip terminating at the cavoatrial junction.  The cardiomediastinal silhouette is within normal limits.  There is unchanged right hilar consolidation.  There is hazy right basilar  airspace opacity, likely representing a small layering right-sided pleural effusion.  There is no evidence of pneumothorax.  There are degenerative changes of the thoracic spine.        1. Hazy right basilar airspace opacity likely representing a small layering right-sided pleural effusion.  No evidence of pneumothorax. 2. Unchanged right hilar consolidation likely related to patient's history of right-sided lung cancer. 3. No evidence of pneumothorax.        BENEDICT JEFFERSON MD       Electronically Signed and Approved By: BENEDICT JEFFERSON MD on 7/27/2023 at 18:33             XR Chest 1 View    Result Date: 7/27/2023  PROCEDURE: XR CHEST 1 VW  COMPARISON: [facility], CR, XR CHEST 1 VW, 3/29/2023, 14:37.  INDICATIONS: SHORTNESS OF BREATH TODAY  FINDINGS:   The cardiac silhouette is obscured.  There is new moderate effusion/consolidation in the right mid and lower lung field.  Stable right IJ Port-A-Cath.  The left lung field is grossly clear.  No evidence of pneumothorax.  IMPRESSION: New moderate effusion/consolidation in the right mid and lower lung field.   VIVIEN YO MD       Electronically Signed and Approved By: VIVIEN YO MD on 7/27/2023 at 10:51               [x]  EKG/Telemetry   []  Cardiology/Vascular   []  Pathology  [x]  Old records  []  Other:    Assessment & Plan   Assessment / Plan     Assessment/Plan:    Assessment:  Colitis, infectious versus inflammatory  Right lower lobe consolidation  Bilateral pleural effusions  Pericardial effusion  Severe sepsis secondary to colitis  Severe malnutrition  Adenocarcinoma stage IIIa, status post chemo, XRT  Hyponatremia of clinical significance  Hypokalemia  Current tobacco smoker  AAA  COPD  Fibromyalgia  Chronic opioid use  Fecal occult blood positive likely secondary to colitis    Plan:  Labs and imaging reviewed  Continue Zosyn  Oral intake per tolerance  Try to still obtain stool samples for enteric panel  Avoid bowel regimen and  patient with colitis  Critical care consulted discussed with Dr. Cavazos, recommendations appreciated  Brovana pulmonary nebs twice daily  Nicotine patch  Protonix 40 mg daily  Florastor for probiotic  A.m. labs  Full code  DVT prophylaxis with SCDs in the setting of Hemoccult positive stools  Clinical course to dictate further management    DVT prophylaxis:  Mechanical DVT prophylaxis orders are present.    CODE STATUS:   Level Of Support Discussed With: Patient  Code Status (Patient has no pulse and is not breathing): CPR (Attempt to Resuscitate)  Medical Interventions (Patient has pulse or is breathing): Full Support  Release to patient: Routine Release    Electronically signed by Mohsen Pizarro MD, 07/29/23, 3:33 PM EDT.      Portions of this documentation were transcribed electronically from a voice recognition software.  I confirm all data accurately represents the service(s) I performed at today's visit.

## 2023-07-29 NOTE — PROGRESS NOTES
Pulmonary / Critical Care Progress Note      Patient Name: Theresa M Gabehart  : 1960  MRN: [id number]  Attending:  Mohsen Pizarro MD   Date of admission: 2023    Subjective   Subjective   Follow-up for pleural effusion.     right thoracentesis with drainage of 900 mL.  Cultures negative to date and cytology pending.    Over past 24 hours, transferred out of the ICU.    No acute events overnight.    This morning,  Lying in bed on room air  BP stable  systolically  Dyspnea improving  No chest pain  No fever or chills  Weak and fatigued    Review of Systems  Constitutional symptoms:  Denied complaints   Ear, nose, throat: Denied complaints  Cardiovascular:  Denied complaints  Respiratory: Denied complaints  Gastrointestinal: Denied complaints  Musculoskeletal: Denied complaints  Neurologic: Denied complaints  Skin: Denied complaints    Objective   Objective     Vitals:   Vital signs for last 24 hours:  Temp:  [98.4 øF (36.9 øC)-99.3 øF (37.4 øC)] 99.3 øF (37.4 øC)  Heart Rate:  [102-118] 118  Resp:  [18] 18  BP: ()/(58-70) 101/67    Physical Exam   Vital Signs Reviewed   General:  Alert, NAD. Lying in bed; chronically ill appearing female.   HEENT:  PERRL, EOMI.    Neck:  No JVD, no thyromegaly  Lymph: no axillary, cervical, supraclavicular lymphadenopathy noted bilaterally  Chest:  Clear to auscultation bilaterally, no work of breathing noted on room air  CV: RRR, no M/G/R, pulses 2+  Abd:  Soft, NT, ND, +BS  EXT:  no clubbing, no cyanosis, no edema  Neuro:  A&Ox3, CN grossly intact, no focal deficits.  Skin: No rashes or lesions noted    Result Review    Result Review:  I have personally reviewed the results from the time of this admission to 2023 10:46 EDT and agree with these findings:  [x]  Laboratory  [x]  Microbiology  [x]  Radiology  []  EKG/Telemetry   []  Cardiology/Vascular   []  Pathology  []  Old records  []  Other:  Most notable findings include:     - pleural  fluid cultures negative to date and cytology pending        Lab 07/29/23  0502 07/28/23  1306 07/28/23  0301 07/27/23  1455 07/27/23  1033   WBC 10.52  --  11.96*  --  13.80*   HEMOGLOBIN 10.3*  --  11.1*  --  14.1   HEMATOCRIT 30.5*  --  33.2*  --  40.2   PLATELETS 372  --  350  --  422   SODIUM 132*  --  132*  --  132*   POTASSIUM 3.8 4.5 3.5  --  3.1*   CHLORIDE 100  --  99  --  93*   CO2 23.2  --  22.8  --  23.0   BUN 4*  --  6*  --  8   CREATININE 0.36*  --  0.38*  --  0.45*   GLUCOSE 92  --  111*  --  122*   CALCIUM 8.0*  --  7.9*  --  8.9   PHOSPHORUS 3.0  --  3.0 3.7  --    TOTAL PROTEIN 4.8*  --   --   --  6.3   ALBUMIN 2.3*  --   --   --  3.0*   GLOBULIN  --   --   --   --  3.3     Assessment & Plan   Assessment / Plan     Active Hospital Problems:  Active Hospital Problems    Diagnosis     **Colitis     Severe malnutrition      Impression:  Bilateral pleural effusion status post thoracentesis 7/27  Colitis  Septic shock, resolved  Recently diagnosed adenocarcinoma of the lung, stage IIIa status post chemo, XRT  Hyponatremia  Hypokalemia  History of tobacco use     Plan:  -On room air.  -Pleural fluid cultures negative to date and cytology pending.  -Pro-Fausto negative and cultures negative to date.  Continue Zosyn.  De-escalate based on cultures.  -Continue Brovana, Pulmicort, and DuoNebs.  -Continue bronchopulmonary hygiene.  -Echocardiogram with normal ventricular systolic function, trace tricuspid regurg.  -GI on board appreciate assistance.  -Continue Protonix.  -Trend renal panel and electrolytes.  -Encourage mobilization.  Out of bed to chair.  -PT/OT on board.    DVT prophylaxis:  Mechanical DVT prophylaxis orders are present.    CODE STATUS:   Level Of Support Discussed With: Patient  Code Status (Patient has no pulse and is not breathing): CPR (Attempt to Resuscitate)  Medical Interventions (Patient has pulse or is breathing): Full Support  Release to patient: Routine Release    Electronically  signed by BLANCA Barr, 07/29/23, 10:46 AM EDT.    This patient was seen by both a physician and a NP. I, Jamin Cavazos MD, spent >50% of time in accordance with split shared billing. This included personally reviewing all pertinent labs, imaging, microbiology and documentation. Also discussing the case with the patient and any available family, the admitting physician and any available ancillary staff.   Electronically signed by Jamin Cavazos MD, 07/29/23, 1:27 PM EDT.

## 2023-07-30 ENCOUNTER — APPOINTMENT (OUTPATIENT)
Dept: CARDIOLOGY | Facility: HOSPITAL | Age: 63
DRG: 871 | End: 2023-07-30
Payer: MEDICARE

## 2023-07-30 LAB
ALBUMIN SERPL-MCNC: 2.2 G/DL (ref 3.5–5.2)
ALP SERPL-CCNC: 38 U/L (ref 39–117)
ALT SERPL W P-5'-P-CCNC: 10 U/L (ref 1–33)
ANION GAP SERPL CALCULATED.3IONS-SCNC: 7.9 MMOL/L (ref 5–15)
AST SERPL-CCNC: 9 U/L (ref 1–32)
BACTERIA FLD CULT: NORMAL
BASOPHILS # BLD AUTO: 0.05 10*3/MM3 (ref 0–0.2)
BASOPHILS NFR BLD AUTO: 0.5 % (ref 0–1.5)
BH CV ECHO MEAS - AO ROOT DIAM: 3.4 CM
BH CV ECHO MEAS - EDV(MOD-SP2): 44.4 ML
BH CV ECHO MEAS - EDV(MOD-SP4): 49.3 ML
BH CV ECHO MEAS - EF(MOD-SP4): 52.3 %
BH CV ECHO MEAS - ESV(MOD-SP2): 20.1 ML
BH CV ECHO MEAS - ESV(MOD-SP4): 23.5 ML
BH CV ECHO MEAS - IVSD: 1 CM
BH CV ECHO MEAS - LA DIMENSION: 2.7 CM
BH CV ECHO MEAS - LAT PEAK E' VEL: 9.5 CM/SEC
BH CV ECHO MEAS - LVIDD: 4.3 CM
BH CV ECHO MEAS - LVIDS: 3.2 CM
BH CV ECHO MEAS - LVOT DIAM: 2 CM
BH CV ECHO MEAS - LVPWD: 0.9 CM
BH CV ECHO MEAS - MED PEAK E' VEL: 6.64 CM/SEC
BH CV ECHO MEAS - MV A MAX VEL: 71 CM/SEC
BH CV ECHO MEAS - MV DEC TIME: 151 MSEC
BH CV ECHO MEAS - MV E MAX VEL: 45 CM/SEC
BH CV ECHO MEAS - MV E/A: 0.6
BH CV ECHO MEAS - RVDD: 2.52 CM
BH CV ECHO MEAS - TR MAX PG: 29 MMHG
BH CV ECHO MEAS - TR MAX VEL: 244 CM/SEC
BH CV ECHO MEASUREMENTS AVERAGE E/E' RATIO: 5.58
BILIRUB CONJ SERPL-MCNC: <0.2 MG/DL (ref 0–0.3)
BILIRUB INDIRECT SERPL-MCNC: ABNORMAL MG/DL
BILIRUB SERPL-MCNC: <0.2 MG/DL (ref 0–1.2)
BUN SERPL-MCNC: 6 MG/DL (ref 8–23)
BUN/CREAT SERPL: 18.8 (ref 7–25)
CALCIUM SPEC-SCNC: 8 MG/DL (ref 8.6–10.5)
CHLORIDE SERPL-SCNC: 99 MMOL/L (ref 98–107)
CO2 SERPL-SCNC: 24.1 MMOL/L (ref 22–29)
CREAT SERPL-MCNC: 0.32 MG/DL (ref 0.57–1)
DEPRECATED RDW RBC AUTO: 44.5 FL (ref 37–54)
EGFRCR SERPLBLD CKD-EPI 2021: 119 ML/MIN/1.73
EOSINOPHIL # BLD AUTO: 0.44 10*3/MM3 (ref 0–0.4)
EOSINOPHIL NFR BLD AUTO: 4.2 % (ref 0.3–6.2)
ERYTHROCYTE [DISTWIDTH] IN BLOOD BY AUTOMATED COUNT: 13.4 % (ref 12.3–15.4)
GLUCOSE SERPL-MCNC: 102 MG/DL (ref 65–99)
GRAM STN SPEC: NORMAL
GRAM STN SPEC: NORMAL
HCT VFR BLD AUTO: 30.2 % (ref 34–46.6)
HGB BLD-MCNC: 10.3 G/DL (ref 12–15.9)
IMM GRANULOCYTES # BLD AUTO: 0.06 10*3/MM3 (ref 0–0.05)
IMM GRANULOCYTES NFR BLD AUTO: 0.6 % (ref 0–0.5)
IVRT: 71 MSEC
LEFT ATRIUM VOLUME INDEX: 10.8 ML/M2
LYMPHOCYTES # BLD AUTO: 0.78 10*3/MM3 (ref 0.7–3.1)
LYMPHOCYTES NFR BLD AUTO: 7.4 % (ref 19.6–45.3)
MAGNESIUM SERPL-MCNC: 1.6 MG/DL (ref 1.6–2.4)
MCH RBC QN AUTO: 30.9 PG (ref 26.6–33)
MCHC RBC AUTO-ENTMCNC: 34.1 G/DL (ref 31.5–35.7)
MCV RBC AUTO: 90.7 FL (ref 79–97)
MONOCYTES # BLD AUTO: 1.09 10*3/MM3 (ref 0.1–0.9)
MONOCYTES NFR BLD AUTO: 10.3 % (ref 5–12)
NEUTROPHILS NFR BLD AUTO: 77 % (ref 42.7–76)
NEUTROPHILS NFR BLD AUTO: 8.15 10*3/MM3 (ref 1.7–7)
NRBC BLD AUTO-RTO: 0 /100 WBC (ref 0–0.2)
PHOSPHATE SERPL-MCNC: 3.5 MG/DL (ref 2.5–4.5)
PLATELET # BLD AUTO: 351 10*3/MM3 (ref 140–450)
PMV BLD AUTO: 8.6 FL (ref 6–12)
POTASSIUM SERPL-SCNC: 3.3 MMOL/L (ref 3.5–5.2)
PROT SERPL-MCNC: 4.8 G/DL (ref 6–8.5)
RBC # BLD AUTO: 3.33 10*6/MM3 (ref 3.77–5.28)
SODIUM SERPL-SCNC: 131 MMOL/L (ref 136–145)
WBC NRBC COR # BLD: 10.57 10*3/MM3 (ref 3.4–10.8)

## 2023-07-30 PROCEDURE — 83735 ASSAY OF MAGNESIUM: CPT | Performed by: FAMILY MEDICINE

## 2023-07-30 PROCEDURE — 80076 HEPATIC FUNCTION PANEL: CPT | Performed by: FAMILY MEDICINE

## 2023-07-30 PROCEDURE — 85025 COMPLETE CBC W/AUTO DIFF WBC: CPT | Performed by: FAMILY MEDICINE

## 2023-07-30 PROCEDURE — 99232 SBSQ HOSP IP/OBS MODERATE 35: CPT | Performed by: FAMILY MEDICINE

## 2023-07-30 PROCEDURE — 94799 UNLISTED PULMONARY SVC/PX: CPT

## 2023-07-30 PROCEDURE — 94664 DEMO&/EVAL PT USE INHALER: CPT

## 2023-07-30 PROCEDURE — 99232 SBSQ HOSP IP/OBS MODERATE 35: CPT | Performed by: STUDENT IN AN ORGANIZED HEALTH CARE EDUCATION/TRAINING PROGRAM

## 2023-07-30 PROCEDURE — 93306 TTE W/DOPPLER COMPLETE: CPT | Performed by: INTERNAL MEDICINE

## 2023-07-30 PROCEDURE — 93306 TTE W/DOPPLER COMPLETE: CPT

## 2023-07-30 PROCEDURE — 25010000002 PIPERACILLIN SOD-TAZOBACTAM PER 1 G: Performed by: HOSPITALIST

## 2023-07-30 PROCEDURE — 84100 ASSAY OF PHOSPHORUS: CPT | Performed by: FAMILY MEDICINE

## 2023-07-30 PROCEDURE — 80048 BASIC METABOLIC PNL TOTAL CA: CPT | Performed by: FAMILY MEDICINE

## 2023-07-30 RX ORDER — IPRATROPIUM BROMIDE AND ALBUTEROL SULFATE 2.5; .5 MG/3ML; MG/3ML
3 SOLUTION RESPIRATORY (INHALATION)
Status: DISCONTINUED | OUTPATIENT
Start: 2023-07-30 | End: 2023-08-01

## 2023-07-30 RX ORDER — POTASSIUM CHLORIDE 750 MG/1
20 CAPSULE, EXTENDED RELEASE ORAL
Status: COMPLETED | OUTPATIENT
Start: 2023-07-30 | End: 2023-07-30

## 2023-07-30 RX ADMIN — IPRATROPIUM BROMIDE AND ALBUTEROL SULFATE 3 ML: .5; 3 SOLUTION RESPIRATORY (INHALATION) at 07:03

## 2023-07-30 RX ADMIN — POTASSIUM CHLORIDE 20 MEQ: 10 CAPSULE, COATED, EXTENDED RELEASE ORAL at 18:01

## 2023-07-30 RX ADMIN — HYDROCODONE BITARTRATE AND ACETAMINOPHEN 1 TABLET: 7.5; 325 TABLET ORAL at 14:38

## 2023-07-30 RX ADMIN — POTASSIUM CHLORIDE 20 MEQ: 10 CAPSULE, COATED, EXTENDED RELEASE ORAL at 12:40

## 2023-07-30 RX ADMIN — Medication 10 ML: at 08:44

## 2023-07-30 RX ADMIN — PIPERACILLIN SODIUM AND TAZOBACTAM SODIUM 3.38 G: 3; .375 INJECTION, POWDER, LYOPHILIZED, FOR SOLUTION INTRAVENOUS at 12:40

## 2023-07-30 RX ADMIN — IPRATROPIUM BROMIDE AND ALBUTEROL SULFATE 3 ML: 2.5; .5 SOLUTION RESPIRATORY (INHALATION) at 18:17

## 2023-07-30 RX ADMIN — BUDESONIDE 0.5 MG: 0.5 SUSPENSION RESPIRATORY (INHALATION) at 07:03

## 2023-07-30 RX ADMIN — PIPERACILLIN SODIUM AND TAZOBACTAM SODIUM 3.38 G: 3; .375 INJECTION, POWDER, LYOPHILIZED, FOR SOLUTION INTRAVENOUS at 05:52

## 2023-07-30 RX ADMIN — Medication 500 MG: at 21:00

## 2023-07-30 RX ADMIN — ZINC SULFATE 220 MG (50 MG) CAPSULE 220 MG: CAPSULE at 08:43

## 2023-07-30 RX ADMIN — IPRATROPIUM BROMIDE AND ALBUTEROL SULFATE 3 ML: 2.5; .5 SOLUTION RESPIRATORY (INHALATION) at 23:58

## 2023-07-30 RX ADMIN — POTASSIUM CHLORIDE 20 MEQ: 10 CAPSULE, COATED, EXTENDED RELEASE ORAL at 08:43

## 2023-07-30 RX ADMIN — ARFORMOTEROL TARTRATE 15 MCG: 15 SOLUTION RESPIRATORY (INHALATION) at 18:17

## 2023-07-30 RX ADMIN — Medication 500 MG: at 08:43

## 2023-07-30 RX ADMIN — PIPERACILLIN SODIUM AND TAZOBACTAM SODIUM 3.38 G: 3; .375 INJECTION, POWDER, LYOPHILIZED, FOR SOLUTION INTRAVENOUS at 21:00

## 2023-07-30 RX ADMIN — PANTOPRAZOLE SODIUM 40 MG: 40 TABLET, DELAYED RELEASE ORAL at 05:52

## 2023-07-30 RX ADMIN — SODIUM CHLORIDE, POTASSIUM CHLORIDE, SODIUM LACTATE AND CALCIUM CHLORIDE 500 ML: 600; 310; 30; 20 INJECTION, SOLUTION INTRAVENOUS at 21:03

## 2023-07-30 RX ADMIN — Medication 10 ML: at 21:00

## 2023-07-30 RX ADMIN — BUDESONIDE 0.5 MG: 0.5 SUSPENSION RESPIRATORY (INHALATION) at 18:17

## 2023-07-30 RX ADMIN — ARFORMOTEROL TARTRATE 15 MCG: 15 SOLUTION RESPIRATORY (INHALATION) at 07:03

## 2023-07-30 NOTE — PROGRESS NOTES
Respiratory Therapist Broncho-Pulmonary Hygiene Progress Note      Patient Name:  Theresa M Gabehart  YOB: 1960    Theresa M Gabehart meets the qualification for Level 1 of the Bronco-Pulmonary Hygiene Protocol. This was based on my daily patient assessment and includes review of chest x-ray results, cough ability and quality, oxygenation, secretions or risk for secretion development and patient mobility.     Broncho-Pulmonary Hygiene Assessment:    Level of Movement: Actively changing positions without assistance  Alert/ oriented/ cooperative    Breath Sounds: Clear to slightly diminished    Cough: Strong, effective    Chest X-Ray: Possible signs of consolidation and/or atelectasis or clear.     Sputum Productions: None or small amount of thin or watery secretions with effective cough    History and Physical: Chronic condition    SpO2 to Oxygen Need: greater than 92% on room air or  less than 3L nasal canula    Current SpO2 is: 95 on Room Air    Based on this information, I have completed the following interventions: Teach/Instruct patient on cough and deep breathe      Electronically signed by Junie Turk RRT, 07/30/23, 8:20 AM EDT.

## 2023-07-30 NOTE — PROGRESS NOTES
[facility]   Hospitalist Progress Note  Date: 2023  Patient Name: Theresa M Gabehart  : 1960  MRN: [id number]  Date of admission: 2023      Subjective   Subjective     Chief complaint: Shortness of breath, abdominal pain    Summary:  62-year-old female with history of anemia due to chemotherapy, abdominal aortic aneurysm, Arnold-Chiari syndrome, COPD, dysphagia secondary to esophageal stricture, fibromyalgia, lung cancer, neuropathy, current smoker, chronic opioid user, hospitalized on 2023 worth sepsis, colitis, enteric stool panel is pending, colitis etiology uncertain, work-up pursued, transferred to the ICU on 2023 for concerns of resistant hypertension, with failure to improve with initial volume replacement, status post bedside thoracentesis with bilateral pleural effusions on imaging, hypotension improved, transferred out of ICU 2023.  Echo ordered with pleural effusion, to evaluate for possible link with underlying heart failure.  Has history of right upper extremity DVT, repeat ultrasound ordered with pain in the area    Interval follow-up: Patient seen and examined this morning, no acute distress, no acute major overnight events, still without diarrhea.  Feels like she needs to go to the bathroom.  No fevers, chills, sweats.  White blood cell count 10,000, hemoglobin 10.3, potassium 3.3, replacement potassium ordered, creatinine 0.32.  Patient reports history of DVT right upper extremity, wants to see if DVT has resolved.  Telemetry reviewed, no acute major rhythmic events.  Remains weak and fatigued.    Review of systems:  All systems reviewed negative except generalized fatigue, generalized weakness      Objective   Objective     Vitals:   Temp:  [98.6 øF (37 øC)-99.1 øF (37.3 øC)] 98.6 øF (37 øC)  Heart Rate:  [104-121] 107  Resp:  [16-18] 16  BP: ()/(59-78) 90/60  Physical Exam                 Constitutional: Awake, alert, no acute distress thin, cachectic                Eyes: Pupils equal, sclerae anicteric, no conjunctival injection              HENT: NCAT, mucous membranes moist              Neck: Supple, full range of motion              Respiratory: Clear to auscultation bilaterally, nonlabored respirations               Cardiovascular: RRR, no murmurs, rubs, or gallops, palpable pedal pulses bilaterally              Gastrointestinal: Positive bowel sounds, soft, no tenderness on palpation, no distended              Musculoskeletal: No bilateral ankle edema, no clubbing or cyanosis to extremities              Psychiatric: Appropriate affect, cooperative              Neurologic: Oriented x 3, strength symmetric in all extremities, Cranial Nerves grossly intact to confrontation, speech clear              Skin: No rashes        Result Review    Result Review:  I have personally reviewed the pertinent results from the past 24 hours to 7/30/2023 14:04 EDT and agree with these findings:  [x]  Laboratory   CBC          7/28/2023    03:01 7/29/2023    05:02 7/30/2023    04:49   CBC   WBC 11.96  10.52  10.57    RBC 3.66  3.36  3.33    Hemoglobin 11.1  10.3  10.3    Hematocrit 33.2  30.5  30.2    MCV 90.7  90.8  90.7    MCH 30.3  30.7  30.9    MCHC 33.4  33.8  34.1    RDW 13.6  13.5  13.4    Platelets 350  372  351      BMP          7/28/2023    03:01 7/28/2023    13:06 7/29/2023    05:02 7/30/2023    04:49   BMP   BUN 6   4  6    Creatinine 0.38   0.36  0.32    Sodium 132   132  131    Potassium 3.5  4.5  3.8  3.3    Chloride 99   100  99    CO2 22.8   23.2  24.1    Calcium 7.9   8.0  8.0    LIVER FUNCTION TESTS:      Lab 07/30/23  0449 07/29/23  0502 07/27/23  1033   TOTAL PROTEIN 4.8* 4.8* 6.3   ALBUMIN 2.2* 2.3* 3.0*   GLOBULIN  --   --  3.3   ALT (SGPT) 10 10 10   AST (SGOT) 9 9 10   BILIRUBIN <0.2 <0.2 0.3   BILIRUBIN DIRECT <0.2 <0.2  --    ALK PHOS 38* 39 48         [x]  Microbiology No results found for: ACANTHNAEG, AFBCX, BPERTUSSISCX, BLOODCX  No results found for:  BCIDPCR, CXREFLEX, CSFCX, CULTURETIS  No results found for: CULTURES, HSVCX, URCX  No results found for: EYECULTURE, GCCX, HSVCULTURE, LABHSV  No results found for: LEGIONELLA, MRSACX, MUMPSCX, MYCOPLASCX  No results found for: NOCARDIACX, STOOLCX  No results found for: THROATCX, UNSTIMCULT, URINECX, CULTURE, VZVCULTUR  No results found for: VIRALCULTU, WOUNDCX    [x]  Radiology CT Abdomen Pelvis With Contrast    Addendum Date: 7/27/2023    ADDENDUM:  COMPARISON: Other, CT, CT ABDOMEN WO CONTRAST, 7/07/2023, 15:41.  Patient's previous CT of the abdomen and pelvis from 7/7/2023 was obtained from [facility] following initial interpretation of the study.  The colon wall thickening seen on the current study appears acute and is consistent with colitis.  The consolidation in the right lower lobe has clearly increased compared with the last study.  The right pleural effusion appears significantly larger.  There are compression fractures of T11, T12, L3 and L5 which appear unchanged from the previous CT.    Lorenzo Manzanares MD       Electronically Signed and Approved By: Lorenzo Manzanares MD on 7/27/2023 at 14:00             Result Date: 7/27/2023  PROCEDURE: CT ABDOMEN PELVIS W CONTRAST  COMPARISON: [facility], PET, NM PET/CT SKULL BASE TO MID THIGH, 11/09/2022, 10:44.  [facility], CT, CT CHEST WO CONTRAST DIAGNOSTIC, 9/27/2022, 9:47.  [facility], CT, ABDOMEN/PELVIS WITH CONTRAST, 8/11/2015, 9:46.  INDICATIONS: Known aortic aneurysm with abdominal pain radiating to back/PRIOR CT SCAN DONE AT [facility] ON JULY 7th/unable to raise both arms above head  TECHNIQUE: After obtaining the patient's consent, CT images were created with non-ionic intravenous contrast material.   PROTOCOL:   Standard imaging protocol performed    RADIATION:   DLP: 498.8mGy*cm   Automated exposure control was utilized to minimize radiation dose. CONTRAST: 75cc Isovue 370 I.V.  FINDINGS:   There is new dense consolidation in the right lower lobe.  There are areas of relative low attenuation peripherally in this area of dense consolidation.  There is an irregular nodular density in the right middle lobe anteriorly.  The previously described mass in the right middle lobe is not clearly seen.  There is a large right-sided pleural effusion and trace left-sided pleural fluid.  There is a small pericardial effusion which is new.  There are coronary artery calcifications present.  There are at least 2 small hypodense lesions in segment 4 of the liver and a small hypodense lesion in the lateral segment of the left lobe peer it liver itself appears enlarged.  The spleen, adrenal glands, and pancreas are unremarkable.  The gallbladder is surgically absent.  There is diffuse colon wall thickening.  The small bowel is nondilated.  There are no fluid collections in the abdomen or pelvis.  The bladder appears normal.  There is no lymphadenopathy.  No destructive bone lesions are identified.        1. New dense consolidation in the right lower lobe.  The appearance is suggestive of drowned lung.  The central portion of the right lung is not included on the images. 2. Large right-sided pleural effusion and small left-sided pleural effusion.  New pericardial effusion. 3. Irregular density in the right middle lobe which may be atelectasis.  It does not have the same configuration of the patient's known right middle lobe mass.  Recommend chest CT for further characterization.  4. Hepatomegaly with small hypodense lesions in the liver likely cysts based upon review of the old scans. 5. Cholecystectomy 6. Diffuse colon wall thickening suggesting colitis. 7. No evidence of abdominal aortic aneurysm.     Lorenzo Manzanares MD       Electronically Signed and Approved By: Lorenzo Manzanares MD on 7/27/2023 at 13:11             XR Chest 1 View    Result Date: 7/27/2023  PROCEDURE: XR CHEST 1 VW  COMPARISON: [facility],  CR, XR CHEST 1 VW, 7/27/2023, 10:28.  INDICATIONS: POST RIGHT THORACENTESIS  FINDINGS:  There is a right-sided chest port tip terminating at the cavoatrial junction.  The cardiomediastinal silhouette is within normal limits.  There is unchanged right hilar consolidation.  There is hazy right basilar airspace opacity, likely representing a small layering right-sided pleural effusion.  There is no evidence of pneumothorax.  There are degenerative changes of the thoracic spine.        1. Hazy right basilar airspace opacity likely representing a small layering right-sided pleural effusion.  No evidence of pneumothorax. 2. Unchanged right hilar consolidation likely related to patient's history of right-sided lung cancer. 3. No evidence of pneumothorax.        BENEDICT JEFFERSON MD       Electronically Signed and Approved By: BENEDICT JEFFERSON MD on 7/27/2023 at 18:33             XR Chest 1 View    Result Date: 7/27/2023  PROCEDURE: XR CHEST 1 VW  COMPARISON: [facility], CR, XR CHEST 1 VW, 3/29/2023, 14:37.  INDICATIONS: SHORTNESS OF BREATH TODAY  FINDINGS:   The cardiac silhouette is obscured.  There is new moderate effusion/consolidation in the right mid and lower lung field.  Stable right IJ Port-A-Cath.  The left lung field is grossly clear.  No evidence of pneumothorax.  IMPRESSION: New moderate effusion/consolidation in the right mid and lower lung field.   VIVIEN YO MD       Electronically Signed and Approved By: VIVIEN YO MD on 7/27/2023 at 10:51               [x]  EKG/Telemetry   []  Cardiology/Vascular   []  Pathology  [x]  Old records  []  Other:    Assessment & Plan   Assessment / Plan     Assessment/Plan:    Assessment:  Colitis, infectious versus inflammatory  Right lower lobe consolidation  Bilateral pleural effusions  Pericardial effusion  Severe sepsis secondary to colitis  Severe malnutrition  Adenocarcinoma stage IIIa, status post chemo, XRT  Hyponatremia of clinical  significance  Hypokalemia  Current tobacco smoker  AAA  COPD  Fibromyalgia  Chronic opioid use  Fecal occult blood positive likely secondary to colitis    Plan:  Labs and imaging reviewed  Follow-up echocardiogram  Follow-up Doppler study of the right upper extremity  Continue Zosyn, to complete 5-day  Oral intake per tolerance  Try to still obtain stool samples for enteric panel  Avoid bowel regimen and patient with colitis  Critical care consulted discussed with Dr. Cavazos, recommendations appreciated  Brovana pulmonary nebs twice daily  Nicotine patch  Protonix 40 mg daily  Florastor for probiotic  A.m. labs  Full code  DVT prophylaxis with SCDs in the setting of Hemoccult positive stools  Clinical course to dictate further management    DVT prophylaxis:  Mechanical DVT prophylaxis orders are present.    CODE STATUS:   Level Of Support Discussed With: Patient  Code Status (Patient has no pulse and is not breathing): CPR (Attempt to Resuscitate)  Medical Interventions (Patient has pulse or is breathing): Full Support  Release to patient: Routine Release    Electronically signed by Mohsen Pizarro MD, 07/30/23, 2:27 PM EDT.    Portions of this documentation were transcribed electronically from a voice recognition software.  I confirm all data accurately represents the service(s) I performed at today's visit.

## 2023-07-30 NOTE — PROGRESS NOTES
Pulmonary / Critical Care Progress Note      Patient Name: Theresa M Gabehart  : 1960  MRN: [id number]  Attending:  Mohsen Pizarro MD   Date of admission: 2023    Subjective   Subjective   Follow-up for pleural effusion.     right thoracentesis with drainage of 900 mL.  Cultures negative to date and cytology pending.    No acute events overnight.    This morning,  Lying in bed on room air  Dyspnea improved  Denies cough  BP improved  No chest pain  No fever or chills  Remains weak and fatigued    Review of Systems  Constitutional symptoms:  Denied complaints   Ear, nose, throat: Denied complaints  Cardiovascular:  Denied complaints  Respiratory: Dyspnea, otherwise denied complaints  Gastrointestinal: Denied complaints  Musculoskeletal: Denied complaints  Neurologic: Denied complaints  Skin: Denied complaints    Objective   Objective     Vitals:   Vital signs for last 24 hours:  Temp:  [98.6 øF (37 øC)-99.1 øF (37.3 øC)] 98.6 øF (37 øC)  Heart Rate:  [105-121] 107  Resp:  [16-18] 16  BP: ()/(59-78) 90/60    Physical Exam   Vital Signs Reviewed   General:  Alert, NAD. Lying in bed  HEENT:  PERRL, EOMI.    Neck:  No JVD, no thyromegaly  Lymph: no axillary, cervical, supraclavicular lymphadenopathy noted bilaterally  Chest:  Clear to auscultation bilaterally, no work of breathing noted on room air  CV: RRR, no M/G/R, pulses 2+  Abd:  Soft, NT, ND, +BS  EXT:  no clubbing, no cyanosis, no edema  Neuro:  A&Ox3, CN grossly intact, no focal deficits.  Skin: No rashes or lesions noted    Result Review    Result Review:  I have personally reviewed the results from the time of this admission to 2023 15:03 EDT and agree with these findings:  [x]  Laboratory  [x]  Microbiology  [x]  Radiology  []  EKG/Telemetry   []  Cardiology/Vascular   []  Pathology  []  Old records  []  Other:  Most notable findings include:     - pleural fluid cultures negative to date and cytology pending        Lab  07/30/23  0449 07/29/23  0502 07/28/23  1306 07/28/23  0301 07/27/23  1455 07/27/23  1033   WBC 10.57 10.52  --  11.96*  --  13.80*   HEMOGLOBIN 10.3* 10.3*  --  11.1*  --  14.1   HEMATOCRIT 30.2* 30.5*  --  33.2*  --  40.2   PLATELETS 351 372  --  350  --  422   SODIUM 131* 132*  --  132*  --  132*   POTASSIUM 3.3* 3.8 4.5 3.5  --  3.1*   CHLORIDE 99 100  --  99  --  93*   CO2 24.1 23.2  --  22.8  --  23.0   BUN 6* 4*  --  6*  --  8   CREATININE 0.32* 0.36*  --  0.38*  --  0.45*   GLUCOSE 102* 92  --  111*  --  122*   CALCIUM 8.0* 8.0*  --  7.9*  --  8.9   PHOSPHORUS 3.5 3.0  --  3.0 3.7  --    TOTAL PROTEIN 4.8* 4.8*  --   --   --  6.3   ALBUMIN 2.2* 2.3*  --   --   --  3.0*   GLOBULIN  --   --   --   --   --  3.3     Assessment & Plan   Assessment / Plan     Active Hospital Problems:  Active Hospital Problems    Diagnosis     **Colitis     Severe malnutrition      Impression:  Bilateral pleural effusion status post thoracentesis 7/27  Colitis  Septic shock, resolved  Recently diagnosed adenocarcinoma of the lung, stage IIIa status post chemo, XRT  Hyponatremia  Hypokalemia  History of tobacco use     Plan:  -On room air.  -Pleural fluid cultures negative to date and cytology pending.  -Repeat CXR in a.m.  -Pro-Fausto negative and cultures negative to date.  Continue Zosyn.  De-escalate based on cultures.  -Continue Brovana, Pulmicort, and DuoNebs.  -Continue bronchopulmonary hygiene.  -Echocardiogram with normal ventricular systolic function, trace tricuspid regurg.  -GI on board.  Appreciate assistance.  -Continue Protonix.  -Trend renal panel and electrolytes.  -Encourage mobilization.  Out of bed to chair.  -PT/OT on board.  Appreciate assistance.    DVT prophylaxis:  Mechanical DVT prophylaxis orders are present.    CODE STATUS:   Level Of Support Discussed With: Patient  Code Status (Patient has no pulse and is not breathing): CPR (Attempt to Resuscitate)  Medical Interventions (Patient has pulse or is  breathing): Full Support  Release to patient: Routine Release    Electronically signed by BLANCA Orlando, 07/30/23, 3:04 PM EDT.  This patient was seen by both a physician and a NP. I, Jamin Cavazos MD, spent >50% of time in accordance with split shared billing. This included personally reviewing all pertinent labs, imaging, microbiology and documentation. Also discussing the case with the patient and any available family, the admitting physician and any available ancillary staff.     Electronically signed by Jamin Cavazos MD, 07/30/23, 4:56 PM EDT.

## 2023-07-30 NOTE — PLAN OF CARE
Goal Outcome Evaluation:  Plan of Care Reviewed With: patient      Pt remains on room air during the day. Pt VS WNL for this pt. No B.M. today. Reminded pt if has B.M. need for stool sample.  No voiced complaints today.

## 2023-07-31 ENCOUNTER — APPOINTMENT (OUTPATIENT)
Dept: CARDIOLOGY | Facility: HOSPITAL | Age: 63
DRG: 871 | End: 2023-07-31
Payer: MEDICARE

## 2023-07-31 ENCOUNTER — APPOINTMENT (OUTPATIENT)
Dept: CT IMAGING | Facility: HOSPITAL | Age: 63
DRG: 871 | End: 2023-07-31
Payer: MEDICARE

## 2023-07-31 ENCOUNTER — APPOINTMENT (OUTPATIENT)
Dept: GENERAL RADIOLOGY | Facility: HOSPITAL | Age: 63
DRG: 871 | End: 2023-07-31
Payer: MEDICARE

## 2023-07-31 LAB
ALBUMIN SERPL-MCNC: 2.4 G/DL (ref 3.5–5.2)
ALP SERPL-CCNC: 45 U/L (ref 39–117)
ALT SERPL W P-5'-P-CCNC: 17 U/L (ref 1–33)
ANION GAP SERPL CALCULATED.3IONS-SCNC: 7.8 MMOL/L (ref 5–15)
AST SERPL-CCNC: 17 U/L (ref 1–32)
BASOPHILS # BLD AUTO: 0.06 10*3/MM3 (ref 0–0.2)
BASOPHILS NFR BLD AUTO: 0.5 % (ref 0–1.5)
BILIRUB CONJ SERPL-MCNC: <0.2 MG/DL (ref 0–0.3)
BILIRUB INDIRECT SERPL-MCNC: ABNORMAL MG/DL
BILIRUB SERPL-MCNC: 0.2 MG/DL (ref 0–1.2)
BUN SERPL-MCNC: 6 MG/DL (ref 8–23)
BUN/CREAT SERPL: 15 (ref 7–25)
CALCIUM SPEC-SCNC: 8.5 MG/DL (ref 8.6–10.5)
CHLORIDE SERPL-SCNC: 99 MMOL/L (ref 98–107)
CO2 SERPL-SCNC: 26.2 MMOL/L (ref 22–29)
CREAT SERPL-MCNC: 0.4 MG/DL (ref 0.57–1)
DEPRECATED RDW RBC AUTO: 45.6 FL (ref 37–54)
EGFRCR SERPLBLD CKD-EPI 2021: 112.8 ML/MIN/1.73
EOSINOPHIL # BLD AUTO: 0.49 10*3/MM3 (ref 0–0.4)
EOSINOPHIL NFR BLD AUTO: 4.1 % (ref 0.3–6.2)
ERYTHROCYTE [DISTWIDTH] IN BLOOD BY AUTOMATED COUNT: 13.5 % (ref 12.3–15.4)
GLUCOSE SERPL-MCNC: 108 MG/DL (ref 65–99)
HCT VFR BLD AUTO: 32.7 % (ref 34–46.6)
HGB BLD-MCNC: 11.1 G/DL (ref 12–15.9)
IMM GRANULOCYTES # BLD AUTO: 0.07 10*3/MM3 (ref 0–0.05)
IMM GRANULOCYTES NFR BLD AUTO: 0.6 % (ref 0–0.5)
LYMPHOCYTES # BLD AUTO: 0.88 10*3/MM3 (ref 0.7–3.1)
LYMPHOCYTES NFR BLD AUTO: 7.3 % (ref 19.6–45.3)
MAGNESIUM SERPL-MCNC: 1.7 MG/DL (ref 1.6–2.4)
MCH RBC QN AUTO: 31.1 PG (ref 26.6–33)
MCHC RBC AUTO-ENTMCNC: 33.9 G/DL (ref 31.5–35.7)
MCV RBC AUTO: 91.6 FL (ref 79–97)
MONOCYTES # BLD AUTO: 1.44 10*3/MM3 (ref 0.1–0.9)
MONOCYTES NFR BLD AUTO: 12 % (ref 5–12)
NEUTROPHILS NFR BLD AUTO: 75.5 % (ref 42.7–76)
NEUTROPHILS NFR BLD AUTO: 9.06 10*3/MM3 (ref 1.7–7)
NRBC BLD AUTO-RTO: 0 /100 WBC (ref 0–0.2)
PHOSPHATE SERPL-MCNC: 3.3 MG/DL (ref 2.5–4.5)
PLATELET # BLD AUTO: 376 10*3/MM3 (ref 140–450)
PMV BLD AUTO: 8.6 FL (ref 6–12)
POTASSIUM SERPL-SCNC: 4.1 MMOL/L (ref 3.5–5.2)
PROT SERPL-MCNC: 5.1 G/DL (ref 6–8.5)
RBC # BLD AUTO: 3.57 10*6/MM3 (ref 3.77–5.28)
SODIUM SERPL-SCNC: 133 MMOL/L (ref 136–145)
WBC NRBC COR # BLD: 12 10*3/MM3 (ref 3.4–10.8)

## 2023-07-31 PROCEDURE — 94799 UNLISTED PULMONARY SVC/PX: CPT

## 2023-07-31 PROCEDURE — 94664 DEMO&/EVAL PT USE INHALER: CPT

## 2023-07-31 PROCEDURE — 83735 ASSAY OF MAGNESIUM: CPT | Performed by: FAMILY MEDICINE

## 2023-07-31 PROCEDURE — 71250 CT THORAX DX C-: CPT

## 2023-07-31 PROCEDURE — 99231 SBSQ HOSP IP/OBS SF/LOW 25: CPT | Performed by: INTERNAL MEDICINE

## 2023-07-31 PROCEDURE — 99232 SBSQ HOSP IP/OBS MODERATE 35: CPT | Performed by: FAMILY MEDICINE

## 2023-07-31 PROCEDURE — 85025 COMPLETE CBC W/AUTO DIFF WBC: CPT | Performed by: FAMILY MEDICINE

## 2023-07-31 PROCEDURE — 80076 HEPATIC FUNCTION PANEL: CPT | Performed by: FAMILY MEDICINE

## 2023-07-31 PROCEDURE — 71045 X-RAY EXAM CHEST 1 VIEW: CPT

## 2023-07-31 PROCEDURE — 80048 BASIC METABOLIC PNL TOTAL CA: CPT | Performed by: FAMILY MEDICINE

## 2023-07-31 PROCEDURE — 99233 SBSQ HOSP IP/OBS HIGH 50: CPT | Performed by: INTERNAL MEDICINE

## 2023-07-31 PROCEDURE — 84100 ASSAY OF PHOSPHORUS: CPT | Performed by: FAMILY MEDICINE

## 2023-07-31 PROCEDURE — 93971 EXTREMITY STUDY: CPT

## 2023-07-31 PROCEDURE — 25010000002 PIPERACILLIN SOD-TAZOBACTAM PER 1 G: Performed by: HOSPITALIST

## 2023-07-31 PROCEDURE — 93971 EXTREMITY STUDY: CPT | Performed by: SURGERY

## 2023-07-31 RX ADMIN — Medication 10 ML: at 22:13

## 2023-07-31 RX ADMIN — Medication 500 MG: at 08:22

## 2023-07-31 RX ADMIN — PIPERACILLIN SODIUM AND TAZOBACTAM SODIUM 3.38 G: 3; .375 INJECTION, POWDER, LYOPHILIZED, FOR SOLUTION INTRAVENOUS at 11:35

## 2023-07-31 RX ADMIN — IPRATROPIUM BROMIDE AND ALBUTEROL SULFATE 3 ML: 2.5; .5 SOLUTION RESPIRATORY (INHALATION) at 07:02

## 2023-07-31 RX ADMIN — ARFORMOTEROL TARTRATE 15 MCG: 15 SOLUTION RESPIRATORY (INHALATION) at 07:02

## 2023-07-31 RX ADMIN — HYDROCODONE BITARTRATE AND ACETAMINOPHEN 1 TABLET: 7.5; 325 TABLET ORAL at 08:22

## 2023-07-31 RX ADMIN — IPRATROPIUM BROMIDE AND ALBUTEROL SULFATE 3 ML: 2.5; .5 SOLUTION RESPIRATORY (INHALATION) at 19:40

## 2023-07-31 RX ADMIN — Medication 500 MG: at 21:43

## 2023-07-31 RX ADMIN — SODIUM CHLORIDE 500 ML: 9 INJECTION, SOLUTION INTRAVENOUS at 16:27

## 2023-07-31 RX ADMIN — BUDESONIDE 0.5 MG: 0.5 SUSPENSION RESPIRATORY (INHALATION) at 07:02

## 2023-07-31 RX ADMIN — HYDROCODONE BITARTRATE AND ACETAMINOPHEN 1 TABLET: 7.5; 325 TABLET ORAL at 21:43

## 2023-07-31 RX ADMIN — HYDROCODONE BITARTRATE AND ACETAMINOPHEN 1 TABLET: 7.5; 325 TABLET ORAL at 00:02

## 2023-07-31 RX ADMIN — ZINC SULFATE 220 MG (50 MG) CAPSULE 220 MG: CAPSULE at 08:22

## 2023-07-31 RX ADMIN — ARFORMOTEROL TARTRATE 15 MCG: 15 SOLUTION RESPIRATORY (INHALATION) at 19:40

## 2023-07-31 RX ADMIN — Medication 10 ML: at 08:23

## 2023-07-31 RX ADMIN — PANTOPRAZOLE SODIUM 40 MG: 40 TABLET, DELAYED RELEASE ORAL at 06:12

## 2023-07-31 RX ADMIN — IPRATROPIUM BROMIDE AND ALBUTEROL SULFATE 3 ML: 2.5; .5 SOLUTION RESPIRATORY (INHALATION) at 11:49

## 2023-07-31 RX ADMIN — BUDESONIDE 0.5 MG: 0.5 SUSPENSION RESPIRATORY (INHALATION) at 19:40

## 2023-07-31 RX ADMIN — PIPERACILLIN SODIUM AND TAZOBACTAM SODIUM 3.38 G: 3; .375 INJECTION, POWDER, LYOPHILIZED, FOR SOLUTION INTRAVENOUS at 03:28

## 2023-07-31 NOTE — PLAN OF CARE
Goal Outcome Evaluation:   Patient pain treated per MAR. No further complaints voiced throughout shift. Pt. BP 82/57 500cc LR bolus given per MAR, interventions effective. No acute changes throughout shift, will continue POC.

## 2023-07-31 NOTE — PROGRESS NOTES
[facility]   Hospitalist Progress Note  Date: 2023  Patient Name: Theresa M Gabehart  : 1960  MRN: [id number]  Date of admission: 2023      Subjective   Subjective     Chief complaint: Shortness of breath, abdominal pain    Summary:  62-year-old female with history of anemia due to chemotherapy, abdominal aortic aneurysm, Arnold-Chiari syndrome, COPD, dysphagia secondary to esophageal stricture, fibromyalgia, lung cancer, neuropathy, current smoker, chronic opioid user, hospitalized on 2023 worth sepsis, colitis, enteric stool panel is pending, colitis etiology uncertain, work-up pursued, transferred to the ICU on 2023 for concerns of resistant hypertension, with failure to improve with initial volume replacement, status post bedside thoracentesis with bilateral pleural effusions on imaging, hypotension improved, transferred out of ICU 2023.  Echo ordered with pleural effusion, to evaluate for possible link with underlying heart failure.  Has history of right upper extremity DVT, repeat ultrasound ordered with pain in the area, increasing mediastinal adenopathy on imaging, plans to pursue bronchoscopy 2023    Interval follow-up: Patient seen and examined this morning, no acute distress, no acute major overnight events, no diarrhea or bowel movement, passing gas, no abdominal cramping, what ever was causing her infectious colitis has improved with Zosyn, white blood cell count today is 12,000, hemoglobin 9.1, remains afebrile.  Chest CT showing worsening mediastinal adenopathy, discussed with pulmonary, plans to pursue bronchoscopy.  Still has dense consolidation right lower lobe.  Creatinine 0.4, BUN 6, sodium 133.  Telemetry reviewed, no acute major events.    Review of systems:  All systems reviewed negative except generalized fatigue, generalized weakness, shortness of breath with exertion      Objective   Objective     Vitals:   Temp:  [98.2 øF (36.8 øC)-99.3 øF (37.4  øC)] 98.2 øF (36.8 øC)  Heart Rate:  [] 105  Resp:  [18-24] 18  BP: (82-96)/(50-71) 82/60  Physical Exam               Constitutional: Awake, alert, no acute distress thin, cachectic               Eyes: Pupils equal, sclerae anicteric, no conjunctival injection              HENT: NCAT, mucous membranes moist              Neck: Supple, full range of motion              Respiratory: Diminished breath sounds throughout              Cardiovascular: RRR, no murmurs, rubs, or gallops, palpable pedal pulses bilaterally              Gastrointestinal: Positive bowel sounds, soft, no tenderness on palpation, no distended              Musculoskeletal: No bilateral ankle edema, no clubbing or cyanosis to extremities              Psychiatric: Appropriate affect, cooperative              Neurologic: Oriented x 3, strength symmetric in all extremities, Cranial Nerves grossly intact to confrontation, speech clear              Skin: No rashes        Result Review    Result Review:  I have personally reviewed the pertinent results from the past 24 hours to 7/31/2023 17:56 EDT and agree with these findings:  [x]  Laboratory   CBC          7/29/2023    05:02 7/30/2023    04:49 7/31/2023    05:38   CBC   WBC 10.52  10.57  12.00    RBC 3.36  3.33  3.57    Hemoglobin 10.3  10.3  11.1    Hematocrit 30.5  30.2  32.7    MCV 90.8  90.7  91.6    MCH 30.7  30.9  31.1    MCHC 33.8  34.1  33.9    RDW 13.5  13.4  13.5    Platelets 372  351  376      BMP          7/29/2023    05:02 7/30/2023    04:49 7/31/2023    05:38   BMP   BUN 4  6  6    Creatinine 0.36  0.32  0.40    Sodium 132  131  133    Potassium 3.8  3.3  4.1    Chloride 100  99  99    CO2 23.2  24.1  26.2    Calcium 8.0  8.0  8.5    LIVER FUNCTION TESTS:      Lab 07/31/23  0538 07/30/23  0449 07/29/23  0502 07/27/23  1033   TOTAL PROTEIN 5.1* 4.8* 4.8* 6.3   ALBUMIN 2.4* 2.2* 2.3* 3.0*   GLOBULIN  --   --   --  3.3   ALT (SGPT) 17 10 10 10   AST (SGOT) 17 9 9 10   BILIRUBIN 0.2  <0.2 <0.2 0.3   BILIRUBIN DIRECT <0.2 <0.2 <0.2  --    ALK PHOS 45 38* 39 48         [x]  Microbiology No results found for: ACANTHNAEG, AFBCX, BPERTUSSISCX, BLOODCX  No results found for: BCIDPCR, CXREFLEX, CSFCX, CULTURETIS  No results found for: CULTURES, HSVCX, URCX  No results found for: EYECULTURE, GCCX, HSVCULTURE, LABHSV  No results found for: LEGIONELLA, MRSACX, MUMPSCX, MYCOPLASCX  No results found for: NOCARDIACX, STOOLCX  No results found for: THROATCX, UNSTIMCULT, URINECX, CULTURE, VZVCULTUR  No results found for: VIRALCULTU, WOUNDCX    [x]  Radiology CT Abdomen Pelvis With Contrast    Addendum Date: 7/27/2023    ADDENDUM:  COMPARISON: Other, CT, CT ABDOMEN WO CONTRAST, 7/07/2023, 15:41.  Patient's previous CT of the abdomen and pelvis from 7/7/2023 was obtained from [facility] following initial interpretation of the study.  The colon wall thickening seen on the current study appears acute and is consistent with colitis.  The consolidation in the right lower lobe has clearly increased compared with the last study.  The right pleural effusion appears significantly larger.  There are compression fractures of T11, T12, L3 and L5 which appear unchanged from the previous CT.    Lorenzo Manzanares MD       Electronically Signed and Approved By: Lorenzo Manzanares MD on 7/27/2023 at 14:00             Result Date: 7/27/2023  PROCEDURE: CT ABDOMEN PELVIS W CONTRAST  COMPARISON: [facility], PET, NM PET/CT SKULL BASE TO MID THIGH, 11/09/2022, 10:44.  [facility], CT, CT CHEST WO CONTRAST DIAGNOSTIC, 9/27/2022, 9:47.  [facility], CT, ABDOMEN/PELVIS WITH CONTRAST, 8/11/2015, 9:46.  INDICATIONS: Known aortic aneurysm with abdominal pain radiating to back/PRIOR CT SCAN DONE AT [facility] ON JULY 7th/unable to raise both arms above head  TECHNIQUE: After obtaining the patient's consent, CT images were created with non-ionic intravenous contrast material.    PROTOCOL:   Standard imaging protocol performed    RADIATION:   DLP: 498.8mGy*cm   Automated exposure control was utilized to minimize radiation dose. CONTRAST: 75cc Isovue 370 I.V.  FINDINGS:  There is new dense consolidation in the right lower lobe.  There are areas of relative low attenuation peripherally in this area of dense consolidation.  There is an irregular nodular density in the right middle lobe anteriorly.  The previously described mass in the right middle lobe is not clearly seen.  There is a large right-sided pleural effusion and trace left-sided pleural fluid.  There is a small pericardial effusion which is new.  There are coronary artery calcifications present.  There are at least 2 small hypodense lesions in segment 4 of the liver and a small hypodense lesion in the lateral segment of the left lobe peer it liver itself appears enlarged.  The spleen, adrenal glands, and pancreas are unremarkable.  The gallbladder is surgically absent.  There is diffuse colon wall thickening.  The small bowel is nondilated.  There are no fluid collections in the abdomen or pelvis.  The bladder appears normal.  There is no lymphadenopathy.  No destructive bone lesions are identified.        1. New dense consolidation in the right lower lobe.  The appearance is suggestive of drowned lung.  The central portion of the right lung is not included on the images. 2. Large right-sided pleural effusion and small left-sided pleural effusion.  New pericardial effusion. 3. Irregular density in the right middle lobe which may be atelectasis.  It does not have the same configuration of the patient's known right middle lobe mass.  Recommend chest CT for further characterization.  4. Hepatomegaly with small hypodense lesions in the liver likely cysts based upon review of the old scans. 5. Cholecystectomy 6. Diffuse colon wall thickening suggesting colitis. 7. No evidence of abdominal aortic aneurysm.     Lorenzo Manzanares MD        Electronically Signed and Approved By: Lorenzo Manzanares MD on 7/27/2023 at 13:11             XR Chest 1 View    Result Date: 7/27/2023  PROCEDURE: XR CHEST 1 VW  COMPARISON: [facility], CR, XR CHEST 1 VW, 7/27/2023, 10:28.  INDICATIONS: POST RIGHT THORACENTESIS  FINDINGS:  There is a right-sided chest port tip terminating at the cavoatrial junction.  The cardiomediastinal silhouette is within normal limits.  There is unchanged right hilar consolidation.  There is hazy right basilar airspace opacity, likely representing a small layering right-sided pleural effusion.  There is no evidence of pneumothorax.  There are degenerative changes of the thoracic spine.        1. Hazy right basilar airspace opacity likely representing a small layering right-sided pleural effusion.  No evidence of pneumothorax. 2. Unchanged right hilar consolidation likely related to patient's history of right-sided lung cancer. 3. No evidence of pneumothorax.        BENEDICT JEFFERSON MD       Electronically Signed and Approved By: BENEDICT JEFFERSON MD on 7/27/2023 at 18:33             XR Chest 1 View    Result Date: 7/27/2023  PROCEDURE: XR CHEST 1 VW  COMPARISON: [facility], CR, XR CHEST 1 VW, 3/29/2023, 14:37.  INDICATIONS: SHORTNESS OF BREATH TODAY  FINDINGS:   The cardiac silhouette is obscured.  There is new moderate effusion/consolidation in the right mid and lower lung field.  Stable right IJ Port-A-Cath.  The left lung field is grossly clear.  No evidence of pneumothorax.  IMPRESSION: New moderate effusion/consolidation in the right mid and lower lung field.   VIVIEN YO MD       Electronically Signed and Approved By: VIVIEN YO MD on 7/27/2023 at 10:51               [x]  EKG/Telemetry   []  Cardiology/Vascular   []  Pathology  [x]  Old records  []  Other:    Assessment & Plan   Assessment / Plan     Assessment/Plan:  Assessment:  Colitis, infectious versus inflammatory  Right lower lobe  consolidation  Bilateral pleural effusions  Worsening mediastinal adenopathy  Pericardial effusion  Severe sepsis secondary to colitis  Severe malnutrition  Adenocarcinoma stage IIIa, status post chemo, XRT  Hyponatremia of clinical significance  Hypokalemia  Current tobacco smoker  AAA  COPD  Fibromyalgia  Chronic opioid use  Fecal occult blood positive likely secondary to colitis    Plan:  Labs and imaging reviewed  Discussed with Dr. Bonds, recommendations appreciated  N.p.o. after midnight for bronchoscopy procedure tomorrow  Echo reviewed  Follow-up Doppler study of the right upper extremity  Continue Zosyn, to complete 5-day  Oral intake per tolerance  Avoid bowel regimen and patient with colitis  Brovana pulmonary nebs twice daily  Nicotine patch  Protonix 40 mg daily  Florastor for probiotic  A.m. labs  Full code  DVT prophylaxis with SCDs in the setting of Hemoccult positive stools  Clinical course to dictate further management      DVT prophylaxis:  Mechanical DVT prophylaxis orders are present.    CODE STATUS:   Level Of Support Discussed With: Patient  Code Status (Patient has no pulse and is not breathing): CPR (Attempt to Resuscitate)  Medical Interventions (Patient has pulse or is breathing): Full Support  Release to patient: Routine Release    Electronically signed by Mohsen Pizarro MD, 07/31/23, 5:59 PM EDT.    Portions of this documentation were transcribed electronically from a voice recognition software.  I confirm all data accurately represents the service(s) I performed at today's visit.

## 2023-07-31 NOTE — PROGRESS NOTES
Pulmonary / Critical Care Progress Note      Patient Name: Theresa M Gabehart  : 1960  MRN: [id number]  Attending:  Mohsen Pizarro MD   Date of admission: 2023    Subjective   Subjective   Follow-up for pleural effusion.     right thoracentesis with drainage of 900 mL.  Cultures negative to date and cytology pending.    No acute events overnight.    This morning,  Lying in bed on room air  Dyspnea improved  Occasional nonproductive cough  BP remains soft  No chest pain  No fever or chills  Remains weak and fatigued    Review of Systems  Constitutional symptoms: Fatigue, otherwise denied complaints   Ear, nose, throat: Denied complaints  Cardiovascular:  Denied complaints  Respiratory: Dyspnea, cough, otherwise denied complaints  Gastrointestinal: Denied complaints  Musculoskeletal: Weakness, otherwise denied complaints  Neurologic: Denied complaints  Skin: Denied complaints    Objective   Objective     Vitals:   Vital signs for last 24 hours:  Temp:  [98.2 øF (36.8 øC)-99.3 øF (37.4 øC)] 98.2 øF (36.8 øC)  Heart Rate:  [] 105  Resp:  [18-24] 18  BP: (82-96)/(50-71) 82/60    Physical Exam   Vital Signs Reviewed   General: Chronically ill-appearing female, alert, NAD. Lying in bed  HEENT:  PERRL, EOMI.    Neck:  No JVD, no thyromegaly  Lymph: no axillary, cervical, supraclavicular lymphadenopathy noted bilaterally  Chest: Diminished right lung base, fine crackles noted in left lung base, no work of breathing noted on room air  CV: RRR, no M/G/R, pulses 2+  Abd:  Soft, NT, ND, +BS  EXT:  no clubbing, no cyanosis, no edema  Neuro:  A&Ox3, CN grossly intact, no focal deficits.  Skin: No rashes or lesions noted    Result Review    Result Review:  I have personally reviewed the results from the time of this admission to 2023 15:55 EDT and agree with these findings:  [x]  Laboratory  [x]  Microbiology  [x]  Radiology  []  EKG/Telemetry   []  Cardiology/Vascular   []  Pathology  []  Old  records  []  Other:  Most notable findings include:     -7/27 pleural fluid cultures negative to date and cytology pending    7/31 CXR - Interval accumulation of large right-sided pleural effusion since prior study, perihilar opacities bilaterally as seen in similar exam        Lab 07/31/23  0538 07/30/23  0449 07/29/23  0502 07/28/23  1306 07/28/23  0301 07/27/23  1455 07/27/23  1033   WBC 12.00* 10.57 10.52  --  11.96*  --  13.80*   HEMOGLOBIN 11.1* 10.3* 10.3*  --  11.1*  --  14.1   HEMATOCRIT 32.7* 30.2* 30.5*  --  33.2*  --  40.2   PLATELETS 376 351 372  --  350  --  422   SODIUM 133* 131* 132*  --  132*  --  132*   POTASSIUM 4.1 3.3* 3.8 4.5 3.5  --  3.1*   CHLORIDE 99 99 100  --  99  --  93*   CO2 26.2 24.1 23.2  --  22.8  --  23.0   BUN 6* 6* 4*  --  6*  --  8   CREATININE 0.40* 0.32* 0.36*  --  0.38*  --  0.45*   GLUCOSE 108* 102* 92  --  111*  --  122*   CALCIUM 8.5* 8.0* 8.0*  --  7.9*  --  8.9   PHOSPHORUS 3.3 3.5 3.0  --  3.0 3.7  --    TOTAL PROTEIN 5.1* 4.8* 4.8*  --   --   --  6.3   ALBUMIN 2.4* 2.2* 2.3*  --   --   --  3.0*   GLOBULIN  --   --   --   --   --   --  3.3     Assessment & Plan   Assessment / Plan     Active Hospital Problems:  Active Hospital Problems    Diagnosis     **Colitis     Severe malnutrition     Pleural effusion      Impression:  Bilateral pleural effusion status post thoracentesis 7/27  Colitis  Septic shock, resolved  Recently diagnosed adenocarcinoma of the lung, stage IIIa status post chemo, XRT  Hyponatremia  Hypokalemia  History of tobacco use     Plan:  -On room air.  -Pleural fluid cultures negative to date and cytology pending.  -Repeat CXR reveals recurrence of large right-sided pleural effusion  -CT chest ordered --> moderate right pleural effusion, dense consolidation in right lower lobe with 7 cm right perihilar opacity, increasing mediastinal lymphadenopathy measuring 1.3 cm previously 0.9 cm  -Will take for bronchoscopy with airway inspection, brushings,  biopsies, bronchoalveolar lavage  I have discussed the risks of the procedure with the patient including pneumothorax, hemothorax, bleeding, hypoxia, required mechanical ventilation and death. The patient recognizes these findings, acknowledges these findings and is agreeable to the procedure.  -N.p.o. after midnight for bronchoscopy tomorrow  -Continue Zosyn x7 days.  May de-escalate based on cultures.  -Continue Brovana, Pulmicort, and DuoNebs.  -Continue bronchopulmonary hygiene.  -Echocardiogram with normal ventricular systolic function, trace tricuspid regurg.  -GI on board.  Appreciate assistance.  -Continue Protonix.  -Trend renal panel and electrolytes.  -Encourage mobilization.  Out of bed to chair.  -PT/OT on board.  Appreciate assistance.  DVT prophylaxis:  Mechanical DVT prophylaxis orders are present.    CODE STATUS:   Level Of Support Discussed With: Patient  Code Status (Patient has no pulse and is not breathing): CPR (Attempt to Resuscitate)  Medical Interventions (Patient has pulse or is breathing): Full Support  Release to patient: Routine Release    Electronically signed by BLANCA Orlando, 07/31/23, 3:55 PM EDT.  This visit was performed by BOTH a physician and an APC I have spent greater then 51% of the time caring for this patient. I personally evaluated and examined the patient. I performed all aspects of MDM as documented. , I have reviewed and confirmed the accuracy of the patient's history as documented in this note. and I have reexamined the patient and the results are consistent with the previously documented exam. I have updated the documentation as necessary.      Electronically signed by Shaw Bonds DO, 07/31/23, 7:08 PM EDT.

## 2023-07-31 NOTE — PROGRESS NOTES
[facility]  Inpatient Progress Note    Reason for Follow Up:  colitis    Subjective     Interval History:   Pt reports no diarrhea over recent days.    Current Facility-Administered Medications:     arformoterol (BROVANA) nebulizer solution 15 mcg, 15 mcg, Nebulization, BID - RT, Thorne, Dimpi, DO, 15 mcg at 07/31/23 0702    baclofen (LIORESAL) tablet 10 mg, 10 mg, Oral, Q12H PRN, Jamin Cavazos MD    budesonide (PULMICORT) nebulizer solution 0.5 mg, 0.5 mg, Nebulization, BID - RT, Thorne, Dimpi, DO, 0.5 mg at 07/31/23 0702    HYDROcodone-acetaminophen (NORCO) 7.5-325 MG per tablet 1 tablet, 1 tablet, Oral, Q6H PRN, Thorne, Dimpi, DO, 1 tablet at 07/31/23 0822    ipratropium-albuterol (DUO-NEB) nebulizer solution 3 mL, 3 mL, Nebulization, Q6H - RT, Mohsen Pizarro MD, 3 mL at 07/31/23 1149    melatonin tablet 10 mg, 10 mg, Oral, Nightly PRN, Wale Hernandez DO    nicotine (NICODERM CQ) 14 MG/24HR patch 1 patch, 1 patch, Transdermal, Q24H, Thorne, Dimpi, DO    ondansetron (ZOFRAN) tablet 4 mg, 4 mg, Oral, Q6H PRN **OR** ondansetron (ZOFRAN) injection 4 mg, 4 mg, Intravenous, Q6H PRN, Thorne, Dimpi, DO    pantoprazole (PROTONIX) EC tablet 40 mg, 40 mg, Oral, Q AM, Thorne, Dimpi, DO, 40 mg at 07/31/23 0612    Pharmacy to Dose Zosyn, , Does not apply, Continuous PRN, Thorne, Dimpi, DO    piperacillin-tazobactam (ZOSYN) 3.375 g/100 mL 0.9% NS IVPB (mbp), 3.375 g, Intravenous, Q8H, Thorne, Dimpi, DO, 3.375 g at 07/31/23 1135    saccharomyces boulardii (FLORASTOR) capsule 500 mg, 500 mg, Oral, BID, Thorne, Dimpi, DO, 500 mg at 07/31/23 0822    sodium chloride 0.9 % flush 10 mL, 10 mL, Intravenous, PRN, Wicho Winter MD    sodium chloride 0.9 % flush 10 mL, 10 mL, Intravenous, Q12H, Thorne, Dimpi, DO, 10 mL at 07/31/23 0823    sodium chloride 0.9 % infusion 40 mL, 40 mL, Intravenous, PRN, Thorne, Dimpi, DO, 40 mL at 07/27/23 1849    vitamin D (ERGOCALCIFEROL) capsule 50,000 Units, 50,000 Units, Oral, Q7  Days, Tyler Thorne , 50,000 Units at 07/28/23 0856    zinc sulfate (ZINCATE) capsule 220 mg, 220 mg, Oral, Daily, Tyler Thorne DO, 220 mg at 07/31/23 0822  Review of Systems:    The following systems were reviewed and negative;  constitution, respiratory, and cardiovascular    Objective     Vital Signs  Temp:  [98.2 øF (36.8 øC)-99.3 øF (37.4 øC)] 98.2 øF (36.8 øC)  Heart Rate:  [] 113  Resp:  [16-24] 18  BP: (82-98)/(50-71) 96/71  Body mass index is 16.75 kg/mý.    Intake/Output Summary (Last 24 hours) at 7/31/2023 1308  Last data filed at 7/31/2023 0900  Gross per 24 hour   Intake 440 ml   Output --   Net 440 ml     I/O this shift:  In: 200 [P.O.:200]  Out: -      Physical Exam:   General: awake, alert and in no acute distress   Eyes: eyes move symmetrical in all directions, no scleral icterus   Neck: supple, trachea is midline   Skin: warm and dry, not jaundiced   Cardiovascular: no chest tenderness   Pulm: breathing unlabored   Abdomen: soft, nontender, nondistended   Rectal: deferred   Extremities: no rash or edema   Psychiatric: mental status within normal limits     Results Review:     I reviewed the patient's new clinical results.    Results from last 7 days   Lab Units 07/31/23  0538 07/30/23  0449 07/29/23  0502   WBC 10*3/mm3 12.00* 10.57 10.52   HEMOGLOBIN g/dL 11.1* 10.3* 10.3*   HEMATOCRIT % 32.7* 30.2* 30.5*   PLATELETS 10*3/mm3 376 351 372     Results from last 7 days   Lab Units 07/31/23  0538 07/30/23  0449 07/29/23  0502   SODIUM mmol/L 133* 131* 132*   POTASSIUM mmol/L 4.1 3.3* 3.8   CHLORIDE mmol/L 99 99 100   CO2 mmol/L 26.2 24.1 23.2   BUN mg/dL 6* 6* 4*   CREATININE mg/dL 0.40* 0.32* 0.36*   CALCIUM mg/dL 8.5* 8.0* 8.0*   BILIRUBIN mg/dL 0.2 <0.2 <0.2   ALK PHOS U/L 45 38* 39   ALT (SGPT) U/L 17 10 10   AST (SGOT) U/L 17 9 9   GLUCOSE mg/dL 108* 102* 92         Lab Results   Lab Value Date/Time    LIPASE 11 (L) 03/29/2023 1425    LIPASE 17 10/30/2022 1637       Radiology:               Assessment & Plan   Assessment:     Colitis    Plan:     - pt reports diarrhea resolved  - stool studies unable to be collected  - agree with completing course of abx for suspected infectious diarrhea  - please call if can be of further assistance    I discussed the patients findings and my recommendations with patient.         Malou Siddiqui M.D.  [place] [address]  [place]  [id number]  Office: [phone]

## 2023-07-31 NOTE — CONSULTS
"Nutrition Services    Patient Name: Theresa M Gabehart  YOB: 1960  MRN: [id number]  Admission date: 7/27/2023      CLINICAL NUTRITION ASSESSMENT      Reason for Assessment  Follow-up protocol   H&P:    Past Medical History:   Diagnosis Date    Allergic rhinitis     Anemia due to chemotherapy 12/28/2022    Aortic aneurysm     FOLLOWED BY GARCIA    Arnold-Chiari syndrome     Bleeding disorder     FREE BLEEDER, BLUE AUGUSTBRAND TEST NEGATIVE    Carpal tunnel syndrome     RIGHT    Colon polyps     Colon polyps     COPD (chronic obstructive pulmonary disease)     NEBS/INHALER    Dupuytren's contracture syndrome     Dysphagia     ESOPHAGEAL STRICTURE    Emphysema lung     Fibromyalgia     Hiatal hernia     History of fractured vertebra     18 fractured/HEALING, 2021    History of pelvic fracture     2021 HEALED    Hypokalemia 12/28/2022    Lung cancer     RIGHT    Malignant neoplasm of middle lobe of right lung 11/30/2022    Malignant neoplasm of overlapping sites of right lung 11/30/2022    Neuropathy     Osteoporosis     Vertigo         Current Problems:   Active Hospital Problems    Diagnosis     **Colitis     Severe malnutrition         Nutrition/Diet History         Narrative     Pt admitted with colitis. Medical hx significant for lung CA. Pt has finished chemo and XRT. Thoracentesis on 7/27. Pt meets criteria for severe malnutrition.     Nutrition follow up:    Pt is receiving Boost VHC TID. Pt's intake improving on Reg diet, was 10%, now 75% of brkfst this AM.  No acute nutrition concerns at this time. Will continue current POC.     RD will continue to monitor per protocol.      Anthropometrics        Current Height, Weight Height: 170.2 cm (67\")  Weight: 48.5 kg (106 lb 14.8 oz)   Current BMI Body mass index is 16.75 kg/mý.       Weight Hx  Wt Readings from Last 30 Encounters:   07/27/23 2000 48.5 kg (106 lb 14.8 oz)   07/20/23 1004 46.3 kg (102 lb)   07/13/23 1214 46.3 kg (102 lb)   05/04/23 1306 " 51.3 kg (113 lb)   04/19/23 0921 52.5 kg (115 lb 11.9 oz)   04/05/23 0841 53.6 kg (118 lb 2.7 oz)   04/05/23 0855 53.6 kg (118 lb 2.7 oz)   03/29/23 1407 52.6 kg (116 lb)   02/08/23 0914 55.8 kg (123 lb 0.3 oz)   02/08/23 0921 55.8 kg (123 lb 0.3 oz)   01/20/23 1425 54.2 kg (119 lb 7.8 oz)   01/18/23 0849 53.8 kg (118 lb 9.7 oz)   01/18/23 0901 53.8 kg (118 lb 9.7 oz)   01/11/23 1500 52.6 kg (115 lb 15.4 oz)   01/10/23 1534 53.8 kg (118 lb 9.7 oz)   01/04/23 1300 52.9 kg (116 lb 10 oz)   01/03/23 1431 52.7 kg (116 lb 2.9 oz)   12/28/22 0834 53.9 kg (118 lb 13.3 oz)   12/28/22 1100 53.7 kg (118 lb 7.3 oz)   12/28/22 0824 53.9 kg (118 lb 13.3 oz)   12/27/22 1546 51.8 kg (114 lb 3.2 oz)   12/21/22 1500 52.8 kg (116 lb 6.5 oz)   12/20/22 1520 52.4 kg (115 lb 8.3 oz)   12/16/22 1323 51.3 kg (113 lb)   12/14/22 1500 51 kg (112 lb 7 oz)   12/13/22 1532 51.7 kg (113 lb 15.7 oz)   12/08/22 0900 52 kg (114 lb 10.2 oz)   12/07/22 0942 51.2 kg (112 lb 14 oz)   12/07/22 1538 50.8 kg (112 lb)   12/05/22 1425 51.9 kg (114 lb 6.7 oz)            Wt Change Observation -7.8% x 3 months, clinically significant  -10.9% x 6 months, clinically significant     Estimated/Assessed Needs       Energy Requirements 35-40 kcal/kg CBW (48.5 kg)   EST Needs (kcal/day) 5300-0601 kcal       Protein Requirements 1.0-1.2 g/kg    EST Daily Needs (g/day) 49-58 g       Fluid Requirements 25 ml/kg IBW    Estimated Needs (mL/day) 1540 ml     Labs/Medications         Pertinent Labs Reviewed.   Results from last 7 days   Lab Units 07/31/23  0538 07/30/23  0449 07/29/23  0502   SODIUM mmol/L 133* 131* 132*   POTASSIUM mmol/L 4.1 3.3* 3.8   CHLORIDE mmol/L 99 99 100   CO2 mmol/L 26.2 24.1 23.2   BUN mg/dL 6* 6* 4*   CREATININE mg/dL 0.40* 0.32* 0.36*   CALCIUM mg/dL 8.5* 8.0* 8.0*   BILIRUBIN mg/dL 0.2 <0.2 <0.2   ALK PHOS U/L 45 38* 39   ALT (SGPT) U/L 17 10 10   AST (SGOT) U/L 17 9 9   GLUCOSE mg/dL 108* 102* 92       Results from last 7 days   Lab Units  07/31/23  0538 07/30/23  0449 07/29/23  0502   MAGNESIUM mg/dL 1.7 1.6 2.1   PHOSPHORUS mg/dL 3.3 3.5 3.0   HEMOGLOBIN g/dL 11.1* 10.3* 10.3*   HEMATOCRIT % 32.7* 30.2* 30.5*       COVID19   Date Value Ref Range Status   07/27/2023 Not Detected Not Detected - Ref. Range Final     No results found for: HGBA1C      Pertinent Medications Reviewed.     Current Nutrition Orders & Evaluation of Intake       Oral Nutrition     Current PO Diet Diet: Regular/House Diet; Texture: Regular Texture (IDDSI 7); Fluid Consistency: Thin (IDDSI 0)   Supplement Orders Placed This Encounter      Dietary Nutrition Supplements Boost VHC; Strawberry       Malnutrition Severity Assessment      Patient meets criteria for : Severe Malnutrition    Malnutrition Severity Assessment      Patient meets criteria for : Severe Malnutrition              Nutrition Diagnosis         Nutrition Dx Problem 1 Severe malnutrition related to decreased ability to consume sufficient energy as evidenced by decreased appetite., unintended wt change., body composition changes., patient report., and report of minimal PO intake.     Nutrition Intervention         Boost VHC strawberry TID (1590 kcal, 66 g pro)     Medical Nutrition Therapy/Nutrition Education          Learner     Readiness Patient  Education not indicated at this time     Method     Response N/A  N/A     Monitor/Evaluation        Monitor Per protocol, PO intake, Supplement intake, Pertinent labs, Weight, POC/GOC     Nutrition Discharge Plan         Continue high calorie oral nutrition supplement 3x/day     Electronically signed by:  Jessi Turk RD  07/31/23 10:27 EDT

## 2023-07-31 NOTE — PLAN OF CARE
Goal Outcome Evaluation:      Pt BP soft x1 qshift MD aware, bolus given, see mar. NPO at midnight tonight in preparation for bronch tomorrow.

## 2023-08-01 ENCOUNTER — ANESTHESIA (OUTPATIENT)
Dept: GASTROENTEROLOGY | Facility: HOSPITAL | Age: 63
DRG: 871 | End: 2023-08-01
Payer: MEDICARE

## 2023-08-01 ENCOUNTER — ANESTHESIA EVENT (OUTPATIENT)
Dept: GASTROENTEROLOGY | Facility: HOSPITAL | Age: 63
DRG: 871 | End: 2023-08-01
Payer: MEDICARE

## 2023-08-01 LAB
ALBUMIN SERPL-MCNC: 2.4 G/DL (ref 3.5–5.2)
ALP SERPL-CCNC: 43 U/L (ref 39–117)
ALT SERPL W P-5'-P-CCNC: 20 U/L (ref 1–33)
ANION GAP SERPL CALCULATED.3IONS-SCNC: 9.8 MMOL/L (ref 5–15)
AST SERPL-CCNC: 17 U/L (ref 1–32)
BACTERIA SPEC AEROBE CULT: NORMAL
BACTERIA SPEC AEROBE CULT: NORMAL
BASOPHILS # BLD AUTO: 0.07 10*3/MM3 (ref 0–0.2)
BASOPHILS NFR BLD AUTO: 0.6 % (ref 0–1.5)
BH CV UPPER VENOUS LEFT INTERNAL JUGULAR AUGMENT: NORMAL
BH CV UPPER VENOUS LEFT INTERNAL JUGULAR COMPRESS: NORMAL
BH CV UPPER VENOUS LEFT INTERNAL JUGULAR PHASIC: NORMAL
BH CV UPPER VENOUS LEFT INTERNAL JUGULAR SPONT: NORMAL
BH CV UPPER VENOUS LEFT SUBCLAVIAN AUGMENT: NORMAL
BH CV UPPER VENOUS LEFT SUBCLAVIAN COMPRESS: NORMAL
BH CV UPPER VENOUS LEFT SUBCLAVIAN PHASIC: NORMAL
BH CV UPPER VENOUS LEFT SUBCLAVIAN SPONT: NORMAL
BH CV UPPER VENOUS RIGHT AXILLARY AUGMENT: NORMAL
BH CV UPPER VENOUS RIGHT AXILLARY COMPRESS: NORMAL
BH CV UPPER VENOUS RIGHT AXILLARY PHASIC: NORMAL
BH CV UPPER VENOUS RIGHT AXILLARY SPONT: NORMAL
BH CV UPPER VENOUS RIGHT BASILIC FOREARM COMPRESS: NORMAL
BH CV UPPER VENOUS RIGHT BASILIC UPPER COMPRESS: NORMAL
BH CV UPPER VENOUS RIGHT BRACHIAL AUGMENT: NORMAL
BH CV UPPER VENOUS RIGHT BRACHIAL COMPRESS: NORMAL
BH CV UPPER VENOUS RIGHT BRACHIAL SPONT: NORMAL
BH CV UPPER VENOUS RIGHT CEPHALIC FOREARM COMPRESS: NORMAL
BH CV UPPER VENOUS RIGHT CEPHALIC UPPER COMPRESS: NORMAL
BH CV UPPER VENOUS RIGHT INTERNAL JUGULAR COLOR: 1
BH CV UPPER VENOUS RIGHT INTERNAL JUGULAR COMPRESS: NORMAL
BH CV UPPER VENOUS RIGHT RADIAL AUGMENT: NORMAL
BH CV UPPER VENOUS RIGHT RADIAL COMPRESS: NORMAL
BH CV UPPER VENOUS RIGHT RADIAL SPONT: NORMAL
BH CV UPPER VENOUS RIGHT SUBCLAVIAN AUGMENT: NORMAL
BH CV UPPER VENOUS RIGHT SUBCLAVIAN COMPRESS: NORMAL
BH CV UPPER VENOUS RIGHT SUBCLAVIAN PHASIC: NORMAL
BH CV UPPER VENOUS RIGHT SUBCLAVIAN SPONT: NORMAL
BH CV UPPER VENOUS RIGHT ULNAR AUGMENT: NORMAL
BH CV UPPER VENOUS RIGHT ULNAR COMPRESS: NORMAL
BH CV UPPER VENOUS RIGHT ULNAR SPONT: NORMAL
BILIRUB CONJ SERPL-MCNC: <0.2 MG/DL (ref 0–0.3)
BILIRUB INDIRECT SERPL-MCNC: ABNORMAL MG/DL
BILIRUB SERPL-MCNC: 0.2 MG/DL (ref 0–1.2)
BUN SERPL-MCNC: 5 MG/DL (ref 8–23)
BUN/CREAT SERPL: 14.3 (ref 7–25)
CALCIUM SPEC-SCNC: 8.5 MG/DL (ref 8.6–10.5)
CHLORIDE SERPL-SCNC: 97 MMOL/L (ref 98–107)
CO2 SERPL-SCNC: 26.2 MMOL/L (ref 22–29)
CREAT SERPL-MCNC: 0.35 MG/DL (ref 0.57–1)
DEPRECATED RDW RBC AUTO: 46.7 FL (ref 37–54)
EGFRCR SERPLBLD CKD-EPI 2021: 116.5 ML/MIN/1.73
EOSINOPHIL # BLD AUTO: 0.49 10*3/MM3 (ref 0–0.4)
EOSINOPHIL NFR BLD AUTO: 4 % (ref 0.3–6.2)
ERYTHROCYTE [DISTWIDTH] IN BLOOD BY AUTOMATED COUNT: 13.8 % (ref 12.3–15.4)
GLUCOSE SERPL-MCNC: 110 MG/DL (ref 65–99)
HCT VFR BLD AUTO: 33.5 % (ref 34–46.6)
HGB BLD-MCNC: 11.1 G/DL (ref 12–15.9)
IMM GRANULOCYTES # BLD AUTO: 0.08 10*3/MM3 (ref 0–0.05)
IMM GRANULOCYTES NFR BLD AUTO: 0.7 % (ref 0–0.5)
LYMPHOCYTES # BLD AUTO: 0.9 10*3/MM3 (ref 0.7–3.1)
LYMPHOCYTES NFR BLD AUTO: 7.4 % (ref 19.6–45.3)
MAGNESIUM SERPL-MCNC: 1.7 MG/DL (ref 1.6–2.4)
MCH RBC QN AUTO: 30.5 PG (ref 26.6–33)
MCHC RBC AUTO-ENTMCNC: 33.1 G/DL (ref 31.5–35.7)
MCV RBC AUTO: 92 FL (ref 79–97)
MONOCYTES # BLD AUTO: 1.44 10*3/MM3 (ref 0.1–0.9)
MONOCYTES NFR BLD AUTO: 11.8 % (ref 5–12)
NEUTROPHILS NFR BLD AUTO: 75.5 % (ref 42.7–76)
NEUTROPHILS NFR BLD AUTO: 9.21 10*3/MM3 (ref 1.7–7)
NRBC BLD AUTO-RTO: 0 /100 WBC (ref 0–0.2)
PHOSPHATE SERPL-MCNC: 3.8 MG/DL (ref 2.5–4.5)
PLATELET # BLD AUTO: 387 10*3/MM3 (ref 140–450)
PMV BLD AUTO: 8.4 FL (ref 6–12)
POTASSIUM SERPL-SCNC: 3.8 MMOL/L (ref 3.5–5.2)
PROT SERPL-MCNC: 5.4 G/DL (ref 6–8.5)
RBC # BLD AUTO: 3.64 10*6/MM3 (ref 3.77–5.28)
SODIUM SERPL-SCNC: 133 MMOL/L (ref 136–145)
WBC NRBC COR # BLD: 12.19 10*3/MM3 (ref 3.4–10.8)

## 2023-08-01 PROCEDURE — 99233 SBSQ HOSP IP/OBS HIGH 50: CPT | Performed by: INTERNAL MEDICINE

## 2023-08-01 PROCEDURE — 87205 SMEAR GRAM STAIN: CPT | Performed by: INTERNAL MEDICINE

## 2023-08-01 PROCEDURE — 0BDF8ZX EXTRACTION OF RIGHT LOWER LUNG LOBE, VIA NATURAL OR ARTIFICIAL OPENING ENDOSCOPIC, DIAGNOSTIC: ICD-10-PCS | Performed by: INTERNAL MEDICINE

## 2023-08-01 PROCEDURE — 31652 BRONCH EBUS SAMPLNG 1/2 NODE: CPT | Performed by: INTERNAL MEDICINE

## 2023-08-01 PROCEDURE — 88342 IMHCHEM/IMCYTCHM 1ST ANTB: CPT | Performed by: INTERNAL MEDICINE

## 2023-08-01 PROCEDURE — 80048 BASIC METABOLIC PNL TOTAL CA: CPT | Performed by: FAMILY MEDICINE

## 2023-08-01 PROCEDURE — 25010000002 PROPOFOL 10 MG/ML EMULSION: Performed by: NURSE ANESTHETIST, CERTIFIED REGISTERED

## 2023-08-01 PROCEDURE — 88104 CYTOPATH FL NONGYN SMEARS: CPT | Performed by: INTERNAL MEDICINE

## 2023-08-01 PROCEDURE — 87102 FUNGUS ISOLATION CULTURE: CPT | Performed by: INTERNAL MEDICINE

## 2023-08-01 PROCEDURE — 85025 COMPLETE CBC W/AUTO DIFF WBC: CPT | Performed by: FAMILY MEDICINE

## 2023-08-01 PROCEDURE — 84100 ASSAY OF PHOSPHORUS: CPT | Performed by: FAMILY MEDICINE

## 2023-08-01 PROCEDURE — 0B968ZX DRAINAGE OF RIGHT LOWER LOBE BRONCHUS, VIA NATURAL OR ARTIFICIAL OPENING ENDOSCOPIC, DIAGNOSTIC: ICD-10-PCS | Performed by: INTERNAL MEDICINE

## 2023-08-01 PROCEDURE — 83735 ASSAY OF MAGNESIUM: CPT | Performed by: FAMILY MEDICINE

## 2023-08-01 PROCEDURE — 31624 DX BRONCHOSCOPE/LAVAGE: CPT | Performed by: INTERNAL MEDICINE

## 2023-08-01 PROCEDURE — 94664 DEMO&/EVAL PT USE INHALER: CPT

## 2023-08-01 PROCEDURE — 31623 DX BRONCHOSCOPE/BRUSH: CPT | Performed by: INTERNAL MEDICINE

## 2023-08-01 PROCEDURE — 97161 PT EVAL LOW COMPLEX 20 MIN: CPT

## 2023-08-01 PROCEDURE — 87116 MYCOBACTERIA CULTURE: CPT | Performed by: INTERNAL MEDICINE

## 2023-08-01 PROCEDURE — 0BD68ZX EXTRACTION OF RIGHT LOWER LOBE BRONCHUS, VIA NATURAL OR ARTIFICIAL OPENING ENDOSCOPIC, DIAGNOSTIC: ICD-10-PCS | Performed by: INTERNAL MEDICINE

## 2023-08-01 PROCEDURE — C1726 CATH, BAL DIL, NON-VASCULAR: HCPCS | Performed by: INTERNAL MEDICINE

## 2023-08-01 PROCEDURE — 87071 CULTURE AEROBIC QUANT OTHER: CPT | Performed by: INTERNAL MEDICINE

## 2023-08-01 PROCEDURE — 88305 TISSUE EXAM BY PATHOLOGIST: CPT | Performed by: INTERNAL MEDICINE

## 2023-08-01 PROCEDURE — 94799 UNLISTED PULMONARY SVC/PX: CPT

## 2023-08-01 PROCEDURE — 88108 CYTOPATH CONCENTRATE TECH: CPT | Performed by: INTERNAL MEDICINE

## 2023-08-01 PROCEDURE — 88341 IMHCHEM/IMCYTCHM EA ADD ANTB: CPT | Performed by: INTERNAL MEDICINE

## 2023-08-01 PROCEDURE — 0BB68ZX EXCISION OF RIGHT LOWER LOBE BRONCHUS, VIA NATURAL OR ARTIFICIAL OPENING ENDOSCOPIC, DIAGNOSTIC: ICD-10-PCS | Performed by: INTERNAL MEDICINE

## 2023-08-01 PROCEDURE — 80076 HEPATIC FUNCTION PANEL: CPT | Performed by: FAMILY MEDICINE

## 2023-08-01 PROCEDURE — 88173 CYTOPATH EVAL FNA REPORT: CPT | Performed by: INTERNAL MEDICINE

## 2023-08-01 PROCEDURE — 87206 SMEAR FLUORESCENT/ACID STAI: CPT | Performed by: INTERNAL MEDICINE

## 2023-08-01 PROCEDURE — 31625 BRONCHOSCOPY W/BIOPSY(S): CPT | Performed by: INTERNAL MEDICINE

## 2023-08-01 RX ORDER — LIDOCAINE HYDROCHLORIDE 40 MG/ML
INJECTION, SOLUTION RETROBULBAR; TOPICAL AS NEEDED
Status: DISCONTINUED | OUTPATIENT
Start: 2023-08-01 | End: 2023-08-01 | Stop reason: HOSPADM

## 2023-08-01 RX ORDER — SODIUM CHLORIDE, SODIUM LACTATE, POTASSIUM CHLORIDE, CALCIUM CHLORIDE 600; 310; 30; 20 MG/100ML; MG/100ML; MG/100ML; MG/100ML
30 INJECTION, SOLUTION INTRAVENOUS CONTINUOUS
Status: DISCONTINUED | OUTPATIENT
Start: 2023-08-01 | End: 2023-08-04 | Stop reason: HOSPADM

## 2023-08-01 RX ORDER — MAGNESIUM HYDROXIDE 1200 MG/15ML
LIQUID ORAL AS NEEDED
Status: DISCONTINUED | OUTPATIENT
Start: 2023-08-01 | End: 2023-08-01 | Stop reason: HOSPADM

## 2023-08-01 RX ORDER — PHENYLEPHRINE HCL IN 0.9% NACL 1 MG/10 ML
SYRINGE (ML) INTRAVENOUS AS NEEDED
Status: DISCONTINUED | OUTPATIENT
Start: 2023-08-01 | End: 2023-08-01 | Stop reason: SURG

## 2023-08-01 RX ORDER — LIDOCAINE HYDROCHLORIDE 20 MG/ML
INJECTION, SOLUTION EPIDURAL; INFILTRATION; INTRACAUDAL; PERINEURAL AS NEEDED
Status: DISCONTINUED | OUTPATIENT
Start: 2023-08-01 | End: 2023-08-01 | Stop reason: SURG

## 2023-08-01 RX ORDER — IPRATROPIUM BROMIDE AND ALBUTEROL SULFATE 2.5; .5 MG/3ML; MG/3ML
3 SOLUTION RESPIRATORY (INHALATION) EVERY 4 HOURS PRN
Status: DISCONTINUED | OUTPATIENT
Start: 2023-08-01 | End: 2023-08-04 | Stop reason: HOSPADM

## 2023-08-01 RX ORDER — PROPOFOL 10 MG/ML
VIAL (ML) INTRAVENOUS AS NEEDED
Status: DISCONTINUED | OUTPATIENT
Start: 2023-08-01 | End: 2023-08-01 | Stop reason: SURG

## 2023-08-01 RX ORDER — DRONABINOL 2.5 MG/1
5 CAPSULE ORAL 2 TIMES DAILY
Status: DISCONTINUED | OUTPATIENT
Start: 2023-08-01 | End: 2023-08-01

## 2023-08-01 RX ADMIN — Medication 200 MCG: at 12:29

## 2023-08-01 RX ADMIN — Medication 500 MG: at 20:45

## 2023-08-01 RX ADMIN — Medication 500 MG: at 08:12

## 2023-08-01 RX ADMIN — IPRATROPIUM BROMIDE AND ALBUTEROL SULFATE 3 ML: 2.5; .5 SOLUTION RESPIRATORY (INHALATION) at 00:00

## 2023-08-01 RX ADMIN — ZINC SULFATE 220 MG (50 MG) CAPSULE 220 MG: CAPSULE at 08:12

## 2023-08-01 RX ADMIN — ARFORMOTEROL TARTRATE 15 MCG: 15 SOLUTION RESPIRATORY (INHALATION) at 06:50

## 2023-08-01 RX ADMIN — Medication 200 MCG: at 12:47

## 2023-08-01 RX ADMIN — HYDROCODONE BITARTRATE AND ACETAMINOPHEN 1 TABLET: 7.5; 325 TABLET ORAL at 20:45

## 2023-08-01 RX ADMIN — Medication 10 ML: at 08:12

## 2023-08-01 RX ADMIN — Medication 100 MCG: at 12:25

## 2023-08-01 RX ADMIN — PROPOFOL 200 MCG/KG/MIN: 10 INJECTION, EMULSION INTRAVENOUS at 12:23

## 2023-08-01 RX ADMIN — LIDOCAINE HYDROCHLORIDE 80 MG: 20 INJECTION, SOLUTION EPIDURAL; INFILTRATION; INTRACAUDAL; PERINEURAL at 12:23

## 2023-08-01 RX ADMIN — PANTOPRAZOLE SODIUM 40 MG: 40 TABLET, DELAYED RELEASE ORAL at 05:41

## 2023-08-01 RX ADMIN — Medication 10 ML: at 20:46

## 2023-08-01 RX ADMIN — SODIUM CHLORIDE, POTASSIUM CHLORIDE, SODIUM LACTATE AND CALCIUM CHLORIDE: 600; 310; 30; 20 INJECTION, SOLUTION INTRAVENOUS at 12:24

## 2023-08-01 RX ADMIN — HYDROCODONE BITARTRATE AND ACETAMINOPHEN 1 TABLET: 7.5; 325 TABLET ORAL at 05:41

## 2023-08-01 RX ADMIN — SODIUM CHLORIDE 1000 ML: 9 INJECTION, SOLUTION INTRAVENOUS at 09:39

## 2023-08-01 RX ADMIN — PROPOFOL 60 MG: 10 INJECTION, EMULSION INTRAVENOUS at 12:23

## 2023-08-01 RX ADMIN — IPRATROPIUM BROMIDE AND ALBUTEROL SULFATE 3 ML: 2.5; .5 SOLUTION RESPIRATORY (INHALATION) at 06:50

## 2023-08-01 RX ADMIN — BUDESONIDE 0.5 MG: 0.5 SUSPENSION RESPIRATORY (INHALATION) at 06:50

## 2023-08-01 NOTE — PLAN OF CARE
Goal Outcome Evaluation:  Plan of Care Reviewed With: patient           Outcome Evaluation: Patient is able to complete all transfers with standby assistance.  She is nonambulatory at baseline.  There is no decline in status at this point.  She does not need inpatient PT services and is okay to discharge home upon discharge from the hospital.      Anticipated Discharge Disposition (PT): home with assist

## 2023-08-01 NOTE — PLAN OF CARE
Goal Outcome Evaluation:      Pt had bronchoscopy today. See results. Returned the floor lethargic but VSS. Will continue with plan of care.

## 2023-08-01 NOTE — PROGRESS NOTES
[facility]   Hospitalist Progress Note  Date: 2023  Patient Name: Theresa M Gabehart  : 1960  MRN: [id number]  Date of admission: 2023      Subjective   Subjective     Chief complaint: Shortness of breath, abdominal pain    Summary:  62-year-old female with history of anemia due to chemotherapy, abdominal aortic aneurysm, Arnold-Chiari syndrome, COPD, dysphagia secondary to esophageal stricture, fibromyalgia, lung cancer, neuropathy, current smoker, chronic opioid user, hospitalized on 2023 worth sepsis, colitis, enteric stool panel is pending, colitis etiology uncertain, work-up pursued, transferred to the ICU on 2023 for concerns of resistant hypertension, with failure to improve with initial volume replacement, status post bedside thoracentesis with bilateral pleural effusions on imaging, hypotension improved, transferred out of ICU 2023.  Echo ordered with pleural effusion, to evaluate for possible link with underlying heart failure.  Has history of right upper extremity DVT, repeat ultrasound ordered with pain in the area, increasing mediastinal adenopathy on imaging, plans to pursue bronchoscopy 2023    Interval follow-up: No acute events overnight.  Very anxious today about bronchoscopy.  Breathing feels stable today.    Objective   Objective     Vitals:   Temp:  [98.2 øF (36.8 øC)-98.8 øF (37.1 øC)] 98.4 øF (36.9 øC)  Heart Rate:  [] 102  Resp:  [18-20] 18  BP: ()/(48-72) 98/71  Physical Exam               Constitutional: Thin, chronically ill-appearing female, awake, alert              HENT: NCAT, mucous membranes moist              Respiratory: Diminished breath sounds throughout              Cardiovascular: RRR, no MRG              Gastrointestinal: Positive bowel sounds, soft, no tenderness on palpation, no distended              Musculoskeletal: No bilateral ankle edema, no clubbing or cyanosis to extremities              Neurologic: Oriented x 3,  strength symmetric in all extremities, Cranial Nerves grossly intact to confrontation, speech clear              Skin: No rashes     Result Review    Result Review:  I have personally reviewed the pertinent results from the past 24 hours to 8/1/2023 12:50 EDT and agree with these findings:  [x]  Laboratory personally reviewed CMP, CBC, magnesium, phosphorus  CBC          7/30/2023    04:49 7/31/2023    05:38 8/1/2023    05:01   CBC   WBC 10.57  12.00  12.19    RBC 3.33  3.57  3.64    Hemoglobin 10.3  11.1  11.1    Hematocrit 30.2  32.7  33.5    MCV 90.7  91.6  92.0    MCH 30.9  31.1  30.5    MCHC 34.1  33.9  33.1    RDW 13.4  13.5  13.8    Platelets 351  376  387      BMP          7/30/2023    04:49 7/31/2023    05:38 8/1/2023    05:01   BMP   BUN 6  6  5    Creatinine 0.32  0.40  0.35    Sodium 131  133  133    Potassium 3.3  4.1  3.8    Chloride 99  99  97    CO2 24.1  26.2  26.2    Calcium 8.0  8.5  8.5    LIVER FUNCTION TESTS:      Lab 08/01/23  0501 07/31/23  0538 07/30/23  0449 07/29/23  0502 07/27/23  1033   TOTAL PROTEIN 5.4* 5.1* 4.8* 4.8* 6.3   ALBUMIN 2.4* 2.4* 2.2* 2.3* 3.0*   GLOBULIN  --   --   --   --  3.3   ALT (SGPT) 20 17 10 10 10   AST (SGOT) 17 17 9 9 10   BILIRUBIN 0.2 0.2 <0.2 <0.2 0.3   BILIRUBIN DIRECT <0.2 <0.2 <0.2 <0.2  --    ALK PHOS 43 45 38* 39 48       [x]  Microbiology   []  Radiology   []  EKG/Telemetry   []  Cardiology/Vascular   []  Pathology  [x]  Old records  []  Other:    Assessment & Plan   Assessment / Plan     Assessment/Plan:  Colitis, infectious versus inflammatory  Right lower lobe consolidation  Bilateral pleural effusions  Worsening mediastinal adenopathy  Pericardial effusion  Severe sepsis secondary to colitis  Severe malnutrition  Adenocarcinoma stage IIIa, status post chemo, XRT  Hyponatremia of clinical significance  Hypokalemia  Current tobacco smoker  AAA  COPD  Fibromyalgia  Chronic opioid use  Fecal occult blood positive likely secondary to colitis    Continue  to monitor in the hospital for management of the above  Pulmonology following, appreciate assistance  Hypotensive this morning, give 1 L bolus and monitor response  Plan to proceed with bronchoscopy today if blood pressures improved  Personally reviewed right upper extremity duplex with revealed chronic right upper extremity DVT in the subclavian as well as acute versus chronic right upper extremity DVT in the internal jugular  Discussed with patient, she does not have any prior evidence of severe bleeding and is willing to go on a blood thinner if indicated  Will plan to start Eliquis tomorrow after bronchoscopy  Continue Zosyn, to complete 5-day  Continue Pulmicort and Brovana scheduled twice daily, DuoNebs as needed  Nicotine patch  Protonix 40 mg daily  Florastor for probiotic  Trend renal function and electrolytes with a.m. BMP, magnesium   Trend Hgb and WBC with a.m. CBC    Discussed case with: Bedside RN, pulmonology    DVT prophylaxis:  Mechanical DVT prophylaxis orders are present.    CODE STATUS:   Level Of Support Discussed With: Patient  Code Status (Patient has no pulse and is not breathing): CPR (Attempt to Resuscitate)  Medical Interventions (Patient has pulse or is breathing): Full Support  Release to patient: Routine Release

## 2023-08-01 NOTE — OP NOTE
Procedure name: Bronchoscopy with endobronchial ultrasound guided transbronchial fine needle aspiration of the right lower lobe lung mass, endobronchial biopsy of the right lower lobe, endobronchial brushing right lower lobe, BAL right lower lobe     Indication: History lung cancer with concern for recurrent     Risks versus benefits of bronchoscopy explained to the patient including death, respiratory failure requiring intubation mechanical ventilation, pneumothorax requiring chest tube placement, bleeding.  Patient voices understanding of the risk of the procedure and wishes to proceed.     Sedation-IV MAC anesthesia per the anesthesia service     Procedure details:  Patient was brought back to the endoscopy suite, was sedated with IV MAC anesthesia, a bite block was placed on oral cavity and endobronchial ultrasound scope was then used to intubate the trachea.  A survey was done of the mediastinal hilar lymph node stations, and then the endobronchial ultrasound scope was used to take in a bronchial ultrasound guided transbronchial needle aspirations of the following right lower lobe lung mass.  Next the endobronchial ultrasound scope was removed.  The therapeutic scope was then inserted into the oral cavity and the biopsy sites were examined.  There was evidence of extensive tumor studding involving the mucosal surface of the airway extending from the right lower lobe all the way up past the orifice of the right middle lobe, at this time numerous endobronchial biopsies were obtained from the right lower lobe bronchus, next numerous endobronchial brushings obtained from the right lower lobe bronchus, next BAL from the right lower lobe bronchus     Postoperative diagnosis: Right lower lobe lung mass     Estimated blood loss: Less than 10 mL    Specimens  Endobronchial ultrasound-guided transbronchial fine-needle aspiration of the right lower lobe lung mass  Endobronchial biopsy from the right lower lobe  bronchus  Endobronchial brushing right lower lobe bronchus  BAL right lower lobe bronchus     Patient tolerated procedure well     No immediate complications        Plan:  Patient is a followup results of Biopsy    Electronically signed by Shaw Bonds DO, 08/01/23, 12:55 PM EDT.

## 2023-08-01 NOTE — PLAN OF CARE
Goal Outcome Evaluation:      Patient is alert and oriented x 4, complaint of headache and generalized pain, medication given as needed, npo midnight for bronchoscopy, consent signed, no acute changes in condition, to continue plan of care.

## 2023-08-01 NOTE — PROGRESS NOTES
Pulmonary / Critical Care Progress Note      Patient Name: Theresa M Gabehart  : 1960  MRN: [id number]  Attending:  Orion Heard MD   Date of admission: 2023    Subjective   Subjective   Follow-up for pleural effusion.     right thoracentesis with drainage of 900 mL.  Cultures negative to date and cytology pending.    No acute events overnight.    Has been n.p.o. for bronchoscopy  Blood pressures have been a little bit soft today  No complaints of chest pain  He is very anxious after being told that the CT scan is concerning for return of her cancer    Review of Systems  Constitutional symptoms: Fatigue, otherwise denied complaints   Ear, nose, throat: Denied complaints  Cardiovascular:  Denied complaints  Respiratory: Dyspnea, cough, otherwise denied complaints  Gastrointestinal: Denied complaints  Musculoskeletal: Weakness, otherwise denied complaints  Neurologic: Denied complaints  Skin: Denied complaints    Objective   Objective     Vitals:   Vital signs for last 24 hours:  Temp:  [98.2 øF (36.8 øC)-99.3 øF (37.4 øC)] 98.2 øF (36.8 øC)  Heart Rate:  [] 105  Resp:  [18-24] 18  BP: (82-96)/(50-71) 82/60    Physical Exam   Vital Signs Reviewed   General: Chronically ill-appearing female, alert, NAD. Lying in bed  HEENT:  PERRL, EOMI.    Neck:  No JVD, no thyromegaly  Lymph: no axillary, cervical, supraclavicular lymphadenopathy noted bilaterally  Chest: Diminished right lung base, fine crackles noted in left lung base, no work of breathing noted on room air  CV: RRR, no M/G/R, pulses 2+  Abd:  Soft, NT, ND, +BS  EXT:  no clubbing, no cyanosis, no edema  Neuro:  A&Ox3, CN grossly intact, no focal deficits.  Skin: No rashes or lesions noted    Result Review    Result Review:  I have personally reviewed the results from the time of this admission to 2023 15:55 EDT and agree with these findings:  [x]  Laboratory  [x]  Microbiology  [x]  Radiology  []  EKG/Telemetry   []   Cardiology/Vascular   []  Pathology  []  Old records  []  Other:  Most notable findings include:     -7/27 pleural fluid cultures negative to date and cytology pending    7/31 CXR - Interval accumulation of large right-sided pleural effusion since prior study, perihilar opacities bilaterally as seen in similar exam        Lab 07/31/23  0538 07/30/23  0449 07/29/23  0502 07/28/23  1306 07/28/23  0301 07/27/23  1455 07/27/23  1033   WBC 12.00* 10.57 10.52  --  11.96*  --  13.80*   HEMOGLOBIN 11.1* 10.3* 10.3*  --  11.1*  --  14.1   HEMATOCRIT 32.7* 30.2* 30.5*  --  33.2*  --  40.2   PLATELETS 376 351 372  --  350  --  422   SODIUM 133* 131* 132*  --  132*  --  132*   POTASSIUM 4.1 3.3* 3.8 4.5 3.5  --  3.1*   CHLORIDE 99 99 100  --  99  --  93*   CO2 26.2 24.1 23.2  --  22.8  --  23.0   BUN 6* 6* 4*  --  6*  --  8   CREATININE 0.40* 0.32* 0.36*  --  0.38*  --  0.45*   GLUCOSE 108* 102* 92  --  111*  --  122*   CALCIUM 8.5* 8.0* 8.0*  --  7.9*  --  8.9   PHOSPHORUS 3.3 3.5 3.0  --  3.0 3.7  --    TOTAL PROTEIN 5.1* 4.8* 4.8*  --   --   --  6.3   ALBUMIN 2.4* 2.2* 2.3*  --   --   --  3.0*   GLOBULIN  --   --   --   --   --   --  3.3     Assessment & Plan   Assessment / Plan     Active Hospital Problems:  Active Hospital Problems    Diagnosis     **Colitis     Severe malnutrition     Pleural effusion      Impression:  Bilateral pleural effusion status post thoracentesis 7/27  Colitis  Septic shock, resolved  Recently diagnosed adenocarcinoma of the lung, stage IIIa status post chemo, XRT  Hyponatremia  Hypokalemia  History of tobacco use     Plan:  CT scan reviewed concerning for recurrent to the patient's lung cancer she does have significant mediastinal adenopathy and large pleural effusion  Cytology still pending from the effusion    We will take patient for bronchoscopy today with biopsy the mediastinal lymph nodes and endobronchial mass to see if we cannot get enough tissue to allow oncology to order genetic testing  if indeed the patient's cancer is back    We will also likely need pleural fluid drained at the cancer cells are present on cytology examination if so we will need to place Pleurx catheter    Risks versus benefits of bronchoscopy explained to the patient including death, respiratory failure requiring intubation mechanical ventilation, pneumothorax requiring chest tube placement, bleeding.  Patient voices understanding of the risks of the procedure and wishes to proceed.    We will discontinue antibiotics at this time      DVT prophylaxis:  Mechanical DVT prophylaxis orders are present.    CODE STATUS:   Level Of Support Discussed With: Patient  Code Status (Patient has no pulse and is not breathing): CPR (Attempt to Resuscitate)  Medical Interventions (Patient has pulse or is breathing): Full Support  Release to patient: Routine Release    Electronically signed by Shaw Bonds DO, 08/01/23, 11:42 AM EDT.

## 2023-08-01 NOTE — THERAPY EVALUATION
Acute Care - Physical Therapy Initial Evaluation  KELVIN Medina     Patient Name: Theresa M Gabehart  : 1960  MRN: [id number]  Today's Date: 2023      Visit Dx:     ICD-10-CM ICD-9-CM   1. Malignant neoplasm of lower lobe of right lung  C34.31 162.5   2. Colitis  K52.9 558.9   3. Dysphagia, unspecified type  R13.10 787.20   4. Pleural effusion  J90 511.9   5. Lung mass  R91.8 786.6   6. Difficulty walking  R26.2 719.7     Patient Active Problem List   Diagnosis    Age related osteoporosis    Annular tear of lumbar disc    Anxiety disorder    Arthritis    Atopic neurodermatitis    Carpal tunnel syndrome of right wrist    Chronic idiopathic constipation    Chronic obstructive pulmonary disease    Compression of brain    Fibromyalgia    Herniated lumbar intervertebral disc    Rotator cuff arthropathy of right shoulder    Seasonal allergies    Simple chronic bronchitis    Solitary pulmonary nodule    Tobacco user    Von Willebrand disease    Lung mass    Centrilobular emphysema    Malignant neoplasm of overlapping sites of right lung    Weight loss    Encounter for adjustment or management of vascular access device    Anemia due to chemotherapy    Hypokalemia    Neuropathy involving both lower extremities    Encounter for long-term (current) use of medications    Acute colitis    Atypical chest pain    Back pain    Compression fracture    Constipation    Fall    Lower abdominal pain    Tick bites    Pain in anterior right upper extremity    Chiari I malformation    Smokes with greater than 40 pack year history    Pleural effusion    Pericardial effusion    Leukocytosis    Colitis    Severe malnutrition     Past Medical History:   Diagnosis Date    Allergic rhinitis     Anemia due to chemotherapy 2022    Aortic aneurysm     FOLLOWED BY GARCIA    Arnold-Chiari syndrome     Bleeding disorder     FREE BLUE DUGGAN TEST NEGATIVE    Carpal tunnel syndrome     RIGHT    Colon polyps     Colon polyps      COPD (chronic obstructive pulmonary disease)     NEBS/INHALER    Dupuytren's contracture syndrome     Dysphagia     ESOPHAGEAL STRICTURE    Emphysema lung     Fibromyalgia     Hiatal hernia     History of fractured vertebra     18 fractured/HEALING,     History of pelvic fracture      HEALED    Hypokalemia 2022    Lung cancer     RIGHT    Malignant neoplasm of middle lobe of right lung 2022    Malignant neoplasm of overlapping sites of right lung 2022    Neuropathy     Osteoporosis     Vertigo      Past Surgical History:   Procedure Laterality Date    BRONCHOSCOPY N/A 2022    Procedure: BRONCHOSCOPY WITH ENDOBRONCHIAL ULTRASOUND AND FINE NEEDLE ASPIRATIONS;  Surgeon: Shaw Bonds DO;  Location: [facility] ENDOSCOPY;  Service: Pulmonary;  Laterality: N/A;  MEDIASTINAL ADENOPATHY,  LUNG NODULE     SECTION      CHOLECYSTECTOMY      COLONOSCOPY      ENDOSCOPY      HYSTERECTOMY      total    KNEE ARTHROSCOPY      ORTHOPEDIC SURGERY      meniscus tear    TONSILLECTOMY AND ADENOIDECTOMY      UPPER GASTROINTESTINAL ENDOSCOPY      VENOUS ACCESS DEVICE (PORT) INSERTION N/A 2022    Procedure: INSERTION OF PORTACATH;  Surgeon: Ishan Vargas MD;  Location: [facility] OR [facility] [place];  Service: General;  Laterality: N/A;     PT Assessment (last 12 hours)       PT Evaluation and Treatment       Row Name 23 1500          Physical Therapy Time and Intention    Subjective Information no complaints  -DP     Document Type evaluation  -DP     Mode of Treatment individual therapy;physical therapy  -DP     Patient Effort good  -DP       Row Name 23 1500          General Information    Patient Profile Reviewed yes  -DP     Patient Observations alert;cooperative;agree to therapy  -DP     General Observations of Patient Patient is wheelchair-bound at baseline.  She uses a motorized scooter.  Patient reported that she is independent with all ADLs and transfers.  -DP     Prior Level  of Function w/c or scooter;independent:;bed mobility;transfer  -DP     Equipment Currently Used at Home wheelchair, motorized  -DP     Existing Precautions/Restrictions fall  -DP     Barriers to Rehab none identified  -DP       Row Name 08/01/23 1500          Living Environment    Current Living Arrangements home  -DP     Home Accessibility wheelchair accessible  -DP     People in Home significant other  -DP       Row Name 08/01/23 1500          Range of Motion (ROM)    Range of Motion bilateral lower extremities;ROM is WFL  -DP       Row Name 08/01/23 1500          Strength (Manual Muscle Testing)    Strength (Manual Muscle Testing) bilateral lower extremities  4-/5  -DP       Row Name 08/01/23 1500          Bed Mobility    Bed Mobility supine-sit-supine  -DP     Supine-Sit-Supine Ware (Bed Mobility) standby assist  -DP     Assistive Device (Bed Mobility) head of bed elevated  -DP       Row Name 08/01/23 1500          Transfers    Transfers bed-chair transfer  -DP       Row Name 08/01/23 1500          Bed-Chair Transfer    Bed-Chair Ware (Transfers) standby assist  -DP       Row Name 08/01/23 1500          Gait/Stairs (Locomotion)    Comment, (Gait/Stairs) non ambulatory at baseline  -DP       Row Name 08/01/23 1500          Plan of Care Review    Plan of Care Reviewed With patient  -DP     Outcome Evaluation Patient is able to complete all transfers with standby assistance.  She is nonambulatory at baseline.  There is no decline in status at this point.  She does not need inpatient PT services and is okay to discharge home upon discharge from the hospital.  -DP       Row Name 08/01/23 1500          Therapy Assessment/Plan (PT)    Criteria for Skilled Interventions Met (PT) no problems identified which require skilled intervention  -DP     Therapy Frequency (PT) evaluation only  -DP       Row Name 08/01/23 1500          PT Evaluation Complexity    History, PT Evaluation Complexity no personal  factors and/or comorbidities  -DP     Examination of Body Systems (PT Eval Complexity) total of 4 or more elements  -DP     Clinical Presentation (PT Evaluation Complexity) stable  -DP     Clinical Decision Making (PT Evaluation Complexity) low complexity  -DP     Overall Complexity (PT Evaluation Complexity) low complexity  -DP               User Key  (r) = Recorded By, (t) = Taken By, (c) = Cosigned By      Initials Name Provider Type    Fatou Alonso PT Physical Therapist                  PHYSICAL THERAPY GOALS/ PLAN    LTG 1:  Today:  Complete PT evaluation.  Treatment:  None  Time Frame/Durataion: 1 day  Outcome: Goal met      PT Recommendation and Plan  Anticipated Discharge Disposition (PT): home with assist  Therapy Frequency (PT): evaluation only  Plan of Care Reviewed With: patient  Outcome Evaluation: Patient is able to complete all transfers with standby assistance.  She is nonambulatory at baseline.  There is no decline in status at this point.  She does not need inpatient PT services and is okay to discharge home upon discharge from the hospital.   Outcome Measures       Row Name 08/01/23 1500             How much help from another person do you currently need...    Turning from your back to your side while in flat bed without using bedrails? 4  -DP      Moving from lying on back to sitting on the side of a flat bed without bedrails? 4  -DP      Moving to and from a bed to a chair (including a wheelchair)? 4  -DP      Standing up from a chair using your arms (e.g., wheelchair, bedside chair)? 4  -DP      Climbing 3-5 steps with a railing? 1  -DP      To walk in hospital room? 1  -DP      AM-PAC 6 Clicks Score (PT) 18  -DP         Functional Assessment    Outcome Measure Options AM-PAC 6 Clicks Basic Mobility (PT)  -DP                User Key  (r) = Recorded By, (t) = Taken By, (c) = Cosigned By      Initials Name Provider Type    Fatou Alonso PT Physical Therapist                     Time  Calculation:    PT Charges       Row Name 08/01/23 1517             Time Calculation    PT Received On 08/01/23  -DP         Untimed Charges    PT Eval/Re-eval Minutes 40  -DP         Total Minutes    Untimed Charges Total Minutes 40  -DP       Total Minutes 40  -DP                User Key  (r) = Recorded By, (t) = Taken By, (c) = Cosigned By      Initials Name Provider Type    Fatou Alonso, PT Physical Therapist                      PT G-Codes  Outcome Measure Options: AM-PAC 6 Clicks Basic Mobility (PT)  AM-PAC 6 Clicks Score (PT): 18    Fatou Thorne, PT  8/1/2023

## 2023-08-02 LAB
ANION GAP SERPL CALCULATED.3IONS-SCNC: 9.7 MMOL/L (ref 5–15)
BACTERIA SPEC ANAEROBE CULT: NORMAL
BASOPHILS # BLD AUTO: 0.07 10*3/MM3 (ref 0–0.2)
BASOPHILS NFR BLD AUTO: 0.5 % (ref 0–1.5)
BUN SERPL-MCNC: 5 MG/DL (ref 8–23)
BUN/CREAT SERPL: 16.1 (ref 7–25)
CALCIUM SPEC-SCNC: 8.1 MG/DL (ref 8.6–10.5)
CHLORIDE SERPL-SCNC: 97 MMOL/L (ref 98–107)
CO2 SERPL-SCNC: 26.3 MMOL/L (ref 22–29)
CREAT SERPL-MCNC: 0.31 MG/DL (ref 0.57–1)
CYTO UR: NORMAL
DEPRECATED RDW RBC AUTO: 45.5 FL (ref 37–54)
EGFRCR SERPLBLD CKD-EPI 2021: 119.9 ML/MIN/1.73
EOSINOPHIL # BLD AUTO: 0.54 10*3/MM3 (ref 0–0.4)
EOSINOPHIL NFR BLD AUTO: 4.1 % (ref 0.3–6.2)
ERYTHROCYTE [DISTWIDTH] IN BLOOD BY AUTOMATED COUNT: 13.6 % (ref 12.3–15.4)
GLUCOSE SERPL-MCNC: 96 MG/DL (ref 65–99)
HCT VFR BLD AUTO: 32.2 % (ref 34–46.6)
HGB BLD-MCNC: 10.8 G/DL (ref 12–15.9)
IMM GRANULOCYTES # BLD AUTO: 0.06 10*3/MM3 (ref 0–0.05)
IMM GRANULOCYTES NFR BLD AUTO: 0.5 % (ref 0–0.5)
LAB AP CASE REPORT: NORMAL
LAB AP CLINICAL INFORMATION: NORMAL
LAB AP SPECIAL STAINS: NORMAL
LYMPHOCYTES # BLD AUTO: 0.83 10*3/MM3 (ref 0.7–3.1)
LYMPHOCYTES NFR BLD AUTO: 6.4 % (ref 19.6–45.3)
MAGNESIUM SERPL-MCNC: 1.7 MG/DL (ref 1.6–2.4)
MCH RBC QN AUTO: 30.5 PG (ref 26.6–33)
MCHC RBC AUTO-ENTMCNC: 33.5 G/DL (ref 31.5–35.7)
MCV RBC AUTO: 91 FL (ref 79–97)
MONOCYTES # BLD AUTO: 1.51 10*3/MM3 (ref 0.1–0.9)
MONOCYTES NFR BLD AUTO: 11.6 % (ref 5–12)
NEUTROPHILS NFR BLD AUTO: 10.03 10*3/MM3 (ref 1.7–7)
NEUTROPHILS NFR BLD AUTO: 76.9 % (ref 42.7–76)
NRBC BLD AUTO-RTO: 0 /100 WBC (ref 0–0.2)
PATH REPORT.FINAL DX SPEC: NORMAL
PATH REPORT.GROSS SPEC: NORMAL
PHOSPHATE SERPL-MCNC: 3.3 MG/DL (ref 2.5–4.5)
PLATELET # BLD AUTO: 390 10*3/MM3 (ref 140–450)
PMV BLD AUTO: 8.5 FL (ref 6–12)
POTASSIUM SERPL-SCNC: 3.7 MMOL/L (ref 3.5–5.2)
QT INTERVAL: 221 MS
RBC # BLD AUTO: 3.54 10*6/MM3 (ref 3.77–5.28)
SODIUM SERPL-SCNC: 133 MMOL/L (ref 136–145)
WBC NRBC COR # BLD: 13.04 10*3/MM3 (ref 3.4–10.8)

## 2023-08-02 PROCEDURE — 93010 ELECTROCARDIOGRAM REPORT: CPT | Performed by: INTERNAL MEDICINE

## 2023-08-02 PROCEDURE — 85025 COMPLETE CBC W/AUTO DIFF WBC: CPT | Performed by: INTERNAL MEDICINE

## 2023-08-02 PROCEDURE — 84100 ASSAY OF PHOSPHORUS: CPT | Performed by: INTERNAL MEDICINE

## 2023-08-02 PROCEDURE — 93005 ELECTROCARDIOGRAM TRACING: CPT | Performed by: INTERNAL MEDICINE

## 2023-08-02 PROCEDURE — 80048 BASIC METABOLIC PNL TOTAL CA: CPT | Performed by: INTERNAL MEDICINE

## 2023-08-02 PROCEDURE — 83735 ASSAY OF MAGNESIUM: CPT | Performed by: INTERNAL MEDICINE

## 2023-08-02 PROCEDURE — 99233 SBSQ HOSP IP/OBS HIGH 50: CPT | Performed by: INTERNAL MEDICINE

## 2023-08-02 RX ORDER — ALPRAZOLAM 0.25 MG/1
0.5 TABLET ORAL 3 TIMES DAILY PRN
Status: DISCONTINUED | OUTPATIENT
Start: 2023-08-02 | End: 2023-08-04 | Stop reason: HOSPADM

## 2023-08-02 RX ADMIN — HYDROCODONE BITARTRATE AND ACETAMINOPHEN 1 TABLET: 7.5; 325 TABLET ORAL at 02:55

## 2023-08-02 RX ADMIN — HYDROCODONE BITARTRATE AND ACETAMINOPHEN 1 TABLET: 7.5; 325 TABLET ORAL at 22:44

## 2023-08-02 RX ADMIN — Medication 10 ML: at 21:22

## 2023-08-02 RX ADMIN — HYDROCODONE BITARTRATE AND ACETAMINOPHEN 1 TABLET: 7.5; 325 TABLET ORAL at 09:39

## 2023-08-02 RX ADMIN — PANTOPRAZOLE SODIUM 40 MG: 40 TABLET, DELAYED RELEASE ORAL at 05:21

## 2023-08-02 RX ADMIN — Medication 10 ML: at 09:40

## 2023-08-02 RX ADMIN — Medication 500 MG: at 09:39

## 2023-08-02 RX ADMIN — ZINC SULFATE 220 MG (50 MG) CAPSULE 220 MG: CAPSULE at 09:39

## 2023-08-02 RX ADMIN — HYDROCODONE BITARTRATE AND ACETAMINOPHEN 1 TABLET: 7.5; 325 TABLET ORAL at 16:37

## 2023-08-02 NOTE — PROGRESS NOTES
[facility]   Hospitalist Progress Note  Date: 2023  Patient Name: Theresa M Gabehart  : 1960  MRN: [id number]  Date of admission: 2023      Subjective   Subjective     Chief complaint: Shortness of breath, abdominal pain    Summary:  62-year-old female with history of anemia due to chemotherapy, abdominal aortic aneurysm, Arnold-Chiari syndrome, COPD, dysphagia secondary to esophageal stricture, fibromyalgia, lung cancer, neuropathy, current smoker, chronic opioid user, hospitalized on 2023 worth sepsis, colitis, enteric stool panel is pending, colitis etiology uncertain, work-up pursued, transferred to the ICU on 2023 for concerns of resistant hypertension, with failure to improve with initial volume replacement, status post bedside thoracentesis with bilateral pleural effusions on imaging, hypotension improved, transferred out of ICU 2023.  Echo ordered with pleural effusion, to evaluate for possible link with underlying heart failure.  Has history of right upper extremity DVT, repeat ultrasound ordered with pain in the area, increasing mediastinal adenopathy on imaging, plans to pursue bronchoscopy 2023    Interval follow-up: No acute events overnight.  Tolerated bronchoscopy yesterday.  Denies any significant shortness of breath.  Feels like her breathing is little bit better today after bronchoscopy.  I explained to her that her pleural fluid is significant for metastatic adenocarcinoma and she will require further treatment for her cancer.  She does not want any further chemo or radiation but might be amenable to immunotherapy.  She wants to follow-up with Dr. Jonas in oncology to discuss her options.    Objective   Objective     Vitals:   Temp:  [98.1 øF (36.7 øC)-99.1 øF (37.3 øC)] 98.1 øF (36.7 øC)  Heart Rate:  [100-127] 112  Resp:  [20] 20  BP: ()/(58-98) 101/79  Physical Exam               Constitutional: Thin, chronically ill-appearing female, awake               HENT: NCAT, mucous membranes moist              Respiratory: Diminished breath sounds in bilateral bases, improved aeration              Cardiovascular: RRR, no MRG              Gastrointestinal: Positive bowel sounds, soft, no tenderness on palpation              Musculoskeletal: No bilateral ankle edema, no clubbing or cyanosis to extremities              Neurologic: Oriented x 3, strength symmetric in all extremities, Cranial Nerves grossly intact to confrontation, speech clear              Skin: No rashes     Result Review    Result Review:  I have personally reviewed the pertinent results from the past 24 hours to 8/2/2023 13:20 EDT and agree with these findings:  [x]  Laboratory personally reviewed CMP, CBC, magnesium, phosphorus  CBC          7/31/2023    05:38 8/1/2023    05:01 8/2/2023    04:33   CBC   WBC 12.00  12.19  13.04    RBC 3.57  3.64  3.54    Hemoglobin 11.1  11.1  10.8    Hematocrit 32.7  33.5  32.2    MCV 91.6  92.0  91.0    MCH 31.1  30.5  30.5    MCHC 33.9  33.1  33.5    RDW 13.5  13.8  13.6    Platelets 376  387  390      BMP          7/31/2023    05:38 8/1/2023    05:01 8/2/2023    04:33   BMP   BUN 6  5  5    Creatinine 0.40  0.35  0.31    Sodium 133  133  133    Potassium 4.1  3.8  3.7    Chloride 99  97  97    CO2 26.2  26.2  26.3    Calcium 8.5  8.5  8.1    LIVER FUNCTION TESTS:      Lab 08/01/23  0501 07/31/23  0538 07/30/23  0449 07/29/23  0502 07/27/23  1033   TOTAL PROTEIN 5.4* 5.1* 4.8* 4.8* 6.3   ALBUMIN 2.4* 2.4* 2.2* 2.3* 3.0*   GLOBULIN  --   --   --   --  3.3   ALT (SGPT) 20 17 10 10 10   AST (SGOT) 17 17 9 9 10   BILIRUBIN 0.2 0.2 <0.2 <0.2 0.3   BILIRUBIN DIRECT <0.2 <0.2 <0.2 <0.2  --    ALK PHOS 43 45 38* 39 48       [x]  Microbiology   []  Radiology   []  EKG/Telemetry   []  Cardiology/Vascular   []  Pathology  [x]  Old records  []  Other:    Assessment & Plan   Assessment / Plan     Assessment/Plan:  Colitis, infectious versus inflammatory  Recurrent metastatic lung  adenocarcinoma  Concern for right lower lobe community-acquired pneumonia/postobstructive pneumonia due to unknown bacterial source, presumed gram-negative  Bilateral pleural effusions  Worsening mediastinal adenopathy  Pericardial effusion  Severe sepsis secondary to colitis  Severe malnutrition  Adenocarcinoma stage IIIa, status post chemo, XRT  Hyponatremia of clinical significance  Hypokalemia  Current tobacco smoker  AAA  COPD  Fibromyalgia  Chronic opioid use  Fecal occult blood positive likely secondary to colitis    Continue to monitor in the hospital for management of the above  Pulmonology following, appreciate assistance  Bronch with BAL and biopsy performed on 8/1, cultures and pathology pending  Pleural fluid positive for malignant cells, metastatic adenocarcinoma.  Discussed with patient.  She will need outpatient PET scan and follow-up with Dr. Jonas for treatment options  Pulmonology considering a Pleurx catheter  Hold Eliquis until cleared from a pulmonology perspective  Completed 5 days of Zosyn  Discontinue probiotic  Continue Pulmicort and Brovana scheduled twice daily, DuoNebs as needed  Nicotine patch daily  Protonix 40 mg daily  Trend renal function and electrolytes with a.m. BMP, magnesium   Trend Hgb and WBC with a.m. CBC    Discussed case with: Bedside RN, pulmonology    DVT prophylaxis:  Mechanical DVT prophylaxis orders are present.    CODE STATUS:   Level Of Support Discussed With: Patient  Code Status (Patient has no pulse and is not breathing): CPR (Attempt to Resuscitate)  Medical Interventions (Patient has pulse or is breathing): Full Support  Release to patient: Routine Release

## 2023-08-02 NOTE — PLAN OF CARE
Goal Outcome Evaluation:     Patient is alert and oriented x 4, complaint of pain, medication given as needed, no acute changes in condition, to continue plan of care

## 2023-08-02 NOTE — PROGRESS NOTES
Pulmonary / Critical Care Progress Note      Patient Name: Theresa M Gabehart  : 1960  MRN: [id number]  Attending:  Orion Heard MD   Date of admission: 2023    Subjective   Subjective   Follow-up for pleural effusion.     right thoracentesis with drainage of 900 mL.  Cultures negative to date and cytology positive for metastatic adenocarcinoma     underwent bronchoscopy with EBUS with fine-needle aspiration of right lower lobe lung mass, cultures NGTD, pathology positive for adenocarcinoma, lung primary.    No acute events overnight.    This afternoon,  Lying in bed on room air  Denies dyspnea  Occasional nonproductive cough  No fever or chills  Denies chest pain  Remains weak and fatigued      Review of Systems  Constitutional symptoms: Fatigue, otherwise denied complaints   Ear, nose, throat: Denied complaints  Cardiovascular:  Denied complaints  Respiratory: Dyspnea, cough, otherwise denied complaints  Gastrointestinal: Denied complaints  Musculoskeletal: Weakness, otherwise denied complaints  Neurologic: Denied complaints  Skin: Denied complaints    Objective   Objective     Vitals:   Vital signs for last 24 hours:  Temp:  [98.2 øF (36.8 øC)-99.3 øF (37.4 øC)] 98.2 øF (36.8 øC)  Heart Rate:  [] 105  Resp:  [18-24] 18  BP: (82-96)/(50-71) 82/60    Physical Exam   Vital Signs Reviewed   General: Chronically ill-appearing female, alert, NAD. Lying in bed on room air  HEENT:  PERRL, EOMI.    Neck:  No JVD, no thyromegaly  Lymph: no axillary, cervical, supraclavicular lymphadenopathy noted bilaterally  Chest: Diminished right lung base, fine crackles noted in left lung base, no work of breathing noted  CV: RRR, no M/G/R, pulses 2+  Abd:  Soft, NT, ND, +BS  EXT:  no clubbing, no cyanosis, no edema  Neuro:  A&Ox3, CN grossly intact, no focal deficits.  Skin: No rashes or lesions noted    Result Review    Result Review:  I have personally reviewed the results from the time of this  admission to 7/31/2023 15:55 EDT and agree with these findings:  [x]  Laboratory  [x]  Microbiology  [x]  Radiology  []  EKG/Telemetry   []  Cardiology/Vascular   []  Pathology  []  Old records  []  Other:  Most notable findings include:   8/1 pathology positive for adenocarcinoma, lung primary    7/27 pleural fluid cultures negative to date and positive for metastatic adenocarcinoma    7/31 CXR - Interval accumulation of large right-sided pleural effusion since prior study, perihilar opacities bilaterally as seen in similar exam        Lab 07/31/23  0538 07/30/23  0449 07/29/23  0502 07/28/23  1306 07/28/23  0301 07/27/23  1455 07/27/23  1033   WBC 12.00* 10.57 10.52  --  11.96*  --  13.80*   HEMOGLOBIN 11.1* 10.3* 10.3*  --  11.1*  --  14.1   HEMATOCRIT 32.7* 30.2* 30.5*  --  33.2*  --  40.2   PLATELETS 376 351 372  --  350  --  422   SODIUM 133* 131* 132*  --  132*  --  132*   POTASSIUM 4.1 3.3* 3.8 4.5 3.5  --  3.1*   CHLORIDE 99 99 100  --  99  --  93*   CO2 26.2 24.1 23.2  --  22.8  --  23.0   BUN 6* 6* 4*  --  6*  --  8   CREATININE 0.40* 0.32* 0.36*  --  0.38*  --  0.45*   GLUCOSE 108* 102* 92  --  111*  --  122*   CALCIUM 8.5* 8.0* 8.0*  --  7.9*  --  8.9   PHOSPHORUS 3.3 3.5 3.0  --  3.0 3.7  --    TOTAL PROTEIN 5.1* 4.8* 4.8*  --   --   --  6.3   ALBUMIN 2.4* 2.2* 2.3*  --   --   --  3.0*   GLOBULIN  --   --   --   --   --   --  3.3     Assessment & Plan   Assessment / Plan     Active Hospital Problems:  Active Hospital Problems    Diagnosis     **Colitis     Severe malnutrition     Pleural effusion      Impression:  Bilateral pleural effusion status post thoracentesis 7/27  Colitis  Septic shock, resolved  Recently diagnosed adenocarcinoma of the lung, stage IIIa status post chemo, XRT  Hyponatremia  Hypokalemia  History of tobacco use     Plan:  -On room air.  -Pleural fluid cultures negative to date and cytology positive for adenocarcinoma  -8/1 underwent bronchoscopy with EBUS with fine-needle  aspiration of right lower lobe lung mass, cultures NGTD, pathology positive for adenocarcinoma, lung primary.  -Repeat CXR reveals recurrence of large right-sided pleural effusion  -CT chest moderate right pleural effusion, dense consolidation in right lower lobe with 7 cm right perihilar opacity, increasing mediastinal lymphadenopathy measuring 1.3 cm previously 0.9 cm  -Completed Zosyn x5 days  -Continue Brovana, Pulmicort, and DuoNebs.  -Continue bronchopulmonary hygiene.  -Echocardiogram with normal ventricular systolic function, trace tricuspid regurg.  -Trend renal panel and electrolytes.  -Encourage mobilization.  Out of bed to chair.  -PT/OT on board.  Appreciate assistance.  -Will need to follow-up with oncology and complete outpatient PET scan  -Discussed with patient option for repeat thoracentesis due to recurrence of pleural fluid or Pleurx catheter due to likelihood of recurrences secondary to adenocarcinoma of lung.  Patient continues to deny any dyspnea and defers thoracentesis and Pleurx catheter placement at this time.  Patient may follow-up as outpatient for placement of Pleurx or thoracentesis at later date if symptoms worsen.        DVT prophylaxis:  Mechanical DVT prophylaxis orders are present.    CODE STATUS:   Level Of Support Discussed With: Patient  Code Status (Patient has no pulse and is not breathing): CPR (Attempt to Resuscitate)  Medical Interventions (Patient has pulse or is breathing): Full Support  Release to patient: Routine Release    Electronically signed by BLANCA Orlando, 08/02/23, 4:44 PM EDT.  This visit was performed by BOTH a physician and an APC I have spent greater then 51% of the time caring for this patient. I personally evaluated and examined the patient. I performed all aspects of MDM as documented. , I have reviewed and confirmed the accuracy of the patient's history as documented in this note. and I have reexamined the patient and the results are consistent  with the previously documented exam. I have updated the documentation as necessary.      Electronically signed by Shaw Bonds DO, 08/02/23, 5:43 PM EDT.

## 2023-08-02 NOTE — PLAN OF CARE
Goal Outcome Evaluation:      Patient c/o of generalized pain , prn pain medication given. She was seen by pulmonary care team. She refused to sign consent for placement of PleurX stating that she wanted to wait until Dr. Guadalupe was available to place.

## 2023-08-03 ENCOUNTER — APPOINTMENT (OUTPATIENT)
Dept: GENERAL RADIOLOGY | Facility: HOSPITAL | Age: 63
DRG: 871 | End: 2023-08-03
Payer: MEDICARE

## 2023-08-03 LAB
ANION GAP SERPL CALCULATED.3IONS-SCNC: 9.8 MMOL/L (ref 5–15)
BACTERIA SPEC AEROBE CULT: NORMAL
BASOPHILS # BLD AUTO: 0.05 10*3/MM3 (ref 0–0.2)
BASOPHILS NFR BLD AUTO: 0.4 % (ref 0–1.5)
BUN SERPL-MCNC: 8 MG/DL (ref 8–23)
BUN/CREAT SERPL: 25.8 (ref 7–25)
CALCIUM SPEC-SCNC: 8.2 MG/DL (ref 8.6–10.5)
CHLORIDE SERPL-SCNC: 97 MMOL/L (ref 98–107)
CO2 SERPL-SCNC: 25.2 MMOL/L (ref 22–29)
CREAT SERPL-MCNC: 0.31 MG/DL (ref 0.57–1)
CYTO UR: NORMAL
DEPRECATED RDW RBC AUTO: 43.8 FL (ref 37–54)
EGFRCR SERPLBLD CKD-EPI 2021: 119.9 ML/MIN/1.73
EOSINOPHIL # BLD AUTO: 0.46 10*3/MM3 (ref 0–0.4)
EOSINOPHIL NFR BLD AUTO: 4 % (ref 0.3–6.2)
ERYTHROCYTE [DISTWIDTH] IN BLOOD BY AUTOMATED COUNT: 13.4 % (ref 12.3–15.4)
FUNGUS WND CULT: NORMAL
GLUCOSE SERPL-MCNC: 110 MG/DL (ref 65–99)
GRAM STN SPEC: NORMAL
HCT VFR BLD AUTO: 31.1 % (ref 34–46.6)
HGB BLD-MCNC: 10.6 G/DL (ref 12–15.9)
IMM GRANULOCYTES # BLD AUTO: 0.05 10*3/MM3 (ref 0–0.05)
IMM GRANULOCYTES NFR BLD AUTO: 0.4 % (ref 0–0.5)
LAB AP CASE REPORT: NORMAL
LAB AP CLINICAL INFORMATION: NORMAL
LYMPHOCYTES # BLD AUTO: 0.78 10*3/MM3 (ref 0.7–3.1)
LYMPHOCYTES NFR BLD AUTO: 6.8 % (ref 19.6–45.3)
MAGNESIUM SERPL-MCNC: 1.7 MG/DL (ref 1.6–2.4)
MCH RBC QN AUTO: 30.6 PG (ref 26.6–33)
MCHC RBC AUTO-ENTMCNC: 34.1 G/DL (ref 31.5–35.7)
MCV RBC AUTO: 89.9 FL (ref 79–97)
MONOCYTES # BLD AUTO: 1.37 10*3/MM3 (ref 0.1–0.9)
MONOCYTES NFR BLD AUTO: 11.9 % (ref 5–12)
MYCOBACTERIUM SPEC CULT: NORMAL
NEUTROPHILS NFR BLD AUTO: 76.5 % (ref 42.7–76)
NEUTROPHILS NFR BLD AUTO: 8.82 10*3/MM3 (ref 1.7–7)
NIGHT BLUE STAIN TISS: NORMAL
NRBC BLD AUTO-RTO: 0 /100 WBC (ref 0–0.2)
PATH REPORT.FINAL DX SPEC: NORMAL
PATH REPORT.GROSS SPEC: NORMAL
PHOSPHATE SERPL-MCNC: 3.5 MG/DL (ref 2.5–4.5)
PLATELET # BLD AUTO: 377 10*3/MM3 (ref 140–450)
PMV BLD AUTO: 8.2 FL (ref 6–12)
POTASSIUM SERPL-SCNC: 3.6 MMOL/L (ref 3.5–5.2)
RBC # BLD AUTO: 3.46 10*6/MM3 (ref 3.77–5.28)
SODIUM SERPL-SCNC: 132 MMOL/L (ref 136–145)
WBC NRBC COR # BLD: 11.53 10*3/MM3 (ref 3.4–10.8)

## 2023-08-03 PROCEDURE — 0W9930Z DRAINAGE OF RIGHT PLEURAL CAVITY WITH DRAINAGE DEVICE, PERCUTANEOUS APPROACH: ICD-10-PCS | Performed by: INTERNAL MEDICINE

## 2023-08-03 PROCEDURE — 32550 INSERT PLEURAL CATH: CPT | Performed by: INTERNAL MEDICINE

## 2023-08-03 PROCEDURE — 85025 COMPLETE CBC W/AUTO DIFF WBC: CPT | Performed by: INTERNAL MEDICINE

## 2023-08-03 PROCEDURE — 94799 UNLISTED PULMONARY SVC/PX: CPT

## 2023-08-03 PROCEDURE — 83735 ASSAY OF MAGNESIUM: CPT | Performed by: INTERNAL MEDICINE

## 2023-08-03 PROCEDURE — 25010000002 LORAZEPAM PER 2 MG: Performed by: INTERNAL MEDICINE

## 2023-08-03 PROCEDURE — 94762 N-INVAS EAR/PLS OXIMTRY CONT: CPT

## 2023-08-03 PROCEDURE — 25010000002 HYDROMORPHONE 1 MG/ML SOLUTION: Performed by: INTERNAL MEDICINE

## 2023-08-03 PROCEDURE — 84100 ASSAY OF PHOSPHORUS: CPT | Performed by: INTERNAL MEDICINE

## 2023-08-03 PROCEDURE — 71045 X-RAY EXAM CHEST 1 VIEW: CPT

## 2023-08-03 PROCEDURE — 99232 SBSQ HOSP IP/OBS MODERATE 35: CPT | Performed by: INTERNAL MEDICINE

## 2023-08-03 PROCEDURE — 80048 BASIC METABOLIC PNL TOTAL CA: CPT | Performed by: INTERNAL MEDICINE

## 2023-08-03 RX ORDER — LORAZEPAM 2 MG/ML
1 INJECTION INTRAMUSCULAR ONCE
Status: COMPLETED | OUTPATIENT
Start: 2023-08-03 | End: 2023-08-03

## 2023-08-03 RX ADMIN — PANTOPRAZOLE SODIUM 40 MG: 40 TABLET, DELAYED RELEASE ORAL at 05:23

## 2023-08-03 RX ADMIN — HYDROMORPHONE HYDROCHLORIDE 0.25 MG: 1 INJECTION, SOLUTION INTRAMUSCULAR; INTRAVENOUS; SUBCUTANEOUS at 21:31

## 2023-08-03 RX ADMIN — HYDROCODONE BITARTRATE AND ACETAMINOPHEN 1 TABLET: 7.5; 325 TABLET ORAL at 05:23

## 2023-08-03 RX ADMIN — LORAZEPAM 1 MG: 2 INJECTION INTRAMUSCULAR; INTRAVENOUS at 14:08

## 2023-08-03 RX ADMIN — ZINC SULFATE 220 MG (50 MG) CAPSULE 220 MG: CAPSULE at 09:20

## 2023-08-03 RX ADMIN — HYDROCODONE BITARTRATE AND ACETAMINOPHEN 1 TABLET: 7.5; 325 TABLET ORAL at 17:41

## 2023-08-03 RX ADMIN — HYDROMORPHONE HYDROCHLORIDE 0.5 MG: 1 INJECTION, SOLUTION INTRAMUSCULAR; INTRAVENOUS; SUBCUTANEOUS at 13:59

## 2023-08-03 RX ADMIN — HYDROCODONE BITARTRATE AND ACETAMINOPHEN 1 TABLET: 7.5; 325 TABLET ORAL at 11:14

## 2023-08-03 RX ADMIN — Medication 10 ML: at 09:20

## 2023-08-03 RX ADMIN — HYDROMORPHONE HYDROCHLORIDE 0.5 MG: 1 INJECTION, SOLUTION INTRAMUSCULAR; INTRAVENOUS; SUBCUTANEOUS at 14:46

## 2023-08-03 RX ADMIN — APIXABAN 10 MG: 5 TABLET, FILM COATED ORAL at 21:30

## 2023-08-03 RX ADMIN — Medication 10 ML: at 21:31

## 2023-08-03 NOTE — PROGRESS NOTES
[facility]   Hospitalist Progress Note  Date: 8/3/2023  Patient Name: Theresa M Gabehart  : 1960  MRN: [id number]  Date of admission: 2023      Subjective   Subjective     Chief complaint: Shortness of breath, abdominal pain    Summary:  62-year-old female with history of anemia due to chemotherapy, abdominal aortic aneurysm, Arnold-Chiari syndrome, COPD, dysphagia secondary to esophageal stricture, fibromyalgia, lung cancer, neuropathy, current smoker, chronic opioid user, hospitalized on 2023 worth sepsis, colitis, enteric stool panel is pending, colitis etiology uncertain, work-up pursued, transferred to the ICU on 2023 for concerns of resistant hypertension, with failure to improve with initial volume replacement, status post bedside thoracentesis with bilateral pleural effusions on imaging, hypotension improved, transferred out of ICU 2023.  Echo ordered with pleural effusion, to evaluate for possible link with underlying heart failure.  Has history of right upper extremity DVT, repeat ultrasound ordered with pain in the area, increasing mediastinal adenopathy on imaging, plans to pursue bronchoscopy 2023    Interval follow-up: No acute events overnight.  Felt little more congested this morning but feels like it may have been due to the weather.  Denies shortness of breath.    Objective   Objective     Vitals:   Temp:  [98.1 øF (36.7 øC)-99.4 øF (37.4 øC)] 98.1 øF (36.7 øC)  Heart Rate:  [105-117] 105  Resp:  [20] 20  BP: ()/(65-73) 92/71  Physical Exam               Constitutional: Thin, chronically ill-appearing female, awake, pleasant              HENT: NCAT, mucous membranes moist              Respiratory: Diminished breath sounds in bilateral bases, improved aeration              Cardiovascular: RRR, no MRG              Gastrointestinal: Positive bowel sounds, soft, nontender              Musculoskeletal: No bilateral ankle edema, no clubbing or cyanosis to  extremities              Neurologic: Oriented x 3, strength symmetric in all extremities, Cranial Nerves grossly intact to confrontation, speech clear              Skin: No rashes     Result Review    Result Review:  I have personally reviewed the pertinent results from the past 24 hours to 8/3/2023 16:00 EDT and agree with these findings:  [x]  Laboratory personally reviewed CMP, CBC, magnesium, phosphorus  CBC          8/1/2023    05:01 8/2/2023    04:33 8/3/2023    04:11   CBC   WBC 12.19  13.04  11.53    RBC 3.64  3.54  3.46    Hemoglobin 11.1  10.8  10.6    Hematocrit 33.5  32.2  31.1    MCV 92.0  91.0  89.9    MCH 30.5  30.5  30.6    MCHC 33.1  33.5  34.1    RDW 13.8  13.6  13.4    Platelets 387  390  377      BMP          8/1/2023    05:01 8/2/2023    04:33 8/3/2023    04:11   BMP   BUN 5  5  8    Creatinine 0.35  0.31  0.31    Sodium 133  133  132    Potassium 3.8  3.7  3.6    Chloride 97  97  97    CO2 26.2  26.3  25.2    Calcium 8.5  8.1  8.2    LIVER FUNCTION TESTS:      Lab 08/01/23  0501 07/31/23  0538 07/30/23  0449 07/29/23  0502   TOTAL PROTEIN 5.4* 5.1* 4.8* 4.8*   ALBUMIN 2.4* 2.4* 2.2* 2.3*   ALT (SGPT) 20 17 10 10   AST (SGOT) 17 17 9 9   BILIRUBIN 0.2 0.2 <0.2 <0.2   BILIRUBIN DIRECT <0.2 <0.2 <0.2 <0.2   ALK PHOS 43 45 38* 39       [x]  Microbiology   []  Radiology   []  EKG/Telemetry   []  Cardiology/Vascular   []  Pathology  [x]  Old records  []  Other:    Assessment & Plan   Assessment / Plan     Assessment/Plan:  Colitis, infectious versus inflammatory  Recurrent metastatic lung adenocarcinoma  Concern for right lower lobe community-acquired pneumonia/postobstructive pneumonia due to unknown bacterial source, presumed gram-negative  Bilateral pleural effusions  Worsening mediastinal adenopathy  Pericardial effusion  Severe sepsis secondary to colitis  Severe malnutrition  Adenocarcinoma stage IIIa, status post chemo, XRT  Hyponatremia of clinical significance  Hypokalemia  Current tobacco  smoker  AAA  COPD  Fibromyalgia  Chronic opioid use  Fecal occult blood positive likely secondary to colitis    Continue to monitor in the hospital for management of the above  Pulmonology following, appreciate assistance  Bronch with BAL and biopsy performed, biopsy consistent with adenocarcinoma.  Cultures negative for infection  She will need outpatient PET scan and follow-up with Dr. Jonas for treatment options  Plan to place Pleurx catheter today  Start Eliquis 10 mg twice daily x7 days, then de-escalate to Eliquis 5 mg twice daily thereafter for DVT  Completed 5 days of Zosyn  Continue Pulmicort and Brovana scheduled twice daily, DuoNebs as needed  Nicotine patch daily  Protonix 40 mg daily  Trend renal function and electrolytes with a.m. BMP, magnesium   Trend Hgb and WBC with a.m. CBC    Discussed case with: Bedside RN, pulmonology    DVT prophylaxis:  Mechanical DVT prophylaxis orders are present.    CODE STATUS:   Level Of Support Discussed With: Patient  Code Status (Patient has no pulse and is not breathing): CPR (Attempt to Resuscitate)  Medical Interventions (Patient has pulse or is breathing): Full Support  Release to patient: Routine Release

## 2023-08-03 NOTE — PROCEDURES
PleurX chest tube placement procedure Note    Indication: Recurrent malignant pleural effusion    Consent obtained: Yes, placed in chart.    Time Out: performed at bedside.    Pre-Medication: Dilaudid 0.5 mg *2, ativan 0.5 mg * 1 , local lidocaine    Procedure: The patient was placed in a left lateral decubitus position. Anterior approach attempted, but we were not able to have any fluid drainage. Ultrasound was used to  visualize the fluid pocket in right postero-lateral chest. Site was marked for incisions. The site were prepped with chlorprep and draped in sterile fashion. Local anesthesia over the insertion site was applied with 1% lidocaine. An 0.5 cm incision was made in the 6th rib space along the posterolateral chest wall. Needle was inserted with drainage of clear dark yellow fluid. Guide wire was inserted.  5 cm lateral and inferior to the initial incision, 0.5 cm incision was made. Hemostat was used to dilate the channel, and tunnel the space. Tunnel trochar was used to attach to pleurX chest tube, and was fed from lower incision site to upper incision site. The catheter was fed till the middle hub portion sat in between the two incision points. The site with guide wire was dilated with dilators serially, and feeding catheter was introduced. PleurX catheter was introduced as the catheter and guide wire was removed. The pleurX catheter was kept in place and attached to suction with drainage of 1900 cc fluid.  Initial output from the tube was 1900 cc. The tube was sutured and secured in place with suture site was covered with an occlusive dressing.   Chest x-ray is obtained to evaluate placement which is pending at this time.    The patient tolerated the procedure well.    Complications: None.

## 2023-08-03 NOTE — PLAN OF CARE
Goal Outcome Evaluation:     Patient is alert and oriented x 4, on room air, still complaining of pain, medication given as needed, no acute changes in condition, to continue plan of care, had episode of low blood pressure, pa made aware, no new orders,patient is asymptomatic, no acute changes ,to continue plan of care.

## 2023-08-04 ENCOUNTER — APPOINTMENT (OUTPATIENT)
Dept: GENERAL RADIOLOGY | Facility: HOSPITAL | Age: 63
DRG: 871 | End: 2023-08-04
Payer: MEDICARE

## 2023-08-04 ENCOUNTER — READMISSION MANAGEMENT (OUTPATIENT)
Dept: CALL CENTER | Facility: HOSPITAL | Age: 63
End: 2023-08-04
Payer: MEDICARE

## 2023-08-04 VITALS
HEIGHT: 67 IN | BODY MASS INDEX: 16.78 KG/M2 | DIASTOLIC BLOOD PRESSURE: 75 MMHG | RESPIRATION RATE: 20 BRPM | TEMPERATURE: 98.6 F | HEART RATE: 122 BPM | WEIGHT: 106.92 LBS | SYSTOLIC BLOOD PRESSURE: 102 MMHG | OXYGEN SATURATION: 90 %

## 2023-08-04 LAB
ANION GAP SERPL CALCULATED.3IONS-SCNC: 10.4 MMOL/L (ref 5–15)
BASOPHILS # BLD AUTO: 0.06 10*3/MM3 (ref 0–0.2)
BASOPHILS NFR BLD AUTO: 0.5 % (ref 0–1.5)
BUN SERPL-MCNC: 10 MG/DL (ref 8–23)
BUN/CREAT SERPL: 27.8 (ref 7–25)
CALCIUM SPEC-SCNC: 8.4 MG/DL (ref 8.6–10.5)
CHLORIDE SERPL-SCNC: 95 MMOL/L (ref 98–107)
CO2 SERPL-SCNC: 24.6 MMOL/L (ref 22–29)
CREAT SERPL-MCNC: 0.36 MG/DL (ref 0.57–1)
DEPRECATED RDW RBC AUTO: 45.4 FL (ref 37–54)
EGFRCR SERPLBLD CKD-EPI 2021: 115.7 ML/MIN/1.73
EOSINOPHIL # BLD AUTO: 0.43 10*3/MM3 (ref 0–0.4)
EOSINOPHIL NFR BLD AUTO: 3.3 % (ref 0.3–6.2)
ERYTHROCYTE [DISTWIDTH] IN BLOOD BY AUTOMATED COUNT: 13.4 % (ref 12.3–15.4)
GLUCOSE SERPL-MCNC: 108 MG/DL (ref 65–99)
HCT VFR BLD AUTO: 32.9 % (ref 34–46.6)
HGB BLD-MCNC: 10.9 G/DL (ref 12–15.9)
IMM GRANULOCYTES # BLD AUTO: 0.07 10*3/MM3 (ref 0–0.05)
IMM GRANULOCYTES NFR BLD AUTO: 0.5 % (ref 0–0.5)
LYMPHOCYTES # BLD AUTO: 0.76 10*3/MM3 (ref 0.7–3.1)
LYMPHOCYTES NFR BLD AUTO: 5.9 % (ref 19.6–45.3)
MAGNESIUM SERPL-MCNC: 1.7 MG/DL (ref 1.6–2.4)
MCH RBC QN AUTO: 30.4 PG (ref 26.6–33)
MCHC RBC AUTO-ENTMCNC: 33.1 G/DL (ref 31.5–35.7)
MCV RBC AUTO: 91.6 FL (ref 79–97)
MONOCYTES # BLD AUTO: 1.47 10*3/MM3 (ref 0.1–0.9)
MONOCYTES NFR BLD AUTO: 11.3 % (ref 5–12)
NEUTROPHILS NFR BLD AUTO: 10.17 10*3/MM3 (ref 1.7–7)
NEUTROPHILS NFR BLD AUTO: 78.5 % (ref 42.7–76)
NRBC BLD AUTO-RTO: 0 /100 WBC (ref 0–0.2)
PHOSPHATE SERPL-MCNC: 4 MG/DL (ref 2.5–4.5)
PLATELET # BLD AUTO: 392 10*3/MM3 (ref 140–450)
PMV BLD AUTO: 8.5 FL (ref 6–12)
POTASSIUM SERPL-SCNC: 3.7 MMOL/L (ref 3.5–5.2)
RBC # BLD AUTO: 3.59 10*6/MM3 (ref 3.77–5.28)
SODIUM SERPL-SCNC: 130 MMOL/L (ref 136–145)
WBC NRBC COR # BLD: 12.96 10*3/MM3 (ref 3.4–10.8)

## 2023-08-04 PROCEDURE — 94799 UNLISTED PULMONARY SVC/PX: CPT

## 2023-08-04 PROCEDURE — 99239 HOSP IP/OBS DSCHRG MGMT >30: CPT | Performed by: INTERNAL MEDICINE

## 2023-08-04 PROCEDURE — 25010000002 HYDROMORPHONE 1 MG/ML SOLUTION: Performed by: INTERNAL MEDICINE

## 2023-08-04 PROCEDURE — 94761 N-INVAS EAR/PLS OXIMETRY MLT: CPT

## 2023-08-04 PROCEDURE — 71045 X-RAY EXAM CHEST 1 VIEW: CPT

## 2023-08-04 PROCEDURE — 80048 BASIC METABOLIC PNL TOTAL CA: CPT | Performed by: INTERNAL MEDICINE

## 2023-08-04 PROCEDURE — 83735 ASSAY OF MAGNESIUM: CPT | Performed by: INTERNAL MEDICINE

## 2023-08-04 PROCEDURE — 84100 ASSAY OF PHOSPHORUS: CPT | Performed by: INTERNAL MEDICINE

## 2023-08-04 PROCEDURE — 99232 SBSQ HOSP IP/OBS MODERATE 35: CPT | Performed by: INTERNAL MEDICINE

## 2023-08-04 PROCEDURE — 94618 PULMONARY STRESS TESTING: CPT

## 2023-08-04 PROCEDURE — 85025 COMPLETE CBC W/AUTO DIFF WBC: CPT | Performed by: INTERNAL MEDICINE

## 2023-08-04 RX ORDER — HYDROCODONE BITARTRATE AND ACETAMINOPHEN 7.5; 325 MG/1; MG/1
1 TABLET ORAL EVERY 6 HOURS PRN
Qty: 12 TABLET | Refills: 0 | Status: SHIPPED | OUTPATIENT
Start: 2023-08-04

## 2023-08-04 RX ORDER — PANTOPRAZOLE SODIUM 40 MG/1
40 TABLET, DELAYED RELEASE ORAL
Qty: 30 TABLET | Refills: 0 | Status: SHIPPED | OUTPATIENT
Start: 2023-08-05

## 2023-08-04 RX ADMIN — Medication 10 ML: at 10:41

## 2023-08-04 RX ADMIN — APIXABAN 10 MG: 5 TABLET, FILM COATED ORAL at 10:40

## 2023-08-04 RX ADMIN — ERGOCALCIFEROL 50000 UNITS: 1.25 CAPSULE ORAL at 10:40

## 2023-08-04 RX ADMIN — PANTOPRAZOLE SODIUM 40 MG: 40 TABLET, DELAYED RELEASE ORAL at 05:15

## 2023-08-04 RX ADMIN — HYDROMORPHONE HYDROCHLORIDE 0.25 MG: 1 INJECTION, SOLUTION INTRAMUSCULAR; INTRAVENOUS; SUBCUTANEOUS at 10:39

## 2023-08-04 RX ADMIN — HYDROMORPHONE HYDROCHLORIDE 0.25 MG: 1 INJECTION, SOLUTION INTRAMUSCULAR; INTRAVENOUS; SUBCUTANEOUS at 05:15

## 2023-08-04 RX ADMIN — ZINC SULFATE 220 MG (50 MG) CAPSULE 220 MG: CAPSULE at 10:40

## 2023-08-04 NOTE — PLAN OF CARE
Goal Outcome Evaluation:  Plan of Care Reviewed With: patient, family            Patient to DC home per MD.

## 2023-08-04 NOTE — PROGRESS NOTES
Pulmonary / Critical Care Progress Note      Patient Name: Theresa M Gabehart  : 1960  MRN: [id number]  Attending:  Orion Heard MD   Date of admission: 2023    Subjective   Subjective   Follow-up for pleural effusion.     right thoracentesis with drainage of 900 mL.  Cultures negative to date and cytology positive for metastatic adenocarcinoma     underwent bronchoscopy with EBUS with fine-needle aspiration of right lower lobe lung mass, cultures NGTD, pathology positive for adenocarcinoma, lung primary.    8/3 Pleurx catheter placement.1900cc out    No acute events overnight.    This afternoon,  Lying in bed on room air  Family present. Discussed pleurX drain care.  Denies dyspnea  Occasional nonproductive weak cough  No fever or chills  Denies chest pain  Remains weak and fatigued    Review of Systems  Constitutional symptoms: Fatigue, otherwise denied complaints   Ear, nose, throat: Denied complaints  Cardiovascular:  Denied complaints  Respiratory: Dyspnea, cough, otherwise denied complaints  Gastrointestinal: Denied complaints  Musculoskeletal: Weakness, otherwise denied complaints  Neurologic: Denied complaints  Skin: Denied complaints    Objective   Objective     Vitals:   Vital signs for last 24 hours:  Temp:  [98.2 øF (36.8 øC)-99.3 øF (37.4 øC)] 98.2 øF (36.8 øC)  Heart Rate:  [] 105  Resp:  [18-24] 18  BP: (82-96)/(50-71) 82/60    Physical Exam   Vital Signs Reviewed   General: Chronically ill-appearing female, alert, NAD. Lying in bed on room air  HEENT:  PERRL, EOMI.    Neck:  No JVD, no thyromegaly  Lymph: no axillary, cervical, supraclavicular lymphadenopathy noted bilaterally  Chest: Diminished right lung base, fine crackles noted in left lung base, no work of breathing noted  CV: RRR, no M/G/R, pulses 2+  Abd:  Soft, NT, ND, +BS  EXT:  no clubbing, no cyanosis, no edema  Neuro:  A&Ox3, CN grossly intact, no focal deficits.  Skin: No rashes or lesions noted    Result  Review    Result Review:  I have personally reviewed the results from the time of this admission to 7/31/2023 15:55 EDT and agree with these findings:  [x]  Laboratory  [x]  Microbiology  [x]  Radiology  []  EKG/Telemetry   []  Cardiology/Vascular   []  Pathology  []  Old records  []  Other:  Most notable findings include:   8/1 pathology positive for adenocarcinoma, lung primary    7/27 pleural fluid  positive for metastatic adenocarcinoma    7/31 CXR - Interval accumulation of large right-sided pleural effusion since prior study, perihilar opacities bilaterally as seen in similar exam        Lab 08/04/23  0430 08/03/23  0411 08/02/23  0433 08/01/23  0501 07/31/23  0538 07/30/23  0449 07/29/23  0502   WBC 12.96* 11.53* 13.04* 12.19* 12.00* 10.57 10.52   HEMOGLOBIN 10.9* 10.6* 10.8* 11.1* 11.1* 10.3* 10.3*   HEMATOCRIT 32.9* 31.1* 32.2* 33.5* 32.7* 30.2* 30.5*   PLATELETS 392 377 390 387 376 351 372   SODIUM 130* 132* 133* 133* 133* 131* 132*   POTASSIUM 3.7 3.6 3.7 3.8 4.1 3.3* 3.8   CHLORIDE 95* 97* 97* 97* 99 99 100   CO2 24.6 25.2 26.3 26.2 26.2 24.1 23.2   BUN 10 8 5* 5* 6* 6* 4*   CREATININE 0.36* 0.31* 0.31* 0.35* 0.40* 0.32* 0.36*   GLUCOSE 108* 110* 96 110* 108* 102* 92   CALCIUM 8.4* 8.2* 8.1* 8.5* 8.5* 8.0* 8.0*   PHOSPHORUS 4.0 3.5 3.3 3.8 3.3 3.5 3.0   TOTAL PROTEIN  --   --   --  5.4* 5.1* 4.8* 4.8*   ALBUMIN  --   --   --  2.4* 2.4* 2.2* 2.3*       Assessment & Plan   Assessment / Plan     Active Hospital Problems:  Active Hospital Problems    Diagnosis     **Colitis     Severe malnutrition     Pleural effusion      Impression:  Bilateral pleural effusion status post thoracentesis 7/27  Colitis  Septic shock, resolved  Recently diagnosed adenocarcinoma of the lung, stage IIIa status post chemo, XRT  Hyponatremia  Hypokalemia  History of tobacco use     Plan:  -On room air.  -Pleural fluid positive for adenocarcinoma  -8/4  PluerX placed. 1900cc out  -Continue Brovana, Pulmicort, and  DuoNebs.  -Continue bronchopulmonary hygiene.  -Trend renal panel and electrolytes.  -Encourage mobilization.  Out of bed to chair.  -PT/OT on board.  Appreciate assistance.  -Encourage tobacco cessation. Continue nicotine patch.  -Will need to follow-up with oncology and complete outpatient PET scan    -Will likely need to follow up in 1 week at pulmonary clinic upon discharge for pleurX drain management.    From our standpoint, okay to discharge at this time     Unless otherwise needed, pulmonary will sign off at this time. Please call with any questions or concerns.  DVT prophylaxis:  Medical and mechanical DVT prophylaxis orders are present.    CODE STATUS:   Level Of Support Discussed With: Patient  Code Status (Patient has no pulse and is not breathing): CPR (Attempt to Resuscitate)  Medical Interventions (Patient has pulse or is breathing): Full Support  Release to patient: Routine Release    Electronically signed by BLANCA Barrett, 08/04/23, 11:28 AM EDT.    Electronically signed by Shaw Bonds DO, 08/4/23, 4:54 PM EDT.

## 2023-08-04 NOTE — DISCHARGE SUMMARY
[facility]         HOSPITALIST  DISCHARGE SUMMARY    Patient Name: Theresa M Gabehart  : 1960  MRN: [id number]    Date of Admission: 2023  Date of Discharge: 2023  Primary Care Physician: Carrie Castelan APRN    Consults       Date and Time Order Name Status Description    2023  5:02 PM Inpatient Pulmonology Consult      2023  4:53 PM Inpatient Gastroenterology Consult Completed     2023  2:34 PM Inpatient Pulmonology Consult Completed     2023  2:18 PM Hospitalist (on-call MD unless specified)              Active and Resolved Hospital Problems:  Active Hospital Problems    Diagnosis POA    **Colitis [K52.9] Yes    Severe malnutrition [E43] Yes    Pleural effusion [J90] Unknown    Lung mass [R91.8] Unknown      Resolved Hospital Problems   No resolved problems to display.   Colitis, infectious versus inflammatory  Recurrent metastatic lung adenocarcinoma  Concern for right lower lobe community-acquired pneumonia/postobstructive pneumonia due to unknown bacterial source, presumed gram-negative  Chronic hypoxemia, discharged on 2 L nasal cannula  Bilateral pleural effusions  Worsening mediastinal adenopathy  Right Internal Jugular: Acute-on-chronic deep vein thrombosis noted.  Right Subclavian: Chronic deep vein thrombosis noted.   Pericardial effusion  Severe sepsis secondary to colitis  Severe protein calorie malnutrition  Adenocarcinoma stage IIIa, status post chemo, XRT  Hyponatremia of clinical significance  Hypokalemia  Current tobacco smoker  AAA  COPD  Fibromyalgia  Chronic opioid use  Fecal occult blood positive likely secondary to colitis    Hospital Course     Hospital Course:  Theresa M Gabehart is a 62 y.o. female with history of lung adenocarcinoma status post radiation and chemotherapy, apparently in remission briefly, abdominal aortic aneurysm, Arnold-Chiari syndrome, COPD, dysphagia secondary to esophageal stricture, fibromyalgia, lung cancer,  neuropathy, current smoker, chronic opioid user, hospitalized on 7/27/2023 with sepsis and colitis.  She was admitted for further care, transferred to the ICU on 7/27/2023 for concerns of resistant hypotension, with failure to improve with initial volume replacement, status post bedside thoracentesis with bilateral pleural effusions on imaging. Hypotension improved, transferred out of ICU 7/28/2023.  Thoracentesis fluid did reveal malignant cells.  Underwent bronchoscopy, biopsy revealed adenocarcinoma which is metastatic.  Goals of care discussed with the patient due to her poor baseline wheelchair-bound status and BMI of 16.  She does want to attempt to pursue immunotherapy with Dr. Jonas and oncology.  She does not want any further chemo or radiation.  Her malignant pleural effusion reaccumulated rapidly prompting pulmonology to place a Pleurx catheter prior to discharge.  She had a history of of right upper extremity DVT, repeat ultrasound ordered showing right IJ acute on chronic DVT as well as right chronic subclavian DVT.  Risks and benefits of anticoagulation discussed with the patient, Eliquis started prior to discharge.  It was recommended she consider hospice, she plans to speak with family before making any decisions.  She was discharged home with home health in stable condition on 8/4/2023.  Recommend follow-up with PCP within 1 week, recommend follow-up with oncology within 1 week, recommend follow-up with pulmonology within 1 week.  She will need outpatient PET scan for further staging.  Of note, she did require oxygen at discharge and was set up with 2 L to continue going forward.    DISCHARGE Follow Up Recommendations for labs and diagnostics: Outpatient PET scan for staging    The patient needs a bedside commode at home for the following reason: the patient is confined to one level of the home environment and there is no toilet on that level.    Day of Discharge     Vital Signs:  Temp:  [98.3 øF  (36.8 øC)-99.1 øF (37.3 øC)] 98.6 øF (37 øC)  Heart Rate:  [102-127] 122  Resp:  [18-21] 20  BP: ()/(55-89) 102/75  Physical Exam:               Constitutional: Thin, chronically ill-appearing female, awake              HENT: NCAT, mucous membranes moist              Respiratory: Diminished breath sounds in bilateral bases, improved aeration              Cardiovascular: RRR, no MRG              Gastrointestinal: Positive bowel sounds, soft, nontender              Musculoskeletal: No bilateral ankle edema, no clubbing or cyanosis to extremities              Neurologic: Oriented x 3, strength symmetric in all extremities, Cranial Nerves grossly intact to confrontation, speech clear    Discharge Details        Discharge Medications        New Medications        Instructions Start Date   apixaban 5 MG tablet tablet  Commonly known as: ELIQUIS   Take 2 tablets by mouth 2 (Two) Times a Day for 6 days, THEN 1 tablet 2 (Two) Times a Day for 24 days. Indications: DVT/PE (active thrombosis)   Start Date: August 10, 2023     pantoprazole 40 MG EC tablet  Commonly known as: PROTONIX   40 mg, Oral, Every Early Morning   Start Date: August 5, 2023            Changes to Medications        Instructions Start Date   HYDROcodone-acetaminophen 7.5-325 MG per tablet  Commonly known as: NORCO  What changed: reasons to take this   1 tablet, Oral, Every 6 Hours PRN      ipratropium-albuterol 0.5-2.5 mg/3 ml nebulizer  Commonly known as: DUO-NEB  What changed: See the new instructions.   USE 1 VIAL IN NEBULIZER EVERY 4 TO 6 HOURS             Continue These Medications        Instructions Start Date   albuterol sulfate  (90 Base) MCG/ACT inhaler  Commonly known as: PROVENTIL HFA;VENTOLIN HFA;PROAIR HFA   2 puffs, Inhalation, Every 4 Hours PRN      baclofen 10 MG tablet  Commonly known as: LIORESAL   1 tablet, Oral, Every 12 Hours Scheduled      [employer] Calcium 600 MG tablet  Generic drug: calcium   1 tablet, Oral, 2 Times Daily  With Meals      L-Lysine 500 MG tablet tablet   Oral, Daily      PROBIOTIC BLEND PO   1 tablet, Oral, Daily      tiotropium bromide monohydrate 2.5 MCG/ACT aerosol solution inhaler  Commonly known as: SPIRIVA RESPIMAT   2 puffs, Inhalation, Daily - RT      vitamin D 1.25 MG (10210 UT) capsule capsule  Commonly known as: ERGOCALCIFEROL   50,000 Units, Oral, Every 7 Days      ZINC 15 PO   1 each, Oral, Every Other Day             Stop These Medications      doxycycline 100 MG tablet  Commonly known as: VIBRAMYICN              Allergies   Allergen Reactions    Clarithromycin Rash    Metronidazole Unknown - High Severity    Doxycycline Calcium Rash     Has tolerated Doxycycline in July 2023 -Slade Cuellar [facility]    Gabapentin Mental Status Change       Discharge Disposition:  Home-Health Care c    Diet:  Hospital:  Diet Order   Procedures    Diet: Regular/House Diet; Texture: Regular Texture (IDDSI 7); Fluid Consistency: Thin (IDDSI 0)       Discharge Activity:   Activity Instructions       Activity as Tolerated              CODE STATUS:  Code Status and Medical Interventions:   Ordered at: 07/27/23 1432     Level Of Support Discussed With:    Patient     Code Status (Patient has no pulse and is not breathing):    CPR (Attempt to Resuscitate)     Medical Interventions (Patient has pulse or is breathing):    Full Support     Release to patient:    Routine Release         Future Appointments   Date Time Provider Department Center   8/8/2023  3:00 PM Narayan Jonas MD [facility] [address]   9/14/2023  2:00 PM Osvaldo Guadalupe MD [facility] [place]       Additional Instructions for the Follow-ups that You Need to Schedule       Discharge Follow-up with PCP   As directed       Currently Documented PCP:    Carrie Castelan APRN    PCP Phone Number:    [phone]     Follow Up Details: 3-5 days        Discharge Follow-up with Specified Provider: Oncology Dr Pritesh GODINEZ; 5 Days   As directed      To: Oncology Dr Pritesh GODINEZ    Follow Up: 5 Days        Discharge Follow-up with Specified Provider: Pulmonology Dr. Guadalupe; 2 Weeks   As directed      To: Pulmonology Dr. Guadalupe   Follow Up: 2 Weeks                Pertinent  and/or Most Recent Results     PROCEDURES:   Bronchoscopy, Pleurx placement    LAB RESULTS:      Lab 08/04/23  0430 08/03/23  0411 08/02/23  0433 08/01/23  0501 07/31/23  0538   WBC 12.96* 11.53* 13.04* 12.19* 12.00*   HEMOGLOBIN 10.9* 10.6* 10.8* 11.1* 11.1*   HEMATOCRIT 32.9* 31.1* 32.2* 33.5* 32.7*   PLATELETS 392 377 390 387 376   NEUTROS ABS 10.17* 8.82* 10.03* 9.21* 9.06*   IMMATURE GRANS (ABS) 0.07* 0.05 0.06* 0.08* 0.07*   LYMPHS ABS 0.76 0.78 0.83 0.90 0.88   MONOS ABS 1.47* 1.37* 1.51* 1.44* 1.44*   EOS ABS 0.43* 0.46* 0.54* 0.49* 0.49*   MCV 91.6 89.9 91.0 92.0 91.6         Lab 08/04/23  0430 08/03/23  0411 08/02/23  0433 08/01/23  0501 07/31/23  0538   SODIUM 130* 132* 133* 133* 133*   POTASSIUM 3.7 3.6 3.7 3.8 4.1   CHLORIDE 95* 97* 97* 97* 99   CO2 24.6 25.2 26.3 26.2 26.2   ANION GAP 10.4 9.8 9.7 9.8 7.8   BUN 10 8 5* 5* 6*   CREATININE 0.36* 0.31* 0.31* 0.35* 0.40*   EGFR 115.7 119.9 119.9 116.5 112.8   GLUCOSE 108* 110* 96 110* 108*   CALCIUM 8.4* 8.2* 8.1* 8.5* 8.5*   MAGNESIUM 1.7 1.7 1.7 1.7 1.7   PHOSPHORUS 4.0 3.5 3.3 3.8 3.3         Lab 08/01/23  0501 07/31/23  0538 07/30/23  0449 07/29/23  0502   TOTAL PROTEIN 5.4* 5.1* 4.8* 4.8*   ALBUMIN 2.4* 2.4* 2.2* 2.3*   ALT (SGPT) 20 17 10 10   AST (SGOT) 17 17 9 9   BILIRUBIN 0.2 0.2 <0.2 <0.2   BILIRUBIN DIRECT <0.2 <0.2 <0.2 <0.2   ALK PHOS 43 45 38* 39                     Brief Urine Lab Results       None          Microbiology Results (last 10 days)       Procedure Component Value - Date/Time    AFB Culture - Lavage, Lung, Right Lower Lobe [[id number]] Collected: 08/01/23 1243    Lab Status: Preliminary result Specimen: Lavage from Lung, Right Lower Lobe Updated: 08/02/23 1154     AFB Stain No acid fast bacilli seen on direct smear      No acid fast  bacilli seen on concentrated smear    BAL Culture, Quantitative - Lavage, Lung, Right Lower Lobe [[id number]] Collected: 08/01/23 1243    Lab Status: Final result Specimen: Lavage from Lung, Right Lower Lobe Updated: 08/03/23 0943     BAL Culture 50,000 CFU/mL Normal respiratory anderson. No S. aureus or Pseudomonas aeruginosa detected. Final report.     Gram Stain Few (2+) Gram positive cocci in pairs, chains and clusters      Few (2+) Gram negative bacilli      Few (2+) WBCs seen    S. Pneumo Ag Urine or CSF - Urine, Urine, Clean Catch [[id number]]  (Normal) Collected: 07/27/23 2027    Lab Status: Final result Specimen: Urine, Clean Catch Updated: 07/27/23 2129     Strep Pneumo Ag Negative    Legionella Antigen, Urine - Urine, Urine, Clean Catch [[id number]]  (Normal) Collected: 07/27/23 2027    Lab Status: Final result Specimen: Urine, Clean Catch Updated: 07/27/23 2129     LEGIONELLA ANTIGEN, URINE Negative    MRSA Screen, PCR (Inpatient) - Swab, Nares [[id number]]  (Normal) Collected: 07/27/23 2019    Lab Status: Final result Specimen: Swab from Nares Updated: 07/27/23 2141     MRSA PCR No MRSA Detected    Narrative:      The negative predictive value of this diagnostic test is high and should only be used to consider de-escalating anti-MRSA therapy. A positive result may indicate colonization with MRSA and must be correlated clinically.    Respiratory Culture - Sputum, Throat [[id number]] Collected: 07/27/23 1937    Lab Status: Final result Specimen: Sputum from Throat Updated: 07/29/23 1002     Respiratory Culture Light growth (2+) Normal respiratory anderson. No S. aureus or Pseudomonas aeruginosa detected. Final report.     Gram Stain Moderate (3+) Gram positive cocci in pairs and chains      Few (2+) Gram positive cocci in clusters      Few (2+) Gram negative bacilli      Moderate (3+) WBCs seen      Rare (1+) Epithelial cells per low power field      Moderate (3+) Mucous strands    AFB Culture - Body Fluid, Pleural  Cavity [[id number]] Collected: 07/27/23 1630    Lab Status: Preliminary result Specimen: Body Fluid from Pleural Cavity Updated: 08/03/23 1715     AFB Culture No AFB isolated at 1 week     AFB Stain No acid fast bacilli seen on direct smear    Body Fluid Culture - Body Fluid, Pleural Cavity [[id number]] Collected: 07/27/23 1630    Lab Status: Final result Specimen: Body Fluid from Pleural Cavity Updated: 07/30/23 0649     Body Fluid Culture No growth at 3 days     Gram Stain Rare (1+) WBCs seen      No organisms seen    Anaerobic Culture - Pleural Fluid, Pleural Cavity [[id number]]  (Normal) Collected: 07/27/23 1630    Lab Status: Final result Specimen: Pleural Fluid from Pleural Cavity Updated: 08/02/23 0622     Anaerobic Culture No anaerobes isolated at 5 days    Fungus Culture - Body Fluid, Pleural Cavity [[id number]] Collected: 07/27/23 1630    Lab Status: Preliminary result Specimen: Body Fluid from Pleural Cavity Updated: 08/03/23 1715     Fungus Culture No fungus isolated at 1 week    Blood Culture - Blood, Arm, Right [[id number]]  (Normal) Collected: 07/27/23 1455    Lab Status: Final result Specimen: Blood from Arm, Right Updated: 08/01/23 1515     Blood Culture No growth at 5 days    Blood Culture - Blood, Arm, Right [[id number]]  (Normal) Collected: 07/27/23 1455    Lab Status: Final result Specimen: Blood from Arm, Right Updated: 08/01/23 1515     Blood Culture No growth at 5 days    Respiratory Panel PCR w/COVID-19(SARS-CoV-2) ANDRES/ALEK/AKILA/PAD/COR/MAD/ROSANA In-House, NP Swab in UTM/VTM, 3-4 HR TAT - Swab, Nasopharynx [[id number]]  (Normal) Collected: 07/27/23 1449    Lab Status: Final result Specimen: Swab from Nasopharynx Updated: 07/27/23 1607     ADENOVIRUS, PCR Not Detected     Coronavirus 229E Not Detected     Coronavirus HKU1 Not Detected     Coronavirus NL63 Not Detected     Coronavirus OC43 Not Detected     COVID19 Not Detected     Human Metapneumovirus Not Detected     Human Rhinovirus/Enterovirus Not  Detected     Influenza A PCR Not Detected     Influenza B PCR Not Detected     Parainfluenza Virus 1 Not Detected     Parainfluenza Virus 2 Not Detected     Parainfluenza Virus 3 Not Detected     Parainfluenza Virus 4 Not Detected     RSV, PCR Not Detected     Bordetella pertussis pcr Not Detected     Bordetella parapertussis PCR Not Detected     Chlamydophila pneumoniae PCR Not Detected     Mycoplasma pneumo by PCR Not Detected    Narrative:      In the setting of a positive respiratory panel with a viral infection PLUS a negative procalcitonin without other underlying concern for bacterial infection, consider observing off antibiotics or discontinuation of antibiotics and continue supportive care. If the respiratory panel is positive for atypical bacterial infection (Bordetella pertussis, Chlamydophila pneumoniae, or Mycoplasma pneumoniae), consider antibiotic de-escalation to target atypical bacterial infection.            CT Chest Without Contrast Diagnostic    Result Date: 7/31/2023  Impression:   CT scan of the chest without IV contrast demonstrating moderate right pleural effusion.  Dense consolidation is seen in the right lower lobe.  7 cm right perihilar opacity may be treatment related.  Small left effusion with subsegmental atelectasis in the left lower lobe.  Worsening mediastinal adenopathy.     VIVIANA SMITH MD       Electronically Signed and Approved By: VIVIANA SMITH MD on 7/31/2023 at 15:18             CT Abdomen Pelvis With Contrast    Addendum Date: 7/27/2023    ADDENDUM:  COMPARISON: Other, CT, CT ABDOMEN WO CONTRAST, 7/07/2023, 15:41.  Patient's previous CT of the abdomen and pelvis from 7/7/2023 was obtained from [facility] following initial interpretation of the study.  The colon wall thickening seen on the current study appears acute and is consistent with colitis.  The consolidation in the right lower lobe has clearly increased compared with the last study.  The right pleural  effusion appears significantly larger.  There are compression fractures of T11, T12, L3 and L5 which appear unchanged from the previous CT.    Lorenzo Manzanares MD       Electronically Signed and Approved By: Lorenzo Manzanares MD on 7/27/2023 at 14:00             Result Date: 7/27/2023  Impression:   1. New dense consolidation in the right lower lobe.  The appearance is suggestive of drowned lung.  The central portion of the right lung is not included on the images. 2. Large right-sided pleural effusion and small left-sided pleural effusion.  New pericardial effusion. 3. Irregular density in the right middle lobe which may be atelectasis.  It does not have the same configuration of the patient's known right middle lobe mass.  Recommend chest CT for further characterization.  4. Hepatomegaly with small hypodense lesions in the liver likely cysts based upon review of the old scans. 5. Cholecystectomy 6. Diffuse colon wall thickening suggesting colitis. 7. No evidence of abdominal aortic aneurysm.     Lorenzo Manzanares MD       Electronically Signed and Approved By: Lorenzo Manzanares MD on 7/27/2023 at 13:11             XR Chest 1 View    Result Date: 8/4/2023  Impression:   1. No visualized pneumothorax. 2. Increasing left basilar opacities and small effusion, possibly related to atelectasis. 3. Grossly similar right-sided airspace disease and moderate sized effusion.       MOHINDER LOPEZ MD       Electronically Signed and Approved By: MOHINDER LOPEZ MD on 8/04/2023 at 8:07             XR Chest 1 View    Result Date: 8/3/2023  Impression:   Small bore right chest tube is in place.  Less fluid is seen in the right pleural space.  A small right basilar pneumothorax is evident.  I discussed findings with Dr. Guadalupe at 16 10.       VIVIANA SMITH MD       Electronically Signed and Approved By: VIVIANA SMITH MD on 8/03/2023 at 16:17             XR Chest 1 View    Result Date: 7/31/2023  Impression:   1. Interval accumulation of a  large right-sided pleural effusion since the prior study. 2. Perihilar opacities bilaterally grossly similar to the previous study.  More focal density in the right perihilar region likely related to patient's history of right-sided lung cancer as previously discussed. 3. Mild blunting left costophrenic angle and dependent opacities left base slightly increased from prior exam       MATTHEW LENTZ MD       Electronically Signed and Approved By: MATTHEW LENTZ MD on 7/31/2023 at 8:37             XR Chest 1 View    Result Date: 7/27/2023  Impression:   1. Hazy right basilar airspace opacity likely representing a small layering right-sided pleural effusion.  No evidence of pneumothorax. 2. Unchanged right hilar consolidation likely related to patient's history of right-sided lung cancer. 3. No evidence of pneumothorax.        BENEDICT JEFFERSON MD       Electronically Signed and Approved By: BENEDICT JEFFERSON MD on 7/27/2023 at 18:33              Results for orders placed during the hospital encounter of 07/27/23    Duplex Venous Upper Extremity - Right CV-READ    Interpretation Summary    Chronic right upper extremity deep vein thrombosis noted in the subclavian.    Acute/chronic right upper extremity deep vein thrombosis noted in the internal jugular.    All other right sided vessels appear normal.      Results for orders placed during the hospital encounter of 07/27/23    Duplex Venous Upper Extremity - Right CV-READ    Interpretation Summary    Chronic right upper extremity deep vein thrombosis noted in the subclavian.    Acute/chronic right upper extremity deep vein thrombosis noted in the internal jugular.    All other right sided vessels appear normal.      Results for orders placed during the hospital encounter of 07/27/23    Adult Transthoracic Echo Complete W/ Cont if Necessary Per Protocol    Interpretation Summary    Left ventricular systolic function is low normal. Left ventricular ejection fraction  appears to be 51 - 55%.    Left ventricular diastolic function is consistent with (grade I) impaired relaxation.    There is a circumferential pericardial effusion.  Small to moderate there is no evidence of cardiac tamponade.    No evidence of valvular disease      Labs Pending at Discharge:  Pending Labs       Order Current Status    Fungus Culture - Lavage, Lung, Right Lower Lobe In process    AFB Culture - Body Fluid, Pleural Cavity Preliminary result    AFB Culture - Lavage, Lung, Right Lower Lobe Preliminary result    Fungus Culture - Body Fluid, Pleural Cavity Preliminary result              Time spent on Discharge including face to face service: 36 minutes    Electronically signed by Orion Purvis MD, 08/04/23, 1:16 PM EDT.

## 2023-08-04 NOTE — PLAN OF CARE
Goal Outcome Evaluation:    Patient is alert and oriented x 4, on room air , saturating 90-91%, with productive weak cough, pleurx in place, dressing intact and dry, spacing out the pain medications because bp has been soft, but map still within normal range, asymptomatic,pa notified, no acute changes, to continue plan of care.

## 2023-08-04 NOTE — NURSING NOTE
Exercise Oximetry    Patient Name:Theresa M Gabehart   MRN: [id number]   Date: 08/04/23             ROOM AIR BASELINE   SpO2% 89   Heart Rate 112   Blood Pressure      EXERCISE ON ROOM AIR SpO2% EXERCISE ON O2 @ 2 LPM SpO2%   1 MINUTE 88 1 MINUTE 90   2 MINUTES  2 MINUTES 93   3 MINUTES  3 MINUTES 94   4 MINUTES  4 MINUTES 96   5 MINUTES  5 MINUTES 96   6 MINUTES  6 MINUTES 96              Distance Walked   Distance Walked   Dyspnea (Huber Scale)   Dyspnea (Huber Scale)   Fatigue (Huber Scale)   Fatigue (Huber Scale)   SpO2% Post Exercise   SpO2% Post Exercise   HR Post Exercise   HR Post Exercise   Time to Recovery   Time to Recovery     Comments:

## 2023-08-06 LAB
FUNGUS WND CULT: NORMAL
MYCOBACTERIUM SPEC CULT: NORMAL
NIGHT BLUE STAIN TISS: NORMAL
NIGHT BLUE STAIN TISS: NORMAL

## 2023-08-08 ENCOUNTER — READMISSION MANAGEMENT (OUTPATIENT)
Dept: CALL CENTER | Facility: HOSPITAL | Age: 63
End: 2023-08-08
Payer: MEDICARE

## 2023-08-08 NOTE — OUTREACH NOTE
Medical Week 1 Survey      Flowsheet Row Responses   [facility] patient discharged from? [place]   Does the patient have one of the following disease processes/diagnoses(primary or secondary)? Other   Week 1 attempt successful? Yes   Call start time 1039   Call end time 1055   Discharge diagnosis colitis   Meds reviewed with patient/caregiver? Yes   Is the patient having any side effects they believe may be caused by any medication additions or changes? No   Does the patient have all medications ordered at discharge? Yes   Is the patient taking all medications as directed (includes completed medication regime)? Yes   Does the patient have a primary care provider?  Yes   Does the patient have an appointment with their PCP within 7 days of discharge? Yes   Has the patient kept scheduled appointments due by today? N/A   What is the Home health agency?  [facility]   Has home health visited the patient within 72 hours of discharge? Yes   What DME was ordered? [employer] - home o2 and BSC   Has all DME been delivered? No   DME interventions Called DME agency   DME comments Pt has O2 but not BSC. DME company reports they did not get order for the BSC. Will notify CM at the [facility]   Psychosocial issues? No   Did the patient receive a copy of their discharge instructions? Yes   Nursing interventions Reviewed instructions with patient   What is the patient's perception of their health status since discharge? Improving   Is the patient/caregiver able to teach back signs and symptoms related to disease process for when to call PCP? Yes   Is the patient/caregiver able to teach back signs and symptoms related to disease process for when to call 911? Yes   Is the patient/caregiver able to teach back the hierarchy of who to call/visit for symptoms/problems? PCP, Specialist, Home health nurse, Urgent Care, ED, 911 Yes   Week 1 call completed? Yes   Wrap up additional comments Pt reports that she did not get BSC and [employer] reports  they only got orders for O2. Will send to CM at the hospital to FU.   Call end time 1055            ROBERT CONWAY - Registered Nurse

## 2023-08-14 LAB
CYTO UR: NORMAL
LAB AP CASE REPORT: NORMAL
LAB AP CLINICAL INFORMATION: NORMAL
LAB AP DIAGNOSIS COMMENT: NORMAL
LAB AP SPECIAL STAINS: NORMAL
Lab: NORMAL
PATH REPORT.ADDENDUM SPEC: NORMAL
PATH REPORT.FINAL DX SPEC: NORMAL
PATH REPORT.GROSS SPEC: NORMAL

## 2023-08-16 ENCOUNTER — READMISSION MANAGEMENT (OUTPATIENT)
Dept: CALL CENTER | Facility: HOSPITAL | Age: 63
End: 2023-08-16
Payer: MEDICARE

## 2023-08-16 NOTE — OUTREACH NOTE
Medical Week 2 Survey      Flowsheet Row Responses   [facility] patient discharged from? [place]   Does the patient have one of the following disease processes/diagnoses(primary or secondary)? Other   Week 2 attempt successful? No   Unsuccessful attempts Attempt 1            Corin YANEZ - Licensed Nurse

## 2023-08-17 LAB
FUNGUS WND CULT: NORMAL
MYCOBACTERIUM SPEC CULT: NORMAL
NIGHT BLUE STAIN TISS: NORMAL

## 2023-08-22 ENCOUNTER — READMISSION MANAGEMENT (OUTPATIENT)
Dept: CALL CENTER | Facility: HOSPITAL | Age: 63
End: 2023-08-22
Payer: MEDICARE

## 2023-08-22 NOTE — OUTREACH NOTE
Medical Week 3 Survey      Flowsheet Row Responses   [facility] patient discharged from? [place]   Does the patient have one of the following disease processes/diagnoses(primary or secondary)? Other   Week 3 attempt successful? Yes   Call start time 0934   Call end time 0938   Discharge diagnosis colitis   Meds reviewed with patient/caregiver? Yes   Is the patient taking all medications as directed (includes completed medication regime)? Yes   Does the patient have a primary care provider?  Yes   Does the patient have an appointment with their PCP within 7 days of discharge? Yes   Has the patient kept scheduled appointments due by today? Yes   Comments 9/16/23 GI   What is the Home health agency?  [facility]   Has home health visited the patient within 72 hours of discharge? Yes   DME comments Pt is using a friends   Psychosocial issues? No   Did the patient receive a copy of their discharge instructions? Yes   What is the patient's perception of their health status since discharge? Improving   Is the patient/caregiver able to teach back signs and symptoms related to disease process for when to call PCP? Yes   Is the patient/caregiver able to teach back signs and symptoms related to disease process for when to call 911? Yes   Is the patient/caregiver able to teach back the hierarchy of who to call/visit for symptoms/problems? PCP, Specialist, Home health nurse, Urgent Care, ED, 911 Yes   Week 3 Call Completed? Yes   Graduated Yes   Graduated/Revoked comments Pt reports a little better. HH still coming out   Call end time 0938            ROBERT CONWAY - Registered Nurse

## 2023-08-24 LAB — FUNGUS WND CULT: NORMAL

## 2023-08-31 LAB
MYCOBACTERIUM SPEC CULT: NORMAL
NIGHT BLUE STAIN TISS: NORMAL

## 2023-09-05 LAB
MYCOBACTERIUM SPEC CULT: NORMAL
NIGHT BLUE STAIN TISS: NORMAL
NIGHT BLUE STAIN TISS: NORMAL

## 2023-09-07 LAB
MYCOBACTERIUM SPEC CULT: NORMAL
NIGHT BLUE STAIN TISS: NORMAL

## 2023-09-12 LAB
MYCOBACTERIUM SPEC CULT: NORMAL
NIGHT BLUE STAIN TISS: NORMAL
NIGHT BLUE STAIN TISS: NORMAL

## 2023-09-13 ENCOUNTER — TELEPHONE (OUTPATIENT)
Dept: PULMONOLOGY | Facility: CLINIC | Age: 63
End: 2023-09-13
Payer: MEDICARE

## 2023-09-13 NOTE — TELEPHONE ENCOUNTER
I have called pt and pt stated she wanted her drain removed and she was in too bad to come to her follow up appt. I spoke with Kadi, in our office, Kadi is calling Agnieszka with Hosp[facility]. Pt has been informed and I informed pt we will get back in touch with her.

## 2023-09-13 NOTE — TELEPHONE ENCOUNTER
Agnieszka with Hospice called and said that Sabina has an appointment tomorrow with Dr. Guadalupe and wants to get her drain removed. She does not want to come if this can't  be done.  Please call her at [phone]

## 2023-09-18 ENCOUNTER — TELEPHONE (OUTPATIENT)
Dept: PULMONOLOGY | Facility: CLINIC | Age: 63
End: 2023-09-18
Payer: MEDICARE

## 2023-09-18 NOTE — TELEPHONE ENCOUNTER
Spoke with Agnieszka with [facility].  Ambulance transport is not approved to bring patient to [place] for pleurx drain removal.  Patient states she is too weak to travel POV.  Agnieszka states she would reach out to the patient to discuss this with her.     FREE:[LAST:[[place]],FIRST:[[place]],PHONE:[phone]],FAX:[(   )    -],ADDRESS:[[address] [place] [address]]]

## (undated) DEVICE — SUT MNCRYL 4/0 PS2 18 IN

## (undated) DEVICE — SOL IRR NACL 0.9PCT BT 1000ML

## (undated) DEVICE — CONN JET HYDRA H20 AUXILIARY DISP

## (undated) DEVICE — SINGLE USE ASPIRATION NEEDLE: Brand: SINGLE USE ASPIRATION NEEDLE

## (undated) DEVICE — LINER SURG CANSTR SXN S/RIGD 1500CC

## (undated) DEVICE — PENCL E/S SMOKEEVAC W/TELESCP CANN

## (undated) DEVICE — ANTIBACTERIAL VIOLET BRAIDED (POLYGLACTIN 910), SYNTHETIC ABSORBABLE SUTURE: Brand: COATED VICRYL

## (undated) DEVICE — BLCK/BITE BLOX WO/DENTL/RIM W/STRAP 54F

## (undated) DEVICE — SEAMLESS VIRAL BARRIER ADHESIVE PATCH, LATEX-FREE, 1.5"W X 4.5"L ON 3"W X 5"L, STERILE, ULTRASOUND PROBE COVER, INDIVIDUALLY WRAPPED: Brand: SHEATHES3D

## (undated) DEVICE — Device: Brand: BALLOON

## (undated) DEVICE — SOLIDIFIER LIQLOC PLS 1500CC BT

## (undated) DEVICE — SINGLE USE SUCTION VALVE MAJ-209: Brand: SINGLE USE SUCTION VALVE (STERILE)

## (undated) DEVICE — DEV ATOMIZATION MUCOSAL/NASALTRACH

## (undated) DEVICE — CUP SPECI 4OZ LF STRL

## (undated) DEVICE — TRAP,MUCUS SPECIMEN,40CC: Brand: MEDLINE

## (undated) DEVICE — SINGLE USE BIOPSY VALVE MAJ-210: Brand: SINGLE USE BIOPSY VALVE (STERILE)

## (undated) DEVICE — GLV SURG BIOGEL LTX PF 7 1/2

## (undated) DEVICE — ADHS SKIN SURG TISS VISC PREMIERPRO EXOFIN HI/VISC FAST/DRY

## (undated) DEVICE — SYR LUER SLPTP 50ML

## (undated) DEVICE — DISPOSABLE CYTOLOGY BRUSH: Brand: DISPOSABLE CYTOLOGY BRUSH

## (undated) DEVICE — Device

## (undated) DEVICE — SLV SCD KN/LEN ADJ EXPRSS BLENDED MD 1P/U

## (undated) DEVICE — DECANTER: Brand: UNBRANDED

## (undated) DEVICE — INTENDED FOR TISSUE SEPARATION, AND OTHER PROCEDURES THAT REQUIRE A SHARP SURGICAL BLADE TO PUNCTURE OR CUT.: Brand: BARD-PARKER ® CARBON RIB-BACK BLADES

## (undated) DEVICE — DISPOSABLE BIOPSY FORCEPS: Brand: DISPOSABLE BIOPSY FORCEPS

## (undated) DEVICE — VASCULAR ACCESS-LF: Brand: MEDLINE INDUSTRIES, INC.